# Patient Record
Sex: FEMALE | Race: WHITE | Employment: OTHER | URBAN - METROPOLITAN AREA
[De-identification: names, ages, dates, MRNs, and addresses within clinical notes are randomized per-mention and may not be internally consistent; named-entity substitution may affect disease eponyms.]

---

## 2017-01-17 ENCOUNTER — OFFICE VISIT (OUTPATIENT)
Dept: INTERNAL MEDICINE CLINIC | Age: 72
End: 2017-01-17

## 2017-01-17 VITALS
SYSTOLIC BLOOD PRESSURE: 136 MMHG | RESPIRATION RATE: 16 BRPM | HEART RATE: 72 BPM | HEIGHT: 63 IN | OXYGEN SATURATION: 96 % | TEMPERATURE: 98.2 F | DIASTOLIC BLOOD PRESSURE: 82 MMHG | BODY MASS INDEX: 24.06 KG/M2 | WEIGHT: 135.8 LBS

## 2017-01-17 DIAGNOSIS — L30.9 DERMATITIS: ICD-10-CM

## 2017-01-17 DIAGNOSIS — J40 BRONCHITIS: Primary | ICD-10-CM

## 2017-01-17 DIAGNOSIS — J45.20 MILD INTERMITTENT ASTHMA WITHOUT COMPLICATION: ICD-10-CM

## 2017-01-17 RX ORDER — DESONIDE 0.5 MG/G
CREAM TOPICAL 2 TIMES DAILY
Qty: 15 G | Refills: 0 | Status: SHIPPED | OUTPATIENT
Start: 2017-01-17 | End: 2017-11-29 | Stop reason: ALTCHOICE

## 2017-01-17 RX ORDER — LEVALBUTEROL TARTRATE 45 UG/1
1-2 AEROSOL, METERED ORAL
Qty: 1 INHALER | Refills: 2 | Status: SHIPPED | OUTPATIENT
Start: 2017-01-17

## 2017-01-17 RX ORDER — AZITHROMYCIN 250 MG/1
TABLET, FILM COATED ORAL
Qty: 6 TAB | Refills: 0 | Status: SHIPPED | OUTPATIENT
Start: 2017-01-17 | End: 2017-01-22

## 2017-01-17 NOTE — PROGRESS NOTES
HPI  Ms. Jelena Vázquez is a 70y.o. year old female, she is seen today for cold symptoms. Had thick sputum from chest last night, woke her last night. Started as nasal congestion, sneezing, runny nose about 7-8 days ago, then progressed to chest congestion. Has yellow sputum. Not sure if wheezing, coughs if takes a deep breath. Had sinus pressure, now resolved. Had 2-3 days of fever, Tm 100.9. Yesterday 99. Taking motrin, delsym. Mild nausea, no vomiting. No appetite. No body aches other than normal from exercising. Fell twice while walking and texting. Landed on right rib initially, had pain, then fell weeks later on left rib. Pain resolved now. Also red area on forehead on right about 2 weeks, not itchy, feels a little tender. No known injury. Chief Complaint   Patient presents with    Cold Symptoms     Room 1// sx's x 1 week// congestion, sneezing, productive cough// using Zycam nasal and mouth spray, Delsym cough, motrin    Fall     pt reports 2 falls. one in Nov and one in Dec. \"i wore a rib belt for the pain afterwards. \" Was not seen by UC.  Rash     Pt notes small area on forehead, tender, non-itchy        Prior to Admission medications    Medication Sig Start Date End Date Taking? Authorizing Provider   desonide (TRIDESILON) 0.05 % cream Apply  to affected area two (2) times a day. 1/17/17  Yes Kelly Tjeeda MD   levalbuterol tartrate Maday Clap) 45 mcg/actuation inhaler Take 1-2 Puffs by inhalation every four (4) hours as needed for Wheezing.  1/17/17  Yes Kelly Tejeda MD   azithromycin [facility]) 250 mg tablet use as directed 1/17/17 1/22/17 Yes Kelly Tejeda MD   amLODIPine (NORVASC) 5 mg tablet TAKE ONE (1) TABLET(S) DAILY 11/3/16  Yes Kelly Tejeda MD   oxybutynin chloride XL (DITROPAN XL) 10 mg CR tablet TAKE ONE (1) TABLET(S) DAILY 1/17/16  Yes Kelly Tejeda MD   buPROPion XL (WELLBUTRIN XL) 150 mg tablet TAKE ONE (1) TABLET(S) EVERY MORNING 1/17/16  Yes Dom Barnett MD   FLAXSEED PO Take  by mouth daily. Flaxseed meal   Yes Historical Provider   cetirizine (ZYRTEC) 10 mg tablet Take 10 mg by mouth daily. Yes Historical Provider   folic acid 687 mcg tablet Take 800 mcg by mouth daily. Yes Historical Provider   aspirin 81 mg chewable tablet Take 81 mg by mouth daily. Yes Historical Provider   cyanocobalamin (VITAMIN B-12) 1,000 mcg tablet Take 1,000 mcg by mouth daily. Yes Historical Provider   montelukast (SINGULAIR) 10 mg tablet Take 10 mg by mouth every seven (7) days. Takes on day when she gets allergy shots   Yes Historical Provider   Biotin 2,500 mcg cap Take 1 Cap by mouth daily. Yes Historical Provider   naphazoline-pheniramine (VISINE-A) 0.025-0.3 % ophthalmic solution Administer  to both eyes two (2) times daily as needed. Yes Historical Provider   ibuprofen 200 mg cap Take 200 mg by mouth as needed. Yes Historical Provider         Allergies   Allergen Reactions    Nuts [Tree Nut] Other (comments)     Mouth sores. Walnuts, pecans    Sulfa (Sulfonamide Antibiotics) Hives         REVIEW OF SYSTEMS:  Per HPI    PHYSICAL EXAM:  Visit Vitals    /82 (BP 1 Location: Right arm, BP Patient Position: Sitting)    Pulse 72    Temp 98.2 °F (36.8 °C) (Oral)    Resp 16    Ht 5' 3\" (1.6 m)    Wt 135 lb 12.8 oz (61.6 kg)    SpO2 96%    BMI 24.06 kg/m2     Constitutional: Appears well-developed and well-nourished. No distress. HENT:   Head: Normocephalic and atraumatic. Flat area right forehead near scalp approx 2cm which appears to be broken capillaries, slightly scaly  Eyes: No scleral icterus. Ears: tm's wnl   Nose: b/l nasal mucosa erythema with edema  Mouth: OP clear without lesions, no pharyngeal exudate  Neck: no lad, no tm, supple   Cardiovascular: Normal S1/S2, regular rhythm. No murmurs, rubs, or gallops. Pulmonary/Chest: Effort normal and breath sounds normal. No respiratory distress.  No wheezes, rhonchi, or rales. +coughing  Neurological: Alert. Psychiatric: Normal mood and affect. Behavior is normal.     Lab Results   Component Value Date/Time    Sodium 142 11/07/2016 10:45 AM    Potassium 5.2 11/07/2016 10:45 AM    Chloride 105 11/07/2016 10:45 AM    CO2 24 11/07/2016 10:45 AM    Anion gap 8 05/09/2013 04:10 AM    Glucose 94 11/07/2016 10:45 AM    BUN 12 11/07/2016 10:45 AM    Creatinine 0.77 11/07/2016 10:45 AM    BUN/Creatinine ratio 16 11/07/2016 10:45 AM    GFR est AA 90 11/07/2016 10:45 AM    GFR est non-AA 78 11/07/2016 10:45 AM    Calcium 9.3 11/07/2016 10:45 AM    Bilirubin, total 0.3 11/07/2016 10:45 AM    ALT 12 11/07/2016 10:45 AM    AST 22 11/07/2016 10:45 AM    Alk. phosphatase 64 11/07/2016 10:45 AM    Protein, total 6.5 11/07/2016 10:45 AM    Albumin 4.0 11/07/2016 10:45 AM    Globulin 3.1 05/08/2013 04:04 AM    A-G Ratio 1.6 11/07/2016 10:45 AM     Lab Results   Component Value Date/Time    Hemoglobin A1c 5.4 11/07/2016 10:45 AM    Hemoglobin A1c 5.7 05/05/2016 11:51 AM    Hemoglobin A1c 5.6 08/12/2014 11:21 AM      Lab Results   Component Value Date/Time    Cholesterol, total 227 11/07/2016 10:45 AM    HDL Cholesterol 71 11/07/2016 10:45 AM    LDL, calculated 143 11/07/2016 10:45 AM    VLDL, calculated 13 11/07/2016 10:45 AM    Triglyceride 66 11/07/2016 10:45 AM    CHOL/HDL Ratio 3.7 05/08/2013 04:04 AM          ASSESSMENT/PLAN  Fabi was seen today for cold symptoms, fall and rash. Diagnoses and all orders for this visit:    Bronchitis  -     levalbuterol tartrate (XOPENEX) 45 mcg/actuation inhaler; Take 1-2 Puffs by inhalation every four (4) hours as needed for Wheezing.  -     azithromycin (ZITHROMAX) 250 mg tablet; use as directed  Treat as above, do not feel steroids needed  Dermatitis  -     desonide (TRIDESILON) 0.05 % cream; Apply  to affected area two (2) times a day.   If not improved with above see derm  Mild intermittent asthma without complication          Health Maintenance Due   Topic Date Due    BREAST CANCER SCRN MAMMOGRAM  04/08/2017        Follow-up Disposition:  Return if symptoms worsen or fail to improve. Reviewed plan of care. Patient has provided input and agrees with goals. The nurse provided the patient and/or family with advanced directive information if needed and encouraged the patient to provide a copy to the office when available.

## 2017-01-17 NOTE — MR AVS SNAPSHOT
Visit Information Date & Time Provider Department Dept. Phone Encounter #  
 1/17/2017 10:00 AM Cole BlancoAlejandro [phone] [id number] Upcoming Health Maintenance Date Due  
 BREAST CANCER SCRN MAMMOGRAM 4/8/2017 MEDICARE YEARLY EXAM 5/13/2017 GLAUCOMA SCREENING Q2Y 9/28/2017 COLONOSCOPY 2/21/2020 DTaP/Tdap/Td series (2 - Td) 5/12/2026 Allergies as of 1/17/2017  Review Complete On: 1/17/2017 By: Cole Blanco MD  
  
 Severity Noted Reaction Type Reactions Nuts [Tree Nut]  10/13/2014    Other (comments) Mouth sores. Walnuts, pecans Sulfa (Sulfonamide Antibiotics)  05/31/2010    Hives Current Immunizations  Reviewed on 5/12/2016 Name Date Influenza High Dose Vaccine PF 10/13/2014 Influenza Vaccine 10/29/2015, 10/6/2013 Pneumococcal Conjugate (PCV-13) 5/12/2016  9:58 AM  
 Pneumococcal Vaccine (Unspecified Type) 1/1/2012 Zoster Vaccine, Live 5/21/2010 Not reviewed this visit You Were Diagnosed With   
  
 Codes Comments Bronchitis    -  Primary ICD-10-CM: A19 ICD-9-CM: 092 Dermatitis     ICD-10-CM: L30.9 ICD-9-CM: 692.9 Mild intermittent asthma without complication     NKD-78-PF: J45.20 ICD-9-CM: 493.90 Vitals BP Pulse Temp Resp Height(growth percentile) Weight(growth percentile) 136/82 (BP 1 Location: Right arm, BP Patient Position: Sitting) 72 98.2 °F (36.8 °C) (Oral) 16 5' 3\" (1.6 m) 135 lb 12.8 oz (61.6 kg) SpO2 BMI OB Status Smoking Status 96% 24.06 kg/m2 Postmenopausal Former Smoker Vitals History BMI and BSA Data Body Mass Index Body Surface Area 24.06 kg/m 2 1.65 m 2 Preferred Pharmacy Pharmacy Name Phone [facility] [phone] Your Updated Medication List  
  
   
This list is accurate as of: 1/17/17 10:40 AM.  Always use your most recent med list.  
 amLODIPine 5 mg tablet Commonly known as:  Alexandro Whatley TAKE ONE (1) TABLET(S) DAILY  
  
 aspirin 81 mg chewable tablet Take 81 mg by mouth daily. azithromycin 250 mg tablet Commonly known as:  ZITHROMAX  
use as directed Biotin 2,500 mcg Cap Take 1 Cap by mouth daily. buPROPion  mg tablet Commonly known as:  WELLBUTRIN XL  
TAKE ONE (1) TABLET(S) EVERY MORNING  
  
 desonide 0.05 % cream  
Commonly known as:  TRIDESILON Apply  to affected area two (2) times a day. FLAXSEED PO Take  by mouth daily. Flaxseed meal  
  
 folic acid 960 mcg tablet Take 800 mcg by mouth daily. ibuprofen 200 mg Cap Take 200 mg by mouth as needed. levalbuterol tartrate 45 mcg/actuation inhaler Commonly known as:  Eyvomahade Dykes Take 1-2 Puffs by inhalation every four (4) hours as needed for Wheezing.  
  
 montelukast 10 mg tablet Commonly known as:  SINGULAIR Take 10 mg by mouth every seven (7) days. Takes on day when she gets allergy shots  
  
 oxybutynin chloride XL 10 mg CR tablet Commonly known as:  DITROPAN XL  
TAKE ONE (1) TABLET(S) DAILY  
  
 VISINE-A 0.025-0.3 % ophthalmic solution Generic drug:  naphazoline-pheniramine Administer  to both eyes two (2) times daily as needed. VITAMIN B-12 1,000 mcg tablet Generic drug:  cyanocobalamin Take 1,000 mcg by mouth daily. ZyrTEC 10 mg tablet Generic drug:  cetirizine Take 10 mg by mouth daily. Prescriptions Printed Refills  
 azithromycin (ZITHROMAX) 250 mg tablet 0 Sig: use as directed Class: Print Prescriptions Sent to Pharmacy Refills  
 desonide (TRIDESILON) 0.05 % cream 0 Sig: Apply  to affected area two (2) times a day. Class: Normal  
 Pharmacy: [facility] [address] #: [phone]  Route: Topical  
 levalbuterol tartrate (XOPENEX) 45 mcg/actuation inhaler 2  
 Sig: Take 1-2 Puffs by inhalation every four (4) hours as needed for Wheezing. Class: Normal  
 Pharmacy: [facility] [place] [address], [address] [place] [address] #: [phone] Route: Inhalation Patient Instructions Bronchitis: Care Instructions Your Care Instructions Bronchitis is inflammation of the bronchial tubes, which carry air to the lungs. The tubes swell and produce mucus, or phlegm. The mucus and inflamed bronchial tubes make you cough. You may have trouble breathing. Most cases of bronchitis are caused by viruses like those that cause colds. Antibiotics usually do not help and they may be harmful. Bronchitis usually develops rapidly and lasts about 2 to 3 weeks in otherwise healthy people. Follow-up care is a key part of your treatment and safety. Be sure to make and go to all appointments, and call your doctor if you are having problems. It's also a good idea to know your test results and keep a list of the medicines you take. How can you care for yourself at home? · Take all medicines exactly as prescribed. Call your doctor if you think you are having a problem with your medicine. · Get some extra rest. 
· Take an over-the-counter pain medicine, such as acetaminophen (Tylenol), ibuprofen (Advil, Motrin), or naproxen (Aleve) to reduce fever and relieve body aches. Read and follow all instructions on the label. · Do not take two or more pain medicines at the same time unless the doctor told you to. Many pain medicines have acetaminophen, which is Tylenol. Too much acetaminophen (Tylenol) can be harmful. · Take an over-the-counter cough medicine that contains dextromethorphan to help quiet a dry, hacking cough so that you can sleep. Avoid cough medicines that have more than one active ingredient. Read and follow all instructions on the label.  
· Breathe moist air from a humidifier, hot shower, or sink filled with hot water. The heat and moisture will thin mucus so you can cough it out. · Do not smoke. Smoking can make bronchitis worse. If you need help quitting, talk to your doctor about stop-smoking programs and medicines. These can increase your chances of quitting for good. When should you call for help? Call 911 anytime you think you may need emergency care. For example, call if: 
· You have severe trouble breathing. Call your doctor now or seek immediate medical care if: 
· You have new or worse trouble breathing. · You cough up dark brown or bloody mucus (sputum). · You have a new or higher fever. · You have a new rash. Watch closely for changes in your health, and be sure to contact your doctor if: 
· You cough more deeply or more often, especially if you notice more mucus or a change in the color of your mucus. · You are not getting better as expected. Where can you learn more? Go to http://audra-christal.info/. Enter H333 in the search box to learn more about \"Bronchitis: Care Instructions. \" Current as of: May 23, 2016 Content Version: 11.1 © 6923-3597 [employer]. Care instructions adapted under license by [employer] (which disclaims liability or warranty for this information). If you have questions about a medical condition or this instruction, always ask your healthcare professional. [address] any warranty or liability for your use of this information. Please provide this summary of care documentation to your next provider. Your primary care clinician is listed as Ami Watson. If you have any questions after today's visit, please call [phone].

## 2017-01-17 NOTE — PROGRESS NOTES
Patient received paperwork for advance directive during previous visit but has not completed at this time     Reviewed record In preparation for visit and have obtained necessary documentation      1. Have you been to the ER, urgent care clinic since your last visit? Hospitalized since your last visit? NO    2. Have you seen or consulted any other health care providers outside of the [address] [place] [address] since your last visit? Include any pap smears or colon screening.  NO

## 2017-01-28 RX ORDER — OXYBUTYNIN CHLORIDE 10 MG/1
TABLET, EXTENDED RELEASE ORAL
Qty: 90 TAB | Refills: 4 | Status: SHIPPED | OUTPATIENT
Start: 2017-01-28 | End: 2017-11-29 | Stop reason: SDUPTHER

## 2017-02-16 RX ORDER — BUPROPION HYDROCHLORIDE 150 MG/1
TABLET ORAL
Qty: 90 TAB | Refills: 4 | Status: SHIPPED | OUTPATIENT
Start: 2017-02-16 | End: 2017-11-29 | Stop reason: SDUPTHER

## 2017-08-16 ENCOUNTER — OFFICE VISIT (OUTPATIENT)
Dept: INTERNAL MEDICINE CLINIC | Age: 72
End: 2017-08-16

## 2017-08-16 VITALS
DIASTOLIC BLOOD PRESSURE: 74 MMHG | RESPIRATION RATE: 16 BRPM | TEMPERATURE: 98.6 F | BODY MASS INDEX: 23.92 KG/M2 | HEART RATE: 68 BPM | HEIGHT: 63 IN | OXYGEN SATURATION: 97 % | SYSTOLIC BLOOD PRESSURE: 139 MMHG | WEIGHT: 135 LBS

## 2017-08-16 DIAGNOSIS — Z00.00 MEDICARE ANNUAL WELLNESS VISIT, SUBSEQUENT: Primary | ICD-10-CM

## 2017-08-16 DIAGNOSIS — Z13.39 SCREENING FOR ALCOHOLISM: ICD-10-CM

## 2017-08-16 DIAGNOSIS — Z71.89 ADVANCE CARE PLANNING: ICD-10-CM

## 2017-08-16 DIAGNOSIS — Z12.31 ENCOUNTER FOR SCREENING MAMMOGRAM FOR MALIGNANT NEOPLASM OF BREAST: ICD-10-CM

## 2017-08-16 DIAGNOSIS — L84 CALLUS OF FOOT: ICD-10-CM

## 2017-08-16 DIAGNOSIS — Z13.31 SCREENING FOR DEPRESSION: ICD-10-CM

## 2017-08-16 NOTE — MR AVS SNAPSHOT
Visit Information Date & Time Provider Department Dept. Phone Encounter #  
 8/16/2017  9:10 AM Ena Palomino [phone] [id number] Follow-up Instructions Return if symptoms worsen or fail to improve, for Scheduled appointment with Dr. Lorena Turk on 11/29/17. Your Appointments 11/21/2017  9:30 AM  
LAB with LAB [facility] Ena Ca[facility]) Appt Note: fasting labs [address] [address] [phone]  
  
   
 [address] [place] [address]  
  
    
 11/29/2017  1:00 PM  
[facility] Appt Note: bp f/u  
 [address] [address] [phone]  
  
   
 [address] [facility] [address] Upcoming Health Maintenance Date Due  
 BREAST CANCER SCRN MAMMOGRAM 4/8/2017 INFLUENZA AGE 9 TO ADULT 8/1/2017 GLAUCOMA SCREENING Q2Y 9/28/2017 MEDICARE YEARLY EXAM 8/17/2018 COLONOSCOPY 2/21/2020 DTaP/Tdap/Td series (2 - Td) 5/12/2026 Allergies as of 8/16/2017  Review Complete On: 8/16/2017 By: Debbie Conner MD  
  
 Severity Noted Reaction Type Reactions Nuts [Tree Nut]  10/13/2014    Other (comments) Mouth sores. Walnuts, pecans Sulfa (Sulfonamide Antibiotics)  05/31/2010    Hives Current Immunizations  Reviewed on 5/12/2016 Name Date Influenza High Dose Vaccine PF 10/13/2014 Influenza Vaccine 10/29/2015, 10/6/2013 Pneumococcal Conjugate (PCV-13) 5/12/2016  9:58 AM  
 Pneumococcal Vaccine (Unspecified Type) 1/1/2012 Zoster Vaccine, Live 5/21/2010 Not reviewed this visit You Were Diagnosed With   
  
 Codes Comments Medicare annual wellness visit, subsequent    -  Primary ICD-10-CM: Z00.00 ICD-9-CM: V70.0 Callus of foot     ICD-10-CM: L84 
ICD-9-CM: 734 Encounter for screening mammogram for malignant neoplasm of breast     ICD-10-CM: Z12.31 
ICD-9-CM: V76.12 Screening for depression     ICD-10-CM: Z13.89 ICD-9-CM: V79.0 Screening for alcoholism     ICD-10-CM: Z13.89 ICD-9-CM: V79.1 Advance care planning     ICD-10-CM: Z71.89 ICD-9-CM: V65.49 Vitals BP Pulse Temp Resp Height(growth percentile) Weight(growth percentile) 139/74 68 98.6 °F (37 °C) (Oral) 16 5' 3\" (1.6 m) 135 lb (61.2 kg) SpO2 BMI OB Status Smoking Status 97% 23.91 kg/m2 Postmenopausal Former Smoker Vitals History BMI and BSA Data Body Mass Index Body Surface Area  
 23.91 kg/m 2 1.65 m 2 Preferred Pharmacy Pharmacy Name Phone [facility] [address] [facility], [address]  [phone] Your Updated Medication List  
  
   
This list is accurate as of: 8/16/17  9:55 AM.  Always use your most recent med list. amLODIPine 5 mg tablet Commonly known as:  [place] Letcher TAKE ONE (1) TABLET(S) DAILY  
  
 aspirin 81 mg chewable tablet Take 81 mg by mouth daily. Biotin 2,500 mcg Cap Take 1 Cap by mouth daily. buPROPion  mg tablet Commonly known as:  WELLBUTRIN XL  
TAKE ONE TABLET EVERY MORNING. desonide 0.05 % cream  
Commonly known as:  García Cousin Apply  to affected area two (2) times a day. FLAXSEED PO Take  by mouth daily. Flaxseed meal  
  
 folic acid 292 mcg tablet Take 800 mcg by mouth daily. ibuprofen 200 mg Cap Take 200 mg by mouth as needed. levalbuterol tartrate 45 mcg/actuation inhaler Commonly known as:  Emerson Bain Take 1-2 Puffs by inhalation every four (4) hours as needed for Wheezing.  
  
 montelukast 10 mg tablet Commonly known as:  SINGULAIR Take 10 mg by mouth every seven (7) days. Takes on day when she gets allergy shots  
  
 oxybutynin chloride XL 10 mg CR tablet Commonly known as:  DITROPAN XL  
 TAKE ONE TABLET DAILY. VISINE-A 0.025-0.3 % ophthalmic solution Generic drug:  naphazoline-pheniramine Administer  to both eyes two (2) times daily as needed. VITAMIN B-12 1,000 mcg tablet Generic drug:  cyanocobalamin Take 1,000 mcg by mouth daily. ZyrTEC 10 mg tablet Generic drug:  cetirizine Take 10 mg by mouth daily. Follow-up Instructions Return if symptoms worsen or fail to improve, for Scheduled appointment with Dr. David Lazaro on 11/29/17. To-Do List   
 08/17/2017 Imaging:  [facility] MAMMO BI SCREENING INCL CAD Patient Instructions Schedule of Personalized Health Plan The best way to stay healthy is to live a healthy lifestyle. A healthy lifestyle includes regular exercise, eating a well-balanced diet, keeping a healthy weight and not smoking. Regular physical exams and screening tests are another important way to take care of yourself. Preventive exams provided by health care providers can find health problems early when treatment works best and can keep you from getting certain diseases or illnesses. Preventive services include exams, lab tests, screenings, shots, monitoring and information to help you take care of your own health. All people over 65 should have a pneumonia shot. Pneumonia shots are usually only needed once in a lifetime unless your doctor decides differently. All people over 65 should have a yearly flu shot. People over 65 are at medium to high risk for Hepatitis B. Three shots are needed for complete protection. In addition to your physical exam, some screening tests are recommended: 
 
Bone mass measurement (dexa scan) is recommended every two years Diabetes Mellitus screening is recommended every year. Glaucoma is an eye disease caused by high pressure in the eye. An eye exam is recommended every year.   
 
Cardiovascular screening tests that check your cholesterol and other blood fat (lipid) levels are recommended every five years. Colorectal Cancer screening tests help to find pre-cancerous polyps (growths in the colon) so they can be removed before they turn into cancer. Tests ordered for screening depend on your personal and family history risk factors. Screening for Breast Cancer is recommended yearly with a mammogram. 
 
Screening for Cervical Cancer is recommended every two years (annually for certain risk factors, such as previous history of STD or abnormal PAP in past 7 years), with a Pelvic Exam with PAP Here is a list of your current Health Maintenance items with a due date: 
Health Maintenance Topic Date Due  
 BREAST CANCER SCRN MAMMOGRAM  04/08/2017  INFLUENZA AGE 9 TO ADULT  08/01/2017  GLAUCOMA SCREENING Q2Y  09/28/2017  MEDICARE YEARLY EXAM  08/17/2018  COLONOSCOPY  02/21/2020  
 DTaP/Tdap/Td series (2 - Td) 05/12/2026  Hepatitis C Screening  Completed  OSTEOPOROSIS SCREENING (DEXA)  Completed  ZOSTER VACCINE AGE 60>  Completed  Pneumococcal 65+ Low/Medium Risk  Completed Corns and Calluses: Care Instructions Your Care Instructions Corns and calluses are areas of thick, hard, dead skin. They form to protect your skin from injury. Corns usually form where toes rub together. Calluses often form on the hands or feet. They may form wherever the skin rubs against something, such as shoes. In most cases, you can take steps at home to care for a corn or callus. Follow-up care is a key part of your treatment and safety. Be sure to make and go to all appointments, and call your doctor if you are having problems. It's also a good idea to know your test results and keep a list of the medicines you take. How can you care for yourself at home? · Wear shoes and other footwear that fit correctly and are roomy.  This will reduce rubbing and give corns or calluses time to heal. 
 · Use protective pads, such as moleskin, to cushion the callus or corn. · Soften the corn or callus and remove the dead skin by using over-the-counter products such as salicylic acid. If you have a condition that causes problems with blood flow (such as coronary artery disease) or loss of feeling in your feet (such as diabetes), talk to your doctor before you try any home treatment. · Soak your corn or callus in warm water, and then use a pumice stone to rub dead skin away. · Wash your feet regularly, and rub lotion into your feet while they are still moist. Dry skin can cause a callus to crack and bleed. · Never cut the corn or callus yourself, especially if you have problems with blood flow to your legs or feet. When should you call for help? Call your doctor now or seek immediate medical care if: 
· You have signs of infection, such as: 
¨ Increased pain, swelling, warmth, or redness around the corn or callus. ¨ Red streaks leading from the corn or callus. ¨ Pus draining from the corn or callus. ¨ A fever. Watch closely for changes in your health, and be sure to contact your doctor if: 
· You do not get better as expected. Where can you learn more? Go to http://audra-christal.info/. Enter R244 in the search box to learn more about \"Corns and Calluses: Care Instructions. \" Current as of: October 13, 2016 Content Version: 11.3 © 5101-0560 [employer] Software. Care instructions adapted under license by [employer] (which disclaims liability or warranty for this information). If you have questions about a medical condition or this instruction, always ask your healthcare professional. [address] any warranty or liability for your use of this information. Introducing [facility]! Dear Clyde Pozo: Thank you for requesting a [employer] account.   Our records indicate that you have previously registered for a [employer] account but its currently inactive. Please call our Compass[employer] support line at [phone]. Additional Information If you have questions, please visit the Frequently Asked Questions section of the Compass[employer] website at [email or web address] Remember, CompassMD is NOT to be used for urgent needs. For medical emergencies, dial 911. Now available from your iPhone and Android! Please provide this summary of care documentation to your next provider. Your primary care clinician is listed as Ami Watson. If you have any questions after today's visit, please call [phone].

## 2017-08-16 NOTE — ACP (ADVANCE CARE PLANNING)
Advance Care Planning (ACP) Provider Conversation Snapshot    Date of ACP Conversation: 08/16/17  Persons included in Conversation:  patient  Length of ACP Conversation in minutes:  <16 minutes (Non-Billable)    Authorized Decision Maker (if patient is incapable of making informed decisions): This person is:   Healthcare Agent/Medical Power of  under Advance Directive          For Patients with Decision Making Capacity:   Values/Goals: Exploration of values, goals, and preferences if recovery is not expected, even with continued medical treatment in the event of:  Imminent death    Conversation Outcomes / Follow-Up Plan:   Recommended completion of Advance Directive form after review of ACP materials and conversation with prospective healthcare agent . States she will return completed form at her next clinic visit.

## 2017-08-16 NOTE — PATIENT INSTRUCTIONS
Schedule of Personalized Health Plan    The best way to stay healthy is to live a healthy lifestyle. A healthy lifestyle includes regular exercise, eating a well-balanced diet, keeping a healthy weight and not smoking. Regular physical exams and screening tests are another important way to take care of yourself. Preventive exams provided by health care providers can find health problems early when treatment works best and can keep you from getting certain diseases or illnesses. Preventive services include exams, lab tests, screenings, shots, monitoring and information to help you take care of your own health. All people over 65 should have a pneumonia shot. Pneumonia shots are usually only needed once in a lifetime unless your doctor decides differently. All people over 65 should have a yearly flu shot. People over 65 are at medium to high risk for Hepatitis B. Three shots are needed for complete protection. In addition to your physical exam, some screening tests are recommended:    Bone mass measurement (dexa scan) is recommended every two years  Diabetes Mellitus screening is recommended every year. Glaucoma is an eye disease caused by high pressure in the eye. An eye exam is recommended every year. Cardiovascular screening tests that check your cholesterol and other blood fat (lipid) levels are recommended every five years. Colorectal Cancer screening tests help to find pre-cancerous polyps (growths in the colon) so they can be removed before they turn into cancer. Tests ordered for screening depend on your personal and family history risk factors.     Screening for Breast Cancer is recommended yearly with a mammogram.    Screening for Cervical Cancer is recommended every two years (annually for certain risk factors, such as previous history of STD or abnormal PAP in past 7 years), with a Pelvic Exam with PAP    Here is a list of your current Health Maintenance items with a due date:  Health Maintenance   Topic Date Due    BREAST CANCER SCRN MAMMOGRAM  04/08/2017    INFLUENZA AGE 9 TO ADULT  08/01/2017    GLAUCOMA SCREENING Q2Y  09/28/2017    MEDICARE YEARLY EXAM  08/17/2018    COLONOSCOPY  02/21/2020    DTaP/Tdap/Td series (2 - Td) 05/12/2026    Hepatitis C Screening  Completed    OSTEOPOROSIS SCREENING (DEXA)  Completed    ZOSTER VACCINE AGE 60>  Completed    Pneumococcal 65+ Low/Medium Risk  Completed                Corns and Calluses: Care Instructions  Your Care Instructions  Corns and calluses are areas of thick, hard, dead skin. They form to protect your skin from injury. Corns usually form where toes rub together. Calluses often form on the hands or feet. They may form wherever the skin rubs against something, such as shoes. In most cases, you can take steps at home to care for a corn or callus. Follow-up care is a key part of your treatment and safety. Be sure to make and go to all appointments, and call your doctor if you are having problems. It's also a good idea to know your test results and keep a list of the medicines you take. How can you care for yourself at home? · Wear shoes and other footwear that fit correctly and are roomy. This will reduce rubbing and give corns or calluses time to heal.  · Use protective pads, such as moleskin, to cushion the callus or corn. · Soften the corn or callus and remove the dead skin by using over-the-counter products such as salicylic acid. If you have a condition that causes problems with blood flow (such as coronary artery disease) or loss of feeling in your feet (such as diabetes), talk to your doctor before you try any home treatment. · Soak your corn or callus in warm water, and then use a pumice stone to rub dead skin away. · Wash your feet regularly, and rub lotion into your feet while they are still moist. Dry skin can cause a callus to crack and bleed.   · Never cut the corn or callus yourself, especially if you have problems with blood flow to your legs or feet. When should you call for help? Call your doctor now or seek immediate medical care if:  · You have signs of infection, such as:  ¨ Increased pain, swelling, warmth, or redness around the corn or callus. ¨ Red streaks leading from the corn or callus. ¨ Pus draining from the corn or callus. ¨ A fever. Watch closely for changes in your health, and be sure to contact your doctor if:  · You do not get better as expected. Where can you learn more? Go to http://audra-christal.info/. Enter R244 in the search box to learn more about \"Corns and Calluses: Care Instructions. \"  Current as of: October 13, 2016  Content Version: 11.3  © 7472-2909 [employer]. Care instructions adapted under license by [employer] (which disclaims liability or warranty for this information). If you have questions about a medical condition or this instruction, always ask your healthcare professional. [place] [address] any warranty or liability for your use of this information.

## 2017-08-16 NOTE — PROGRESS NOTES
Reviewed record  In preparation for visit and have obtained necessary documentation. 1. Have you been to the ER, urgent care clinic since your last visit? Hospitalized since your last visit?no  2. Have you seen or consulted any other health care providers outside of the [facility]"" since your last visit? Include any pap smears or colon screening. Saw derm for alopecia Dr Glee [facility]  Advanced directives: has advanced directives and is asked to bring in a copy  Patients vital signs discussed with physician.

## 2017-08-16 NOTE — PROGRESS NOTES
This is a Subsequent Medicare Annual Wellness Visit providing Personalized Prevention Plan Services (PPPS) (Performed 12 months after initial AWV and PPPS )    I have reviewed the patient's medical history in detail and updated the computerized patient record. History   Panchito Hennessy is a 70 y.o. female. Presents for evaluation of bilateral foot pain and for Medicare Annual Wellness Visit. She complains of 1 month history of cracked calluses at soles of both feet. Painful when the cracks occur. Started after wearing textured Croc shoes. Has been using Ceravue cream (ceramide cream and petrolatum jelly) once daily. Trying to get in to see her podiatrist, Dr. Ronak Reddy. Reports she saw her dermatologist, Dr. Andi Nino, a few months ago due to alopecia. Was prescribed creams, shampoos, and Rogaine that have been helping. Soc Hx  Drinks 2-3 alcoholic drinks a night (has [facility]). Health Maintenance  Flu vaccine: 10/11/16    Pneumonia vaccine: PPSV-23 2012, PCV-13 5/12/16      Tetanus vaccine: not indicated     Zoster vaccine: 5/21/10  Colonoscopy: 2/21/15  Mammogram: 4/8/15; due for this  Bone density: 1/6/11  Eye exam: 9/16  Lipids: 11/7/16 (tot chol 227, )  A1c: 11/7/16 (5.4%)  Advanced Directives: discussed  End of Life: discussed       Past Medical History:   Diagnosis Date    Alopecia     Asthma     H/O seasonal allergies     H/O TB skin testing 1968    treated with INH    Hypertension     IFG (impaired fasting glucose) 6/14/2013    a1c 6.0 5/2013    Ill-defined condition     TIA symptoms, saw neuro-opthalmologist and was told it was probably spasm of cranial nerve.  Overactive bladder 3/13/2013    Serrated adenoma of colon 2/6/2014 11/17/09 - Dr. Gege Olvera - repeat in 5 years      Past Surgical History:   Procedure Laterality Date    COLORECTAL SCRN; HI RISK IND  2/20/2015         HX CATARACT REMOVAL Bilateral     HX GI      colonscopy.  polyp removed     Current Outpatient Prescriptions   Medication Sig Dispense Refill    buPROPion XL (WELLBUTRIN XL) 150 mg tablet TAKE ONE TABLET EVERY MORNING. 90 Tab 4    oxybutynin chloride XL (DITROPAN XL) 10 mg CR tablet TAKE ONE TABLET DAILY. 90 Tab 4    desonide (TRIDESILON) 0.05 % cream Apply  to affected area two (2) times a day. 15 g 0    levalbuterol tartrate (XOPENEX) 45 mcg/actuation inhaler Take 1-2 Puffs by inhalation every four (4) hours as needed for Wheezing. 1 Inhaler 2    amLODIPine (NORVASC) 5 mg tablet TAKE ONE (1) TABLET(S) DAILY 90 Tab 4    FLAXSEED PO Take  by mouth daily. Flaxseed meal      cetirizine (ZYRTEC) 10 mg tablet Take 10 mg by mouth daily.  folic acid 833 mcg tablet Take 800 mcg by mouth daily.  aspirin 81 mg chewable tablet Take 81 mg by mouth daily.  cyanocobalamin (VITAMIN B-12) 1,000 mcg tablet Take 1,000 mcg by mouth daily.  montelukast (SINGULAIR) 10 mg tablet Take 10 mg by mouth every seven (7) days. Takes on day when she gets allergy shots      Biotin 2,500 mcg cap Take 1 Cap by mouth daily.  naphazoline-pheniramine (VISINE-A) 0.025-0.3 % ophthalmic solution Administer  to both eyes two (2) times daily as needed.  ibuprofen 200 mg cap Take 200 mg by mouth as needed. Allergies   Allergen Reactions    Nuts [Tree Nut] Other (comments)     Mouth sores.      Walnuts, pecans    Sulfa (Sulfonamide Antibiotics) Hives     Family History   Problem Relation Age of Onset    Hypertension Mother     Stroke Mother     Cancer Father      bladder    Other Father      congential heart valve defect    Depression Sister     Stroke Sister 72    Hypertension Sister     Psychiatric Disorder Sister      depression    COPD Sister     Heart Disease Maternal Grandfather     Dementia Paternal Grandfather      Social History   Substance Use Topics    Smoking status: Former Smoker     Packs/day: 0.25     Years: 8.00     Quit date: 2/19/1995    Smokeless tobacco: Never Used Comment: quit in 1991    Alcohol use 10.5 oz/week     21 Glasses of wine per week      Comment: 2-3 etoh drinks daily - burboun - no withdrawl symptoms when she does not have it     Patient Active Problem List   Diagnosis Code    HTN (hypertension) I10    Asthma J45.909    ADD (attention deficit disorder) F98.8    Overactive bladder N32.81    Environmental allergies Z91.09    S/P colonoscopy Z98.890    At risk for osteoporosis Z91.89    Carotid stenosis, bilateral I65.23    Vertical diplopia H53.2    IFG (impaired fasting glucose) R73.01    Serrated adenoma of colon D12.6    Alopecia areata L63.9    Advance directive discussed with patient Z71.89       Depression Risk Factor Screening:     PHQ over the last two weeks 8/16/2017   Little interest or pleasure in doing things Not at all   Feeling down, depressed or hopeless Not at all   Total Score PHQ 2 0     Alcohol Risk Factor Screening: On any occasion during the past 3 months, have you had more than 3 drinks containing alcohol? No    Do you average more than 7 drinks per week? Yes        Functional Ability and Level of Safety:     Hearing Loss   normal-to-mild    Activities of Daily Living   Self-care. Requires assistance with: no ADLs    Fall Risk   Fall Risk Assessment, last 12 mths 8/16/2017   Able to walk? Yes   Fall in past 12 months? No   Fall with injury? -   Number of falls in past 12 months -   Fall Risk Score -     Abuse Screen   Patient is not abused    Review of Systems   Pertinent items are noted in HPI. Physical Examination     Evaluation of Cognitive Function:  Mood/affect:  neutral  Appearance: age appropriate  Family member/caregiver input: none    Visit Vitals    /74    Pulse 68    Temp 98.6 °F (37 °C) (Oral)    Resp 16    Ht 5' 3\" (1.6 m)    Wt 135 lb (61.2 kg)    SpO2 97%    BMI 23.91 kg/m2       General: Well-developed and well-nourished, no distress.   HEENT:  Head normocephalic/atraumatic, no scleral icterus  Lungs:  Clear to ausculation bilaterally. Good air movement. Heart:  Regular rate and rhythm, normal S1 and S2, no murmur, gallop, or rub  Extremities: No clubbing, cyanosis, or edema. On medial portions of plantar surfaces of both feet are shallow calluses with one linear crack at each foot with adjacent peeling skin. No erythema, warmth, tenderness, or discharge. Neurological: Alert and oriented. Psychiatric: Normal mood and affect. Behavior is normal.     Patient Care Team:  Chuy Dejesus MD as PCP - General (Internal Medicine)        Advice/Referrals/Counseling   Education and counseling provided:  Are appropriate based on today's review and evaluation  End-of-Life planning (with patient's consent)  Screening Mammography    Assessment/Plan       ICD-10-CM ICD-9-CM    1. Medicare annual wellness visit, subsequent Z00.00 V70.0    2. Callus of foot L84 700    3. Encounter for screening mammogram for malignant neoplasm of breast Z12.31 V76.12 [facility] MAMMO BI SCREENING INCL CAD   4. Screening for depression Z13.89 V79.0    5. Screening for alcoholism Z13.89 V79.1    6. Advance care planning Z71.89 V65.49        Diagnoses and all orders for this visit:    1. Medicare annual wellness visit, subsequent    2. Callus of foot  Calluses at soles of both feet. Recommended avoiding wearing shoes without socks, increasing Ceravue cream use to twice a day, and soaking feet in warm water with Epsom salt daily to soften calluses. 3. Encounter for screening mammogram for malignant neoplasm of breast  -     [facility] MAMMO BI SCREENING INCL CAD; Future    4. Screening for depression    5. Screening for alcoholism    6. Advance care planning      Follow-up Disposition:  Return if symptoms worsen or fail to improve, for Scheduled appointment with Dr. Kyle Alonso on 11/29/17. Patient seen and had Medicare Annual Wellness Exam; Wellness Schedule printed, reviewed, and given to patient.

## 2017-08-24 ENCOUNTER — HOSPITAL ENCOUNTER (OUTPATIENT)
Dept: MAMMOGRAPHY | Age: 72
Discharge: HOME | End: 2017-08-24
Attending: INTERNAL MEDICINE
Payer: MEDICARE

## 2017-08-24 DIAGNOSIS — Z12.31 ENCOUNTER FOR SCREENING MAMMOGRAM FOR MALIGNANT NEOPLASM OF BREAST: ICD-10-CM

## 2017-08-24 PROCEDURE — 77067 SCR MAMMO BI INCL CAD: CPT

## 2017-09-18 RX ORDER — AMLODIPINE BESYLATE 5 MG/1
TABLET ORAL
Qty: 90 TAB | Refills: 4 | Status: SHIPPED | OUTPATIENT
Start: 2017-09-18 | End: 2017-11-29 | Stop reason: SDUPTHER

## 2017-09-18 NOTE — TELEPHONE ENCOUNTER
Refill   Requested Prescriptions     Pending Prescriptions Disp Refills    amLODIPine (NORVASC) 5 mg tablet 90 Tab 4     Patient will be using [facility]/ [place] not [facility] [employer]

## 2017-11-20 ENCOUNTER — HOSPITAL ENCOUNTER (OUTPATIENT)
Dept: LAB | Age: 72
Discharge: HOME | End: 2017-11-20
Payer: MEDICARE

## 2017-11-20 ENCOUNTER — LAB ONLY (OUTPATIENT)
Dept: INTERNAL MEDICINE CLINIC | Age: 72
End: 2017-11-20

## 2017-11-20 DIAGNOSIS — R73.01 IFG (IMPAIRED FASTING GLUCOSE): Primary | ICD-10-CM

## 2017-11-20 DIAGNOSIS — Z13.29 SCREENING FOR THYROID DISORDER: ICD-10-CM

## 2017-11-20 DIAGNOSIS — Z13.220 LIPID SCREENING: ICD-10-CM

## 2017-11-20 DIAGNOSIS — I10 ESSENTIAL HYPERTENSION: ICD-10-CM

## 2017-11-20 PROCEDURE — 84443 ASSAY THYROID STIM HORMONE: CPT

## 2017-11-20 PROCEDURE — 80061 LIPID PANEL: CPT

## 2017-11-20 PROCEDURE — 36415 COLL VENOUS BLD VENIPUNCTURE: CPT

## 2017-11-20 PROCEDURE — 83036 HEMOGLOBIN GLYCOSYLATED A1C: CPT

## 2017-11-20 PROCEDURE — 80053 COMPREHEN METABOLIC PANEL: CPT

## 2017-11-21 LAB
ALBUMIN SERPL-MCNC: 4.3 G/DL (ref 3.5–4.8)
ALBUMIN/GLOB SERPL: 1.7 {RATIO} (ref 1.2–2.2)
ALP SERPL-CCNC: 57 IU/L (ref 39–117)
ALT SERPL-CCNC: 13 IU/L (ref 0–32)
AST SERPL-CCNC: 19 IU/L (ref 0–40)
BILIRUB SERPL-MCNC: 0.5 MG/DL (ref 0–1.2)
BUN SERPL-MCNC: 21 MG/DL (ref 8–27)
BUN/CREAT SERPL: 25 (ref 12–28)
CALCIUM SERPL-MCNC: 9.5 MG/DL (ref 8.7–10.3)
CHLORIDE SERPL-SCNC: 102 MMOL/L (ref 96–106)
CHOLEST SERPL-MCNC: 226 MG/DL (ref 100–199)
CO2 SERPL-SCNC: 24 MMOL/L (ref 18–29)
CREAT SERPL-MCNC: 0.84 MG/DL (ref 0.57–1)
EST. AVERAGE GLUCOSE BLD GHB EST-MCNC: 105 MG/DL
GFR SERPLBLD CREATININE-BSD FMLA CKD-EPI: 70 ML/MIN/1.73
GFR SERPLBLD CREATININE-BSD FMLA CKD-EPI: 80 ML/MIN/1.73
GLOBULIN SER CALC-MCNC: 2.6 G/DL (ref 1.5–4.5)
GLUCOSE SERPL-MCNC: 103 MG/DL (ref 65–99)
HBA1C MFR BLD: 5.3 % (ref 4.8–5.6)
HDLC SERPL-MCNC: 83 MG/DL
INTERPRETATION, 910389: NORMAL
LDLC SERPL CALC-MCNC: 127 MG/DL (ref 0–99)
POTASSIUM SERPL-SCNC: 4.9 MMOL/L (ref 3.5–5.2)
PROT SERPL-MCNC: 6.9 G/DL (ref 6–8.5)
SODIUM SERPL-SCNC: 140 MMOL/L (ref 134–144)
TRIGL SERPL-MCNC: 80 MG/DL (ref 0–149)
TSH SERPL DL<=0.005 MIU/L-ACNC: 1.36 UIU/ML (ref 0.45–4.5)
VLDLC SERPL CALC-MCNC: 16 MG/DL (ref 5–40)

## 2017-11-29 ENCOUNTER — OFFICE VISIT (OUTPATIENT)
Dept: INTERNAL MEDICINE CLINIC | Age: 72
End: 2017-11-29

## 2017-11-29 VITALS
BODY MASS INDEX: 24.24 KG/M2 | DIASTOLIC BLOOD PRESSURE: 81 MMHG | TEMPERATURE: 98 F | SYSTOLIC BLOOD PRESSURE: 125 MMHG | WEIGHT: 136.8 LBS | HEART RATE: 82 BPM | OXYGEN SATURATION: 95 % | RESPIRATION RATE: 14 BRPM | HEIGHT: 63 IN

## 2017-11-29 DIAGNOSIS — J45.40 MODERATE PERSISTENT ASTHMA WITHOUT COMPLICATION: Primary | ICD-10-CM

## 2017-11-29 DIAGNOSIS — I10 ESSENTIAL HYPERTENSION: ICD-10-CM

## 2017-11-29 DIAGNOSIS — F98.8 ATTENTION DEFICIT DISORDER (ADD) WITHOUT HYPERACTIVITY: ICD-10-CM

## 2017-11-29 DIAGNOSIS — N32.81 OVERACTIVE BLADDER: ICD-10-CM

## 2017-11-29 DIAGNOSIS — R73.01 IFG (IMPAIRED FASTING GLUCOSE): ICD-10-CM

## 2017-11-29 RX ORDER — BUPROPION HYDROCHLORIDE 150 MG/1
TABLET ORAL
Qty: 90 TAB | Refills: 4 | Status: SHIPPED | OUTPATIENT
Start: 2017-11-29 | End: 2018-02-20

## 2017-11-29 RX ORDER — AMLODIPINE BESYLATE 5 MG/1
TABLET ORAL
Qty: 90 TAB | Refills: 4 | Status: SHIPPED | OUTPATIENT
Start: 2017-11-29 | End: 2018-03-22 | Stop reason: SDUPTHER

## 2017-11-29 RX ORDER — IPRATROPIUM BROMIDE 42 UG/1
SPRAY, METERED NASAL
COMMUNITY
Start: 2017-11-09 | End: 2018-08-16

## 2017-11-29 RX ORDER — OXYBUTYNIN CHLORIDE 10 MG/1
TABLET, EXTENDED RELEASE ORAL
Qty: 90 TAB | Refills: 4 | Status: SHIPPED | OUTPATIENT
Start: 2017-11-29 | End: 2018-12-21 | Stop reason: SDUPTHER

## 2017-11-29 NOTE — PROGRESS NOTES
Fabi Baker  Identified pt with two pt identifiers(name and ). Chief Complaint   Patient presents with    Blood Pressure Check     Room 1// fasting // eye exam up to date w/ [place] Silva    Medication Evaluation     needs printed scripts today        1. Have you been to the ER, urgent care clinic since your last visit? Hospitalized since your last visit? NO    2. Have you seen or consulted any other health care providers outside of the [address] [place] [address] since your last visit? Include any pap smears or colon screening.  NO      Dr Cheyenne Canada notified of reason for visit and vitals    Patient received paperwork for advance directive during previous visit but has not completed at this time     Reviewed record In preparation for visit, huddled with provider and have obtained necessary documentation      Health Maintenance Due   Topic    GLAUCOMA SCREENING Q2Y        Wt Readings from Last 3 Encounters:   17 136 lb 12.8 oz (62.1 kg)   17 135 lb (61.2 kg)   17 135 lb 12.8 oz (61.6 kg)     Temp Readings from Last 3 Encounters:   17 98.6 °F (37 °C) (Oral)   17 98.2 °F (36.8 °C) (Oral)   16 98 °F (36.7 °C) (Oral)     BP Readings from Last 3 Encounters:   17 139/74   17 136/82   16 120/60     Pulse Readings from Last 3 Encounters:   17 68   17 72   16 74         Learning Assessment:  :     Learning Assessment 3/20/2015   PRIMARY LEARNER Patient   HIGHEST LEVEL OF EDUCATION - PRIMARY LEARNER  4 YEARS OF COLLEGE   BARRIERS PRIMARY LEARNER NONE   PRIMARY LANGUAGE ENGLISH    NEED No   LEARNER PREFERENCE PRIMARY READING   ANSWERED BY patient   RELATIONSHIP SELF       Depression Screening:  :     PHQ over the last two weeks 2017   Little interest or pleasure in doing things Not at all   Feeling down, depressed or hopeless Not at all   Total Score PHQ 2 0       Fall Risk Assessment:  :     Fall Risk Assessment, last 12 mths 8/16/2017   Able to walk? Yes   Fall in past 12 months? No   Fall with injury? -   Number of falls in past 12 months -   Fall Risk Score -       Abuse Screening:  :     Abuse Screening Questionnaire 11/14/2016 3/20/2015   Do you ever feel afraid of your partner? N N   Are you in a relationship with someone who physically or mentally threatens you? N N   Is it safe for you to go home? Y Y        I have received verbal consent from Julius Gauthier to discuss any/all medical information while they are present in the room. Medication reconciliation up to date and corrected with patient at this time.

## 2017-11-29 NOTE — MR AVS SNAPSHOT
Visit Information Date & Time Provider Department Dept. Phone Encounter #  
 11/29/2017  1:00 PM Laquita Sorenson MD N[facility] Zarate [phone] [id number] Follow-up Instructions Return in about 6 months (around 5/29/2018) for bp, chol, thyroid, ifg, labs prior. Routing History Upcoming Health Maintenance Date Due  
 GLAUCOMA SCREENING Q2Y 9/28/2017 MEDICARE YEARLY EXAM 8/17/2018 BREAST CANCER SCRN MAMMOGRAM 8/24/2019 COLONOSCOPY 2/21/2020 DTaP/Tdap/Td series (2 - Td) 5/12/2026 Allergies as of 11/29/2017  Review Complete On: 11/29/2017 By: Laquita Sorenson MD  
  
 Severity Noted Reaction Type Reactions Nuts [Tree Nut]  10/13/2014    Other (comments) Mouth sores. Walnuts, pecans Sulfa (Sulfonamide Antibiotics)  05/31/2010    Hives Current Immunizations  Reviewed on 11/29/2017 Name Date Influenza High Dose Vaccine PF 10/13/2014 Influenza Vaccine 10/29/2015, 10/6/2013 Pneumococcal Conjugate (PCV-13) 5/12/2016  9:58 AM  
 Pneumococcal Vaccine (Unspecified Type) 1/1/2012 Zoster Vaccine, Live 5/21/2010 Reviewed by Laquita Sorenson MD on 11/29/2017 at  1:20 PM  
You Were Diagnosed With   
  
 Codes Comments Moderate persistent asthma without complication    -  Primary ICD-10-CM: J45.40 ICD-9-CM: 493.90 IFG (impaired fasting glucose)     ICD-10-CM: R73.01 
ICD-9-CM: 790.21 Essential hypertension     ICD-10-CM: I10 
ICD-9-CM: 401.9 Attention deficit disorder (ADD) without hyperactivity     ICD-10-CM: F98.8 ICD-9-CM: 314.00 Overactive bladder     ICD-10-CM: N32.81 ICD-9-CM: 596.51 Vitals BP Pulse Temp Resp Height(growth percentile) Weight(growth percentile) 125/81 (BP 1 Location: Right arm, BP Patient Position: Sitting) 82 98 °F (36.7 °C) (Oral) 14 5' 3\" (1.6 m) 136 lb 12.8 oz (62.1 kg) SpO2 BMI OB Status Smoking Status 95% 24.23 kg/m2 Postmenopausal Former Smoker Vitals History BMI and BSA Data Body Mass Index Body Surface Area  
 24.23 kg/m 2 1.66 m 2 Preferred Pharmacy Pharmacy Name Phone  [address] [phone] Your Updated Medication List  
  
   
This list is accurate as of: 11/29/17  1:33 PM.  Always use your most recent med list. amLODIPine 5 mg tablet Commonly known as:  Elspekenneth Bakes TAKE ONE (1) TABLET(S) DAILY  
  
 aspirin 81 mg chewable tablet Take 81 mg by mouth daily. Biotin 2,500 mcg Cap Take 1 Cap by mouth daily. buPROPion  mg tablet Commonly known as:  WELLBUTRIN XL  
TAKE ONE TABLET EVERY MORNING. FLAXSEED PO Take  by mouth daily. Flaxseed meal  
  
 folic acid 758 mcg tablet Take 800 mcg by mouth daily. ibuprofen 200 mg Cap Take 200 mg by mouth as needed. ipratropium 0.06 % nasal spray Commonly known as:  ATROVENT  
  
 levalbuterol tartrate 45 mcg/actuation inhaler Commonly known as:  Remigio Dura Take 1-2 Puffs by inhalation every four (4) hours as needed for Wheezing.  
  
 montelukast 10 mg tablet Commonly known as:  SINGULAIR Take 10 mg by mouth every seven (7) days. Takes on day when she gets allergy shots  
  
 oxybutynin chloride XL 10 mg CR tablet Commonly known as:  DITROPAN XL  
TAKE ONE TABLET DAILY. VITAMIN B-12 1,000 mcg tablet Generic drug:  cyanocobalamin Take 1,000 mcg by mouth daily. ZyrTEC 10 mg tablet Generic drug:  cetirizine Take 10 mg by mouth daily. Prescriptions Printed Refills  
 amLODIPine (NORVASC) 5 mg tablet 4 Sig: TAKE ONE (1) TABLET(S) DAILY Class: Print buPROPion XL (WELLBUTRIN XL) 150 mg tablet 4 Sig: TAKE ONE TABLET EVERY MORNING. Class: Print  
 oxybutynin chloride XL (DITROPAN XL) 10 mg CR tablet 4 Sig: TAKE ONE TABLET DAILY. Class: Print Follow-up Instructions Return in about 6 months (around 5/29/2018) for bp, chol, thyroid, ifg, labs prior. To-Do List   
 05/28/2018 Lab:  HEMOGLOBIN A1C WITH EAG   
  
 05/28/2018 Lab:  LIPID PANEL   
  
 05/28/2018 Lab:  METABOLIC PANEL, COMPREHENSIVE [facility]! Dear Shima Cheng: Thank you for requesting a [employer] account. Our records indicate that you already have an active [employer] account. You can access your account anytime at [email or web address] Did you know that you can access your hospital and ER discharge instructions at any time in [employer]? You can also review all of your test results from your hospital stay or ER visit. Additional Information If you have questions, please visit the Frequently Asked Questions section of the [employer] website at [email or web address] Remember, [employer] is NOT to be used for urgent needs. For medical emergencies, dial 911. Now available from your iPhone and Android! Please provide this summary of care documentation to your next provider. Your primary care clinician is listed as Ami Watson. If you have any questions after today's visit, please call [phone].

## 2017-11-29 NOTE — Clinical Note
Masood Abreu NP - all derm records last year - [address] [facility], [facility]

## 2017-11-29 NOTE — PROGRESS NOTES
HPI  Ms. Alphonso Silver is a 67y.o. year old female, she is seen today for follow up HTN. Exercises as much as she can. Walking and crunches and modified pushups. Has been feeling well. Saw derm for alopecia - had treatment with steroid injections, topical steroids and is improved. Has fissures in toe flexures at times - uses cerave which healed it. Is now healed. No, cp, sob, cough or wheezing. Back on allergy injections and asthma controlled. Wellbutrin helps with focus. Missed about 4 days of ditropan and had severe urinary frequency, now back to baseline. Chief Complaint   Patient presents with    Blood Pressure Check     Room 1// fasting // eye exam up to date w/ [facility]    Medication Evaluation     needs printed scripts today         Prior to Admission medications    Medication Sig Start Date End Date Taking? Authorizing Provider   ipratropium (ATROVENT) 0.06 % nasal spray  11/9/17  Yes Historical Provider   amLODIPine (NORVASC) 5 mg tablet TAKE ONE (1) TABLET(S) DAILY 11/29/17  Yes Florencia Dc MD   buPROPion XL (WELLBUTRIN XL) 150 mg tablet TAKE ONE TABLET EVERY MORNING. 11/29/17  Yes Florencia Dc MD   oxybutynin chloride XL (DITROPAN XL) 10 mg CR tablet TAKE ONE TABLET DAILY. 11/29/17  Yes Florencia Dc MD   levalbuterol tartrate [facility]) 45 mcg/actuation inhaler Take 1-2 Puffs by inhalation every four (4) hours as needed for Wheezing. 1/17/17  Yes Flroencia Dc MD   FLAXSEED PO Take  by mouth daily. Flaxseed meal   Yes Historical Provider   cetirizine (ZYRTEC) 10 mg tablet Take 10 mg by mouth daily. Yes Historical Provider   folic acid 841 mcg tablet Take 800 mcg by mouth daily. Yes Historical Provider   aspirin 81 mg chewable tablet Take 81 mg by mouth daily. Yes Historical Provider   cyanocobalamin (VITAMIN B-12) 1,000 mcg tablet Take 1,000 mcg by mouth daily.    Yes Historical Provider   montelukast (SINGULAIR) 10 mg tablet Take 10 mg by mouth every seven (7) days. Takes on day when she gets allergy shots   Yes Historical Provider   Biotin 2,500 mcg cap Take 1 Cap by mouth daily. Yes Historical Provider   ibuprofen 200 mg cap Take 200 mg by mouth as needed. Yes Historical Provider         Allergies   Allergen Reactions    Nuts [Tree Nut] Other (comments)     Mouth sores. Walnuts, pecans    Sulfa (Sulfonamide Antibiotics) Hives         REVIEW OF SYSTEMS:  Per HPI    PHYSICAL EXAM:  Visit Vitals    /81 (BP 1 Location: Right arm, BP Patient Position: Sitting)    Pulse 82    Temp 98 °F (36.7 °C) (Oral)    Resp 14    Ht 5' 3\" (1.6 m)    Wt 136 lb 12.8 oz (62.1 kg)    SpO2 95%    BMI 24.23 kg/m2     Constitutional: Appears well-developed and well-nourished. No distress. HENT:   Head: Normocephalic and atraumatic. Eyes: No scleral icterus. Neck: no lad, no tm, supple   Cardiovascular: Normal S1/S2, regular rhythm. No murmurs, rubs, or gallops. Pulmonary/Chest: Effort normal and breath sounds normal. No respiratory distress. No wheezes, rhonchi, or rales. Ext: No edema. +callous on both feet L>R  Neurological: Alert. Psychiatric: Normal mood and affect. Behavior is normal.     Lab Results   Component Value Date/Time    Sodium 140 11/20/2017 09:36 AM    Potassium 4.9 11/20/2017 09:36 AM    Chloride 102 11/20/2017 09:36 AM    CO2 24 11/20/2017 09:36 AM    Anion gap 8 05/09/2013 04:10 AM    Glucose 103 11/20/2017 09:36 AM    BUN 21 11/20/2017 09:36 AM    Creatinine 0.84 11/20/2017 09:36 AM    BUN/Creatinine ratio 25 11/20/2017 09:36 AM    GFR est AA 80 11/20/2017 09:36 AM    GFR est non-AA 70 11/20/2017 09:36 AM    Calcium 9.5 11/20/2017 09:36 AM    Bilirubin, total 0.5 11/20/2017 09:36 AM    AST (SGOT) 19 11/20/2017 09:36 AM    Alk.  phosphatase 57 11/20/2017 09:36 AM    Protein, total 6.9 11/20/2017 09:36 AM    Albumin 4.3 11/20/2017 09:36 AM    Globulin 3.1 05/08/2013 04:04 AM    A-G Ratio 1.7 11/20/2017 09:36 AM    ALT (SGPT) 13 11/20/2017 09:36 AM     Lab Results   Component Value Date/Time    Hemoglobin A1c 5.3 11/20/2017 09:36 AM    Hemoglobin A1c 5.4 11/07/2016 10:45 AM    Hemoglobin A1c 5.7 05/05/2016 11:51 AM      Lab Results   Component Value Date/Time    Cholesterol, total 226 11/20/2017 09:36 AM    HDL Cholesterol 83 11/20/2017 09:36 AM    LDL, calculated 127 11/20/2017 09:36 AM    VLDL, calculated 16 11/20/2017 09:36 AM    Triglyceride 80 11/20/2017 09:36 AM    CHOL/HDL Ratio 3.7 05/08/2013 04:04 AM          ASSESSMENT/PLAN  Diagnoses and all orders for this visit:    1. Moderate persistent asthma without complication  Controlled - xopenex prn and allergy injections per allergist  2. IFG (impaired fasting glucose)  -     HEMOGLOBIN A1C WITH EAG; Future  Normal range with diet/exercise changes  3. Essential hypertension  -     amLODIPine (NORVASC) 5 mg tablet; TAKE ONE (1) TABLET(S) DAILY  -     METABOLIC PANEL, COMPREHENSIVE; Future  -     LIPID PANEL; Future  Controlled on current regimen, continue   4. Attention deficit disorder (ADD) without hyperactivity  -     buPROPion XL (WELLBUTRIN XL) 150 mg tablet; TAKE ONE TABLET EVERY MORNING. Controlled with wellbutrin  5. Overactive bladder  -     oxybutynin chloride XL (DITROPAN XL) 10 mg CR tablet; TAKE ONE TABLET DAILY. Controlled with oxybutynin      Health Maintenance Due   Topic Date Due    GLAUCOMA SCREENING Q2Y  09/28/2017        Follow-up Disposition:  Return in about 6 months (around 5/29/2018) for bp, chol, ifg, labs prior. Reviewed plan of care. Patient has provided input and agrees with goals. The nurse provided the patient and/or family with advanced directive information if needed and encouraged the patient to provide a copy to the office when available.

## 2018-02-18 ENCOUNTER — TELEPHONE (OUTPATIENT)
Dept: INTERNAL MEDICINE CLINIC | Age: 73
End: 2018-02-18

## 2018-02-18 DIAGNOSIS — B02.9 HERPES ZOSTER WITHOUT COMPLICATION: Primary | ICD-10-CM

## 2018-02-18 RX ORDER — VALACYCLOVIR HYDROCHLORIDE 1 G/1
1000 TABLET, FILM COATED ORAL 3 TIMES DAILY
Qty: 21 TAB | Refills: 0 | Status: SHIPPED | OUTPATIENT
Start: 2018-02-18 | End: 2018-02-19 | Stop reason: SDUPTHER

## 2018-02-18 NOTE — TELEPHONE ENCOUNTER
Patient, whose  is retired pediatrician and neighbor is retired internist, calls with complaint of blisters above eye. Developed itching in that area last night and pain in this area and eye developed this evening. Will send prescription for Valtrex, but she explained to her and her  that she needs to see an ophthalmologist tomorrow. She does not have an ophthalmologist. They stated they will need help from out office to accomplish this. Will send message to Dr Isabela Ruth and her nurse to work on this. . If possible she would like to see Dr Brady Churchill.

## 2018-02-19 ENCOUNTER — TELEPHONE (OUTPATIENT)
Dept: INTERNAL MEDICINE CLINIC | Age: 73
End: 2018-02-19

## 2018-02-19 DIAGNOSIS — B02.9 HERPES ZOSTER WITHOUT COMPLICATION: ICD-10-CM

## 2018-02-19 RX ORDER — VALACYCLOVIR HYDROCHLORIDE 1 G/1
1000 TABLET, FILM COATED ORAL 3 TIMES DAILY
Qty: 21 TAB | Refills: 0 | Status: SHIPPED | OUTPATIENT
Start: 2018-02-19 | End: 2018-02-26

## 2018-02-19 NOTE — TELEPHONE ENCOUNTER
----- Message from Yinka Lord MD sent at 2/19/2018  2:49 PM EST -----  Regarding: RE: Dr. Maria Stein   Please call her  ----- Message -----     From: Elba Sutton LPN     Sent: 9/79/7052   2:16 PM       To: Yinka Lord MD  Subject: FW: Dr. Maria Stein                              ----- Message -----     From: Alice Yang     Sent: 2/19/2018   2:01 PM       To: Elba Sutton LPN, Tess Frost LPN  Subject: FW: Dr. Maria Stein                              ----- Message -----     From: Kevin Moctezuma     Sent: 2/19/2018   1:51 PM       To: Crow Garcia [facility]  Subject: Dr. Lelia Rodriguez stated she is going out of town on Wednesday 2/21/18 and wanted to speak with a nurse or Dr. Michele aClhoun about her shingles and medications.   Pt best contact [phone]

## 2018-02-19 NOTE — TELEPHONE ENCOUNTER
Pt called and stated that she needs an appointment for an ophthalmologist as soon as possible. Wants us to make the appointment for her.

## 2018-02-19 NOTE — TELEPHONE ENCOUNTER
Appt today with Dr Antonina Navarrete, patient is to arrive by 12 for paper work. Patient understands.

## 2018-02-19 NOTE — TELEPHONE ENCOUNTER
Patient reports she did see dr Anju Matt who told her to call dr barajas for 3-4 more days of valtrex. Patient reports she does have a lot of pain on her face that keeps her up at night, patient is taking tylenol/ibuprofen. Pain was deferred to dr barajas as well. Patient reports otc treatment is not helping with the pain, patient is going out of town and would like something to help with the pain. Patient advised if she has open sores she is contagious and this can last up to a week or more. Patient reports she does have open sores and she understands she is contagious. Please advise.

## 2018-02-19 NOTE — TELEPHONE ENCOUNTER
This writer called Dr Rhoades Kind office which is closed at this time. Will attempt again in 30 mins.

## 2018-02-20 ENCOUNTER — OFFICE VISIT (OUTPATIENT)
Dept: INTERNAL MEDICINE CLINIC | Age: 73
End: 2018-02-20

## 2018-02-20 VITALS
HEART RATE: 87 BPM | DIASTOLIC BLOOD PRESSURE: 78 MMHG | SYSTOLIC BLOOD PRESSURE: 130 MMHG | TEMPERATURE: 98.1 F | WEIGHT: 136.6 LBS | HEIGHT: 63 IN | RESPIRATION RATE: 14 BRPM | BODY MASS INDEX: 24.2 KG/M2 | OXYGEN SATURATION: 95 %

## 2018-02-20 DIAGNOSIS — F98.8 ATTENTION DEFICIT DISORDER (ADD) WITHOUT HYPERACTIVITY: ICD-10-CM

## 2018-02-20 DIAGNOSIS — I10 ESSENTIAL HYPERTENSION: ICD-10-CM

## 2018-02-20 DIAGNOSIS — B02.9 HERPES ZOSTER WITHOUT COMPLICATION: Primary | ICD-10-CM

## 2018-02-20 RX ORDER — GABAPENTIN 100 MG/1
100 CAPSULE ORAL
Qty: 90 CAP | Refills: 0 | Status: SHIPPED | OUTPATIENT
Start: 2018-02-20 | End: 2018-04-24

## 2018-02-20 RX ORDER — BUPROPION HYDROCHLORIDE 75 MG/1
75 TABLET ORAL 2 TIMES DAILY
Qty: 60 TAB | Refills: 1 | Status: ON HOLD | OUTPATIENT
Start: 2018-02-20 | End: 2018-08-12

## 2018-02-20 NOTE — PATIENT INSTRUCTIONS
To taper wellbutrin (buproprion):  Start taking 75mg once daily for about a week, then take 75mg every other day for 5-7 days, then every 3 days for 5-7 days then stop. Shingles: Care Instructions  Your Care Instructions    Shingles (herpes zoster) causes pain and a blistered rash. The rash can appear anywhere on the body but will be on only one side of the body, the left or right. It will be in a band, a strip, or a small area. The pain can be very severe. Shingles can also cause tingling or itching in the area of the rash. The blisters scab over after a few days and heal in 2 to 4 weeks. Medicines can help you feel better and may help prevent more serious problems caused by shingles. Shingles is caused by the same virus that causes chickenpox. When you have chickenpox, the virus gets into your nerve roots and stays there (becomes dormant) long after you get over the chickenpox. If the virus becomes active again, it can cause shingles. Follow-up care is a key part of your treatment and safety. Be sure to make and go to all appointments, and call your doctor if you are having problems. It's also a good idea to know your test results and keep a list of the medicines you take. How can you care for yourself at home? · Be safe with medicines. Take your medicines exactly as prescribed. Call your doctor if you think you are having a problem with your medicine. Antiviral medicine helps you get better faster. · Try not to scratch or pick at the blisters. They will crust over and fall off on their own if you leave them alone. · Put cool, wet cloths on the area to relieve pain and itching. You can also use calamine lotion. Try not to use so much lotion that it cakes and is hard to get off. · Put cornstarch or baking soda on the sores to help dry them out so they heal faster. · Do not use thick ointment, such as petroleum jelly, on the sores. This will keep them from drying and healing.   · To help remove loose crusts, soak them in tap water. This can help decrease oozing, and dry and soothe the skin. · Take an over-the-counter pain medicine, such as acetaminophen (Tylenol), ibuprofen (Advil, Motrin), or naproxen (Aleve). Read and follow all instructions on the label. · Avoid close contact with people until the blisters have healed. It is very important for you to avoid contact with anyone who has never had chickenpox or the chickenpox vaccine. Pregnant women, young babies, and anyone else who has a hard time fighting infection (such as someone with HIV, diabetes, or cancer) is especially at risk. When should you call for help? Call your doctor now or seek immediate medical care if:  ? · You have a new or higher fever. ? · You have a severe headache and a stiff neck. ? · You lose the ability to think clearly. ? · The rash spreads to your forehead, nose, eyes, or eyelids. ? · You have eye pain, or your vision gets worse. ? · You have new pain in your face, or you cannot move the muscles in your face. ? · Blisters spread to new parts of your body. ? Watch closely for changes in your health, and be sure to contact your doctor if:  ? · The rash has not healed after 2 to 4 weeks. ? · You still have pain after the rash has healed. Where can you learn more? Go to http://audra-christal.info/. Fidel Ochoa in the search box to learn more about \"Shingles: Care Instructions. \"  Current as of: March 3, 2017  Content Version: 11.4  © 0336-9068 [employer]. Care instructions adapted under license by [employer] (which disclaims liability or warranty for this information). If you have questions about a medical condition or this instruction, always ask your healthcare professional. [address] any warranty or liability for your use of this information.

## 2018-02-20 NOTE — PROGRESS NOTES
HPI  Ms. Shavon Velasquez is a 67y.o. year old female, she is seen today for shingles. Had right sided jaw pain on 2/16/18 and couldn't sleep with some shooting pains. Still has some shooting pains intermittently but no longer having jaw pain. Seen by Dr. Cecelia Mendoza who saw no ophthalmic involvement. Had blisters in her mouth which are now healing. Called on call Dr. Sohail Valerio on 2/18/18 and has been taking valtrex since. No new lesions, seem to be healing. Pain is fairly well controlled with ibuprofen 400mg every 5-6 hours. Getting ready to visit [place] in [place]. Also has been taking ROBERTO-E which is helping mood, wants to stop wellbutrin. Says mood has been good, concentration adequate. Chief Complaint   Patient presents with    Shingles     Room 1// started Valtrex yesterday per Dr Sohail Valerio (on call) // Bryant Winkler yesterday who recommended longer course         Prior to Admission medications    Medication Sig Start Date End Date Taking? Authorizing Provider   gabapentin (NEURONTIN) 100 mg capsule Take 1 Cap by mouth three (3) times daily as needed. 2/20/18  Yes Rene Allred MD   buPROPion [facility]) 75 mg tablet Take 1 Tab by mouth two (2) times a day. 2/20/18  Yes Rene Allred MD   valACYclovir (VALTREX) 1 gram tablet Take 1 Tab by mouth three (3) times daily for 7 days. 2/19/18 2/26/18 Yes Rene Allred MD   ipratropium (ATROVENT) 0.06 % nasal spray  11/9/17  Yes Historical Provider   amLODIPine (NORVASC) 5 mg tablet TAKE ONE (1) TABLET(S) DAILY 11/29/17  Yes Rene Allred MD   oxybutynin chloride XL (DITROPAN XL) 10 mg CR tablet TAKE ONE TABLET DAILY. 11/29/17  Yes Rene Allred MD   levalbuterol tartrate [facility]) 45 mcg/actuation inhaler Take 1-2 Puffs by inhalation every four (4) hours as needed for Wheezing. 1/17/17  Yes Rene Allred MD   FLAXSEED PO Take  by mouth daily.  Flaxseed meal   Yes Historical Provider   cetirizine (ZYRTEC) 10 mg tablet Take 10 mg by mouth daily. Yes Historical Provider   folic acid 798 mcg tablet Take 800 mcg by mouth daily. Yes Historical Provider   aspirin 81 mg chewable tablet Take 81 mg by mouth daily. Yes Historical Provider   cyanocobalamin (VITAMIN B-12) 1,000 mcg tablet Take 1,000 mcg by mouth daily. Yes Historical Provider   montelukast (SINGULAIR) 10 mg tablet Take 10 mg by mouth every seven (7) days. Takes on day when she gets allergy shots   Yes Historical Provider   Biotin 2,500 mcg cap Take 1 Cap by mouth daily. Yes Historical Provider   ibuprofen 200 mg cap Take 200 mg by mouth as needed. Yes Historical Provider         Allergies   Allergen Reactions    Nuts [Tree Nut] Other (comments)     Mouth sores. Walnuts, pecans    Sulfa (Sulfonamide Antibiotics) Hives         REVIEW OF SYSTEMS:  Per HPI    PHYSICAL EXAM:  Visit Vitals    /78 (BP 1 Location: Right arm, BP Patient Position: Sitting)    Pulse 87    Temp 98.1 °F (36.7 °C) (Oral)    Resp 14    Ht 5' 3\" (1.6 m)    Wt 136 lb 9.6 oz (62 kg)    SpO2 95%    BMI 24.2 kg/m2     Constitutional: Appears well-developed and well-nourished. No distress. HENT:   Head: papules and scabbed areas right side of face sparing eye  Eyes: No scleral icterus. Mouth: healing ulcer right inner lower lip  Neck: no lad, no tm, supple   Cardiovascular: Normal S1/S2, regular rhythm. No murmurs, rubs, or gallops. Pulmonary/Chest: Effort normal and breath sounds normal. No respiratory distress. No wheezes, rhonchi, or rales. Psychiatric: Normal mood and affect.  Behavior is normal.     Lab Results   Component Value Date/Time    Sodium 140 11/20/2017 09:36 AM    Potassium 4.9 11/20/2017 09:36 AM    Chloride 102 11/20/2017 09:36 AM    CO2 24 11/20/2017 09:36 AM    Anion gap 8 05/09/2013 04:10 AM    Glucose 103 (H) 11/20/2017 09:36 AM    BUN 21 11/20/2017 09:36 AM    Creatinine 0.84 11/20/2017 09:36 AM    BUN/Creatinine ratio 25 11/20/2017 09:36 AM    GFR est AA 80 11/20/2017 09:36 AM    GFR est non-AA 70 11/20/2017 09:36 AM    Calcium 9.5 11/20/2017 09:36 AM    Bilirubin, total 0.5 11/20/2017 09:36 AM    AST (SGOT) 19 11/20/2017 09:36 AM    Alk. phosphatase 57 11/20/2017 09:36 AM    Protein, total 6.9 11/20/2017 09:36 AM    Albumin 4.3 11/20/2017 09:36 AM    Globulin 3.1 05/08/2013 04:04 AM    A-G Ratio 1.7 11/20/2017 09:36 AM    ALT (SGPT) 13 11/20/2017 09:36 AM     Lab Results   Component Value Date/Time    Hemoglobin A1c 5.3 11/20/2017 09:36 AM    Hemoglobin A1c 5.4 11/07/2016 10:45 AM    Hemoglobin A1c 5.7 (H) 05/05/2016 11:51 AM      Lab Results   Component Value Date/Time    Cholesterol, total 226 (H) 11/20/2017 09:36 AM    HDL Cholesterol 83 11/20/2017 09:36 AM    LDL, calculated 127 (H) 11/20/2017 09:36 AM    VLDL, calculated 16 11/20/2017 09:36 AM    Triglyceride 80 11/20/2017 09:36 AM    CHOL/HDL Ratio 3.7 05/08/2013 04:04 AM          ASSESSMENT/PLAN  Diagnoses and all orders for this visit:    1. Herpes zoster without complication  -     gabapentin (NEURONTIN) 100 mg capsule; Take 1 Cap by mouth three (3) times daily as needed. Healing - continue valtrex - total of 14 days given - will start gabapentin hs and can titrate as needed  2. Essential hypertension  Controlled on current regimen, continue   3. Attention deficit disorder (ADD) without hyperactivity  -     buPROPion (WELLBUTRIN) 75 mg tablet; Take 1 Tab by mouth two (2) times a day. Plan for taper:  Start taking 75mg once daily for about a week, then take 75mg every other day for 5-7 days, then every 3 days for 5-7 days then stop. There are no preventive care reminders to display for this patient. Follow-up Disposition:  Return if symptoms worsen or fail to improve. Keep follow up already scheduled for May    Reviewed plan of care. Patient has provided input and agrees with goals.      The nurse provided the patient and/or family with advanced directive information if needed and encouraged the patient to provide a copy to the office when available.

## 2018-02-20 NOTE — MR AVS SNAPSHOT
Fely Clause 
 
 
 [address] [facility] [address] [phone] Patient: Panchito Stevenson MRN: [id number] ROBERT:[id number] Visit Information Date & Time Provider Department Dept. Phone Encounter #  
 2/20/2018  1:45 PM MD Earl Hills [phone] [id number] Your Appointments 5/22/2018  9:30 AM  
LAB with LAB TT Earl Gould [address] [place] [address]) Appt Note: Dr. Kb Villa BP,Chol,Thyroid,IGF  
 [address] [facility] [address] [phone] 2858 [facility]  
  
    
 5/29/2018  2:00 PM  
ROUTINE CARE with MD Earl Hills [address] [place] [address]) Appt Note: 6 Months Fu for Lab Results [address] [facility] [address] [phone]  
  
   
 8 [facility] [address] Upcoming Health Maintenance Date Due  
 MEDICARE YEARLY EXAM 8/17/2018 GLAUCOMA SCREENING Q2Y 3/1/2019 BREAST CANCER SCRN MAMMOGRAM 8/24/2019 COLONOSCOPY 2/21/2020 DTaP/Tdap/Td series (2 - Td) 5/12/2026 Allergies as of 2/20/2018  Review Complete On: 2/20/2018 By: Kyra Dorado MD  
  
 Severity Noted Reaction Type Reactions Nuts [Tree Nut]  10/13/2014    Other (comments) Mouth sores. Walnuts, pecans Sulfa (Sulfonamide Antibiotics)  05/31/2010    Hives Current Immunizations  Reviewed on 11/29/2017 Name Date Influenza High Dose Vaccine PF 10/13/2014 Influenza Vaccine 10/29/2015, 10/6/2013 Pneumococcal Conjugate (PCV-13) 5/12/2016  9:58 AM  
 Pneumococcal Vaccine (Unspecified Type) 1/1/2012 Zoster Vaccine, Live 5/21/2010 Not reviewed this visit You Were Diagnosed With   
  
 Codes Comments Essential hypertension    -  Primary ICD-10-CM: I10 
ICD-9-CM: 401.9 Herpes zoster without complication     BNA-67-DI: B02.9 ICD-9-CM: 294. 9 Attention deficit disorder (ADD) without hyperactivity     ICD-10-CM: F98.8 ICD-9-CM: 314.00 Vitals BP Pulse Temp Resp Height(growth percentile) Weight(growth percentile) 130/78 (BP 1 Location: Right arm, BP Patient Position: Sitting) 87 98.1 °F (36.7 °C) (Oral) 14 5' 3\" (1.6 m) 136 lb 9.6 oz (62 kg) SpO2 BMI OB Status Smoking Status 95% 24.2 kg/m2 Postmenopausal Former Smoker Vitals History BMI and BSA Data Body Mass Index Body Surface Area  
 24.2 kg/m 2 1.66 m 2 Preferred Pharmacy Pharmacy Name Phone [phone], [address] [address] [phone] Your Updated Medication List  
  
   
This list is accurate as of: 2/20/18  2:00 PM.  Always use your most recent med list. amLODIPine 5 mg tablet Commonly known as:  Claudell Vince TAKE ONE (1) TABLET(S) DAILY  
  
 aspirin 81 mg chewable tablet Take 81 mg by mouth daily. Biotin 2,500 mcg Cap Take 1 Cap by mouth daily. buPROPion 75 mg tablet Commonly known as:  [facility] Take 1 Tab by mouth two (2) times a day. FLAXSEED PO Take  by mouth daily. Flaxseed meal  
  
 folic acid 077 mcg tablet Take 800 mcg by mouth daily. gabapentin 100 mg capsule Commonly known as:  NEURONTIN Take 1 Cap by mouth three (3) times daily as needed. ibuprofen 200 mg Cap Take 200 mg by mouth as needed. ipratropium 42 mcg (0.06 %) nasal spray Commonly known as:  ATROVENT  
  
 levalbuterol tartrate 45 mcg/actuation inhaler Commonly known as:  Gara Anibal Take 1-2 Puffs by inhalation every four (4) hours as needed for Wheezing.  
  
 montelukast 10 mg tablet Commonly known as:  SINGULAIR Take 10 mg by mouth every seven (7) days. Takes on day when she gets allergy shots  
  
 oxybutynin chloride XL 10 mg CR tablet Commonly known as:  DITROPAN XL  
TAKE ONE TABLET DAILY. valACYclovir 1 gram tablet Commonly known as:  VALTREX Take 1 Tab by mouth three (3) times daily for 7 days. VITAMIN B-12 1,000 mcg tablet Generic drug:  cyanocobalamin Take 1,000 mcg by mouth daily. ZyrTEC 10 mg tablet Generic drug:  cetirizine Take 10 mg by mouth daily. Prescriptions Sent to Pharmacy Refills  
 gabapentin (NEURONTIN) 100 mg capsule 0 Sig: Take 1 Cap by mouth three (3) times daily as needed. Class: Normal  
 Pharmacy: [address] Ph #: [phone] Route: Oral  
 buPROPion (WELLBUTRIN) 75 mg tablet 1 Sig: Take 1 Tab by mouth two (2) times a day. Class: Normal  
 Pharmacy: [address] Ph #: [phone] Route: Oral  
  
Patient Instructions To taper wellbutrin (buproprion): 
Start taking 75mg once daily for about a week, then take 75mg every other day for 5-7 days, then every 3 days for 5-7 days then stop. Shingles: Care Instructions Your Care Instructions Shingles (herpes zoster) causes pain and a blistered rash. The rash can appear anywhere on the body but will be on only one side of the body, the left or right. It will be in a band, a strip, or a small area. The pain can be very severe. Shingles can also cause tingling or itching in the area of the rash. The blisters scab over after a few days and heal in 2 to 4 weeks. Medicines can help you feel better and may help prevent more serious problems caused by shingles. Shingles is caused by the same virus that causes chickenpox. When you have chickenpox, the virus gets into your nerve roots and stays there (becomes dormant) long after you get over the chickenpox. If the virus becomes active again, it can cause shingles. Follow-up care is a key part of your treatment and safety.  Be sure to make and go to all appointments, and call your doctor if you are having problems. It's also a good idea to know your test results and keep a list of the medicines you take. How can you care for yourself at home? · Be safe with medicines. Take your medicines exactly as prescribed. Call your doctor if you think you are having a problem with your medicine. Antiviral medicine helps you get better faster. · Try not to scratch or pick at the blisters. They will crust over and fall off on their own if you leave them alone. · Put cool, wet cloths on the area to relieve pain and itching. You can also use calamine lotion. Try not to use so much lotion that it cakes and is hard to get off. · Put cornstarch or baking soda on the sores to help dry them out so they heal faster. · Do not use thick ointment, such as petroleum jelly, on the sores. This will keep them from drying and healing. · To help remove loose crusts, soak them in tap water. This can help decrease oozing, and dry and soothe the skin. · Take an over-the-counter pain medicine, such as acetaminophen (Tylenol), ibuprofen (Advil, Motrin), or naproxen (Aleve). Read and follow all instructions on the label. · Avoid close contact with people until the blisters have healed. It is very important for you to avoid contact with anyone who has never had chickenpox or the chickenpox vaccine. Pregnant women, young babies, and anyone else who has a hard time fighting infection (such as someone with HIV, diabetes, or cancer) is especially at risk. When should you call for help? Call your doctor now or seek immediate medical care if: 
? · You have a new or higher fever. ? · You have a severe headache and a stiff neck. ? · You lose the ability to think clearly. ? · The rash spreads to your forehead, nose, eyes, or eyelids. ? · You have eye pain, or your vision gets worse. ? · You have new pain in your face, or you cannot move the muscles in your face. ? · Blisters spread to new parts of your body. ?Watch closely for changes in your health, and be sure to contact your doctor if: 
? · The rash has not healed after 2 to 4 weeks. ? · You still have pain after the rash has healed. Where can you learn more? Go to http://audra-christal.info/. Antonieta Bernal in the search box to learn more about \"Shingles: Care Instructions. \" Current as of: March 3, 2017 Content Version: 11.4 © 3518-1590 Knack.it. Care instructions adapted under license by [employer] (which disclaims liability or warranty for this information). If you have questions about a medical condition or this instruction, always ask your healthcare professional. [address] any warranty or liability for your use of this information. Introducing [facility] & HEALTH SERVICES! Dear Kendall Etsrada: Thank you for requesting a KloudCatch account. Our records indicate that you already have an active KloudCatch account. You can access your account anytime at [email or web address] Systems Did you know that you can access your hospital and ER discharge instructions at any time in KloudCatch? You can also review all of your test results from your hospital stay or ER visit. Additional Information If you have questions, please visit the Frequently Asked Questions section of the KloudCatch website at [email or web address] Remember, KloudCatch is NOT to be used for urgent needs. For medical emergencies, dial 911. Now available from your iPhone and Android! Please provide this summary of care documentation to your next provider. Your primary care clinician is listed as Ami Watson. If you have any questions after today's visit, please call [phone].

## 2018-04-23 ENCOUNTER — OFFICE VISIT (OUTPATIENT)
Dept: INTERNAL MEDICINE CLINIC | Facility: CLINIC | Age: 73
End: 2018-04-23

## 2018-04-23 VITALS
HEIGHT: 63 IN | RESPIRATION RATE: 16 BRPM | SYSTOLIC BLOOD PRESSURE: 153 MMHG | HEART RATE: 80 BPM | TEMPERATURE: 98.4 F | WEIGHT: 136 LBS | DIASTOLIC BLOOD PRESSURE: 75 MMHG | BODY MASS INDEX: 24.1 KG/M2 | OXYGEN SATURATION: 99 %

## 2018-04-23 DIAGNOSIS — J01.90 ACUTE NON-RECURRENT SINUSITIS, UNSPECIFIED LOCATION: Primary | ICD-10-CM

## 2018-04-23 DIAGNOSIS — J30.9 ALLERGIC RHINITIS, UNSPECIFIED SEASONALITY, UNSPECIFIED TRIGGER: ICD-10-CM

## 2018-04-23 RX ORDER — LEVOFLOXACIN 500 MG/1
500 TABLET, FILM COATED ORAL DAILY
Qty: 5 TAB | Refills: 0 | Status: SHIPPED | OUTPATIENT
Start: 2018-04-23 | End: 2018-05-31 | Stop reason: ALTCHOICE

## 2018-04-23 NOTE — PATIENT INSTRUCTIONS
Use Flonase 2 sprays in both nostrils once a day for about a month  May try Sinus Rinse  Levofloxacin 500 mg once a day for 5 days. Sinusitis: Care Instructions  Your Care Instructions    Sinusitis is an infection of the lining of the sinus cavities in your head. Sinusitis often follows a cold. It causes pain and pressure in your head and face. In most cases, sinusitis gets better on its own in 1 to 2 weeks. But some mild symptoms may last for several weeks. Sometimes antibiotics are needed. Follow-up care is a key part of your treatment and safety. Be sure to make and go to all appointments, and call your doctor if you are having problems. It's also a good idea to know your test results and keep a list of the medicines you take. How can you care for yourself at home? · Take an over-the-counter pain medicine, such as acetaminophen (Tylenol), ibuprofen (Advil, Motrin), or naproxen (Aleve). Read and follow all instructions on the label. · If the doctor prescribed antibiotics, take them as directed. Do not stop taking them just because you feel better. You need to take the full course of antibiotics. · Be careful when taking over-the-counter cold or flu medicines and Tylenol at the same time. Many of these medicines have acetaminophen, which is Tylenol. Read the labels to make sure that you are not taking more than the recommended dose. Too much acetaminophen (Tylenol) can be harmful. · Breathe warm, moist air from a steamy shower, a hot bath, or a sink filled with hot water. Avoid cold, dry air. Using a humidifier in your home may help. Follow the directions for cleaning the machine. · Use saline (saltwater) nasal washes to help keep your nasal passages open and wash out mucus and bacteria. You can buy saline nose drops at a grocery store or drugstore. Or you can make your own at home by adding 1 teaspoon of salt and 1 teaspoon of baking soda to 2 cups of distilled water.  If you make your own, fill a bulb syringe with the solution, insert the tip into your nostril, and squeeze gently. Franceen Abdiel your nose. · Put a hot, wet towel or a warm gel pack on your face 3 or 4 times a day for 5 to 10 minutes each time. · Try a decongestant nasal spray like oxymetazoline (Afrin). Do not use it for more than 3 days in a row. Using it for more than 3 days can make your congestion worse. When should you call for help? Call your doctor now or seek immediate medical care if:  ? · You have new or worse swelling or redness in your face or around your eyes. ? · You have a new or higher fever. ? Watch closely for changes in your health, and be sure to contact your doctor if:  ? · You have new or worse facial pain. ? · The mucus from your nose becomes thicker (like pus) or has new blood in it. ? · You are not getting better as expected. Where can you learn more? Go to http://audra-christal.info/. Enter G603 in the search box to learn more about \"Sinusitis: Care Instructions. \"  Current as of: May 12, 2017  Content Version: 11.4  © 1333-3837 PureWRX. Care instructions adapted under license by [employer] (which disclaims liability or warranty for this information). If you have questions about a medical condition or this instruction, always ask your healthcare professional. [place] [address] any warranty or liability for your use of this information.

## 2018-04-23 NOTE — PROGRESS NOTES
Fabi Baker  Identified pt with two pt identifiers(name and ). Chief Complaint   Patient presents with    Sinus Infection       Reviewed record In preparation for visit and have obtained necessary documentation. Has info on advanced directive but has not filled them out. 1. Have you been to the ER, urgent care clinic or hospitalized since your last visit? No     2. Have you seen or consulted any other health care providers outside of the [address] [place] [place] [address] since your last visit? Include any pap smears or colon screening. No    Vitals reviewed with provider. Health Maintenance reviewed: There are no preventive care reminders to display for this patient. Wt Readings from Last 3 Encounters:   18 136 lb 9.6 oz (62 kg)   17 136 lb 12.8 oz (62.1 kg)   17 135 lb (61.2 kg)      Temp Readings from Last 3 Encounters:   18 98.1 °F (36.7 °C) (Oral)   17 98 °F (36.7 °C) (Oral)   17 98.6 °F (37 °C) (Oral)      BP Readings from Last 3 Encounters:   18 130/78   17 125/81   17 139/74      Pulse Readings from Last 3 Encounters:   18 87   17 82   17 68      There were no vitals filed for this visit. Learning Assessment:   :     Learning Assessment 3/20/2015   PRIMARY LEARNER Patient   HIGHEST LEVEL OF EDUCATION - PRIMARY LEARNER  4 YEARS OF COLLEGE   BARRIERS PRIMARY LEARNER NONE   PRIMARY LANGUAGE ENGLISH    NEED No   LEARNER PREFERENCE PRIMARY READING   ANSWERED BY patient   RELATIONSHIP SELF        Depression Screening:   :     PHQ over the last two weeks 2017   Little interest or pleasure in doing things Not at all   Feeling down, depressed or hopeless Not at all   Total Score PHQ 2 0        Fall Risk Assessment:   :     Fall Risk Assessment, last 12 mths 2017   Able to walk? Yes   Fall in past 12 months?  No   Fall with injury? -   Number of falls in past 12 months -   Fall Risk Score -        Abuse Screening:   :     Abuse Screening Questionnaire 11/14/2016 3/20/2015   Do you ever feel afraid of your partner? N N   Are you in a relationship with someone who physically or mentally threatens you? N N   Is it safe for you to go home?  Y Y        ADL Screening:   :     ADL Assessment 11/14/2016   Feeding yourself No Help Needed   Getting from bed to chair No Help Needed   Getting dressed No Help Needed   Bathing or showering No Help Needed   Walk across the room (includes cane/walker) No Help Needed   Using the telphone No Help Needed   Taking your medications No Help Needed   Preparing meals No Help Needed   Managing money (expenses/bills) No Help Needed   Moderately strenuous housework (laundry) No Help Needed   Shopping for personal items (toiletries/medicines) No Help Needed   Shopping for groceries No Help Needed   Driving No Help Needed   Climbing a flight of stairs No Help Needed   Getting to places beyond walking distances No Help Needed

## 2018-04-23 NOTE — MR AVS SNAPSHOT
[address] [address] [phone] Patient: Duyen Pham MRN: [id number] XCH:[birth year]51[birth year] Visit Information Date & Time Provider Department Dept. Phone Encounter #  
 4/23/2018  2:45 PM MD [facility] [address] [id number] Follow-up Instructions Return if symptoms worsen or fail to improve. Your Appointments 5/22/2018  9:30 AM  
[facility] ([address]) Appt Note: Dr. Topher Christian BP,Chol,Thyroid,IGF; Dr. Topher Christian BP,Chol,Thyroid,IGF  
 [address] 
[address] [facility] [address] [phone]  
  
   
 [place] [address]  
  
    
 5/29/2018  2:00 PM  
ROUTINE CARE with Sneha Momin MD  
[place] [facility]) Appt Note: 6 Months Fu for Lab Results; 6 Months Fu for Lab Results [place] [address] [phone]  
  
   
 [facility] [address] [facility] Upcoming Health Maintenance Date Due  
 MEDICARE YEARLY EXAM 8/17/2018 BREAST CANCER SCRN MAMMOGRAM 8/24/2019 GLAUCOMA SCREENING Q2Y 2/19/2020 COLONOSCOPY 2/21/2020 DTaP/Tdap/Td series (2 - Td) 5/12/2026 Allergies as of 4/23/2018  Review Complete On: 4/23/2018 By: Ni Herrmann LPN Severity Noted Reaction Type Reactions Nuts [Tree Nut]  10/13/2014    Other (comments) Mouth sores. Walnuts, pecans Sulfa (Sulfonamide Antibiotics)  05/31/2010    Hives Current Immunizations  Reviewed on 4/23/2018 Name Date Influenza High Dose Vaccine PF 10/13/2014 Influenza Vaccine 10/29/2015, 10/6/2013 Pneumococcal Conjugate (PCV-13) 5/12/2016  9:58 AM  
 Pneumococcal Vaccine (Unspecified Type) 1/1/2012 Zoster Vaccine, Live 5/21/2010  Reviewed by Dewey Carter LPN on 4/53/2804 at  2:54 PM  
 You Were Diagnosed With   
  
 Codes Comments Acute non-recurrent sinusitis, unspecified location    -  Primary ICD-10-CM: J01.90 ICD-9-CM: 461.9 Vitals BP Pulse Temp Resp Height(growth percentile) Weight(growth percentile) 153/75 (BP 1 Location: Right arm, BP Patient Position: Sitting) 80 98.4 °F (36.9 °C) (Oral) 16 5' 3\" (1.6 m) 136 lb (61.7 kg) SpO2 BMI OB Status Smoking Status 99% 24.09 kg/m2 Postmenopausal Former Smoker BMI and BSA Data Body Mass Index Body Surface Area 24.09 kg/m 2 1.66 m 2 Preferred Pharmacy Pharmacy Name Phone [phone] [phone] Your Updated Medication List  
  
   
This list is accurate as of 4/23/18  3:55 PM.  Always use your most recent med list. amLODIPine 5 mg tablet Commonly known as:  NORVASC  
take 1 tablet by mouth once daily  
  
 aspirin 81 mg chewable tablet Take 81 mg by mouth daily. Biotin 2,500 mcg Cap Take 1 Cap by mouth daily. buPROPion 75 mg tablet Commonly known as:  [facility] Take 1 Tab by mouth two (2) times a day. FLAXSEED PO Take  by mouth daily. Flaxseed meal  
  
 folic acid 385 mcg tablet Take 800 mcg by mouth daily. gabapentin 100 mg capsule Commonly known as:  NEURONTIN Take 1 Cap by mouth three (3) times daily as needed. ibuprofen 200 mg Cap Take 200 mg by mouth as needed. ipratropium 42 mcg (0.06 %) nasal spray Commonly known as:  ATROVENT  
  
 levalbuterol tartrate 45 mcg/actuation inhaler Commonly known as:  Stacy Maclachlan Take 1-2 Puffs by inhalation every four (4) hours as needed for Wheezing. levoFLOXacin 500 mg tablet Commonly known as:  Lethea Model Take 1 Tab by mouth daily. montelukast 10 mg tablet Commonly known as:  SINGULAIR Take 10 mg by mouth every seven (7) days. Takes on day when she gets allergy shots oxybutynin chloride XL 10 mg CR tablet Commonly known as:  DITROPAN XL  
TAKE ONE TABLET DAILY. VITAMIN B-12 1,000 mcg tablet Generic drug:  cyanocobalamin Take 1,000 mcg by mouth daily. ZyrTEC 10 mg tablet Generic drug:  cetirizine Take 10 mg by mouth daily. Prescriptions Sent to Pharmacy Refills  
 levoFLOXacin (LEVAQUIN) 500 mg tablet 0 Sig: Take 1 Tab by mouth daily. Class: Normal  
 [facility] [place] [address]  #: [phone] Route: Oral  
  
Follow-up Instructions Return if symptoms worsen or fail to improve. Patient Instructions Use Flonase 2 sprays in both nostrils once a day for about a month May try Sinus Rinse Levofloxacin 500 mg once a day for 5 days. Sinusitis: Care Instructions Your Care Instructions Sinusitis is an infection of the lining of the sinus cavities in your head. Sinusitis often follows a cold. It causes pain and pressure in your head and face. In most cases, sinusitis gets better on its own in 1 to 2 weeks. But some mild symptoms may last for several weeks. Sometimes antibiotics are needed. Follow-up care is a key part of your treatment and safety. Be sure to make and go to all appointments, and call your doctor if you are having problems. It's also a good idea to know your test results and keep a list of the medicines you take. How can you care for yourself at home? · Take an over-the-counter pain medicine, such as acetaminophen (Tylenol), ibuprofen (Advil, Motrin), or naproxen (Aleve). Read and follow all instructions on the label. · If the doctor prescribed antibiotics, take them as directed. Do not stop taking them just because you feel better. You need to take the full course of antibiotics. · Be careful when taking over-the-counter cold or flu medicines and Tylenol at the same time.  Many of these medicines have acetaminophen, which is Tylenol. Read the labels to make sure that you are not taking more than the recommended dose. Too much acetaminophen (Tylenol) can be harmful. · Breathe warm, moist air from a steamy shower, a hot bath, or a sink filled with hot water. Avoid cold, dry air. Using a humidifier in your home may help. Follow the directions for cleaning the machine. · Use saline (saltwater) nasal washes to help keep your nasal passages open and wash out mucus and bacteria. You can buy saline nose drops at a grocery store or [employer]. Or you can make your own at home by adding 1 teaspoon of salt and 1 teaspoon of baking soda to 2 cups of distilled water. If you make your own, fill a bulb syringe with the solution, insert the tip into your nostril, and squeeze gently. Macel Halt your nose. · Put a hot, wet towel or a warm gel pack on your face 3 or 4 times a day for 5 to 10 minutes each time. · Try a decongestant nasal spray like oxymetazoline (Afrin). Do not use it for more than 3 days in a row. Using it for more than 3 days can make your congestion worse. When should you call for help? Call your doctor now or seek immediate medical care if: 
? · You have new or worse swelling or redness in your face or around your eyes. ? · You have a new or higher fever. ? Watch closely for changes in your health, and be sure to contact your doctor if: 
? · You have new or worse facial pain. ? · The mucus from your nose becomes thicker (like pus) or has new blood in it. ? · You are not getting better as expected. Where can you learn more? Go to http://audra-christal.info/. Enter O950 in the search box to learn more about \"Sinusitis: Care Instructions. \" Current as of: May 12, 2017 Content Version: 11.4 © 4298-4343 [employer].  Care instructions adapted under license by [employer] (which disclaims liability or warranty for this information). If you have questions about a medical condition or this instruction, always ask your healthcare professional. [address] any warranty or liability for your use of this information. Introducing [facility] & HEALTH SERVICES! Dear Rich Peterson: Thank you for requesting a Postify account. Our records indicate that you already have an active Postify account. You can access your account anytime at [email or web address]. Did you know that you can access your hospital and ER discharge instructions at any time in Postify? You can also review all of your test results from your hospital stay or ER visit. Additional Information If you have questions, please visit the Frequently Asked Questions section of the Postify website at [email or web address]. Remember, Postify is NOT to be used for urgent needs. For medical emergencies, dial 911. Now available from your iPhone and Android! Please provide this summary of care documentation to your next provider. Your primary care clinician is listed as mAi Watson. If you have any questions after today's visit, please call [phone].

## 2018-04-24 NOTE — PROGRESS NOTES
HISTORY OF PRESENT ILLNESS  Paul Christian is a 67 y.o. female. HPI  She complains of 3 weeks of bilateral ear pressure/pain, congestion, nasal congestion, post nasal drip and sore throat. Other symptoms include cough, which is chronic and no worse than usual.  She denies headache, shortness of breath, sinus pressure, wheezing, fever, chills, or night sweats. Clinical course has been unchanged since that time. She has tried Zytec with no relief. Past history is significant for allergy to maple and pine pollen and cats. Patient is non-smoker  Patient Active Problem List   Diagnosis Code    HTN (hypertension) I10    Asthma J45.909    ADD (attention deficit disorder) F98.8    Overactive bladder N32.81    Environmental allergies Z91.09    S/P colonoscopy Z98.890    At risk for osteoporosis Z91.89    Carotid stenosis, bilateral I65.23    Vertical diplopia H53.2    IFG (impaired fasting glucose) R73.01    Serrated adenoma of colon D12.6    Alopecia areata L63.9    Advance directive discussed with patient Z71.89     Past Medical History:   Diagnosis Date    Alopecia     Asthma     H/O seasonal allergies     H/O TB skin testing 1968    treated with INH    Hypertension     IFG (impaired fasting glucose) 6/14/2013    a1c 6.0 5/2013    Ill-defined condition     TIA symptoms, saw neuro-opthalmologist and was told it was probably spasm of cranial nerve.  Overactive bladder 3/13/2013    Serrated adenoma of colon 2/6/2014 11/17/09 - Dr. Malathi Morrow - repeat in 5 years     Past Surgical History:   Procedure Laterality Date    COLORECTAL SCRN; HI RISK IND  2/20/2015         HX CATARACT REMOVAL Bilateral     HX GI      colonscopy.  polyp removed     Social History     Social History    Marital status:      Spouse name: N/A    Number of children: N/A    Years of education: N/A     Social History Main Topics    Smoking status: Former Smoker     Packs/day: 0.25     Years: 8.00     Quit date: 2/19/1995    Smokeless tobacco: Never Used      Comment: quit in 1991    Alcohol use 10.5 oz/week     21 Glasses of wine per week      Comment: 2-3 etoh drinks daily - [place]oun - no withdrawl symptoms when she does not have it    Drug use: No    Sexual activity: Yes     Partners: Male     Other Topics Concern    None     Social History Narrative     Family History   Problem Relation Age of Onset    Hypertension Mother     Stroke Mother     Cancer Father      bladder    Other Father      congential heart valve defect    Depression Sister     Stroke Sister 72    Hypertension Sister     Psychiatric Disorder Sister      depression    COPD Sister     Heart Disease Maternal Grandfather     Dementia Paternal Grandfather      Allergies   Allergen Reactions    Nuts [Tree Nut] Other (comments)     Mouth sores. Walnuts, pecans    Sulfa (Sulfonamide Antibiotics) Hives     Current Outpatient Prescriptions   Medication Sig Dispense Refill    levoFLOXacin (LEVAQUIN) 500 mg tablet Take 1 Tab by mouth daily. 5 Tab 0    amLODIPine (NORVASC) 5 mg tablet take 1 tablet by mouth once daily 90 Tab 4    buPROPion (WELLBUTRIN) 75 mg tablet Take 1 Tab by mouth two (2) times a day. 60 Tab 1    ipratropium (ATROVENT) 0.06 % nasal spray       oxybutynin chloride XL (DITROPAN XL) 10 mg CR tablet TAKE ONE TABLET DAILY. 90 Tab 4    levalbuterol tartrate (XOPENEX) 45 mcg/actuation inhaler Take 1-2 Puffs by inhalation every four (4) hours as needed for Wheezing. 1 Inhaler 2    FLAXSEED PO Take  by mouth daily. Flaxseed meal      cetirizine (ZYRTEC) 10 mg tablet Take 10 mg by mouth daily.  folic acid 147 mcg tablet Take 800 mcg by mouth daily.  aspirin 81 mg chewable tablet Take 81 mg by mouth daily.  cyanocobalamin (VITAMIN B-12) 1,000 mcg tablet Take 1,000 mcg by mouth daily.  montelukast (SINGULAIR) 10 mg tablet Take 10 mg by mouth every seven (7) days.  Takes on day when she gets allergy shots      Biotin 2,500 mcg cap Take 1 Cap by mouth daily.  ibuprofen 200 mg cap Take 200 mg by mouth as needed. ROS  Visit Vitals    /75 (BP 1 Location: Right arm, BP Patient Position: Sitting)    Pulse 80    Temp 98.4 °F (36.9 °C) (Oral)    Resp 16    Ht 5' 3\" (1.6 m)    Wt 136 lb (61.7 kg)    SpO2 99%    BMI 24.09 kg/m2     Physical Exam   Constitutional: She is oriented to person, place, and time. She appears well-developed and well-nourished. HENT:   Head: Normocephalic and atraumatic. Right Ear: Tympanic membrane and ear canal normal.   Left Ear: Tympanic membrane and ear canal normal.   Nose: Mucosal edema present. Mouth/Throat: Mucous membranes are normal. Mucous membranes are not pale and not dry. Posterior oropharyngeal erythema (cobblestoning posterior pharynx) present. No oropharyngeal exudate or posterior oropharyngeal edema. Eyes: Conjunctivae are normal. Pupils are equal, round, and reactive to light. Right eye exhibits no discharge. Left eye exhibits no discharge. Neck: Neck supple. Cardiovascular: Normal rate, regular rhythm, S1 normal, S2 normal and normal heart sounds. Exam reveals no gallop. No murmur heard. Pulmonary/Chest: Effort normal and breath sounds normal. She has no wheezes. She has no rhonchi. She has no rales. Lymphadenopathy:     She has no cervical adenopathy. Neurological: She is alert and oriented to person, place, and time. Skin: Skin is warm, dry and intact. No rash noted. Nursing note and vitals reviewed. ASSESSMENT and PLAN    ICD-10-CM ICD-9-CM    1. Acute non-recurrent sinusitis, unspecified location J01.90 461.9 levoFLOXacin (LEVAQUIN) 500 mg tablet   2. Allergic rhinitis, unspecified seasonality, unspecified trigger J30.9 477.9      Diagnoses and all orders for this visit:    1. Acute non-recurrent sinusitis, unspecified location  -     levoFLOXacin (LEVAQUIN) 500 mg tablet; Take 1 Tab by mouth daily.     2. Allergic rhinitis, unspecified seasonality, unspecified trigger  Flonase 2 sprays in both nostrils daily for 30 days. Sinus Rinse     Follow-up Disposition:  Return if symptoms worsen or fail to improve. I have discussed the diagnosis, evaluation and treatment options and the intended plan with the patient. Patient is in agreement. The patient has received an after-visit summary and questions were answered concerning future plans. I have discussed side effects and warnings of any new medications with the patient as well.

## 2018-05-22 ENCOUNTER — HOSPITAL ENCOUNTER (OUTPATIENT)
Dept: LAB | Age: 73
Discharge: HOME | End: 2018-05-22
Payer: MEDICARE

## 2018-05-22 ENCOUNTER — APPOINTMENT (OUTPATIENT)
Dept: INTERNAL MEDICINE CLINIC | Facility: CLINIC | Age: 73
End: 2018-05-22

## 2018-05-22 DIAGNOSIS — R73.01 IFG (IMPAIRED FASTING GLUCOSE): ICD-10-CM

## 2018-05-22 DIAGNOSIS — I10 ESSENTIAL HYPERTENSION: ICD-10-CM

## 2018-05-22 PROCEDURE — 80053 COMPREHEN METABOLIC PANEL: CPT

## 2018-05-22 PROCEDURE — 36415 COLL VENOUS BLD VENIPUNCTURE: CPT

## 2018-05-22 PROCEDURE — 83036 HEMOGLOBIN GLYCOSYLATED A1C: CPT

## 2018-05-22 PROCEDURE — 80061 LIPID PANEL: CPT

## 2018-05-23 LAB
ALBUMIN SERPL-MCNC: 4 G/DL (ref 3.5–4.8)
ALBUMIN/GLOB SERPL: 1.5 {RATIO} (ref 1.2–2.2)
ALP SERPL-CCNC: 62 IU/L (ref 39–117)
ALT SERPL-CCNC: 12 IU/L (ref 0–32)
AST SERPL-CCNC: 14 IU/L (ref 0–40)
BILIRUB SERPL-MCNC: 0.2 MG/DL (ref 0–1.2)
BUN SERPL-MCNC: 22 MG/DL (ref 8–27)
BUN/CREAT SERPL: 30 (ref 12–28)
CALCIUM SERPL-MCNC: 9 MG/DL (ref 8.7–10.3)
CHLORIDE SERPL-SCNC: 105 MMOL/L (ref 96–106)
CHOLEST SERPL-MCNC: 217 MG/DL (ref 100–199)
CO2 SERPL-SCNC: 23 MMOL/L (ref 18–29)
CREAT SERPL-MCNC: 0.73 MG/DL (ref 0.57–1)
EST. AVERAGE GLUCOSE BLD GHB EST-MCNC: 105 MG/DL
GFR SERPLBLD CREATININE-BSD FMLA CKD-EPI: 83 ML/MIN/1.73
GFR SERPLBLD CREATININE-BSD FMLA CKD-EPI: 95 ML/MIN/1.73
GLOBULIN SER CALC-MCNC: 2.7 G/DL (ref 1.5–4.5)
GLUCOSE SERPL-MCNC: 102 MG/DL (ref 65–99)
HBA1C MFR BLD: 5.3 % (ref 4.8–5.6)
HDLC SERPL-MCNC: 72 MG/DL
LDLC SERPL CALC-MCNC: 128 MG/DL (ref 0–99)
POTASSIUM SERPL-SCNC: 4.2 MMOL/L (ref 3.5–5.2)
PROT SERPL-MCNC: 6.7 G/DL (ref 6–8.5)
SODIUM SERPL-SCNC: 141 MMOL/L (ref 134–144)
TRIGL SERPL-MCNC: 87 MG/DL (ref 0–149)
VLDLC SERPL CALC-MCNC: 17 MG/DL (ref 5–40)

## 2018-05-31 ENCOUNTER — OFFICE VISIT (OUTPATIENT)
Dept: INTERNAL MEDICINE CLINIC | Facility: CLINIC | Age: 73
End: 2018-05-31

## 2018-05-31 VITALS
SYSTOLIC BLOOD PRESSURE: 120 MMHG | DIASTOLIC BLOOD PRESSURE: 76 MMHG | RESPIRATION RATE: 16 BRPM | TEMPERATURE: 97.8 F | HEART RATE: 70 BPM | HEIGHT: 63 IN | BODY MASS INDEX: 24.56 KG/M2 | OXYGEN SATURATION: 97 % | WEIGHT: 138.6 LBS

## 2018-05-31 DIAGNOSIS — I10 ESSENTIAL HYPERTENSION: Primary | ICD-10-CM

## 2018-05-31 DIAGNOSIS — M79.644 PAIN OF FINGER OF RIGHT HAND: ICD-10-CM

## 2018-05-31 DIAGNOSIS — R73.01 IFG (IMPAIRED FASTING GLUCOSE): ICD-10-CM

## 2018-05-31 DIAGNOSIS — J45.40 MODERATE PERSISTENT ASTHMA WITHOUT COMPLICATION: ICD-10-CM

## 2018-05-31 RX ORDER — MOMETASONE FUROATE 1 MG/ML
SOLUTION TOPICAL
Refills: 0 | COMMUNITY
Start: 2018-05-11 | End: 2018-08-16

## 2018-05-31 RX ORDER — MOMETASONE FUROATE 1 MG/G
CREAM TOPICAL
Refills: 0 | COMMUNITY
Start: 2018-05-17 | End: 2018-08-16

## 2018-05-31 RX ORDER — CHOLECALCIFEROL (VITAMIN D3) 125 MCG
CAPSULE ORAL
COMMUNITY
End: 2018-08-14

## 2018-05-31 NOTE — PROGRESS NOTES
Fabi Baker  Identified pt with two pt identifiers(name and ). Chief Complaint   Patient presents with    Hypertension     Room 2B// NON fasting        1. Have you been to the ER, urgent care clinic since your last visit? Hospitalized since your last visit? NO    2. Have you seen or consulted any other health care providers outside of the [address] [place] [address] since your last visit? Allergist seen for normal follow up. Provider notified of reason for visit, vitals and flowsheets obtained on patients. Patient received paperwork for advance directive during previous visit but has not completed at this time     Reviewed record In preparation for visit, huddled with provider and have obtained necessary documentation      There are no preventive care reminders to display for this patient. Wt Readings from Last 3 Encounters:   18 136 lb (61.7 kg)   18 136 lb 9.6 oz (62 kg)   17 136 lb 12.8 oz (62.1 kg)     Temp Readings from Last 3 Encounters:   18 98.4 °F (36.9 °C) (Oral)   18 98.1 °F (36.7 °C) (Oral)   17 98 °F (36.7 °C) (Oral)     BP Readings from Last 3 Encounters:   18 153/75   18 130/78   17 125/81     Pulse Readings from Last 3 Encounters:   18 80   18 87   17 82     There were no vitals filed for this visit. Learning Assessment:  :     Learning Assessment 3/20/2015   PRIMARY LEARNER Patient   HIGHEST LEVEL OF EDUCATION - PRIMARY LEARNER  4 YEARS OF COLLEGE   BARRIERS PRIMARY LEARNER NONE   PRIMARY LANGUAGE ENGLISH    NEED No   LEARNER PREFERENCE PRIMARY READING   ANSWERED BY patient   RELATIONSHIP SELF       Depression Screening:  :     PHQ over the last two weeks 2017   Little interest or pleasure in doing things Not at all   Feeling down, depressed or hopeless Not at all   Total Score PHQ 2 0       Fall Risk Assessment:  :     Fall Risk Assessment, last 12 mths 2017   Able to walk?  Yes Fall in past 12 months? No   Fall with injury? -   Number of falls in past 12 months -   Fall Risk Score -       Abuse Screening:  :     Abuse Screening Questionnaire 4/23/2018 11/14/2016 3/20/2015   Do you ever feel afraid of your partner? N N N   Are you in a relationship with someone who physically or mentally threatens you? N N N   Is it safe for you to go home? Y Y Y       ADL Screening:  :     ADL Assessment 11/14/2016   Feeding yourself No Help Needed   Getting from bed to chair No Help Needed   Getting dressed No Help Needed   Bathing or showering No Help Needed   Walk across the room (includes cane/walker) No Help Needed   Using the telphone No Help Needed   Taking your medications No Help Needed   Preparing meals No Help Needed   Managing money (expenses/bills) No Help Needed   Moderately strenuous housework (laundry) No Help Needed   Shopping for personal items (toiletries/medicines) No Help Needed   Shopping for groceries No Help Needed   Driving No Help Needed   Climbing a flight of stairs No Help Needed   Getting to places beyond walking distances No Help Needed         Medication reconciliation up to date and corrected with patient at this time.

## 2018-05-31 NOTE — MR AVS SNAPSHOT
[address] [address] [phone] Patient: Zay Boyd MRN: [id number] QJM:[birth year]12 Visit Information Date & Time Provider Department Dept. Phone Encounter #  
 5/31/2018 10:30 AM MD Noemy Damon [facility] [address] [place] [address] [id number] Follow-up Instructions Return in about 6 months (around 11/30/2018) for bp, chol, labs prior. Upcoming Health Maintenance Date Due Influenza Age 5 to Adult 8/1/2018 MEDICARE YEARLY EXAM 8/17/2018 BREAST CANCER SCRN MAMMOGRAM 8/24/2019 GLAUCOMA SCREENING Q2Y 2/19/2020 COLONOSCOPY 2/21/2020 DTaP/Tdap/Td series (2 - Td) 5/12/2026 Allergies as of 5/31/2018  Review Complete On: 5/31/2018 By: Venus Dorsey MD  
  
 Severity Noted Reaction Type Reactions Nuts [Tree Nut]  10/13/2014    Other (comments) Mouth sores. Walnuts, pecans Sulfa (Sulfonamide Antibiotics)  05/31/2010    Hives Current Immunizations  Reviewed on 4/23/2018 Name Date Influenza High Dose Vaccine PF 10/13/2014 Influenza Vaccine 10/29/2015, 10/6/2013 Pneumococcal Conjugate (PCV-13) 5/12/2016  9:58 AM  
 Pneumococcal Vaccine (Unspecified Type) 1/1/2012 Zoster Vaccine, Live 5/21/2010 Not reviewed this visit You Were Diagnosed With   
  
 Codes Comments Essential hypertension    -  Primary ICD-10-CM: I10 
ICD-9-CM: 401.9 Moderate persistent asthma without complication     JXA-48-ZN: J45.40 ICD-9-CM: 493.90 IFG (impaired fasting glucose)     ICD-10-CM: R73.01 
ICD-9-CM: 790.21 Pain of finger of right hand     ICD-10-CM: M79.644 ICD-9-CM: 729.5 Vitals BP Pulse Temp Resp Height(growth percentile) Weight(growth percentile) 120/76 70 97.8 °F (36.6 °C) (Oral) 16 5' 3\" (1.6 m) 138 lb 9.6 oz (62.9 kg) SpO2 BMI OB Status Smoking Status 97% 24.55 kg/m2 Postmenopausal Former Smoker Vitals History BMI and BSA Data Body Mass Index Body Surface Area 24.55 kg/m 2 1.67 m 2 Preferred Pharmacy Pharmacy Name Phone [id number] [phone] Your Updated Medication List  
  
   
This list is accurate as of 5/31/18 11:32 AM.  Always use your most recent med list.  
  
  
  
  
 ALEVE 220 mg Cap Generic drug:  naproxen sodium Take  by mouth. amLODIPine 5 mg tablet Commonly known as:  NORVASC  
take 1 tablet by mouth once daily  
  
 aspirin 81 mg chewable tablet Take 81 mg by mouth daily. Biotin 2,500 mcg Cap Take 1 Cap by mouth daily. buPROPion 75 mg tablet Commonly known as:  [facility] Take 1 Tab by mouth two (2) times a day. FLAXSEED PO Take  by mouth daily. Flaxseed meal  
  
 folic acid 355 mcg tablet Take 800 mcg by mouth daily. ibuprofen 200 mg Cap Take 200 mg by mouth as needed. ipratropium 42 mcg (0.06 %) nasal spray Commonly known as:  ATROVENT  
  
 levalbuterol tartrate 45 mcg/actuation inhaler Commonly known as:  Donnella Chance Take 1-2 Puffs by inhalation every four (4) hours as needed for Wheezing. * mometasone 0.1 % lotion Commonly known as:  ELOCON  
APPLY 1 BEAD TO SCALP TWICE DAILY AS NEEDED  
  
 * mometasone 0.1 % topical cream  
Commonly known as:  ELOCON  
  
 montelukast 10 mg tablet Commonly known as:  SINGULAIR Take 10 mg by mouth every seven (7) days. Takes on day when she gets allergy shots  
  
 oxybutynin chloride XL 10 mg CR tablet Commonly known as:  DITROPAN XL  
TAKE ONE TABLET DAILY. ROBERTO-E PO Take  by mouth. VITAMIN B-12 1,000 mcg tablet Generic drug:  cyanocobalamin Take 1,000 mcg by mouth daily. ZyrTEC 10 mg tablet Generic drug:  cetirizine Take 10 mg by mouth daily. * Notice:   This list has 2 medication(s) that are the same as other medications prescribed for you. Read the directions carefully, and ask your doctor or other care provider to review them with you. Follow-up Instructions Return in about 6 months (around 11/30/2018) for bp, chol, labs prior. To-Do List   
 11/27/2018 Lab:  LIPID PANEL   
  
 11/27/2018 Lab:  METABOLIC PANEL, COMPREHENSIVE [facility] SERVICES! Dear Kala Roldan: Thank you for requesting a PhoneAndPhone account. Our records indicate that you already have an active PhoneAndPhone account. You can access your account anytime at [email or web address] Did you know that you can access your hospital and ER discharge instructions at any time in PhoneAndPhone? You can also review all of your test results from your hospital stay or ER visit. Additional Information If you have questions, please visit the Frequently Asked Questions section of the PhoneAndPhone website at [email or web address] Remember, PhoneAndPhone is NOT to be used for urgent needs. For medical emergencies, dial 911. Now available from your iPhone and Android! Please provide this summary of care documentation to your next provider. Your primary care clinician is listed as Ami Watson. If you have any questions after today's visit, please call [phone].

## 2018-05-31 NOTE — PROGRESS NOTES
HPI  Ms. Jitendra Thomason is a 67y.o. year old female, she is seen today for follow up HTN, cholesterol with other concerns as well. Regarding BP, has gained some weight and hasn't been exercising for about 2 mos - now doing sit ups. Doesn't check bp at home. No chest pain, sob, dizziness, weakness. Was traveling more over last few months - spending lots of time in car - to [place]. Was less active. 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 Complains of low back pain, better now - was worse on long trips. No n/v/abd pain. Slammed her right 2nd finger in car door, has some swelling, no pain now. Chief Complaint   Patient presents with    Hypertension     Room 2B// NON fasting     Finger Swelling     pointer finger, R hand shut in car door x 1 week ago    Back Pain     pt reports lots of long car trips last month and np exercises, Aleve helping        Prior to Admission medications    Medication Sig Start Date End Date Taking? Authorizing Provider   s-adenosylmethionine sul tosyl (ROBERTO-E PO) Take  by mouth. Yes Historical Provider   naproxen sodium (ALEVE) 220 mg cap Take  by mouth. Yes Historical Provider   amLODIPine (NORVASC) 5 mg tablet take 1 tablet by mouth once daily 3/22/18  Yes Shimon Augustin MD   buPROPion [facility]) 75 mg tablet Take 1 Tab by mouth two (2) times a day. 2/20/18  Yes Shimon Augustin MD   ipratropium (ATROVENT) 0.06 % nasal spray  11/9/17  Yes Historical Provider   oxybutynin chloride XL (DITROPAN XL) 10 mg CR tablet TAKE ONE TABLET DAILY. 11/29/17  Yes Shimon Augustin MD   levalbuterol tartrate Roena Hogan) 45 mcg/actuation inhaler Take 1-2 Puffs by inhalation every four (4) hours as needed for Wheezing. 1/17/17  Yes Shimon Augustin MD   FLAXSEED PO Take  by mouth daily. Flaxseed meal   Yes Historical Provider   cetirizine (ZYRTEC) 10 mg tablet Take 10 mg by mouth daily.    Yes Historical Provider   folic acid 154 mcg tablet Take 800 mcg by mouth daily. Yes Historical Provider   aspirin 81 mg chewable tablet Take 81 mg by mouth daily. Yes Historical Provider   cyanocobalamin (VITAMIN B-12) 1,000 mcg tablet Take 1,000 mcg by mouth daily. Yes Historical Provider   montelukast (SINGULAIR) 10 mg tablet Take 10 mg by mouth every seven (7) days. Takes on day when she gets allergy shots   Yes Historical Provider   Biotin 2,500 mcg cap Take 1 Cap by mouth daily. Yes Historical Provider   mometasone (ELOCON) 0.1 % lotion APPLY 1 BEAD TO SCALP TWICE DAILY AS NEEDED 5/11/18   Historical Provider   mometasone (ELOCON) 0.1 % topical cream  5/17/18   Historical Provider   ibuprofen 200 mg cap Take 200 mg by mouth as needed. Historical Provider         Allergies   Allergen Reactions    Nuts [Tree Nut] Other (comments)     Mouth sores. Walnuts, pecans    Sulfa (Sulfonamide Antibiotics) Hives         REVIEW OF SYSTEMS:  Per HPI    PHYSICAL EXAM:  Visit Vitals    /74 (BP 1 Location: Left arm, BP Patient Position: Sitting)    Pulse 70    Temp 97.8 °F (36.6 °C) (Oral)    Resp 16    Ht 5' 3\" (1.6 m)    Wt 138 lb 9.6 oz (62.9 kg)    SpO2 97%    BMI 24.55 kg/m2     Constitutional: Appears well-developed and well-nourished. No distress. HENT:   Head: Normocephalic and atraumatic. Eyes: No scleral icterus. Cardiovascular: Normal S1/S2, regular rhythm. No murmurs, rubs, or gallops. Pulmonary/Chest: Effort normal and breath sounds normal. No respiratory distress. No wheezes, rhonchi, or rales. MSK: no pain on palpating area of finger injury distal right 2nd finger  Back: no pain on palpation back  Ext: No edema. Neurological: Alert. Psychiatric: Normal mood and affect.  Behavior is normal.     Lab Results   Component Value Date/Time    Sodium 141 05/22/2018 08:34 AM    Potassium 4.2 05/22/2018 08:34 AM    Chloride 105 05/22/2018 08:34 AM    CO2 23 05/22/2018 08:34 AM    Anion gap 8 05/09/2013 04:10 AM    Glucose 102 (H) 05/22/2018 08:34 AM    BUN 22 05/22/2018 08:34 AM    Creatinine 0.73 05/22/2018 08:34 AM    BUN/Creatinine ratio 30 (H) 05/22/2018 08:34 AM    GFR est AA 95 05/22/2018 08:34 AM    GFR est non-AA 83 05/22/2018 08:34 AM    Calcium 9.0 05/22/2018 08:34 AM    Bilirubin, total 0.2 05/22/2018 08:34 AM    AST (SGOT) 14 05/22/2018 08:34 AM    Alk. phosphatase 62 05/22/2018 08:34 AM    Protein, total 6.7 05/22/2018 08:34 AM    Albumin 4.0 05/22/2018 08:34 AM    Globulin 3.1 05/08/2013 04:04 AM    A-G Ratio 1.5 05/22/2018 08:34 AM    ALT (SGPT) 12 05/22/2018 08:34 AM     Lab Results   Component Value Date/Time    Hemoglobin A1c 5.3 05/22/2018 08:34 AM    Hemoglobin A1c 5.3 11/20/2017 09:36 AM    Hemoglobin A1c 5.4 11/07/2016 10:45 AM      Lab Results   Component Value Date/Time    Cholesterol, total 217 (H) 05/22/2018 08:34 AM    HDL Cholesterol 72 05/22/2018 08:34 AM    LDL, calculated 128 (H) 05/22/2018 08:34 AM    VLDL, calculated 17 05/22/2018 08:34 AM    Triglyceride 87 05/22/2018 08:34 AM    CHOL/HDL Ratio 3.7 05/08/2013 04:04 AM          ASSESSMENT/PLAN  Diagnoses and all orders for this visit:    1. Essential hypertension  -     METABOLIC PANEL, COMPREHENSIVE; Future  -     LIPID PANEL; Future  Controlled on current regimen, continue   2. Moderate persistent asthma without complication  controled  3. IFG (impaired fasting glucose)  Normal a1c  4. Pain of finger of right hand  Due to injury - healing      There are no preventive care reminders to display for this patient. Follow-up Disposition:  Return in about 6 months (around 11/30/2018) for bp, chol, labs prior. Reviewed plan of care. Patient has provided input and agrees with goals. The nurse provided the patient and/or family with advanced directive information if needed and encouraged the patient to provide a copy to the office when available.

## 2018-08-12 ENCOUNTER — HOSPITAL ENCOUNTER (INPATIENT)
Age: 73
LOS: 2 days | Discharge: HOME | DRG: 247 | End: 2018-08-14
Attending: EMERGENCY MEDICINE | Admitting: FAMILY MEDICINE
Payer: MEDICARE

## 2018-08-12 ENCOUNTER — APPOINTMENT (OUTPATIENT)
Dept: GENERAL RADIOLOGY | Age: 73
DRG: 247 | End: 2018-08-12
Attending: EMERGENCY MEDICINE
Payer: MEDICARE

## 2018-08-12 DIAGNOSIS — R07.9 CHEST PAIN, UNSPECIFIED TYPE: ICD-10-CM

## 2018-08-12 DIAGNOSIS — I21.4 NSTEMI (NON-ST ELEVATED MYOCARDIAL INFARCTION) (HCC): Primary | ICD-10-CM

## 2018-08-12 PROBLEM — R77.8 ELEVATED TROPONIN: Status: ACTIVE | Noted: 2018-08-12

## 2018-08-12 LAB
ALBUMIN SERPL-MCNC: 3.7 G/DL (ref 3.5–5)
ALBUMIN/GLOB SERPL: 1.1 {RATIO} (ref 1.1–2.2)
ALP SERPL-CCNC: 68 U/L (ref 45–117)
ALT SERPL-CCNC: 20 U/L (ref 12–78)
ANION GAP SERPL CALC-SCNC: 6 MMOL/L (ref 5–15)
AST SERPL-CCNC: 15 U/L (ref 15–37)
BASOPHILS # BLD: 0 K/UL (ref 0–0.1)
BASOPHILS # BLD: 0.1 K/UL (ref 0–0.1)
BASOPHILS NFR BLD: 1 % (ref 0–1)
BASOPHILS NFR BLD: 1 % (ref 0–1)
BILIRUB SERPL-MCNC: 0.4 MG/DL (ref 0.2–1)
BUN SERPL-MCNC: 19 MG/DL (ref 6–20)
BUN/CREAT SERPL: 25 (ref 12–20)
CALCIUM SERPL-MCNC: 8.7 MG/DL (ref 8.5–10.1)
CHLORIDE SERPL-SCNC: 106 MMOL/L (ref 97–108)
CK SERPL-CCNC: 62 U/L (ref 26–192)
CO2 SERPL-SCNC: 27 MMOL/L (ref 21–32)
CREAT SERPL-MCNC: 0.75 MG/DL (ref 0.55–1.02)
DIFFERENTIAL METHOD BLD: ABNORMAL
DIFFERENTIAL METHOD BLD: NORMAL
EOSINOPHIL # BLD: 0.3 K/UL (ref 0–0.4)
EOSINOPHIL # BLD: 0.5 K/UL (ref 0–0.4)
EOSINOPHIL NFR BLD: 5 % (ref 0–7)
EOSINOPHIL NFR BLD: 8 % (ref 0–7)
ERYTHROCYTE [DISTWIDTH] IN BLOOD BY AUTOMATED COUNT: 12.9 % (ref 11.5–14.5)
ERYTHROCYTE [DISTWIDTH] IN BLOOD BY AUTOMATED COUNT: 13.1 % (ref 11.5–14.5)
GLOBULIN SER CALC-MCNC: 3.3 G/DL (ref 2–4)
GLUCOSE SERPL-MCNC: 90 MG/DL (ref 65–100)
HCT VFR BLD AUTO: 36.7 % (ref 35–47)
HCT VFR BLD AUTO: 40.8 % (ref 35–47)
HGB BLD-MCNC: 12.1 G/DL (ref 11.5–16)
HGB BLD-MCNC: 13.1 G/DL (ref 11.5–16)
IMM GRANULOCYTES # BLD: 0 K/UL (ref 0–0.04)
IMM GRANULOCYTES # BLD: 0 K/UL (ref 0–0.04)
IMM GRANULOCYTES NFR BLD AUTO: 0 % (ref 0–0.5)
IMM GRANULOCYTES NFR BLD AUTO: 0 % (ref 0–0.5)
LYMPHOCYTES # BLD: 1.8 K/UL (ref 0.8–3.5)
LYMPHOCYTES # BLD: 2.3 K/UL (ref 0.8–3.5)
LYMPHOCYTES NFR BLD: 30 % (ref 12–49)
LYMPHOCYTES NFR BLD: 38 % (ref 12–49)
MAGNESIUM SERPL-MCNC: 2 MG/DL (ref 1.6–2.4)
MCH RBC QN AUTO: 31.5 PG (ref 26–34)
MCH RBC QN AUTO: 31.8 PG (ref 26–34)
MCHC RBC AUTO-ENTMCNC: 32.1 G/DL (ref 30–36.5)
MCHC RBC AUTO-ENTMCNC: 33 G/DL (ref 30–36.5)
MCV RBC AUTO: 96.3 FL (ref 80–99)
MCV RBC AUTO: 98.1 FL (ref 80–99)
MONOCYTES # BLD: 0.5 K/UL (ref 0–1)
MONOCYTES # BLD: 0.5 K/UL (ref 0–1)
MONOCYTES NFR BLD: 8 % (ref 5–13)
MONOCYTES NFR BLD: 9 % (ref 5–13)
NEUTS SEG # BLD: 2.9 K/UL (ref 1.8–8)
NEUTS SEG # BLD: 3 K/UL (ref 1.8–8)
NEUTS SEG NFR BLD: 49 % (ref 32–75)
NEUTS SEG NFR BLD: 52 % (ref 32–75)
NRBC # BLD: 0 K/UL (ref 0–0.01)
NRBC # BLD: 0 K/UL (ref 0–0.01)
NRBC BLD-RTO: 0 PER 100 WBC
NRBC BLD-RTO: 0 PER 100 WBC
PLATELET # BLD AUTO: 270 K/UL (ref 150–400)
PLATELET # BLD AUTO: 277 K/UL (ref 150–400)
PMV BLD AUTO: 11.4 FL (ref 8.9–12.9)
PMV BLD AUTO: 11.4 FL (ref 8.9–12.9)
POTASSIUM SERPL-SCNC: 3.5 MMOL/L (ref 3.5–5.1)
PROT SERPL-MCNC: 7 G/DL (ref 6.4–8.2)
RBC # BLD AUTO: 3.81 M/UL (ref 3.8–5.2)
RBC # BLD AUTO: 4.16 M/UL (ref 3.8–5.2)
SODIUM SERPL-SCNC: 139 MMOL/L (ref 136–145)
TROPONIN I SERPL-MCNC: 0.14 NG/ML
TROPONIN I SERPL-MCNC: 11.3 NG/ML
TROPONIN I SERPL-MCNC: 3.91 NG/ML
WBC # BLD AUTO: 5.9 K/UL (ref 3.6–11)
WBC # BLD AUTO: 6 K/UL (ref 3.6–11)

## 2018-08-12 PROCEDURE — 84484 ASSAY OF TROPONIN QUANT: CPT | Performed by: EMERGENCY MEDICINE

## 2018-08-12 PROCEDURE — 36415 COLL VENOUS BLD VENIPUNCTURE: CPT

## 2018-08-12 PROCEDURE — 99284 EMERGENCY DEPT VISIT MOD MDM: CPT

## 2018-08-12 PROCEDURE — 74011250637 HC RX REV CODE- 250/637: Performed by: FAMILY MEDICINE

## 2018-08-12 PROCEDURE — 74011250637 HC RX REV CODE- 250/637: Performed by: EMERGENCY MEDICINE

## 2018-08-12 PROCEDURE — 96361 HYDRATE IV INFUSION ADD-ON: CPT

## 2018-08-12 PROCEDURE — 96372 THER/PROPH/DIAG INJ SC/IM: CPT

## 2018-08-12 PROCEDURE — 93005 ELECTROCARDIOGRAM TRACING: CPT

## 2018-08-12 PROCEDURE — 80053 COMPREHEN METABOLIC PANEL: CPT | Performed by: EMERGENCY MEDICINE

## 2018-08-12 PROCEDURE — 74011000250 HC RX REV CODE- 250: Performed by: EMERGENCY MEDICINE

## 2018-08-12 PROCEDURE — 65660000000 HC RM CCU STEPDOWN

## 2018-08-12 PROCEDURE — 71046 X-RAY EXAM CHEST 2 VIEWS: CPT

## 2018-08-12 PROCEDURE — 83735 ASSAY OF MAGNESIUM: CPT | Performed by: EMERGENCY MEDICINE

## 2018-08-12 PROCEDURE — 96374 THER/PROPH/DIAG INJ IV PUSH: CPT

## 2018-08-12 PROCEDURE — 74011250636 HC RX REV CODE- 250/636: Performed by: EMERGENCY MEDICINE

## 2018-08-12 PROCEDURE — 85025 COMPLETE CBC W/AUTO DIFF WBC: CPT | Performed by: EMERGENCY MEDICINE

## 2018-08-12 PROCEDURE — 82550 ASSAY OF CK (CPK): CPT | Performed by: EMERGENCY MEDICINE

## 2018-08-12 PROCEDURE — 74011250636 HC RX REV CODE- 250/636: Performed by: FAMILY MEDICINE

## 2018-08-12 RX ORDER — ENOXAPARIN SODIUM 100 MG/ML
1 INJECTION SUBCUTANEOUS EVERY 12 HOURS
Status: DISCONTINUED | OUTPATIENT
Start: 2018-08-12 | End: 2018-08-13

## 2018-08-12 RX ORDER — BUPROPION HYDROCHLORIDE 75 MG/1
75 TABLET ORAL DAILY
COMMUNITY
End: 2018-11-29 | Stop reason: SDUPTHER

## 2018-08-12 RX ORDER — BUPROPION HYDROCHLORIDE 75 MG/1
75 TABLET ORAL DAILY
Status: DISCONTINUED | OUTPATIENT
Start: 2018-08-13 | End: 2018-08-14 | Stop reason: HOSPADM

## 2018-08-12 RX ORDER — ALBUTEROL SULFATE 0.83 MG/ML
2.5 SOLUTION RESPIRATORY (INHALATION)
Status: DISCONTINUED | OUTPATIENT
Start: 2018-08-12 | End: 2018-08-14 | Stop reason: HOSPADM

## 2018-08-12 RX ORDER — ENOXAPARIN SODIUM 100 MG/ML
60 INJECTION SUBCUTANEOUS
Status: COMPLETED | OUTPATIENT
Start: 2018-08-12 | End: 2018-08-12

## 2018-08-12 RX ORDER — SODIUM CHLORIDE 0.9 % (FLUSH) 0.9 %
5-10 SYRINGE (ML) INJECTION EVERY 8 HOURS
Status: DISCONTINUED | OUTPATIENT
Start: 2018-08-12 | End: 2018-08-14 | Stop reason: HOSPADM

## 2018-08-12 RX ORDER — NITROGLYCERIN 0.4 MG/1
0.4 TABLET SUBLINGUAL
Status: DISCONTINUED | OUTPATIENT
Start: 2018-08-12 | End: 2018-08-14 | Stop reason: HOSPADM

## 2018-08-12 RX ORDER — LANOLIN ALCOHOL/MO/W.PET/CERES
800 CREAM (GRAM) TOPICAL DAILY
Status: DISCONTINUED | OUTPATIENT
Start: 2018-08-13 | End: 2018-08-14 | Stop reason: HOSPADM

## 2018-08-12 RX ORDER — AMLODIPINE BESYLATE 5 MG/1
5 TABLET ORAL DAILY
Status: DISCONTINUED | OUTPATIENT
Start: 2018-08-13 | End: 2018-08-14 | Stop reason: HOSPADM

## 2018-08-12 RX ORDER — OXYBUTYNIN CHLORIDE 5 MG/1
10 TABLET, EXTENDED RELEASE ORAL DAILY
Status: DISCONTINUED | OUTPATIENT
Start: 2018-08-12 | End: 2018-08-14 | Stop reason: HOSPADM

## 2018-08-12 RX ORDER — CETIRIZINE HCL 10 MG
10 TABLET ORAL DAILY
Status: DISCONTINUED | OUTPATIENT
Start: 2018-08-13 | End: 2018-08-14 | Stop reason: HOSPADM

## 2018-08-12 RX ORDER — SODIUM CHLORIDE 9 MG/ML
100 INJECTION, SOLUTION INTRAVENOUS CONTINUOUS
Status: DISCONTINUED | OUTPATIENT
Start: 2018-08-12 | End: 2018-08-14

## 2018-08-12 RX ORDER — IPRATROPIUM BROMIDE 42 UG/1
1 SPRAY, METERED NASAL
Status: DISCONTINUED | OUTPATIENT
Start: 2018-08-12 | End: 2018-08-14 | Stop reason: HOSPADM

## 2018-08-12 RX ORDER — GUAIFENESIN 100 MG/5ML
162 LIQUID (ML) ORAL
Status: COMPLETED | OUTPATIENT
Start: 2018-08-12 | End: 2018-08-12

## 2018-08-12 RX ORDER — GUAIFENESIN 100 MG/5ML
81 LIQUID (ML) ORAL DAILY
Status: DISCONTINUED | OUTPATIENT
Start: 2018-08-13 | End: 2018-08-14 | Stop reason: HOSPADM

## 2018-08-12 RX ORDER — BUPROPION HYDROCHLORIDE 75 MG/1
75 TABLET ORAL 2 TIMES DAILY
Status: DISCONTINUED | OUTPATIENT
Start: 2018-08-12 | End: 2018-08-12

## 2018-08-12 RX ORDER — SODIUM CHLORIDE 0.9 % (FLUSH) 0.9 %
5-10 SYRINGE (ML) INJECTION AS NEEDED
Status: DISCONTINUED | OUTPATIENT
Start: 2018-08-12 | End: 2018-08-14 | Stop reason: HOSPADM

## 2018-08-12 RX ORDER — MONTELUKAST SODIUM 10 MG/1
10 TABLET ORAL
Status: DISCONTINUED | OUTPATIENT
Start: 2018-08-15 | End: 2018-08-14 | Stop reason: HOSPADM

## 2018-08-12 RX ORDER — LANOLIN ALCOHOL/MO/W.PET/CERES
1000 CREAM (GRAM) TOPICAL DAILY
Status: DISCONTINUED | OUTPATIENT
Start: 2018-08-13 | End: 2018-08-14 | Stop reason: HOSPADM

## 2018-08-12 RX ADMIN — Medication 10 ML: at 21:56

## 2018-08-12 RX ADMIN — FAMOTIDINE 20 MG: 10 INJECTION, SOLUTION INTRAVENOUS at 09:28

## 2018-08-12 RX ADMIN — OXYBUTYNIN CHLORIDE 10 MG: 5 TABLET, EXTENDED RELEASE ORAL at 17:50

## 2018-08-12 RX ADMIN — ENOXAPARIN SODIUM 60 MG: 60 INJECTION SUBCUTANEOUS at 11:04

## 2018-08-12 RX ADMIN — Medication 10 ML: at 17:52

## 2018-08-12 RX ADMIN — NITROGLYCERIN 1 INCH: 20 OINTMENT TOPICAL at 17:50

## 2018-08-12 RX ADMIN — SODIUM CHLORIDE 100 ML/HR: 900 INJECTION, SOLUTION INTRAVENOUS at 17:50

## 2018-08-12 RX ADMIN — SODIUM CHLORIDE 1000 ML: 900 INJECTION, SOLUTION INTRAVENOUS at 09:28

## 2018-08-12 RX ADMIN — LIDOCAINE HYDROCHLORIDE 40 ML: 20 SOLUTION ORAL; TOPICAL at 09:28

## 2018-08-12 RX ADMIN — ENOXAPARIN SODIUM 60 MG: 60 INJECTION SUBCUTANEOUS at 21:56

## 2018-08-12 RX ADMIN — ASPIRIN 81 MG 162 MG: 81 TABLET ORAL at 11:05

## 2018-08-12 RX ADMIN — NITROGLYCERIN 1 INCH: 20 OINTMENT TOPICAL at 11:05

## 2018-08-12 RX ADMIN — NITROGLYCERIN 1 INCH: 20 OINTMENT TOPICAL at 23:38

## 2018-08-12 NOTE — CONSULTS
[facility]ARD[facility] CONSULT              Date of  Admission: 8/12/2018  9:15 AM            Assessment and Plan: Jori Zaragoza is admitted with NSTEMI. # NSTEMI - troponin is slightly elevated but symptoms are concerning. Discussed LHC +/- pci and risks/benefits reviewed with her. - Will plan for tomorrow. - asa/statin  - Lovenox for systemic anticoag  - echo tomorrow    # htn -controlled             REASON FOR CONSULT: NSTEMI  Primary Cardiologist: n/a    HPI: Pleasant 67 yof with no prior cardiac history, HTN admitted with cp. She reports having gerd like symptoms with radiation to left arm. She took tums but had no improvement. Given continued symptoms came to ER. Here noted to have abnormal troponin 0.14. NO ECG chagnes. Currently no compalitns. She has previously had similar discomfort but never this severe and radiating to arm. She had remote heart screening done over 10 years ago but otherwise has not had any complaints. Overall fairly active without any complaints.        Patient Active Problem List    Diagnosis Date Noted    Chest pain 08/12/2018    Elevated troponin 08/12/2018    Alopecia areata 05/12/2016    Advance directive discussed with patient 05/12/2016    Serrated adenoma of colon 02/06/2014    Carotid stenosis, bilateral 06/14/2013    Vertical diplopia 06/14/2013    IFG (impaired fasting glucose) 06/14/2013    At risk for osteoporosis 03/18/2013    HTN (hypertension) 03/13/2013    Asthma 03/13/2013    ADD (attention deficit disorder) 03/13/2013    Overactive bladder 03/13/2013    Environmental allergies 03/13/2013    S/P colonoscopy 03/13/2013      Breanne Heredia MD  Past Medical History:   Diagnosis Date    Alopecia     Asthma     H/O seasonal allergies     H/O TB skin testing 1968    treated with INH    Hypertension     IFG (impaired fasting glucose) 6/14/2013    a1c 6.0 5/2013    Ill-defined condition     TIA symptoms, saw neuro-opthalmologist and was told it was probably spasm of cranial nerve.  Overactive bladder 3/13/2013    Serrated adenoma of colon 2/6/2014 11/17/09 - Dr. Henry Form - repeat in 5 years      Past Surgical History:   Procedure Laterality Date    COLORECTAL SCRN; HI RISK IND  2/20/2015         HX CATARACT REMOVAL Bilateral     HX GI      colonscopy. polyp removed     Allergies   Allergen Reactions    Nuts [Tree Nut] Other (comments)     Mouth sores. Walnuts, pecans    Sulfa (Sulfonamide Antibiotics) Hives      Family History   Problem Relation Age of Onset    Hypertension Mother     Stroke Mother     Cancer Father      bladder    Other Father      congential heart valve defect    Depression Sister     Stroke Sister 72    Hypertension Sister     Psychiatric Disorder Sister      depression    COPD Sister     Heart Disease Maternal Grandfather     Dementia Paternal Grandfather       Social History     Social History    Marital status:      Spouse name: N/A    Number of children: N/A    Years of education: N/A     Occupational History    Not on file. Social History Main Topics    Smoking status: Former Smoker     Packs/day: 0.25     Years: 8.00     Quit date: 2/19/1995    Smokeless tobacco: Never Used      Comment: quit in 1991    Alcohol use 10.5 oz/week     21 Glasses of wine per week      Comment: 2-3 etoh drinks daily - burboun - no withdrawl symptoms when she does not have it    Drug use: No    Sexual activity: Yes     Partners: Male     Other Topics Concern    Not on file     Social History Narrative     No current facility-administered medications for this encounter. Current Outpatient Prescriptions   Medication Sig    s-adenosylmethionine sul tosyl (ROBERTO-E PO) Take  by mouth.  naproxen sodium (ALEVE) 220 mg cap Take  by mouth.     mometasone (ELOCON) 0.1 % lotion APPLY 1 BEAD TO SCALP TWICE DAILY AS NEEDED    mometasone (ELOCON) 0.1 % topical cream     amLODIPine (NORVASC) 5 mg tablet take 1 tablet by mouth once daily    buPROPion (WELLBUTRIN) 75 mg tablet Take 1 Tab by mouth two (2) times a day.  ipratropium (ATROVENT) 0.06 % nasal spray     oxybutynin chloride XL (DITROPAN XL) 10 mg CR tablet TAKE ONE TABLET DAILY.  levalbuterol tartrate (XOPENEX) 45 mcg/actuation inhaler Take 1-2 Puffs by inhalation every four (4) hours as needed for Wheezing.  FLAXSEED PO Take  by mouth daily. Flaxseed meal    cetirizine (ZYRTEC) 10 mg tablet Take 10 mg by mouth daily.  folic acid 575 mcg tablet Take 800 mcg by mouth daily.  aspirin 81 mg chewable tablet Take 81 mg by mouth daily.  cyanocobalamin (VITAMIN B-12) 1,000 mcg tablet Take 1,000 mcg by mouth daily.  montelukast (SINGULAIR) 10 mg tablet Take 10 mg by mouth every seven (7) days. Takes on day when she gets allergy shots    Biotin 2,500 mcg cap Take 1 Cap by mouth daily.  ibuprofen 200 mg cap Take 200 mg by mouth as needed. Review of Symptoms:  A comprehensive review of systems was negative in 11 points other than stated above in HPI. Physical Exam    Visit Vitals    BP (!) 134/102    Pulse 92    Temp 97.5 °F (36.4 °C)    Resp 24    Ht 5' 3\" (1.6 m)    Wt 63 kg (139 lb)    SpO2 (!) 83%    BMI 24.62 kg/m2     Skin warm and dry  HEENT: WNL  Oropharynx without exudate. Neck supple  Lungs clear  Heart - RRR, Normal S1/ S2   No Mummurs, click or Rubs  No S3 or S4  Abdomen soft and non tender,   Pulses 2+ throughout   Neuro:  Normal facial grimace,  Moves all extremities.    AAAO   Psych: unanxious    Labs:   Recent Results (from the past 24 hour(s))   EKG, 12 LEAD, INITIAL    Collection Time: 08/12/18  9:29 AM   Result Value Ref Range    Ventricular Rate 64 BPM    Atrial Rate 64 BPM    P-R Interval 236 ms    QRS Duration 102 ms    Q-T Interval 422 ms    QTC Calculation (Bezet) 435 ms    Calculated P Axis 78 degrees    Calculated R Axis 43 degrees    Calculated T Axis 66 degrees    Diagnosis       Sinus rhythm with sinus arrhythmia with 1st degree AV block  When compared with ECG of 07-MAY-2013 14:07,  NC interval has increased     CBC WITH AUTOMATED DIFF    Collection Time: 08/12/18  9:40 AM   Result Value Ref Range    WBC 5.9 3.6 - 11.0 K/uL    RBC 4.16 3.80 - 5.20 M/uL    HGB 13.1 11.5 - 16.0 g/dL    HCT 40.8 35.0 - 47.0 %    MCV 98.1 80.0 - 99.0 FL    MCH 31.5 26.0 - 34.0 PG    MCHC 32.1 30.0 - 36.5 g/dL    RDW 12.9 11.5 - 14.5 %    PLATELET 843 436 - 078 K/uL    MPV 11.4 8.9 - 12.9 FL    NRBC 0.0 0  WBC    ABSOLUTE NRBC 0.00 0.00 - 0.01 K/uL    NEUTROPHILS 52 32 - 75 %    LYMPHOCYTES 30 12 - 49 %    MONOCYTES 9 5 - 13 %    EOSINOPHILS 8 (H) 0 - 7 %    BASOPHILS 1 0 - 1 %    IMMATURE GRANULOCYTES 0 0.0 - 0.5 %    ABS. NEUTROPHILS 3.0 1.8 - 8.0 K/UL    ABS. LYMPHOCYTES 1.8 0.8 - 3.5 K/UL    ABS. MONOCYTES 0.5 0.0 - 1.0 K/UL    ABS. EOSINOPHILS 0.5 (H) 0.0 - 0.4 K/UL    ABS. BASOPHILS 0.1 0.0 - 0.1 K/UL    ABS. IMM. GRANS. 0.0 0.00 - 0.04 K/UL    DF AUTOMATED     METABOLIC PANEL, COMPREHENSIVE    Collection Time: 08/12/18  9:40 AM   Result Value Ref Range    Sodium 139 136 - 145 mmol/L    Potassium 3.5 3.5 - 5.1 mmol/L    Chloride 106 97 - 108 mmol/L    CO2 27 21 - 32 mmol/L    Anion gap 6 5 - 15 mmol/L    Glucose 90 65 - 100 mg/dL    BUN 19 6 - 20 MG/DL    Creatinine 0.75 0.55 - 1.02 MG/DL    BUN/Creatinine ratio 25 (H) 12 - 20      GFR est AA >60 >60 ml/min/1.73m2    GFR est non-AA >60 >60 ml/min/1.73m2    Calcium 8.7 8.5 - 10.1 MG/DL    Bilirubin, total 0.4 0.2 - 1.0 MG/DL    ALT (SGPT) 20 12 - 78 U/L    AST (SGOT) 15 15 - 37 U/L    Alk.  phosphatase 68 45 - 117 U/L    Protein, total 7.0 6.4 - 8.2 g/dL    Albumin 3.7 3.5 - 5.0 g/dL    Globulin 3.3 2.0 - 4.0 g/dL    A-G Ratio 1.1 1.1 - 2.2     TROPONIN I    Collection Time: 08/12/18  9:40 AM   Result Value Ref Range    Troponin-I, Qt. 0.14 (H) <0.05 ng/mL   CK W/ REFLX CKMB    Collection Time: 08/12/18  9:40 AM   Result Value Ref Range    CK 62 26 - 192 U/L MAGNESIUM    Collection Time: 08/12/18  9:40 AM   Result Value Ref Range    Magnesium 2.0 1.6 - 2.4 mg/dL       Cardiographics    Telemetry: Sr  ECG: SR no st-t changes  Echocardiogram: pending

## 2018-08-12 NOTE — IP AVS SNAPSHOT
[address] [facility] [address] [place] [address] 
[phone] Patient: Efraín Cardenas MRN: [id number] ESO:6/[id number] A check crissy indicates which time of day the medication should be taken. My Medications START taking these medications Instructions Each Dose to Equal  
 Morning Noon Evening Bedtime  
 metoprolol tartrate 25 mg tablet Commonly known as:  LOPRESSOR Your last dose was: Your next dose is: Take 0.5 Tabs by mouth every twelve (12) hours. 12.5 mg  
    
   
   
   
  
 rosuvastatin 20 mg tablet Commonly known as:  CRESTOR Your last dose was: Your next dose is: Take 1 Tab by mouth daily. 20 mg  
    
   
   
   
  
 ticagrelor 90 mg tablet Commonly known as:  [employer] Your last dose was: Your next dose is: Take 1 Tab by mouth every twelve (12) hours every twelve (12) hours. 90 mg CONTINUE taking these medications Instructions Each Dose to Equal  
 Morning Noon Evening Bedtime  
 amLODIPine 5 mg tablet Commonly known as:  Lula Spruce Your last dose was: Your next dose is:    
   
   
 take 1 tablet by mouth once daily  
     
   
   
   
  
 aspirin 81 mg chewable tablet Your last dose was: Your next dose is: Take 81 mg by mouth daily. 81 mg Biotin 2,500 mcg Cap Your last dose was: Your next dose is: Take 1 Cap by mouth daily. 1 Cap  
    
   
   
   
  
 buPROPion 75 mg tablet Commonly known as:  [facility] Your last dose was: Your next dose is: Take 75 mg by mouth daily. 75 mg FLAXSEED PO Your last dose was: Your next dose is: Take  by mouth daily. Flaxseed meal  
     
   
   
   
  
 folic acid 833 mcg tablet Your last dose was: Your next dose is: Take 800 mcg by mouth daily. 800 mcg  
    
   
   
   
  
 ipratropium 42 mcg (0.06 %) nasal spray Commonly known as:  ATROVENT Your last dose was: Your next dose is:    
   
   
      
   
   
   
  
 levalbuterol tartrate 45 mcg/actuation inhaler Commonly known as:  Vinayak Lei Your last dose was: Your next dose is: Take 1-2 Puffs by inhalation every four (4) hours as needed for Wheezing. 1-2 Puff * mometasone 0.1 % lotion Commonly known as:  Armando Craft Your last dose was: Your next dose is:    
   
   
 APPLY 1 BEAD TO SCALP TWICE DAILY AS NEEDED  
     
   
   
   
  
 * mometasone 0.1 % topical cream  
Commonly known as:  Armando Craft Your last dose was: Your next dose is:    
   
   
      
   
   
   
  
 montelukast 10 mg tablet Commonly known as:  SINGULAIR Your last dose was: Your next dose is: Take 10 mg by mouth every seven (7) days. Takes on day when she gets allergy shots 10 mg  
    
   
   
   
  
 oxybutynin chloride XL 10 mg CR tablet Commonly known as:  DITROPAN XL Your last dose was: Your next dose is: TAKE ONE TABLET DAILY. ROBERTO-E PO Your last dose was: Your next dose is: Take  by mouth. VITAMIN B-12 1,000 mcg tablet Generic drug:  cyanocobalamin Your last dose was: Your next dose is: Take 1,000 mcg by mouth daily. 1000 mcg ZyrTEC 10 mg tablet Generic drug:  cetirizine Your last dose was: Your next dose is: Take 10 mg by mouth daily. 10 mg  
    
   
   
   
  
 * Notice: This list has 2 medication(s) that are the same as other medications prescribed for you.  Read the directions carefully, and ask your doctor or other care provider to review them with you. STOP taking these medications ALEVE 220 mg Cap Generic drug:  naproxen sodium  
   
  
 ibuprofen 200 mg Cap Where to Get Your Medications Information on where to get these meds will be given to you by the nurse or doctor. ! Ask your nurse or doctor about these medications  
  metoprolol tartrate 25 mg tablet  
 rosuvastatin 20 mg tablet  
 ticagrelor 90 mg tablet

## 2018-08-12 NOTE — ED PROVIDER NOTES
HPI Comments: 67 y.o. female with past medical history significant for HTN, asthma, and serrated adenoma of colon, who presents ambulatory with family member to the ED with cc of chest pain. Pt reports midsternal chest pain starting this morning at ~8:00AM while she was applying makeup. She states the pain radiates down her LUE and to the left side of her jaw with associated dizziness. She describes the pain as sharp, but notes \"it's hard to tell. \" She states the pain has been constant and waxes/wanes in intensity. She denies any relieving or exacerbating factors, including ambulation. Per pt, she has had similar, milder pain in the past that usually improves with H2O; she believes these past mild chest pains have been related to reflux. Pt reports use of Prilosec and Tums this morning without improvement of pain. Pt adds she has had a bowel movement with \"loose\" stool today. Pt denies any cardiac history or recent long distance travel. She specifically denies any vomiting, leg swelling, or leg pain. There are no other acute medical concerns at this time. Social Hx: former Tobacco use, endorses EtOH use, denies Illicit Drug use  PCP: Mere Santos MD    Note written by Haris Mercedes, as dictated by Bebe Thomas MD 9:14 AM      The history is provided by the patient. No  was used. Past Medical History:   Diagnosis Date    Alopecia     Asthma     H/O seasonal allergies     H/O TB skin testing 1968    treated with INH    Hypertension     IFG (impaired fasting glucose) 6/14/2013    a1c 6.0 5/2013    Ill-defined condition     TIA symptoms, saw neuro-opthalmologist and was told it was probably spasm of cranial nerve.     Overactive bladder 3/13/2013    Serrated adenoma of colon 2/6/2014 11/17/09 - Dr. Ibis Edwards - repeat in 5 years       Past Surgical History:   Procedure Laterality Date    COLORECTAL SCRN; HI RISK IND  2/20/2015         HX CATARACT REMOVAL Bilateral     HX GI      colonscopy. polyp removed         Family History:   Problem Relation Age of Onset    Hypertension Mother     Stroke Mother     Cancer Father      bladder    Other Father      congential heart valve defect    Depression Sister     Stroke Sister 72    Hypertension Sister     Psychiatric Disorder Sister      depression    COPD Sister     Heart Disease Maternal Grandfather     Dementia Paternal Grandfather        Social History     Social History    Marital status:      Spouse name: N/A    Number of children: N/A    Years of education: N/A     Occupational History    Not on file. Social History Main Topics    Smoking status: Former Smoker     Packs/day: 0.25     Years: 8.00     Quit date: 2/19/1995    Smokeless tobacco: Never Used      Comment: quit in 1991    Alcohol use 10.5 oz/week     21 Glasses of wine per week      Comment: 2-3 etoh drinks daily - [place]boun - no withdrawl symptoms when she does not have it    Drug use: No    Sexual activity: Yes     Partners: Male     Other Topics Concern    Not on file     Social History Narrative         ALLERGIES: Nuts [tree nut] and Sulfa (sulfonamide antibiotics)    Review of Systems   Constitutional: Negative for fever. HENT: Negative for facial swelling and nosebleeds. + jaw pain   Eyes: Negative for pain. Respiratory: Negative for cough, chest tightness and shortness of breath. Cardiovascular: Positive for chest pain. Negative for leg swelling. Gastrointestinal: Positive for diarrhea (\"loose stool\"). Negative for abdominal pain and vomiting. Endocrine: Negative for polyuria. Genitourinary: Negative for difficulty urinating and flank pain. Musculoskeletal: Positive for myalgias. Negative for arthralgias and back pain. Skin: Negative for color change. Allergic/Immunologic: Negative for immunocompromised state. Neurological: Negative for dizziness and headaches.    Hematological: Does not bruise/bleed easily. Psychiatric/Behavioral: Negative for agitation. All other systems reviewed and are negative. Vitals:    08/12/18 0914   BP: 155/87   Pulse: 71   Resp: 16   Temp: 97.5 °F (36.4 °C)   SpO2: 99%   Weight: 63 kg (139 lb)   Height: 5' 3\" (1.6 m)            Physical Exam   Constitutional: She is oriented to person, place, and time. She appears well-developed and well-nourished. HENT:   Head: Normocephalic and atraumatic. Right Ear: External ear normal.   Left Ear: External ear normal.   Nose: Nose normal.   Mouth/Throat: Oropharynx is clear and moist.   Eyes: EOM are normal. Pupils are equal, round, and reactive to light. No scleral icterus. Neck: Normal range of motion. Neck supple. No JVD present. No tracheal deviation present. No thyromegaly present. Cardiovascular: Normal rate, regular rhythm, normal heart sounds and intact distal pulses. Exam reveals no friction rub. No murmur heard. Good pulses in all extremities   Pulmonary/Chest: Effort normal and breath sounds normal. No stridor. No respiratory distress. She has no wheezes. She has no rales. She exhibits no tenderness. Abdominal: Soft. Bowel sounds are normal. She exhibits no distension. There is no tenderness. There is no rebound and no guarding. Musculoskeletal: Normal range of motion. She exhibits no edema or tenderness. No pain with ROM   Lymphadenopathy:     She has no cervical adenopathy. Neurological: She is alert and oriented to person, place, and time. She has normal reflexes. No cranial nerve deficit. Coordination normal.   Skin: Skin is warm and dry. No rash noted. No erythema. Psychiatric: She has a normal mood and affect. Her behavior is normal. Judgment and thought content normal.   Nursing note and vitals reviewed.      Note written by Haris Soler, as dictated by Bulmaro White MD 9:14 AM    MDM  Number of Diagnoses or Management Options  Diagnosis management comments: 51-year-old white female presents to the emergency department with chest pain. She woke up and was doing her makeup when she felt the pain about an hour ago. It feels sort of like a sharp pain on the left side. No worse with ambulation. She thinks maybe his reflux but it's hard for her to tell. No previous cardiac history. No PE risk factors. Will check basic blood work and chest x-ray. Will give GI cocktail and Pepcid and IV fluids and reassessed shortly. Patient agrees with this plan. Amount and/or Complexity of Data Reviewed  Clinical lab tests: ordered and reviewed  Tests in the radiology section of CPT®: ordered and reviewed  Tests in the medicine section of CPT®: ordered and reviewed  Decide to obtain previous medical records or to obtain history from someone other than the patient: yes  Obtain history from someone other than the patient: yes  Review and summarize past medical records: yes  Independent visualization of images, tracings, or specimens: yes          ED Course       Procedures    ED EKG interpretation:  NSR, rate 64, 1st degree AV block, no DEBBIE/STD. Note written by Haris Banegas, as dictated by Karly Xiong MD 9:38 AM    CXR- No acute process    10:23 AM  Pt states she is feeling better, no real chest pain at this time; GI cocktail seems to have helped her. 10:35 AM  Spoke with pt, updated on slightly positive troponin result at 0.14. She states she had a stress test \"years ago. \" Will give aspirin and nitroglycerin. Due to troponin, will discuss with cardiology. CONSULT NOTE:  10:48 AM Karly Xiong MD spoke with Dr. Jeannine Vanegas, Consult for Cardiology. Discussed available diagnostic tests and clinical findings. Dr. Jeannine Vanegas recommends admission to hospitalist; recommends dose of IV Lovenox; will consult. CONSULT NOTE:  10:56 AM Karly Xiong MD communicated with Dr. Kelly Mills, Consult for Hospitalist via [facility] Text. Discussed available diagnostic tests and clinical findings.   Dr. Bobo Dixon will admit pt.

## 2018-08-12 NOTE — PROGRESS NOTES
1620: TRANSFER - IN REPORT:    Verbal report received from Moses Soares RN(name) on Fabi Baker  being received from ED(unit) for routine progression of care      Report consisted of patients Situation, Background, Assessment and   Recommendations(SBAR). Information from the following report(s) SBAR, Kardex, ED Summary, Intake/Output, MAR, Med Rec Status and Cardiac Rhythm NSR was reviewed with the receiving nurse. Opportunity for questions and clarification was provided. Assessment completed upon patients arrival to unit and care assumed. Skin Assessment:   Primary Nurse Josefian Hoyos and Miguelina Ugarte RN performed a dual skin assessment on this patient No impairment noted  Bharat score is 23      Bedside shift change report given to Kiki Kraft RN (oncoming nurse) by Tracy Muñoz RN (offgoing nurse). Report included the following information SBAR, Kardex, Intake/Output, MAR, Recent Results and Cardiac Rhythm NSR.

## 2018-08-12 NOTE — PROGRESS NOTES
Problem: Unstable angina/NSTEMI: Day of Admission/Day 1  Goal: Activity/Safety  Outcome: Progressing Towards Goal  Pt up ad sunshine to bathroom   Goal: Diagnostic Test/Procedures  Outcome: Progressing Towards Goal  Monitoring troponin's q6. Echo scheduled for tomorrow. Goal: Respiratory  Outcome: Progressing Towards Goal  Pt on RA sat's remain above 90%.

## 2018-08-12 NOTE — Clinical Note
Status[de-identified] Inpatient [101] Type of Bed: Telemetry [19] Inpatient Hospitalization Certified Necessary for the Following Reasons: 3. Patient receiving treatment that can only be provided in an inpatient setting (further clarification in H&P documentation) Admitting Diagnosis: Chest pain [706424] Admitting Diagnosis: Elevated troponin [[id number]] Admitting Physician: Lee Stovall Attending Physician: Lee Stovall Estimated Length of Stay: 2 Midnights Discharge Plan[de-identified] Home with Office Follow-up

## 2018-08-12 NOTE — H&P
History and Physical    Patient: Jitendra Thomason               Sex: female          DOA: 8/12/2018       YOB: 1945      Age:  67 y.o.        LOS:  LOS: 0 days        Chief Complaint   Patient presents with    Chest Pain         HPI:     Jitendra Thomason is a 67 y.o. female with pmhx of asthma, hypertension , gerd, OAB who presents with chest pain onset 8 am today. The pain is mid sternal and left chest wall with radiation to her left jaw and left arm. She reports that she initially though it was her GERD but then the pain did not go away as typically with her gerd. She was seen in ER and received aspirin 162 mg and Nitroglycerin. EKG did not shows any ST changes , CXR normal however her initial troponin 0.14. Patient was started on Lovenox and cardiology consulted. Past Medical History:   Diagnosis Date    Alopecia     Asthma     H/O seasonal allergies     H/O TB skin testing 1968    treated with INH    Hypertension     IFG (impaired fasting glucose) 6/14/2013    a1c 6.0 5/2013    Ill-defined condition     TIA symptoms, saw neuro-opthalmologist and was told it was probably spasm of cranial nerve.  Overactive bladder 3/13/2013    Serrated adenoma of colon 2/6/2014 11/17/09 - Dr. Desi Bui - repeat in 5 years   . Past Surgical History:   Procedure Laterality Date    COLORECTAL SCRN; HI RISK IND  2/20/2015         HX CATARACT REMOVAL Bilateral     HX GI      colonscopy. polyp removed       No current facility-administered medications on file prior to encounter. Current Outpatient Prescriptions on File Prior to Encounter   Medication Sig Dispense Refill    s-adenosylmethionine sul tosyl (ROBERTO-E PO) Take  by mouth.  naproxen sodium (ALEVE) 220 mg cap Take  by mouth.       mometasone (ELOCON) 0.1 % lotion APPLY 1 BEAD TO SCALP TWICE DAILY AS NEEDED  0    mometasone (ELOCON) 0.1 % topical cream   0    amLODIPine (NORVASC) 5 mg tablet take 1 tablet by mouth once daily 90 Tab 4    buPROPion (WELLBUTRIN) 75 mg tablet Take 1 Tab by mouth two (2) times a day. 60 Tab 1    ipratropium (ATROVENT) 0.06 % nasal spray       oxybutynin chloride XL (DITROPAN XL) 10 mg CR tablet TAKE ONE TABLET DAILY. 90 Tab 4    levalbuterol tartrate (XOPENEX) 45 mcg/actuation inhaler Take 1-2 Puffs by inhalation every four (4) hours as needed for Wheezing. 1 Inhaler 2    FLAXSEED PO Take  by mouth daily. Flaxseed meal      cetirizine (ZYRTEC) 10 mg tablet Take 10 mg by mouth daily.  folic acid 426 mcg tablet Take 800 mcg by mouth daily.  aspirin 81 mg chewable tablet Take 81 mg by mouth daily.  cyanocobalamin (VITAMIN B-12) 1,000 mcg tablet Take 1,000 mcg by mouth daily.  montelukast (SINGULAIR) 10 mg tablet Take 10 mg by mouth every seven (7) days. Takes on day when she gets allergy shots      Biotin 2,500 mcg cap Take 1 Cap by mouth daily.  ibuprofen 200 mg cap Take 200 mg by mouth as needed. Social History     Social History    Marital status:      Spouse name: N/A    Number of children: N/A    Years of education: N/A     Occupational History    Not on file. Social History Main Topics    Smoking status: Former Smoker     Packs/day: 0.25     Years: 8.00     Quit date: 2/19/1995    Smokeless tobacco: Never Used      Comment: quit in 1991    Alcohol use 10.5 oz/week     21 Glasses of wine per week      Comment: 2-3 etoh drinks daily - [place] - no withdrawl symptoms when she does not have it    Drug use: No    Sexual activity: Yes     Partners: Male     Other Topics Concern    Not on file     Social History Narrative       Prior to Admission Medications   Prescriptions Last Dose Informant Patient Reported? Taking? Biotin 2,500 mcg cap   Yes No   Sig: Take 1 Cap by mouth daily. FLAXSEED PO   Yes No   Sig: Take  by mouth daily.  Flaxseed meal   amLODIPine (NORVASC) 5 mg tablet   No No   Sig: take 1 tablet by mouth once daily   aspirin 81 mg chewable tablet   Yes No   Sig: Take 81 mg by mouth daily. buPROPion (WELLBUTRIN) 75 mg tablet   No No   Sig: Take 1 Tab by mouth two (2) times a day. cetirizine (ZYRTEC) 10 mg tablet   Yes No   Sig: Take 10 mg by mouth daily. cyanocobalamin (VITAMIN B-12) 1,000 mcg tablet   Yes No   Sig: Take 1,000 mcg by mouth daily. folic acid 435 mcg tablet   Yes No   Sig: Take 800 mcg by mouth daily. ibuprofen 200 mg cap   Yes No   Sig: Take 200 mg by mouth as needed. ipratropium (ATROVENT) 0.06 % nasal spray   Yes No   levalbuterol tartrate (XOPENEX) 45 mcg/actuation inhaler   No No   Sig: Take 1-2 Puffs by inhalation every four (4) hours as needed for Wheezing.   mometasone (ELOCON) 0.1 % lotion   Yes No   Sig: APPLY 1 BEAD TO SCALP TWICE DAILY AS NEEDED   mometasone (ELOCON) 0.1 % topical cream   Yes No   montelukast (SINGULAIR) 10 mg tablet   Yes No   Sig: Take 10 mg by mouth every seven (7) days. Takes on day when she gets allergy shots   naproxen sodium (ALEVE) 220 mg cap   Yes No   Sig: Take  by mouth. oxybutynin chloride XL (DITROPAN XL) 10 mg CR tablet   No No   Sig: TAKE ONE TABLET DAILY. s-adenosylmethionine sul tosyl (ROBERTO-E PO)   Yes No   Sig: Take  by mouth. Facility-Administered Medications: None       Family History   Problem Relation Age of Onset    Hypertension Mother     Stroke Mother     Cancer Father      bladder    Other Father      congential heart valve defect    Depression Sister     Stroke Sister 72    Hypertension Sister     Psychiatric Disorder Sister      depression    COPD Sister     Heart Disease Maternal Grandfather     Dementia Paternal Grandfather        Allergies   Allergen Reactions    Nuts [Tree Nut] Other (comments)     Mouth sores. Walnuts, pecans    Sulfa (Sulfonamide Antibiotics) Hives         Review of Systems   Constitutional: Negative. HENT: Negative. Eyes: Negative. Respiratory: Negative. Cardiovascular: Positive for chest pain. Gastrointestinal: Negative. Endocrine: Negative. Genitourinary: Negative. Musculoskeletal: Negative. Skin: Negative. Allergic/Immunologic: Negative. Neurological: Negative. Hematological: Negative. Psychiatric/Behavioral: Negative. Physical Exam:       Visit Vitals    /88    Pulse 68    Temp 97.5 °F (36.4 °C)    Resp 14    Ht 5' 3\" (1.6 m)    Wt 63 kg (139 lb)    SpO2 100%    BMI 24.62 kg/m2       Physical Exam:  Physical Exam   Constitutional: She is oriented to person, place, and time. She appears well-developed and well-nourished. No distress. HENT:   Head: Normocephalic. Mouth/Throat: No oropharyngeal exudate. Eyes: EOM are normal. Pupils are equal, round, and reactive to light. No scleral icterus. Neck: Neck supple. Cardiovascular: Normal rate, regular rhythm and normal heart sounds. No murmur heard. Pulmonary/Chest: Effort normal and breath sounds normal. No respiratory distress. She has no wheezes. She has no rales. She exhibits no tenderness. Abdominal: Soft. Bowel sounds are normal.   Musculoskeletal: She exhibits no edema. Neurological: She is alert and oriented to person, place, and time. Skin: Skin is warm. No rash noted. She is not diaphoretic. No erythema. No pallor. Psychiatric: She has a normal mood and affect. Ancillary Studies: All lab and imaging reviewed for the past 24 hours.     Recent Results (from the past 24 hour(s))   EKG, 12 LEAD, INITIAL    Collection Time: 08/12/18  9:29 AM   Result Value Ref Range    Ventricular Rate 64 BPM    Atrial Rate 64 BPM    P-R Interval 236 ms    QRS Duration 102 ms    Q-T Interval 422 ms    QTC Calculation (Bezet) 435 ms    Calculated P Axis 78 degrees    Calculated R Axis 43 degrees    Calculated T Axis 66 degrees    Diagnosis       Sinus rhythm with sinus arrhythmia with 1st degree AV block  When compared with ECG of 07-MAY-2013 14:07,  NV interval has increased     CBC WITH AUTOMATED DIFF    Collection Time: 08/12/18  9:40 AM   Result Value Ref Range    WBC 5.9 3.6 - 11.0 K/uL    RBC 4.16 3.80 - 5.20 M/uL    HGB 13.1 11.5 - 16.0 g/dL    HCT 40.8 35.0 - 47.0 %    MCV 98.1 80.0 - 99.0 FL    MCH 31.5 26.0 - 34.0 PG    MCHC 32.1 30.0 - 36.5 g/dL    RDW 12.9 11.5 - 14.5 %    PLATELET 936 958 - 388 K/uL    MPV 11.4 8.9 - 12.9 FL    NRBC 0.0 0  WBC    ABSOLUTE NRBC 0.00 0.00 - 0.01 K/uL    NEUTROPHILS 52 32 - 75 %    LYMPHOCYTES 30 12 - 49 %    MONOCYTES 9 5 - 13 %    EOSINOPHILS 8 (H) 0 - 7 %    BASOPHILS 1 0 - 1 %    IMMATURE GRANULOCYTES 0 0.0 - 0.5 %    ABS. NEUTROPHILS 3.0 1.8 - 8.0 K/UL    ABS. LYMPHOCYTES 1.8 0.8 - 3.5 K/UL    ABS. MONOCYTES 0.5 0.0 - 1.0 K/UL    ABS. EOSINOPHILS 0.5 (H) 0.0 - 0.4 K/UL    ABS. BASOPHILS 0.1 0.0 - 0.1 K/UL    ABS. IMM. GRANS. 0.0 0.00 - 0.04 K/UL    DF AUTOMATED     METABOLIC PANEL, COMPREHENSIVE    Collection Time: 08/12/18  9:40 AM   Result Value Ref Range    Sodium 139 136 - 145 mmol/L    Potassium 3.5 3.5 - 5.1 mmol/L    Chloride 106 97 - 108 mmol/L    CO2 27 21 - 32 mmol/L    Anion gap 6 5 - 15 mmol/L    Glucose 90 65 - 100 mg/dL    BUN 19 6 - 20 MG/DL    Creatinine 0.75 0.55 - 1.02 MG/DL    BUN/Creatinine ratio 25 (H) 12 - 20      GFR est AA >60 >60 ml/min/1.73m2    GFR est non-AA >60 >60 ml/min/1.73m2    Calcium 8.7 8.5 - 10.1 MG/DL    Bilirubin, total 0.4 0.2 - 1.0 MG/DL    ALT (SGPT) 20 12 - 78 U/L    AST (SGOT) 15 15 - 37 U/L    Alk.  phosphatase 68 45 - 117 U/L    Protein, total 7.0 6.4 - 8.2 g/dL    Albumin 3.7 3.5 - 5.0 g/dL    Globulin 3.3 2.0 - 4.0 g/dL    A-G Ratio 1.1 1.1 - 2.2     TROPONIN I    Collection Time: 08/12/18  9:40 AM   Result Value Ref Range    Troponin-I, Qt. 0.14 (H) <0.05 ng/mL   CK W/ REFLX CKMB    Collection Time: 08/12/18  9:40 AM   Result Value Ref Range    CK 62 26 - 192 U/L   MAGNESIUM    Collection Time: 08/12/18  9:40 AM   Result Value Ref Range    Magnesium 2.0 1.6 - 2.4 mg/dL       Assessment/Plan     Active Problems:    Chest pain (8/12/2018)      Elevated troponin (8/12/2018)      GERD  OAB  HTN  Asthma     PLAN:    Chest pain  - admit to telemetry  - possible NSTEMI with elevated troponin 0.14 will trend troponin x 2  - Aspirin , Nitroglycerin , Lovenox 1 mg/kg q12  - Morphine as needed  - suppl O2 PRN   - ECHO   - CARDIOLOGY consulted     GERD   - Famotidine     Asthma   - home med    HTN  - uncontrolled   - Norvasc   - may need Lopressor if not improving    OAB  - Oxybutynin    DVT prophylaxis     Full code        Feli Tolentino MD  8/12/2018  11:28 AM

## 2018-08-12 NOTE — PROGRESS NOTES
HERBAL PRODUCT DISCONTINUATION  he following herbal, alternative, and/or nutritional/dietary supplement product(s) has been discontinued  per [employer] approved policy:    BIOTIN 6679C daily (medication name/dose/frequency)    Please reorder upon discharge if appropriate.

## 2018-08-12 NOTE — IP AVS SNAPSHOT
2700 [facility] [address] [place] [address] 
[phone] Patient: Norberto Valdivia MRN: [id number] IBD:[id number] About your hospitalization You were admitted on:  August 12, 2018 You last received care in the:  [facility] 4 IMCU You were discharged on:  August 14, 2018 Why you were hospitalized Your primary diagnosis was:  Chest Pain Your diagnoses also included:  Elevated Troponin Follow-up Information Follow up With Details Comments Contact Info Additional Information [facility] CARDIAC WELLNESS  APPT. is scheduled for 8/23/2018 at 1:00pm. arrive about 15 minutes prior to appt. wear comfortable clothing. bring medication list and insurance cards. this is a 2 hour time [address] [facility] [address] 330 [facility], [address]. Please arrive 15 minutes prior to your appointment time and you will register in the [place] [address], [address], on the first floor of the [address] [place] [address] Road. Telephone: [phone] Fax: [phone] Driving directions To [facility] [place]. Building: Driving WEST on [address], take exit [address] to [place]. Turn left onto [facility], then turn right into [employer] Parking lot Driving EAST on [address], take exit [address] [facility]. Turn right at the end of the exit ramp. Turn left onto [facility], then turn right into [employer] lot. Miki Hines MD   [address] [place] [address] Road [facility] [phone] Nakia Denny MD Schedule an appointment as soon as possible for a visit in 2 weeks  200 [facility] [address] [place] [address] 
[phone] Your Scheduled Appointments Thursday August 23, 2018  1:00 PM EDT  
CR INTAKE with Lucia Townsend RN  
[facility] CARDIAC WELLNESS ([address]) [address]ann [address] [place] [address]  
[phone] [address], [address]. Please arrive 15 minutes prior to your appointment time and you will register in the [place] [address], [address], on the first floor of the [address] Telephone: [phone] Fax: [phone] Driving directions To [facility] [place]. Building: Driving WEST on [address], take exit [address] to [address]. Turn left onto [facility], then turn right into [employer] Parking lot Driving EAST on [address], take exit 120 [facility]. Turn right at the end of the exit ramp. Turn left onto [facility], then turn right into [employer] lot. Discharge Orders None A check crissy indicates which time of day the medication should be taken. My Medications START taking these medications Instructions Each Dose to Equal  
 Morning Noon Evening Bedtime  
 metoprolol tartrate 25 mg tablet Commonly known as:  LOPRESSOR Your last dose was: Your next dose is: Take 0.5 Tabs by mouth every twelve (12) hours. 12.5 mg  
    
   
   
   
  
 rosuvastatin 20 mg tablet Commonly known as:  CRESTOR Your last dose was: Your next dose is: Take 1 Tab by mouth daily. 20 mg  
    
   
   
   
  
 ticagrelor 90 mg tablet Commonly known as:  Van[employer] Your last dose was: Your next dose is: Take 1 Tab by mouth every twelve (12) hours every twelve (12) hours. 90 mg CONTINUE taking these medications Instructions Each Dose to Equal  
 Morning Noon Evening Bedtime  
 amLODIPine 5 mg tablet Commonly known as:  Nitza Fanti Your last dose was: Your next dose is:    
   
   
 take 1 tablet by mouth once daily  
     
   
   
   
  
 aspirin 81 mg chewable tablet Your last dose was: Your next dose is: Take 81 mg by mouth daily.   
 81 mg  
    
   
   
 Biotin 2,500 mcg Cap Your last dose was: Your next dose is: Take 1 Cap by mouth daily. 1 Cap  
    
   
   
   
  
 buPROPion 75 mg tablet Commonly known as:  [facility] Your last dose was: Your next dose is: Take 75 mg by mouth daily. 75 mg FLAXSEED PO Your last dose was: Your next dose is: Take  by mouth daily. Flaxseed meal  
     
   
   
   
  
 folic acid 653 mcg tablet Your last dose was: Your next dose is: Take 800 mcg by mouth daily. 800 mcg  
    
   
   
   
  
 ipratropium 42 mcg (0.06 %) nasal spray Commonly known as:  ATROVENT Your last dose was: Your next dose is:    
   
   
      
   
   
   
  
 levalbuterol tartrate 45 mcg/actuation inhaler Commonly known as:  Remigio Smiley Your last dose was: Your next dose is: Take 1-2 Puffs by inhalation every four (4) hours as needed for Wheezing. 1-2 Puff * mometasone 0.1 % lotion Commonly known as:  Bryan Riley Your last dose was: Your next dose is:    
   
   
 APPLY 1 BEAD TO SCALP TWICE DAILY AS NEEDED  
     
   
   
   
  
 * mometasone 0.1 % topical cream  
Commonly known as:  Bryan Riley Your last dose was: Your next dose is:    
   
   
      
   
   
   
  
 montelukast 10 mg tablet Commonly known as:  SINGULAIR Your last dose was: Your next dose is: Take 10 mg by mouth every seven (7) days. Takes on day when she gets allergy shots 10 mg  
    
   
   
   
  
 oxybutynin chloride XL 10 mg CR tablet Commonly known as:  DITROPAN XL Your last dose was: Your next dose is: TAKE ONE TABLET DAILY. ROBERTO-E PO Your last dose was: Your next dose is: Take  by mouth. VITAMIN B-12 1,000 mcg tablet Generic drug:  cyanocobalamin Your last dose was: Your next dose is: Take 1,000 mcg by mouth daily. 1000 mcg ZyrTEC 10 mg tablet Generic drug:  cetirizine Your last dose was: Your next dose is: Take 10 mg by mouth daily. 10 mg  
    
   
   
   
  
 * Notice: This list has 2 medication(s) that are the same as other medications prescribed for you. Read the directions carefully, and ask your doctor or other care provider to review them with you. STOP taking these medications ALEVE 220 mg Cap Generic drug:  naproxen sodium  
   
  
 ibuprofen 200 mg Cap Where to Get Your Medications Information on where to get these meds will be given to you by the nurse or doctor. ! Ask your nurse or doctor about these medications  
  metoprolol tartrate 25 mg tablet  
 rosuvastatin 20 mg tablet  
 ticagrelor 90 mg tablet Discharge Instructions Percutaneous Coronary Intervention: What to Expect at [facility] Your Recovery Percutaneous coronary intervention (PCI) is the name for procedures that are used to open a narrowed or blocked coronary artery. The two most common PCI procedures are coronary angioplasty and coronary stent placement. Your groin or arm may have a bruise and feel sore for a day or two after a percutaneous coronary intervention (PCI). You can do light activities around the house, but nothing strenuous for several days. This care sheet gives you a general idea about how long it will take for you to recover. But each person recovers at a different pace. Follow the steps below to get better as quickly as possible. How can you care for yourself at home? Activity 
  · If the doctor gave you a sedative: ¨ For 24 hours, don't do anything that requires attention to detail. It takes time for the medicine's effects to completely wear off. ¨ For your safety, do not drive or operate any machinery that could be dangerous. Wait until the medicine wears off and you can think clearly and react easily.  
  · Do not do strenuous exercise and do not lift, pull, or push anything heavy until your doctor says it is okay. This may be for a day or two. You can walk around the house and do light activity, such as cooking.  
  · If the catheter was placed in your groin, try not to walk up stairs for the first couple of days.  
  · If the catheter was placed in your arm near your wrist, do not bend your wrist deeply for the first couple of days. Be careful using your hand to get into and out of a chair or bed.  
  · Carry your stent identification card with you at all times.  
  · If your doctor recommends it, get more exercise. Walking is a good choice. Bit by bit, increase the amount you walk every day. Try for at least 30 minutes on most days of the week. Diet 
  · Drink plenty of fluids to help your body flush out the dye. If you have kidney, heart, or liver disease and have to limit fluids, talk with your doctor before you increase the amount of fluids you drink.  
  · Keep eating a heart-healthy diet that has lots of fruits, vegetables, and whole grains. If you have not been eating this way, talk to your doctor. You also may want to talk to a dietitian. This expert can help you to learn about healthy foods and plan meals. Medicines 
  · Your doctor will tell you if and when you can restart your medicines. He or she will also give you instructions about taking any new medicines.  
  · If you take blood thinners, such as warfarin (Coumadin), clopidogrel (Plavix), or aspirin, be sure to talk to your doctor. He or she will tell you if and when to start taking those medicines again. Make sure that you understand exactly what your doctor wants you to do.  
  · Your doctor will prescribe blood-thinning medicines.  You will likely take aspirin plus another antiplatelet, such as clopidogrel (Plavix). It is very important that you take these medicines exactly as directed. These medicines help keep the coronary artery open and reduce your risk of a heart attack.  
  · Call your doctor if you think you are having a problem with your medicine.  
 Care of the catheter site 
  · For 1 or 2 days, keep a bandage over the spot where the catheter was inserted. The bandage probably will fall off in this time.  
  · Put ice or a cold pack on the area for 10 to 20 minutes at a time to help with soreness or swelling. Put a thin cloth between the ice and your skin.  
  · You may shower 24 to 48 hours after the procedure, if your doctor okays it. Pat the incision dry.  
  · Do not soak the catheter site until it is healed. Don't take a bath for 1 week, or until your doctor tells you it is okay.  
  · If you are bleeding, lie down and press on the area for 15 minutes to try to make it stop. If the bleeding does not stop, call your doctor or seek immediate medical care. Follow-up care is a key part of your treatment and safety. Be sure to make and go to all appointments, and call your doctor if you are having problems. It's also a good idea to know your test results and keep a list of the medicines you take. When should you call for help? Call 911 anytime you think you may need emergency care. For example, call if: 
  · You passed out (lost consciousness).  
  · You have severe trouble breathing.  
  · You have sudden chest pain and shortness of breath, or you cough up blood.  
  · You have symptoms of a heart attack, such as: ¨ Chest pain or pressure. ¨ Sweating. ¨ Shortness of breath. ¨ Nausea or vomiting. ¨ Pain that spreads from the chest to the neck, jaw, or one or both shoulders or arms. ¨ Dizziness or lightheadedness. ¨ A fast or uneven pulse. After calling 911, chew 1 adult-strength aspirin. Wait for an ambulance. Do not try to drive yourself.  
  · You have been diagnosed with angina, and you have angina symptoms that do not go away with rest or are not getting better within 5 minutes after you take one dose of nitroglycerin.  
 Call your doctor now or seek immediate medical care if: 
  · You are bleeding from the area where the catheter was put in your artery.  
  · You have a fast-growing, painful lump at the catheter site.  
  · You have signs of infection, such as: 
¨ Increased pain, swelling, warmth, or redness. ¨ Red streaks leading from the catheter site. ¨ Pus draining from the catheter site. ¨ A fever.  
  · Your leg or arm looks blue or feels cold, numb, or tingly.  
 Watch closely for changes in your health, and be sure to contact your doctor if you have any problems. Where can you learn more? Go to http://audra-christal.info/. Enter J587 in the search box to learn more about \"Percutaneous Coronary Intervention: What to Expect at Home. \" Current as of: December 6, 2017 Content Version: 11.7 © 9129-3585 [employer]. Care instructions adapted under license by [employer] (which disclaims liability or warranty for this information). If you have questions about a medical condition or this instruction, always ask your healthcare professional. [address] any warranty or liability for your use of this information. ACO Transitions of Care [facility] offers a voluntary care coordination program to provide high quality service and care to [facility] fee-for-service beneficiaries. Dionte Olmstead was designed to help you enhance your health and well-being through the following services: ? Transitions of Care  support for individuals who are transitioning from one care setting to another (example: Hospital to home). ? Chronic and Complex Care Coordination  support for individuals and caregivers of those with serious or chronic illnesses or with more than one chronic (ongoing) condition and those who take a number of different medications. If you meet specific medical criteria, a [address] [place] [address] Rd may call you directly to coordinate your care with your primary care physician and your other care providers. For questions about the [facility] programs, please, contact your physicians office. For general questions or additional information about Accountable Care Organizations: 
Please visit www.medicare.gov/acos. html or call 1-800-MEDICARE (1-459.221.3376) TTY users should call 5-926.861.3754. Mydeo Announcement We are excited to announce that we are making your provider's discharge notes available to you in Mydeo. You will see these notes when they are completed and signed by the physician that discharged you from your recent hospital stay. If you have any questions or concerns about any information you see in Mydeo, please call the Health Information Department where you were seen or reach out to your Primary Care Provider for more information about your plan of care. Introducing [facility] & HEALTH SERVICES! Dear Misti Captain: Thank you for requesting a Mydeo account. Our records indicate that you already have an active Mydeo account. You can access your account anytime at [email or web address] Did you know that you can access your hospital and ER discharge instructions at any time in Mydeo? You can also review all of your test results from your hospital stay or ER visit. Additional Information If you have questions, please visit the Frequently Asked Questions section of the Mydeo website at [email or web address] Remember, Mydeo is NOT to be used for urgent needs. For medical emergencies, dial 911. Now available from your iPhone and Android! Introducing Kvng Espinoza As a [employer] patient, I wanted to make you aware of our electronic visit tool called Kvng Espinoza. Loopport allows you to connect within minutes with a medical provider 24 hours a day, seven days a week via a mobile device or tablet or logging into a secure website from your computer. You can access Kvng Espinoza from anywhere in the [place]. A virtual visit might be right for you when you have a simple condition and feel like you just dont want to get out of bed, or cant get away from work for an appointment, when your regular [employer] provider is not available (evenings, weekends or holidays), or when youre out of town and need minor care. Electronic visits cost only $49 and if the Loopport provider determines a prescription is needed to treat your condition, one can be electronically transmitted to a nearby pharmacy*. Please take a moment to enroll today if you have not already done so. The enrollment process is free and takes just a few minutes. To enroll, please download the Loopport alex to your tablet or phone, or visit [email or web address] to enroll on your computer. And, as an [address] [place] [address] patient with a Mohound account, the results of your visits will be scanned into your electronic medical record and your primary care provider will be able to view the scanned results. We urge you to continue to see your regular [facility] provider for your ongoing medical care. And while your primary care provider may not be the one available when you seek a Kvng Espinoza virtual visit, the peace of mind you get from getting a real diagnosis real time can be priceless. For more information on Kvng Espinoza, view our Frequently Asked Questions (FAQs) at [email or web address]. Sincerely, 
 
Clem Vazquez MD 
Chief Medical Officer [employer] *:  certain medications cannot be prescribed via Kvng Espinoza Providers Seen During Your Hospitalization Provider Specialty Primary office phone Haylie Vazquez MD Emergency Medicine [phone] Chris Amato MD Family Practice [phone] Bk Lees MD Cardiology [phone] Your Primary Care Physician (PCP) Primary Care Physician Office Phone Office Fax Eleonora Zepeda [facility] [phone] You are allergic to the following Allergen Reactions Nuts (Tree Nut) Other (comments) Mouth sores. Walnuts, pecans Sulfa (Sulfonamide Antibiotics) Hives Recent Documentation Height Weight BMI OB Status Smoking Status 1.6 m 63.5 kg 24.8 kg/m2 Postmenopausal Former Smoker Emergency Contacts Name Discharge Info Relation Home Work Mobile SamuelDexter DISCHARGE CAREGIVER [3] Spouse [3] [phone] Patient Belongings The following personal items are in your possession at time of discharge: 
     Visual Aid: None Please provide this summary of care documentation to your next provider. Signatures-by signing, you are acknowledging that this After Visit Summary has been reviewed with you and you have received a copy. Patient Signature:  ____________________________________________________________ Date:  ____________________________________________________________  
  
Marvin Spaulding Provider Signature:  ____________________________________________________________ Date:  ____________________________________________________________

## 2018-08-13 LAB
ANION GAP SERPL CALC-SCNC: 10 MMOL/L (ref 5–15)
ATRIAL RATE: 59 BPM
ATRIAL RATE: 64 BPM
BASOPHILS # BLD: 0.1 K/UL (ref 0–0.1)
BASOPHILS NFR BLD: 1 % (ref 0–1)
BUN SERPL-MCNC: 17 MG/DL (ref 6–20)
BUN/CREAT SERPL: 24 (ref 12–20)
CALCIUM SERPL-MCNC: 8 MG/DL (ref 8.5–10.1)
CALCULATED P AXIS, ECG09: 75 DEGREES
CALCULATED P AXIS, ECG09: 78 DEGREES
CALCULATED R AXIS, ECG10: 36 DEGREES
CALCULATED R AXIS, ECG10: 43 DEGREES
CALCULATED T AXIS, ECG11: 52 DEGREES
CALCULATED T AXIS, ECG11: 66 DEGREES
CHLORIDE SERPL-SCNC: 111 MMOL/L (ref 97–108)
CO2 SERPL-SCNC: 23 MMOL/L (ref 21–32)
CREAT SERPL-MCNC: 0.71 MG/DL (ref 0.55–1.02)
DIAGNOSIS, 93000: NORMAL
DIAGNOSIS, 93000: NORMAL
DIFFERENTIAL METHOD BLD: ABNORMAL
EOSINOPHIL # BLD: 0.4 K/UL (ref 0–0.4)
EOSINOPHIL NFR BLD: 6 % (ref 0–7)
ERYTHROCYTE [DISTWIDTH] IN BLOOD BY AUTOMATED COUNT: 12.9 % (ref 11.5–14.5)
GLUCOSE SERPL-MCNC: 94 MG/DL (ref 65–100)
HCT VFR BLD AUTO: 35.8 % (ref 35–47)
HGB BLD-MCNC: 11.6 G/DL (ref 11.5–16)
IMM GRANULOCYTES # BLD: 0 K/UL (ref 0–0.04)
IMM GRANULOCYTES NFR BLD AUTO: 0 % (ref 0–0.5)
LYMPHOCYTES # BLD: 2.5 K/UL (ref 0.8–3.5)
LYMPHOCYTES NFR BLD: 37 % (ref 12–49)
MCH RBC QN AUTO: 31.6 PG (ref 26–34)
MCHC RBC AUTO-ENTMCNC: 32.4 G/DL (ref 30–36.5)
MCV RBC AUTO: 97.5 FL (ref 80–99)
MONOCYTES # BLD: 0.6 K/UL (ref 0–1)
MONOCYTES NFR BLD: 9 % (ref 5–13)
NEUTS SEG # BLD: 3.3 K/UL (ref 1.8–8)
NEUTS SEG NFR BLD: 48 % (ref 32–75)
NRBC # BLD: 0 K/UL (ref 0–0.01)
NRBC BLD-RTO: 0 PER 100 WBC
P-R INTERVAL, ECG05: 236 MS
P-R INTERVAL, ECG05: 240 MS
PLATELET # BLD AUTO: 237 K/UL (ref 150–400)
PMV BLD AUTO: 11.5 FL (ref 8.9–12.9)
POTASSIUM SERPL-SCNC: 3.2 MMOL/L (ref 3.5–5.1)
Q-T INTERVAL, ECG07: 422 MS
Q-T INTERVAL, ECG07: 462 MS
QRS DURATION, ECG06: 102 MS
QRS DURATION, ECG06: 106 MS
QTC CALCULATION (BEZET), ECG08: 435 MS
QTC CALCULATION (BEZET), ECG08: 457 MS
RBC # BLD AUTO: 3.67 M/UL (ref 3.8–5.2)
SODIUM SERPL-SCNC: 144 MMOL/L (ref 136–145)
TROPONIN I SERPL-MCNC: 9.82 NG/ML
VENTRICULAR RATE, ECG03: 59 BPM
VENTRICULAR RATE, ECG03: 64 BPM
WBC # BLD AUTO: 6.9 K/UL (ref 3.6–11)

## 2018-08-13 PROCEDURE — 4A023N7 MEASUREMENT OF CARDIAC SAMPLING AND PRESSURE, LEFT HEART, PERCUTANEOUS APPROACH: ICD-10-PCS | Performed by: INTERNAL MEDICINE

## 2018-08-13 PROCEDURE — 80048 BASIC METABOLIC PNL TOTAL CA: CPT | Performed by: NURSE PRACTITIONER

## 2018-08-13 PROCEDURE — 85025 COMPLETE CBC W/AUTO DIFF WBC: CPT | Performed by: NURSE PRACTITIONER

## 2018-08-13 PROCEDURE — C1874 STENT, COATED/COV W/DEL SYS: HCPCS

## 2018-08-13 PROCEDURE — 77030010221 HC SPLNT WR POS TELE -B

## 2018-08-13 PROCEDURE — 74011250637 HC RX REV CODE- 250/637: Performed by: FAMILY MEDICINE

## 2018-08-13 PROCEDURE — 93041 RHYTHM ECG TRACING: CPT

## 2018-08-13 PROCEDURE — C1769 GUIDE WIRE: HCPCS

## 2018-08-13 PROCEDURE — 65660000000 HC RM CCU STEPDOWN

## 2018-08-13 PROCEDURE — 77030013715 HC INFL SYS MRTM -B

## 2018-08-13 PROCEDURE — 3E03317 INTRODUCTION OF OTHER THROMBOLYTIC INTO PERIPHERAL VEIN, PERCUTANEOUS APPROACH: ICD-10-PCS | Performed by: INTERNAL MEDICINE

## 2018-08-13 PROCEDURE — C1887 CATHETER, GUIDING: HCPCS

## 2018-08-13 PROCEDURE — B2111ZZ FLUOROSCOPY OF MULTIPLE CORONARY ARTERIES USING LOW OSMOLAR CONTRAST: ICD-10-PCS | Performed by: INTERNAL MEDICINE

## 2018-08-13 PROCEDURE — 74011250636 HC RX REV CODE- 250/636: Performed by: FAMILY MEDICINE

## 2018-08-13 PROCEDURE — 77030019569 HC BND COMPR RAD TERU -B

## 2018-08-13 PROCEDURE — 77030013744

## 2018-08-13 PROCEDURE — 74011250637 HC RX REV CODE- 250/637: Performed by: INTERNAL MEDICINE

## 2018-08-13 PROCEDURE — 74011000250 HC RX REV CODE- 250: Performed by: INTERNAL MEDICINE

## 2018-08-13 PROCEDURE — 027034Z DILATION OF CORONARY ARTERY, ONE ARTERY WITH DRUG-ELUTING INTRALUMINAL DEVICE, PERCUTANEOUS APPROACH: ICD-10-PCS | Performed by: INTERNAL MEDICINE

## 2018-08-13 PROCEDURE — 93454 CORONARY ARTERY ANGIO S&I: CPT

## 2018-08-13 PROCEDURE — C1894 INTRO/SHEATH, NON-LASER: HCPCS

## 2018-08-13 PROCEDURE — 74011000258 HC RX REV CODE- 258: Performed by: INTERNAL MEDICINE

## 2018-08-13 PROCEDURE — 84484 ASSAY OF TROPONIN QUANT: CPT | Performed by: NURSE PRACTITIONER

## 2018-08-13 PROCEDURE — 36415 COLL VENOUS BLD VENIPUNCTURE: CPT | Performed by: NURSE PRACTITIONER

## 2018-08-13 PROCEDURE — 74011636320 HC RX REV CODE- 636/320: Performed by: INTERNAL MEDICINE

## 2018-08-13 PROCEDURE — 74011250636 HC RX REV CODE- 250/636: Performed by: INTERNAL MEDICINE

## 2018-08-13 RX ORDER — SODIUM CHLORIDE 0.9 % (FLUSH) 0.9 %
5-10 SYRINGE (ML) INJECTION AS NEEDED
Status: DISCONTINUED | OUTPATIENT
Start: 2018-08-13 | End: 2018-08-14 | Stop reason: HOSPADM

## 2018-08-13 RX ORDER — ATROPINE SULFATE 0.1 MG/ML
0.4 INJECTION INTRAVENOUS AS NEEDED
Status: DISCONTINUED | OUTPATIENT
Start: 2018-08-13 | End: 2018-08-13

## 2018-08-13 RX ORDER — SODIUM CHLORIDE 9 MG/ML
1.5 INJECTION, SOLUTION INTRAVENOUS CONTINUOUS
Status: DISCONTINUED | OUTPATIENT
Start: 2018-08-13 | End: 2018-08-13 | Stop reason: HOSPADM

## 2018-08-13 RX ORDER — HEPARIN SODIUM 200 [USP'U]/100ML
2000 INJECTION, SOLUTION INTRAVENOUS ONCE
Status: COMPLETED | OUTPATIENT
Start: 2018-08-13 | End: 2018-08-13

## 2018-08-13 RX ORDER — MIDAZOLAM HYDROCHLORIDE 1 MG/ML
1-10 INJECTION, SOLUTION INTRAMUSCULAR; INTRAVENOUS
Status: DISCONTINUED | OUTPATIENT
Start: 2018-08-13 | End: 2018-08-13

## 2018-08-13 RX ORDER — POTASSIUM CHLORIDE 750 MG/1
40 TABLET, FILM COATED, EXTENDED RELEASE ORAL
Status: COMPLETED | OUTPATIENT
Start: 2018-08-13 | End: 2018-08-13

## 2018-08-13 RX ORDER — METOPROLOL TARTRATE 25 MG/1
12.5 TABLET, FILM COATED ORAL EVERY 12 HOURS
Status: DISCONTINUED | OUTPATIENT
Start: 2018-08-13 | End: 2018-08-14 | Stop reason: HOSPADM

## 2018-08-13 RX ORDER — VERAPAMIL HYDROCHLORIDE 2.5 MG/ML
2.5-5 INJECTION, SOLUTION INTRAVENOUS
Status: DISCONTINUED | OUTPATIENT
Start: 2018-08-13 | End: 2018-08-13

## 2018-08-13 RX ORDER — POTASSIUM CHLORIDE 7.45 MG/ML
10 INJECTION INTRAVENOUS
Status: DISCONTINUED | OUTPATIENT
Start: 2018-08-13 | End: 2018-08-13

## 2018-08-13 RX ORDER — FENTANYL CITRATE 50 UG/ML
25-200 INJECTION, SOLUTION INTRAMUSCULAR; INTRAVENOUS
Status: DISCONTINUED | OUTPATIENT
Start: 2018-08-13 | End: 2018-08-13

## 2018-08-13 RX ORDER — POTASSIUM CHLORIDE 14.9 MG/ML
10 INJECTION INTRAVENOUS ONCE
Status: DISCONTINUED | OUTPATIENT
Start: 2018-08-13 | End: 2018-08-13 | Stop reason: SDUPTHER

## 2018-08-13 RX ORDER — SODIUM CHLORIDE 0.9 % (FLUSH) 0.9 %
5-10 SYRINGE (ML) INJECTION EVERY 8 HOURS
Status: DISCONTINUED | OUTPATIENT
Start: 2018-08-13 | End: 2018-08-14 | Stop reason: HOSPADM

## 2018-08-13 RX ORDER — HEPARIN SODIUM 1000 [USP'U]/ML
5000 INJECTION, SOLUTION INTRAVENOUS; SUBCUTANEOUS AS NEEDED
Status: DISCONTINUED | OUTPATIENT
Start: 2018-08-13 | End: 2018-08-13

## 2018-08-13 RX ORDER — SODIUM CHLORIDE 9 MG/ML
3 INJECTION, SOLUTION INTRAVENOUS CONTINUOUS
Status: DISCONTINUED | OUTPATIENT
Start: 2018-08-13 | End: 2018-08-13 | Stop reason: HOSPADM

## 2018-08-13 RX ORDER — ROSUVASTATIN CALCIUM 10 MG/1
20 TABLET, COATED ORAL DAILY
Status: DISCONTINUED | OUTPATIENT
Start: 2018-08-13 | End: 2018-08-14 | Stop reason: HOSPADM

## 2018-08-13 RX ORDER — SODIUM CHLORIDE 0.9 % (FLUSH) 0.9 %
10 SYRINGE (ML) INJECTION AS NEEDED
Status: DISCONTINUED | OUTPATIENT
Start: 2018-08-13 | End: 2018-08-13 | Stop reason: HOSPADM

## 2018-08-13 RX ORDER — LIDOCAINE HYDROCHLORIDE 10 MG/ML
5-30 INJECTION INFILTRATION; PERINEURAL AS NEEDED
Status: DISCONTINUED | OUTPATIENT
Start: 2018-08-13 | End: 2018-08-13

## 2018-08-13 RX ADMIN — POTASSIUM CHLORIDE 40 MEQ: 750 TABLET, EXTENDED RELEASE ORAL at 13:02

## 2018-08-13 RX ADMIN — BUPROPION HYDROCHLORIDE 75 MG: 75 TABLET, FILM COATED ORAL at 09:43

## 2018-08-13 RX ADMIN — NITROGLYCERIN 1 INCH: 20 OINTMENT TOPICAL at 05:23

## 2018-08-13 RX ADMIN — NITROGLYCERIN 200 MCG: 5 INJECTION, SOLUTION INTRAVENOUS at 07:32

## 2018-08-13 RX ADMIN — HEPARIN SODIUM 1000 UNITS: 1000 INJECTION, SOLUTION INTRAVENOUS; SUBCUTANEOUS at 07:18

## 2018-08-13 RX ADMIN — LIDOCAINE HYDROCHLORIDE 5 ML: 10 INJECTION, SOLUTION INFILTRATION; PERINEURAL at 07:19

## 2018-08-13 RX ADMIN — OXYBUTYNIN CHLORIDE 10 MG: 5 TABLET, EXTENDED RELEASE ORAL at 17:22

## 2018-08-13 RX ADMIN — BIVALIRUDIN 1.75 MG/KG/HR: 250 INJECTION, POWDER, LYOPHILIZED, FOR SOLUTION INTRAVENOUS at 07:26

## 2018-08-13 RX ADMIN — MIDAZOLAM HYDROCHLORIDE 1 MG: 1 INJECTION, SOLUTION INTRAMUSCULAR; INTRAVENOUS at 07:32

## 2018-08-13 RX ADMIN — NITROGLYCERIN 1 INCH: 20 OINTMENT TOPICAL at 11:19

## 2018-08-13 RX ADMIN — Medication 10 ML: at 17:16

## 2018-08-13 RX ADMIN — TICAGRELOR 180 MG: 90 TABLET ORAL at 07:38

## 2018-08-13 RX ADMIN — ASPIRIN 81 MG CHEWABLE TABLET 81 MG: 81 TABLET CHEWABLE at 08:07

## 2018-08-13 RX ADMIN — MIDAZOLAM HYDROCHLORIDE 2 MG: 1 INJECTION, SOLUTION INTRAMUSCULAR; INTRAVENOUS at 07:14

## 2018-08-13 RX ADMIN — TICAGRELOR 90 MG: 90 TABLET ORAL at 18:43

## 2018-08-13 RX ADMIN — NITROGLYCERIN 1 INCH: 20 OINTMENT TOPICAL at 17:22

## 2018-08-13 RX ADMIN — METOPROLOL TARTRATE 12.5 MG: 25 TABLET ORAL at 09:44

## 2018-08-13 RX ADMIN — FOLIC ACID TAB 400 MCG 0.8 MG: 400 TAB at 09:44

## 2018-08-13 RX ADMIN — Medication 10 ML: at 21:13

## 2018-08-13 RX ADMIN — ROSUVASTATIN CALCIUM 20 MG: 10 TABLET, FILM COATED ORAL at 11:19

## 2018-08-13 RX ADMIN — HEPARIN SODIUM 2000 UNITS: 200 INJECTION, SOLUTION INTRAVENOUS at 07:20

## 2018-08-13 RX ADMIN — VERAPAMIL HYDROCHLORIDE 5 MG: 2.5 INJECTION, SOLUTION INTRAVENOUS at 07:19

## 2018-08-13 RX ADMIN — FENTANYL CITRATE 25 MCG: 50 INJECTION, SOLUTION INTRAMUSCULAR; INTRAVENOUS at 07:32

## 2018-08-13 RX ADMIN — FENTANYL CITRATE 50 MCG: 50 INJECTION, SOLUTION INTRAMUSCULAR; INTRAVENOUS at 07:14

## 2018-08-13 RX ADMIN — CETIRIZINE HYDROCHLORIDE 10 MG: 10 TABLET, FILM COATED ORAL at 09:44

## 2018-08-13 RX ADMIN — Medication 10 ML: at 05:24

## 2018-08-13 RX ADMIN — IOPAMIDOL 123 ML: 755 INJECTION, SOLUTION INTRAVENOUS at 07:39

## 2018-08-13 RX ADMIN — POTASSIUM CHLORIDE 10 MEQ: 10 INJECTION, SOLUTION INTRAVENOUS at 11:19

## 2018-08-13 RX ADMIN — SODIUM CHLORIDE 1.5 ML/KG/HR: 900 INJECTION, SOLUTION INTRAVENOUS at 08:04

## 2018-08-13 RX ADMIN — Medication 1000 MCG: at 09:43

## 2018-08-13 RX ADMIN — AMLODIPINE BESYLATE 5 MG: 5 TABLET ORAL at 08:07

## 2018-08-13 RX ADMIN — Medication 10 ML: at 11:21

## 2018-08-13 RX ADMIN — SODIUM CHLORIDE 3 ML/KG/HR: 900 INJECTION, SOLUTION INTRAVENOUS at 07:07

## 2018-08-13 RX ADMIN — METOPROLOL TARTRATE 12.5 MG: 25 TABLET ORAL at 21:12

## 2018-08-13 RX ADMIN — IOPAMIDOL 50 ML: 755 INJECTION, SOLUTION INTRAVENOUS at 07:40

## 2018-08-13 NOTE — PROGRESS NOTES
Reviewed radial cath care site, stent card etc with pt and her . Hard copy for instruction with stent card.

## 2018-08-13 NOTE — PROGRESS NOTES
TRANSFER - IN REPORT:    Verbal report received from RUST 72. on [employer]  being received from procedure for routine progression of care. Report consisted of patients Situation, Background, Assessment and Recommendations(SBAR). Information from the following report(s) Procedure Summary, MAR, Recent Results and Med Rec Status was reviewed with the receiving clinician. Opportunity for questions and clarification was provided. Assessment completed upon patients arrival to [address] [place] [address] and care assumed. Cardiac Cath Lab Recovery Arrival Note:    [employer] arrived to [facility] recovery area. Patient procedure= LHC/stent. Patient on cardiac monitor, non-invasive blood pressure, SPO2 monitor. On O2 @ 2 lpm via n/c. IV  of nacl on pump at 186 ml/hr. Patient status doing well without problems. Patient is A&Ox 4. Patient reports no complaints. PROCEDURE SITE CHECK:    Procedure site:without any bleeding and or hematoma, no pain/discomfort reported at procedure site. No change in patient status. Continue to monitor patient and status.

## 2018-08-13 NOTE — PROGRESS NOTES
Problem: Falls - Risk of  Goal: *Absence of Falls  Document Ina Fall Risk and appropriate interventions in the flowsheet.    Outcome: Progressing Towards Goal  Fall Risk Interventions:            Medication Interventions: Evaluate medications/consider consulting pharmacy, Patient to call before getting OOB, Teach patient to arise slowly                  Problem: Patient Education: Go to Patient Education Activity  Goal: Patient/Family Education  Outcome: Progressing Towards Goal  Pt calls for assistance

## 2018-08-13 NOTE — PROGRESS NOTES
TRANSFER - OUT REPORT:    Verbal report given to Mandy MEDELLIN(name) on Fabi Baker  being transferred to [facility]) for routine progression of care       Report consisted of patients Situation, Background, Assessment and   Recommendations(SBAR). Information from the following report(s) Procedure Summary, Intake/Output, MAR, Recent Results, Med Rec Status and Cardiac Rhythm SR/SB 1st degree was reviewed with the receiving nurse. Lines:   Peripheral IV 08/12/18 Right Antecubital (Active)   Site Assessment Clean, dry, & intact 8/13/2018  4:10 AM   Phlebitis Assessment 0 8/13/2018  4:10 AM   Infiltration Assessment 0 8/13/2018  4:10 AM   Dressing Status Clean, dry, & intact 8/13/2018  4:10 AM   Dressing Type Transparent;Tape 8/13/2018  4:10 AM   Hub Color/Line Status Pink 8/13/2018  4:10 AM   Action Taken Open ports on tubing capped 8/13/2018  4:10 AM   Alcohol Cap Used Yes 8/13/2018  4:10 AM        Opportunity for questions and clarification was provided. Patient transported with:   Monitor  Registered Nurse   Pt to room 404 via stretcher.

## 2018-08-13 NOTE — PROGRESS NOTES
Hospitalist Progress Note  Shimon Granados MD  Answering service: [phone] OR [phone] from in house phone        Date of Service:  2018  NAME:  Missael Lynne YOB: 1945  MRN:  [id number]      Admission Summary:   67 y.o. female with pmhx of HTN,TIA,Asthma OAB who presents with chest pain,mid sternal Lt chest with radiation to Lt jaw Lt arm,initially thought GERD,but symptoms did not resolve. In the ER pt received ASA,&NG,noted with elevated troponin 0.14,ekg with no acute ischemic changes,CXR neg. Pt was started on heparin infusion,card.consulted. Pt admitted for NSTEMI    Interval history / Subjective:   No CP, no SOB, palpitation, dizziness     Assessment & Plan:      NSTEMI   - elevated troponin 0.14>3.91>11.30>> 9.8  - cardiology consult  -s/p LHC,OM2 (99% prox stenosis)-PCI-DESx1  - Started Brilinta ,cont asa, NG,statin  - started on lorpressor  - Morphine as needed  -Cardiology Consult appreciated    Hypokalemia  -replace   -monitor     GERD   - Famotidine      Asthma,mild intermittent  - home med     HTN  - uncontrolled   - Norvasc   - Lopressor has been added     OAB  - Oxybutynin    Code status: full  DVT prophylaxis: Lovenox    Care Plan discussed with: Patient/Family, Nurse and   Disposition: monitor overnight, if stable d/c am with family      Hospital Problems  Date Reviewed: 2018          Codes Class Noted POA    * (Principal)Chest pain ICD-10-CM: R07.9  ICD-9-CM: 786.50  2018 Unknown        Elevated troponin ICD-10-CM: R74.8  ICD-9-CM: 790.6  2018 Unknown                Review of Systems:   A comprehensive review of systems was negative except for that written in the HPI. Vital Signs:    Last 24hrs VS reviewed since prior progress note.  Most recent are:  Visit Vitals    /80    Pulse 75    Temp 98.2 °F (36.8 °C)    Resp 22    Ht 5' 3\" (1.6 m)    Wt 62.1 kg (136 lb 14.5 oz)    SpO2 100%    BMI 24.25 kg/m2       No intake or output data in the 24 hours ending 08/13/18 0740     Physical Examination:             Constitutional:  No acute distress, cooperative, pleasant    ENT:  Oral mucous moist, oropharynx benign. Neck supple,    Resp:  CTA bilaterally. No wheezing/rhonchi/rales. No accessory muscle use   CV:  Regular rhythm, normal rate, no murmurs, gallops, rubs    GI:  Soft, non distended, non tender. normoactive bowel sounds, no hepatosplenomegaly     Musculoskeletal:  No edema, warm, 2+ pulses throughout    Neurologic:  Moves all extremities. AAOx3,no focal deficit            Data Review:    Review and/or order of clinical lab test      Labs:     Recent Labs      08/13/18 0407 08/12/18 2153   WBC  6.9  6.0   HGB  11.6  12.1   HCT  35.8  36.7   PLT  237  270     Recent Labs      08/13/18   0407 08/12/18   0940   NA  144  139   K  3.2*  3.5   CL  111*  106   CO2  23  27   BUN  17  19   CREA  0.71  0.75   GLU  94  90   CA  8.0*  8.7   MG   --   2.0     Recent Labs      08/12/18   0940   SGOT  15   ALT  20   AP  68   TBILI  0.4   TP  7.0   ALB  3.7   GLOB  3.3     No results for input(s): INR, PTP, APTT in the last 72 hours. No lab exists for component: INREXT   No results for input(s): FE, TIBC, PSAT, FERR in the last 72 hours. No results found for: FOL, RBCF   No results for input(s): PH, PCO2, PO2 in the last 72 hours.   Recent Labs      08/13/18   0407 08/12/18 2153  08/12/18   1540   TROIQ  9.82*  11.30*  3.91*     Lab Results   Component Value Date/Time    Cholesterol, total 217 (H) 05/22/2018 08:34 AM    HDL Cholesterol 72 05/22/2018 08:34 AM    LDL, calculated 128 (H) 05/22/2018 08:34 AM    Triglyceride 87 05/22/2018 08:34 AM    CHOL/HDL Ratio 3.7 05/08/2013 04:04 AM     No results found for: GLUCPOC  Lab Results   Component Value Date/Time    Color YELLOW 08/13/2010 08:50 AM    Appearance CLEAR 08/13/2010 08:50 AM    Specific gravity 1.018 08/13/2010 08:50 AM    pH (UA) 7.0 08/13/2010 08:50 AM    Protein NEGATIVE  08/13/2010 08:50 AM    Glucose NEGATIVE  08/13/2010 08:50 AM    Ketone NEGATIVE  08/13/2010 08:50 AM    Bilirubin NEGATIVE  08/13/2010 08:50 AM    Urobilinogen 0.2 08/13/2010 08:50 AM    Nitrites NEGATIVE  08/13/2010 08:50 AM    Leukocyte Esterase SMALL (A) 08/13/2010 08:50 AM    Epithelial cells 0-5 08/13/2010 08:50 AM    Bacteria NEGATIVE  08/13/2010 08:50 AM    WBC 0-4 08/13/2010 08:50 AM    RBC 0-3 08/13/2010 08:50 AM         Medications Reviewed:     Current Facility-Administered Medications   Medication Dose Route Frequency    atropine injection 0.4 mg  0.4 mg IntraVENous PRN    bivalirudin (ANGIOMAX) 250 mg in 0.9% sodium chloride (MBP/ADV) 50 mL  1.75 mg/kg/hr IntraVENous CONTINUOUS    0.9% sodium chloride infusion  1.5 mL/kg/hr IntraVENous CONTINUOUS    0.9% sodium chloride infusion  3 mL/kg/hr IntraVENous CONTINUOUS    fentaNYL citrate (PF) injection  mcg   mcg IntraVENous Multiple    heparin (porcine) 1,000 unit/mL injection 5,000 Units  5,000 Units IntraVENous PRN    iopamidol (ISOVUE-370) 76 % injection 100-400 mL  100-400 mL IntraVENous PRN    iopamidol (ISOVUE-370) 76 % injection 50 mL  50 mL IntraVENous PRN    lidocaine (XYLOCAINE) 10 mg/mL (1 %) injection 5-30 mL  5-30 mL SubCUTAneous PRN    midazolam (VERSED) injection 1-10 mg  1-10 mg IntraVENous Multiple    nitroglycerin 100 mcg/ml compounded injection  100-1,000 mcg IntraCORONary Multiple    saline peripheral flush soln 10 mL  10 mL InterCATHeter PRN    verapamil (ISOPTIN) 2.5 mg/mL injection 2.5-5 mg  2.5-5 mg IntraVENous Multiple    amLODIPine (NORVASC) tablet 5 mg  5 mg Oral DAILY    aspirin chewable tablet 81 mg  81 mg Oral DAILY    cetirizine (ZYRTEC) tablet 10 mg  10 mg Oral DAILY    cyanocobalamin (VITAMIN B12) tablet 1,000 mcg  1,000 mcg Oral DAILY    folic acid tablet 0.8 mg  800 mcg Oral DAILY    ipratropium (ATROVENT) 42 mcg (0.06 %) nasal spray 1 Spray  1 Spray Both Nostrils Q4H PRN    albuterol (PROVENTIL VENTOLIN) nebulizer solution 2.5 mg  2.5 mg Nebulization Q4H PRN    [START ON 8/15/2018] montelukast (SINGULAIR) tablet 10 mg  10 mg Oral Q7D    oxybutynin chloride XL (DITROPAN XL) tablet 10 mg  10 mg Oral DAILY    sodium chloride (NS) flush 5-10 mL  5-10 mL IntraVENous Q8H    sodium chloride (NS) flush 5-10 mL  5-10 mL IntraVENous PRN    0.9% sodium chloride infusion  100 mL/hr IntraVENous CONTINUOUS    nitroglycerin (NITROBID) 2 % ointment 1 Inch  1 Inch Topical Q6H    nitroglycerin (NITROSTAT) tablet 0.4 mg  0.4 mg SubLINGual Q5MIN PRN    enoxaparin (LOVENOX) injection 60 mg  1 mg/kg SubCUTAneous Q12H    buPROPion (WELLBUTRIN) tablet 75 mg  75 mg Oral DAILY     ______________________________________________________________________  EXPECTED LENGTH OF STAY: - - -  ACTUAL LENGTH OF STAY:          1                 Nury Clarke MD

## 2018-08-13 NOTE — PROGRESS NOTES
TRANSFER - IN REPORT:    Verbal report received from Aida(name) on Fabi Baker  being received from cath lab(unit) for ordered procedure      Report consisted of patients Situation, Background, Assessment and   Recommendations(SBAR). Information from the following report(s) SBAR, Kardex, Procedure Summary, MAR and Recent Results was reviewed with the receiving nurse. Opportunity for questions and clarification was provided. Assessment completed upon patients arrival to unit and care assumed.

## 2018-08-13 NOTE — CARDIO/PULMONARY
Cardiac Rehab: MI education folder, with catheterization brochure, to bedside of Fabi Baker. Educated using teach back method. Reviewed MI diagnosis definition and purpose of intervention. Discussed risk factors for CAD to include the following: family history, elevated BMI, hyperlipidemia, hypertension, diabetes, stress, and smoking. Discussed Heart Healthy/Low Sodium (2000 mg) diet. Reviewed the importance of medication compliance, the purpose of BRILINTA AND METOPROLOL, and potential side effects. Discussed follow up appointments with cardiologist, signs and symptoms of angina, and what to report to physician after discharge. Emphasized the value of cardiac rehab. Discussed Cardiac Rehab Program format, benefits, and encouraged enrollment to assist with risk modification and management. Initial Cardiac Rehab Program intake appointment scheduled for 8/23/2018 and appointment information is on the AVS.    Fabi Baker verbalized understanding with questions answered.   Willi Jonas RN

## 2018-08-14 ENCOUNTER — DOCUMENTATION ONLY (OUTPATIENT)
Dept: CASE MANAGEMENT | Age: 73
End: 2018-08-14

## 2018-08-14 VITALS
HEIGHT: 63 IN | WEIGHT: 139.99 LBS | HEART RATE: 71 BPM | RESPIRATION RATE: 18 BRPM | TEMPERATURE: 98.5 F | OXYGEN SATURATION: 98 % | SYSTOLIC BLOOD PRESSURE: 153 MMHG | BODY MASS INDEX: 24.8 KG/M2 | DIASTOLIC BLOOD PRESSURE: 78 MMHG

## 2018-08-14 LAB
ANION GAP SERPL CALC-SCNC: 5 MMOL/L (ref 5–15)
BASOPHILS # BLD: 0 K/UL (ref 0–0.1)
BASOPHILS NFR BLD: 1 % (ref 0–1)
BUN SERPL-MCNC: 14 MG/DL (ref 6–20)
BUN/CREAT SERPL: 21 (ref 12–20)
CALCIUM SERPL-MCNC: 8.9 MG/DL (ref 8.5–10.1)
CHLORIDE SERPL-SCNC: 108 MMOL/L (ref 97–108)
CO2 SERPL-SCNC: 26 MMOL/L (ref 21–32)
CREAT SERPL-MCNC: 0.66 MG/DL (ref 0.55–1.02)
DIFFERENTIAL METHOD BLD: ABNORMAL
EOSINOPHIL # BLD: 0.4 K/UL (ref 0–0.4)
EOSINOPHIL NFR BLD: 5 % (ref 0–7)
ERYTHROCYTE [DISTWIDTH] IN BLOOD BY AUTOMATED COUNT: 12.9 % (ref 11.5–14.5)
GLUCOSE SERPL-MCNC: 112 MG/DL (ref 65–100)
HCT VFR BLD AUTO: 36.5 % (ref 35–47)
HGB BLD-MCNC: 11.9 G/DL (ref 11.5–16)
IMM GRANULOCYTES # BLD: 0 K/UL (ref 0–0.04)
IMM GRANULOCYTES NFR BLD AUTO: 0 % (ref 0–0.5)
LYMPHOCYTES # BLD: 1.7 K/UL (ref 0.8–3.5)
LYMPHOCYTES NFR BLD: 20 % (ref 12–49)
MCH RBC QN AUTO: 31.6 PG (ref 26–34)
MCHC RBC AUTO-ENTMCNC: 32.6 G/DL (ref 30–36.5)
MCV RBC AUTO: 96.8 FL (ref 80–99)
MONOCYTES # BLD: 0.8 K/UL (ref 0–1)
MONOCYTES NFR BLD: 9 % (ref 5–13)
NEUTS SEG # BLD: 5.6 K/UL (ref 1.8–8)
NEUTS SEG NFR BLD: 66 % (ref 32–75)
NRBC # BLD: 0 K/UL (ref 0–0.01)
NRBC BLD-RTO: 0 PER 100 WBC
PLATELET # BLD AUTO: 257 K/UL (ref 150–400)
PMV BLD AUTO: 11.6 FL (ref 8.9–12.9)
POTASSIUM SERPL-SCNC: 4 MMOL/L (ref 3.5–5.1)
RBC # BLD AUTO: 3.77 M/UL (ref 3.8–5.2)
SODIUM SERPL-SCNC: 139 MMOL/L (ref 136–145)
WBC # BLD AUTO: 8.6 K/UL (ref 3.6–11)

## 2018-08-14 PROCEDURE — 93306 TTE W/DOPPLER COMPLETE: CPT

## 2018-08-14 PROCEDURE — 80048 BASIC METABOLIC PNL TOTAL CA: CPT | Performed by: FAMILY MEDICINE

## 2018-08-14 PROCEDURE — 74011250637 HC RX REV CODE- 250/637: Performed by: INTERNAL MEDICINE

## 2018-08-14 PROCEDURE — 36415 COLL VENOUS BLD VENIPUNCTURE: CPT | Performed by: FAMILY MEDICINE

## 2018-08-14 PROCEDURE — 74011250637 HC RX REV CODE- 250/637: Performed by: FAMILY MEDICINE

## 2018-08-14 PROCEDURE — 85025 COMPLETE CBC W/AUTO DIFF WBC: CPT | Performed by: FAMILY MEDICINE

## 2018-08-14 RX ORDER — ROSUVASTATIN CALCIUM 20 MG/1
20 TABLET, COATED ORAL DAILY
Qty: 90 TAB | Refills: 3 | Status: SHIPPED | OUTPATIENT
Start: 2018-08-14 | End: 2019-09-27

## 2018-08-14 RX ORDER — METOPROLOL TARTRATE 25 MG/1
12.5 TABLET, FILM COATED ORAL EVERY 12 HOURS
Qty: 60 TAB | Refills: 3 | Status: SHIPPED | OUTPATIENT
Start: 2018-08-14 | End: 2020-08-19 | Stop reason: SDUPTHER

## 2018-08-14 RX ADMIN — ROSUVASTATIN CALCIUM 20 MG: 10 TABLET, FILM COATED ORAL at 08:32

## 2018-08-14 RX ADMIN — Medication 1000 MCG: at 08:32

## 2018-08-14 RX ADMIN — CETIRIZINE HYDROCHLORIDE 10 MG: 10 TABLET, FILM COATED ORAL at 08:33

## 2018-08-14 RX ADMIN — Medication 10 ML: at 06:54

## 2018-08-14 RX ADMIN — AMLODIPINE BESYLATE 5 MG: 5 TABLET ORAL at 08:33

## 2018-08-14 RX ADMIN — TICAGRELOR 90 MG: 90 TABLET ORAL at 06:54

## 2018-08-14 RX ADMIN — NITROGLYCERIN 1 INCH: 20 OINTMENT TOPICAL at 00:00

## 2018-08-14 RX ADMIN — BUPROPION HYDROCHLORIDE 75 MG: 75 TABLET, FILM COATED ORAL at 08:33

## 2018-08-14 RX ADMIN — FOLIC ACID TAB 400 MCG 0.8 MG: 400 TAB at 08:32

## 2018-08-14 RX ADMIN — ASPIRIN 81 MG CHEWABLE TABLET 81 MG: 81 TABLET CHEWABLE at 08:32

## 2018-08-14 RX ADMIN — METOPROLOL TARTRATE 12.5 MG: 25 TABLET ORAL at 08:32

## 2018-08-14 NOTE — PROGRESS NOTES
Bedside shift change report given to Lisa Anne (oncoming nurse) by JAIRO Hazel (offgoing nurse). Report included the following information SBAR, Kardex, Procedure Summary, MAR and Recent Results.

## 2018-08-14 NOTE — DISCHARGE INSTRUCTIONS
Percutaneous Coronary Intervention: What to Expect at [facility]    Percutaneous coronary intervention (PCI) is the name for procedures that are used to open a narrowed or blocked coronary artery. The two most common PCI procedures are coronary angioplasty and coronary stent placement. Your groin or arm may have a bruise and feel sore for a day or two after a percutaneous coronary intervention (PCI). You can do light activities around the house, but nothing strenuous for several days. This care sheet gives you a general idea about how long it will take for you to recover. But each person recovers at a different pace. Follow the steps below to get better as quickly as possible. How can you care for yourself at home? Activity    · If the doctor gave you a sedative:  ¨ For 24 hours, don't do anything that requires attention to detail. It takes time for the medicine's effects to completely wear off. ¨ For your safety, do not drive or operate any machinery that could be dangerous. Wait until the medicine wears off and you can think clearly and react easily.     · Do not do strenuous exercise and do not lift, pull, or push anything heavy until your doctor says it is okay. This may be for a day or two. You can walk around the house and do light activity, such as cooking.     · If the catheter was placed in your groin, try not to walk up stairs for the first couple of days.     · If the catheter was placed in your arm near your wrist, do not bend your wrist deeply for the first couple of days. Be careful using your hand to get into and out of a chair or bed.     · Carry your stent identification card with you at all times.     · If your doctor recommends it, get more exercise. Walking is a good choice. Bit by bit, increase the amount you walk every day. Try for at least 30 minutes on most days of the week. Diet    · Drink plenty of fluids to help your body flush out the dye.  If you have kidney, heart, or liver disease and have to limit fluids, talk with your doctor before you increase the amount of fluids you drink.     · Keep eating a heart-healthy diet that has lots of fruits, vegetables, and whole grains. If you have not been eating this way, talk to your doctor. You also may want to talk to a dietitian. This expert can help you to learn about healthy foods and plan meals. Medicines    · Your doctor will tell you if and when you can restart your medicines. He or she will also give you instructions about taking any new medicines.     · If you take blood thinners, such as warfarin (Coumadin), clopidogrel (Plavix), or aspirin, be sure to talk to your doctor. He or she will tell you if and when to start taking those medicines again. Make sure that you understand exactly what your doctor wants you to do.     · Your doctor will prescribe blood-thinning medicines. You will likely take aspirin plus another antiplatelet, such as clopidogrel (Plavix). It is very important that you take these medicines exactly as directed. These medicines help keep the coronary artery open and reduce your risk of a heart attack.     · Call your doctor if you think you are having a problem with your medicine.    Care of the catheter site    · For 1 or 2 days, keep a bandage over the spot where the catheter was inserted. The bandage probably will fall off in this time.     · Put ice or a cold pack on the area for 10 to 20 minutes at a time to help with soreness or swelling. Put a thin cloth between the ice and your skin.     · You may shower 24 to 48 hours after the procedure, if your doctor okays it. Pat the incision dry.     · Do not soak the catheter site until it is healed. Don't take a bath for 1 week, or until your doctor tells you it is okay.     · If you are bleeding, lie down and press on the area for 15 minutes to try to make it stop. If the bleeding does not stop, call your doctor or seek immediate medical care.    Follow-up care is a key part of your treatment and safety. Be sure to make and go to all appointments, and call your doctor if you are having problems. It's also a good idea to know your test results and keep a list of the medicines you take. When should you call for help? Call 911 anytime you think you may need emergency care. For example, call if:    · You passed out (lost consciousness).     · You have severe trouble breathing.     · You have sudden chest pain and shortness of breath, or you cough up blood.     · You have symptoms of a heart attack, such as:  ¨ Chest pain or pressure. ¨ Sweating. ¨ Shortness of breath. ¨ Nausea or vomiting. ¨ Pain that spreads from the chest to the neck, jaw, or one or both shoulders or arms. ¨ Dizziness or lightheadedness. ¨ A fast or uneven pulse. After calling 911, chew 1 adult-strength aspirin. Wait for an ambulance. Do not try to drive yourself.     · You have been diagnosed with angina, and you have angina symptoms that do not go away with rest or are not getting better within 5 minutes after you take one dose of nitroglycerin.    Call your doctor now or seek immediate medical care if:    · You are bleeding from the area where the catheter was put in your artery.     · You have a fast-growing, painful lump at the catheter site.     · You have signs of infection, such as:  ¨ Increased pain, swelling, warmth, or redness. ¨ Red streaks leading from the catheter site. ¨ Pus draining from the catheter site. ¨ A fever.     · Your leg or arm looks blue or feels cold, numb, or tingly.    Watch closely for changes in your health, and be sure to contact your doctor if you have any problems. Where can you learn more? Go to http://audra-christal.info/. Enter X452 in the search box to learn more about \"Percutaneous Coronary Intervention: What to Expect at Home. \"  Current as of: December 6, 2017  Content Version: 11.7  © 2049-9410 [employer], [facility].  Care instructions adapted under license by [employer] (which disclaims liability or warranty for this information). If you have questions about a medical condition or this instruction, always ask your healthcare professional. [address] any warranty or liability for your use of this information.

## 2018-08-14 NOTE — PROGRESS NOTES
Hospital follow-up PCP transitional care appointment has been scheduled with Guillaume Hayes NP for Friday, 8/17/18 at 1:00 p.m. Pending patient discharge.   Abhi Meadows, Care Management Specialist.

## 2018-08-14 NOTE — PROGRESS NOTES
DANIKA NOTE:    The objective of todays visit was to review the transitional care plan with the patient. We discussed the importance of transitional care as it relates to reducing the likelihood of readmission. We reviewed the goals for the first 30 days following hospital discharge. The patient and I discussed wrap around services including nurse navigation, care coordination, home health, transitional care appointments with their primary care provider and specialist team and the role of dispatch health. Patient education focused on readmission zones as described as  The Red Zone: High risk for readmission, days 1-21  The Yellow Zone: Moderate risk for readmission, days 22-29  The Green Zone: Lower risk for readmission, days 30 and after    Patient agrees to visit with;  Ezio Griffith NP ( PCP) Friday, 8/17/18 at 1:00 p.m. Patient is to call Jazzmine Pradhan for appointment. She agrees to call his office today to set appointment for next week. Patient will visit with [facility] 8/23/2018. Patient has [facility] information. Patient understands that Ambulatory NN Beth Simon RN CCM will be calling her to review discharge plan and answer any questions. Medication reconciliation was performed by physician and RN. NN discussed Brilinta and not utilizing ETOH and NSAIDs as GI  bleeding is a potential complication . Patient agrees to retrieve medications immediately. Patient utilizes Pill [employer]. In addition, the patient was instructed on worrisome symptoms for worsening of their medical conditions including chest pain, bleeding from right radial insertion site and potential for infection. Learning was assessed using teach back in the form of role playing. The patient identified appropriate wrap around services during this interaction. A written individual care plan was reviewed with the patient as well today. Patient did not discuss Advance Directive with NN.     The patient expressed the following specific concerns regarding their discharge  None    Thank you

## 2018-08-15 ENCOUNTER — PATIENT OUTREACH (OUTPATIENT)
Dept: INTERNAL MEDICINE CLINIC | Facility: CLINIC | Age: 73
End: 2018-08-15

## 2018-08-15 LAB
ATRIAL RATE: 66 BPM
CALCULATED P AXIS, ECG09: 74 DEGREES
CALCULATED R AXIS, ECG10: 24 DEGREES
CALCULATED T AXIS, ECG11: 56 DEGREES
DIAGNOSIS, 93000: NORMAL
P-R INTERVAL, ECG05: 210 MS
Q-T INTERVAL, ECG07: 432 MS
QRS DURATION, ECG06: 108 MS
QTC CALCULATION (BEZET), ECG08: 452 MS
VENTRICULAR RATE, ECG03: 66 BPM

## 2018-08-15 NOTE — PROGRESS NOTES
Hospital Discharge Follow-Up      Date/Time:  8/16/2018 2:45 PM    Patient was admitted to [facility] on 8/12/18 and discharged on 8/14/18 for Chest Pain, Elevated Troponin, NSTEMI, Left Heart Cath, PCI of OM2, CAD. The physician discharge summary was available at the time of outreach. Patient was contacted within 2 business days of discharge. Top Challenges reviewed with the provider   - NSTEMI- elevated Troponin. 8/12/18 range 0.14 -11.30. 8/13/18 9.82. EKG- SR w/ 1st degree AV  Block. Cardio consult. - 8/13/18- Left Cardiac Cath. PCI of OM2. Access: Rt radial.    - 8/14/18- TTE- Left ventricle: Systolic function normal. EF estimated range of 55 % to 60 %. Severe hypokinesis of basal-mid inferoseptal wall(s). Severe hypokinesis of the entire inferior wall(s). - Started on Brilinta, Rosuvastatin and Metoprolol. Planned duration of Dual Antiplatelet Therapy (DAPT) will be 12 months. PCP f/u 8/17/18. [facility] initial appt 8/23/18. Need to schedule 2 week Cardio f/u w/ Dr Rickie Montiel. - HTN- d/c /78. Taking Amlodipine 5 mg daily. Encourage SMBP. - ACP- not on file. Inquire if has document. If so obtain copy. If not discuss [employer]. Method of communication with provider :chart routing    Inpatient RRAT score: 6  Was this a readmission? no   Patient stated reason for the readmission: n/a    Nurse Navigator (NN) attempted to contact the patient by telephone x2 to perform post hospital discharge assessment. Messages left. Disease Specific:   N/A    Summary of patient's top problems:  1. Chest Pain, Elevated Troponin, NSTEMI- elevated Troponin. 8/12/18 range 0.14 -11.30. 8/13/18- 9.82. EKG- SR w/ 1st degree AV  Block. No previous cardiac hx. Cardio consult. Left Heart Cath- PCI of OM2, CAD. TTE- Left ventricle: Systolic function normal. EF estimated range of 55 % to 60 %. Severe hypokinesis of basal-mid inferoseptal wall(s).  Severe hypokinesis of the entire inferior wall(s). 5/23/18 Tot Choles- 217, LDL- 128. Started on Brilinta, Rosuvastatin and Metoprolol. Planned duration of Dual Antiplatelet Therapy (DAPT) will be 12 months. PCP f/u 8/17/18. [facility] initial appt 8/23/18. Need to schedule 2 week Cardio f/u w/ Dr Artur Celeste. 2. HTN- d/c /78. Taking Amlodipine 5 mg daily. 3. ACP- not on file. Home Health orders at discharge: [address]: n/a  Date of initial visit: [address] ordered/company: none  Durable Medical Equipment received: n/a    Medication(s):   New Medications at Discharge:   metoprolol tartrate (LOPRESSOR) 25 mg tablet Take 0.5 Tabs by mouth every twelve (12) hours. Qty: 60 Tab, Refills: 3       rosuvastatin (CRESTOR) 20 mg tablet Take 1 Tab by mouth daily. Qty: 90 Tab, Refills: 3       ticagrelor (BRILINTA) 90 mg tablet Take 1 Tab by mouth every twelve (12) hours every twelve (12) hours. Qty: 60 Tab, Refills: 11         Changed Medications at Discharge: none  Discontinued Medications at Discharge: naproxen sodium (ALEVE) 220 mg cap,  ibuprofen 200 mg cap    Referral to Pharm D needed: no     Current Outpatient Prescriptions   Medication Sig    metoprolol tartrate (LOPRESSOR) 25 mg tablet Take 0.5 Tabs by mouth every twelve (12) hours.  rosuvastatin (CRESTOR) 20 mg tablet Take 1 Tab by mouth daily.  ticagrelor (BRILINTA) 90 mg tablet Take 1 Tab by mouth every twelve (12) hours every twelve (12) hours.  buPROPion (WELLBUTRIN) 75 mg tablet Take 75 mg by mouth daily.  s-adenosylmethionine sul tosyl (ROBERTO-E PO) Take  by mouth.  mometasone (ELOCON) 0.1 % lotion APPLY 1 BEAD TO SCALP TWICE DAILY AS NEEDED    mometasone (ELOCON) 0.1 % topical cream     amLODIPine (NORVASC) 5 mg tablet take 1 tablet by mouth once daily    ipratropium (ATROVENT) 0.06 % nasal spray     oxybutynin chloride XL (DITROPAN XL) 10 mg CR tablet TAKE ONE TABLET DAILY.     levalbuterol tartrate (XOPENEX) 45 mcg/actuation inhaler Take 1-2 Puffs by inhalation every four (4) hours as needed for Wheezing.  FLAXSEED PO Take  by mouth daily. Flaxseed meal    cetirizine (ZYRTEC) 10 mg tablet Take 10 mg by mouth daily.  folic acid 957 mcg tablet Take 800 mcg by mouth daily.  aspirin 81 mg chewable tablet Take 81 mg by mouth daily.  cyanocobalamin (VITAMIN B-12) 1,000 mcg tablet Take 1,000 mcg by mouth daily.  montelukast (SINGULAIR) 10 mg tablet Take 10 mg by mouth every seven (7) days. Takes on day when she gets allergy shots    Biotin 2,500 mcg cap Take 1 Cap by mouth daily. No current facility-administered medications for this visit. There are no discontinued medications. [facility] follow up appointment(s):   Future Appointments  Date Time Provider Niurka Boles   8/17/2018 1:45 PM Mary Alonso MD [facility]   8/23/2018 1:00 PM Leandro Millan RN [address] [place] [address], [facility] [address]   11/20/2018 8:15 AM LAB [facility]   11/29/2018 1:00 PM Sedrick Rodrigues MD [facility]      Non-[facility] follow up appointment(s): needs to schedule 2 week f/u w/ Dr Lasha Cabral of Care- collaboration & care coordination to prevent complications post hospitalization. 8/15/18- attempted to contact pt x2. Messages left. Per EMR review pt has PCP f/u on 8/17/18. [facility] appt on 8/23/18. Needs Cardio f/u in 2 weeks. Call to [facility] [employer] office. Spoke w/ Walker Elba. No upcoming appts scheduled as of yet. Call to [facility]. Confirmed Rxs for Brilinta, Rosuvastatin, and Metoprolol p/u 8/14/18.  Plan- NN to attempt pt contact on 8/16/18 if no pt return call-ID

## 2018-08-16 ENCOUNTER — PATIENT OUTREACH (OUTPATIENT)
Dept: INTERNAL MEDICINE CLINIC | Facility: CLINIC | Age: 73
End: 2018-08-16

## 2018-08-16 PROBLEM — E78.00 HYPERCHOLESTEROLEMIA: Status: ACTIVE | Noted: 2018-08-16

## 2018-08-16 PROBLEM — R77.8 ELEVATED TROPONIN: Status: RESOLVED | Noted: 2018-08-12 | Resolved: 2018-08-16

## 2018-08-16 PROBLEM — R07.9 CHEST PAIN: Status: RESOLVED | Noted: 2018-08-12 | Resolved: 2018-08-16

## 2018-08-16 PROBLEM — I25.10 CORONARY ARTERY DISEASE INVOLVING NATIVE CORONARY ARTERY OF NATIVE HEART WITHOUT ANGINA PECTORIS: Status: ACTIVE | Noted: 2018-08-16

## 2018-08-16 NOTE — Clinical Note
Dr Autumn Pena- I forgot to route my 8/15 note to you. Please review also. You are seeing pt tomorrow. Pt reported she no longer takes Vit B12, Biotin, Atrovent, Elocon lotion & cream. Can you please remove from med list? Thanks.

## 2018-08-16 NOTE — PROGRESS NOTES
Hospital Discharge Follow-Up      Date/Time:  2018 3:33 PM      Top Challenges reviewed with the provider   - See 8/15/16 NN Encounter    - Cardio appt scheduled for 18 w/ Dr Tisha Palomares of communication with provider :chart routing    Nurse Navigator (NN) contacted the patient by telephone to perform post hospital discharge assessment. Verified name and  with patient as identifiers. Provided introduction to self, and explanation of the Nurse Navigator role. Reviewed discharge instructions and red flags with patient who verbalized understanding. Patient given an opportunity to ask questions and does not have any further questions or concerns at this time. The patient agrees to contact the PCP office for questions related to their healthcare. NN provided contact information for future reference. Barriers to care? none identified @ this time    Advance Care Planning:   Does patient have an Advance Directive:  Pt reported thinks does have an ACP document. Will locate & provide copy to PCP office. Pt stated her MPOA is spouse Haris Posey. Medication reconciliation was performed with patient, who verbalizes understanding of administration of home medications. There were no barriers to obtaining medications identified at this time. Current Outpatient Prescriptions   Medication Sig    metoprolol tartrate (LOPRESSOR) 25 mg tablet Take 0.5 Tabs by mouth every twelve (12) hours.  rosuvastatin (CRESTOR) 20 mg tablet Take 1 Tab by mouth daily.  ticagrelor (BRILINTA) 90 mg tablet Take 1 Tab by mouth every twelve (12) hours every twelve (12) hours.  buPROPion (WELLBUTRIN) 75 mg tablet Take 75 mg by mouth daily.  s-adenosylmethionine sul tosyl (ROBERTO-E PO) Take  by mouth.  amLODIPine (NORVASC) 5 mg tablet take 1 tablet by mouth once daily    oxybutynin chloride XL (DITROPAN XL) 10 mg CR tablet TAKE ONE TABLET DAILY.     levalbuterol tartrate (XOPENEX) 45 mcg/actuation inhaler Take 1-2 Puffs by inhalation every four (4) hours as needed for Wheezing.  FLAXSEED PO Take  by mouth daily. Flaxseed meal    cetirizine (ZYRTEC) 10 mg tablet Take 10 mg by mouth daily.  folic acid 811 mcg tablet Take 800 mcg by mouth daily.  aspirin 81 mg chewable tablet Take 81 mg by mouth daily.  montelukast (SINGULAIR) 10 mg tablet Take 10 mg by mouth every seven (7) days. Takes on day when she gets allergy shots    mometasone (ELOCON) 0.1 % lotion APPLY 1 BEAD TO SCALP TWICE DAILY AS NEEDED    mometasone (ELOCON) 0.1 % topical cream     ipratropium (ATROVENT) 0.06 % nasal spray     cyanocobalamin (VITAMIN B-12) 1,000 mcg tablet Take 1,000 mcg by mouth daily.  Biotin 2,500 mcg cap Take 1 Cap by mouth daily. No current facility-administered medications for this visit. There are no discontinued medications. [facility] follow up appointment(s): Future Appointments  Date Time Provider Niurka Boles   8/17/2018 1:45 PM Nica Burch MD [facility]   8/23/2018 1:00 PM Shakir Dos Santos RN [address] [place] [address], [facility] [address]   11/20/2018 8:15 AM LAB [facility]   11/29/2018 1:00 PM Saurabh Melissa MD [facility]      Non-[facility] follow up appointment(s): 8/31/18 with Cardio Dr Morena Mccarthy @ [facility]. [facility]  information provided as a resource       Goals      Transitions of Care- collaboration & care coordination to prevent complications post hospitalization. 8/16/18- spoke w/ pt. Denied CP, SOB, Dyspnea. Confirmed taking new meds & routine meds as prescribed. Verbalized understanding planned duration of Dual Antiplatelet Therapy (DAPT) will be 12 months. Cardiac & AC red flag s/s & action plan reviewed. Patient advised providers on call 24 hours a day / 7 days a week (M-F 5 PM to 8 AM, and from Friday 5 PM until Monday 8 AM for the weekend) should the patient have questions or concerns. [facility] provided. Pt confirmed 8/17/18 PCP f/u. Pt confirmed 8/23/18 Cardiac Wellness f/u. Pt reported Cardio f/u scheduled for 8/31/18 w/ Dr Sheba Young. Plan- pt to attend scheduled appts. Pt continue med adherence. Pt f/u Cardiac Wellness activity recommendation. NN to collaborate w/ pt, PCP, & other Care Team Members as needed for care coordination. Next NN f/u ~ 1 week-ID. 8/15/18- attempted to contact pt x2. Messages left. Per EMR review pt has PCP f/u on 8/17/18. Cardiac Wellness appt on 8/23/18. Needs Cardio f/u in 2 weeks. Call to [place] office. Spoke w/ Bishop Hays. No upcoming appts scheduled as of yet. Call to [facility]. Confirmed Rxs for Brilinta, Rosuvastatin, and Metoprolol p/u 8/14/18.  Plan- NN to attempt pt contact on 8/16/18 if no pt return call-ID

## 2018-08-17 ENCOUNTER — OFFICE VISIT (OUTPATIENT)
Dept: INTERNAL MEDICINE CLINIC | Facility: CLINIC | Age: 73
End: 2018-08-17

## 2018-08-17 VITALS
DIASTOLIC BLOOD PRESSURE: 69 MMHG | OXYGEN SATURATION: 95 % | SYSTOLIC BLOOD PRESSURE: 113 MMHG | WEIGHT: 139 LBS | TEMPERATURE: 98.3 F | BODY MASS INDEX: 24.63 KG/M2 | HEART RATE: 61 BPM | HEIGHT: 63 IN | RESPIRATION RATE: 16 BRPM

## 2018-08-17 DIAGNOSIS — Z71.89 ADVANCE DIRECTIVE DISCUSSED WITH PATIENT: ICD-10-CM

## 2018-08-17 DIAGNOSIS — I25.10 CORONARY ARTERY DISEASE INVOLVING NATIVE CORONARY ARTERY OF NATIVE HEART WITHOUT ANGINA PECTORIS: ICD-10-CM

## 2018-08-17 DIAGNOSIS — Z13.31 SCREENING FOR DEPRESSION: ICD-10-CM

## 2018-08-17 DIAGNOSIS — I77.9 BILATERAL CAROTID ARTERY DISEASE (HCC): ICD-10-CM

## 2018-08-17 DIAGNOSIS — I10 ESSENTIAL HYPERTENSION: ICD-10-CM

## 2018-08-17 DIAGNOSIS — Z00.00 MEDICARE ANNUAL WELLNESS VISIT, SUBSEQUENT: Primary | ICD-10-CM

## 2018-08-17 DIAGNOSIS — E78.00 HYPERCHOLESTEROLEMIA: ICD-10-CM

## 2018-08-17 NOTE — PROGRESS NOTES
This is the Subsequent Medicare Annual Wellness Exam, performed 12 months or more after the Initial AWV or the last Subsequent AWV    I have reviewed the patient's medical history in detail and updated the computerized patient record. History     Past Medical History:   Diagnosis Date    Alopecia     Asthma     CAD (coronary artery disease) 08/12/2018    NSTEMI, Cath, TYRONE OM2 of dominant Cx    H/O seasonal allergies     H/O TB skin testing 1968    treated with INH    Hypercholesterolemia     Hypertension     IFG (impaired fasting glucose) 6/14/2013    a1c 6.0 5/2013    Ill-defined condition     TIA symptoms, saw neuro-opthalmologist and was told it was probably spasm of cranial nerve.  Overactive bladder 3/13/2013    Serrated adenoma of colon 2/6/2014 11/17/09 - Dr. Avril Hill - repeat in 5 years      Past Surgical History:   Procedure Laterality Date    COLORECTAL SCRN; HI RISK IND  2/20/2015         HX CATARACT REMOVAL Bilateral     HX GI      colonscopy. polyp removed     Current Outpatient Prescriptions   Medication Sig Dispense Refill    metoprolol tartrate (LOPRESSOR) 25 mg tablet Take 0.5 Tabs by mouth every twelve (12) hours. 60 Tab 3    rosuvastatin (CRESTOR) 20 mg tablet Take 1 Tab by mouth daily. 90 Tab 3    ticagrelor (BRILINTA) 90 mg tablet Take 1 Tab by mouth every twelve (12) hours every twelve (12) hours. 60 Tab 11    buPROPion (WELLBUTRIN) 75 mg tablet Take 75 mg by mouth daily.  s-adenosylmethionine sul tosyl (ROBERTO-E PO) Take  by mouth.  amLODIPine (NORVASC) 5 mg tablet take 1 tablet by mouth once daily 90 Tab 4    oxybutynin chloride XL (DITROPAN XL) 10 mg CR tablet TAKE ONE TABLET DAILY. 90 Tab 4    levalbuterol tartrate (XOPENEX) 45 mcg/actuation inhaler Take 1-2 Puffs by inhalation every four (4) hours as needed for Wheezing. 1 Inhaler 2    FLAXSEED PO Take  by mouth daily. Flaxseed meal      cetirizine (ZYRTEC) 10 mg tablet Take 10 mg by mouth daily.  folic acid 268 mcg tablet Take 800 mcg by mouth daily.  aspirin 81 mg chewable tablet Take 81 mg by mouth daily.  montelukast (SINGULAIR) 10 mg tablet Take 10 mg by mouth every seven (7) days. Takes on day when she gets allergy shots       Allergies   Allergen Reactions    Nuts [Tree Nut] Other (comments)     Mouth sores.      Walnuts, pecans    Sulfa (Sulfonamide Antibiotics) Hives     Family History   Problem Relation Age of Onset    Hypertension Mother     Stroke Mother     Cancer Father      bladder    Other Father      congential heart valve defect    Depression Sister     Stroke Sister 72    Hypertension Sister     Psychiatric Disorder Sister      depression    COPD Sister     Heart Disease Maternal Grandfather     Dementia Paternal Grandfather      Social History   Substance Use Topics    Smoking status: Former Smoker     Packs/day: 0.25     Years: 8.00     Quit date: 2/19/1995    Smokeless tobacco: Never Used      Comment: quit in 1991    Alcohol use 10.5 oz/week     21 Glasses of wine per week      Comment: 2-3 etoh drinks daily - [place] - no withdrawl symptoms when she does not have it     Patient Active Problem List   Diagnosis Code    Essential hypertension I10    Asthma J45.909    ADD (attention deficit disorder) F98.8    Overactive bladder N32.81    Environmental allergies Z91.09    S/P colonoscopy Z98.890    At risk for osteoporosis Z91.89    Bilateral carotid artery disease (HCC) I77.9    Vertical diplopia H53.2    IFG (impaired fasting glucose) R73.01    Serrated adenoma of colon D12.6    Alopecia areata L63.9    Advance directive discussed with patient Z70.80    Coronary artery disease involving native coronary artery of native heart without angina pectoris I25.10    Hypercholesterolemia E78.00       Depression Risk Factor Screening:     PHQ over the last two weeks 8/17/2018   Little interest or pleasure in doing things Not at all   Feeling down, depressed, irritable, or hopeless Several days   Total Score PHQ 2 1     Alcohol Risk Factor Screening:   Drinks 1 or 2 per day    Functional Ability and Level of Safety:   Hearing Loss  Hearing is good. Activities of Daily Living  The home contains: handrails and rugs  Patient does total self care    Fall Risk  Fall Risk Assessment, last 12 mths 8/17/2018   Able to walk? Yes   Fall in past 12 months?  No   Fall with injury? -   Number of falls in past 12 months -   Fall Risk Score -       Abuse Screen  Patient is not abused    Cognitive Screening   Evaluation of Cognitive Function:  Has your family/caregiver stated any concerns about your memory: no  Normal    Patient Care Team   Patient Care Team:  Bharat Oseguera MD as PCP - General (Internal Medicine)  Roberto Alvarado MD as Physician (Cardiology)  Jennifer Osborn RN as Ambulatory Care Navigator (Internal Medicine)    Assessment/Plan   Education and counseling provided:  End-of-Life planning (with patient's consent)        Health Maintenance Due   Topic Date Due    Influenza Age 5 to Adult  08/01/2018    MEDICARE YEARLY EXAM  08/17/2018

## 2018-08-17 NOTE — MR AVS SNAPSHOT
[address] [phone] Patient: Jitendra Thomason MRN: [id number] BDD:[id number]/75/[id number] Visit Information Date & Time Provider Department Dept. Phone Encounter #  
 8/17/2018  1:45 PM Ernesto Xie MD [facility] of [address] [place] [address] [id number] Follow-up Instructions Return in about 3 months (around 11/29/2018). Your Appointments 11/20/2018  8:15 AM  
LAB with LAB [facility] of [facility] Appt Note: BP [facility] PRIOR [address] [phone]  
  
   
 [address]  
  
    
 11/29/2018  1:00 PM  
ROUTINE CARE with Shimon Augustin MD  
[facility] of [facility]) Appt Note: 6 month f/u  
 [address] [phone]  
  
   
 [address] [place] [address] Upcoming Health Maintenance Date Due Influenza Age 5 to Adult 8/1/2018 MEDICARE YEARLY EXAM 8/17/2018 BREAST CANCER SCRN MAMMOGRAM 8/24/2019 GLAUCOMA SCREENING Q2Y 2/19/2020 COLONOSCOPY 2/21/2020 DTaP/Tdap/Td series (2 - Td) 5/12/2026 Allergies as of 8/17/2018  Review Complete On: 8/17/2018 By: Ernesto Xie MD  
  
 Severity Noted Reaction Type Reactions Nuts [Tree Nut]  10/13/2014    Other (comments) Mouth sores. Walnuts, pecans Sulfa (Sulfonamide Antibiotics)  05/31/2010    Hives Current Immunizations  Reviewed on 8/17/2018 Name Date Influenza High Dose Vaccine PF 10/13/2014 Influenza Vaccine 10/29/2015, 10/6/2013 Pneumococcal Conjugate (PCV-13) 5/12/2016  9:58 AM  
 Pneumococcal Vaccine (Unspecified Type) 1/1/2012 Zoster Vaccine, Live 5/21/2010  Reviewed by Clifton Hernández LPN on 6/84/7217 at  1:44 PM  
 Reviewed by Clifton Hernández LPN on 2/94/9456 at  1:44 PM  
 You Were Diagnosed With   
  
 Codes Comments Medicare annual wellness visit, subsequent    -  Primary ICD-10-CM: Z00.00 ICD-9-CM: V70.0 Advance directive discussed with patient     ICD-10-CM: Z71.89 ICD-9-CM: V65.49 Coronary artery disease involving native coronary artery of native heart without angina pectoris     ICD-10-CM: I25.10 ICD-9-CM: 414.01 Hypercholesterolemia     ICD-10-CM: E78.00 ICD-9-CM: 272.0 Screening for depression     ICD-10-CM: Z13.89 ICD-9-CM: V79.0 Vitals BP Pulse Temp Resp Height(growth percentile) Weight(growth percentile) 113/69 (BP 1 Location: Left arm, BP Patient Position: Sitting) 61 98.3 °F (36.8 °C) (Oral) 16 5' 3\" (1.6 m) 139 lb (63 kg) SpO2 BMI OB Status Smoking Status 95% 24.62 kg/m2 Postmenopausal Former Smoker BMI and BSA Data Body Mass Index Body Surface Area  
 24.62 kg/m 2 1.67 m 2 Preferred Pharmacy Pharmacy Name Phone [id number], [address] [phone] Your Updated Medication List  
  
   
This list is accurate as of 8/17/18  2:32 PM.  Always use your most recent med list. amLODIPine 5 mg tablet Commonly known as:  NORVASC  
take 1 tablet by mouth once daily  
  
 aspirin 81 mg chewable tablet Take 81 mg by mouth daily. buPROPion 75 mg tablet Commonly known as:  [facility] Take 75 mg by mouth daily. FLAXSEED PO Take  by mouth daily. Flaxseed meal  
  
 folic acid 044 mcg tablet Take 800 mcg by mouth daily. levalbuterol tartrate 45 mcg/actuation inhaler Commonly known as:  Terrilee Tavo Take 1-2 Puffs by inhalation every four (4) hours as needed for Wheezing. metoprolol tartrate 25 mg tablet Commonly known as:  LOPRESSOR Take 0.5 Tabs by mouth every twelve (12) hours. montelukast 10 mg tablet Commonly known as:  SINGULAIR Take 10 mg by mouth every seven (7) days.  Takes on day when she gets allergy shots  
  
 oxybutynin chloride XL 10 mg CR tablet Commonly known as:  DITROPAN XL  
TAKE ONE TABLET DAILY. rosuvastatin 20 mg tablet Commonly known as:  CRESTOR Take 1 Tab by mouth daily. ROBERTO-E PO Take  by mouth. ticagrelor 90 mg tablet Commonly known as:  [employer] Take 1 Tab by mouth every twelve (12) hours every twelve (12) hours. ZyrTEC 10 mg tablet Generic drug:  cetirizine Take 10 mg by mouth daily. We Performed the Following [id number] [employer] [ [facility]] Follow-up Instructions Return in about 3 months (around 11/29/2018). To-Do List   
 08/23/2018 1:00 PM  
  Appointment with Richard Lyons RN at [address] [place] [address] ([phone]) Patient Instructions Keep appointments with Dr. Sukhwinder Anders in November. The best way to stay healthy is to live a healthy lifestyle. A healthy lifestyle includes regular exercise, eating a well-balanced diet, keeping a healthy weight and not smoking. Regular physical exams and screening tests are another important way to take care of yourself. Preventive exams provided by health care providers can find health problems early when treatment works best and can keep you from getting certain diseases or illnesses. Preventive services include exams, lab tests, screenings, shots, monitoring and information to help you take care of your own health. All people over 65 should have a pneumonia shot. Pneumonia shots are usually only needed once in a lifetime unless your doctor decides differently. In addition to your physical exam, some screening tests are recommended: 
 
All people over 65 should have a yearly flu shot. People over 65 are at medium to high risk for Hepatitis B. Three shots are needed for complete protection. Bone mass measurement (dexa scan) is recommended every two years. Diabetes Mellitus screening is recommended every year. Glaucoma is an eye disease caused by high pressure in the eye. An eye exam is recommended every year. Cardiovascular screening tests that check your cholesterol and other blood fat (lipid) levels are recommended every five years. Colorectal Cancer screening tests help to find pre-cancerous polyps (growths in the colon) so they can be removed before they turn into cancer. Tests ordered for screening depend on your personal and family history risk factors. Prostate Cancer Screening (annually up to age 76) Screening for breast cancer is recommended yearly with a Mammogram. 
 
Screening for cervical and vaginal cancer is recommended with a pelvic and Pap test every two years. However if you have had an abnormal pap in the past  three years or at high risk for cervical or vaginal cancer Medicare will cover a pap test and a pelvic exam every year. Here is a list of your current Health Maintenance items with a due date: 
Health Maintenance Due Topic Date Due  
 Flu Vaccine  08/01/2018 Mariela Stevenson Annual Well Visit  08/17/2018 Coronary Artery Disease: Care Instructions Your Care Instructions The heart is a muscle, and like any muscle, it needs blood to work well. Coronary artery disease occurs when the arteries that bring oxygen-rich blood to your heart have a buildup of plaque-deposits of fats and other substances. Plaque can reduce blood flow to the heart muscle. This can cause angina symptoms such as chest pain or pressure. A heart attack can happen if blood flow is completely blocked. You can do a lot to improve your health and prevent a heart attack. Eating healthy food, not smoking, getting regular exercise, and taking your medicine are the main things you can do every day to stay healthy. Follow-up care is a key part of your treatment and safety.  Be sure to make and go to all appointments, and call your doctor if you are having problems. It's also a good idea to know your test results and keep a list of the medicines you take. How can you care for yourself at home? Medicines 
  · Be safe with medicines. Take your medicines exactly as prescribed. Call your doctor if you think you are having a problem with your medicine. You will get more details on the specific medicines your doctor prescribes. You may need several medicines. ¨ Angiotensin-converting enzyme (ACE) inhibitors, angiotensin II receptor blockers (ARBs), beta-blockers, and statins can help prevent a heart attack. ACE inhibitors, ARBs, and beta-blockers help lower your blood pressure. Statins help lower cholesterol, which is a type of fat that can clog your arteries. ¨ Nitrates can help make chest pain happen less often. ¨ Aspirin and other blood thinners help prevent heart attacks and strokes.  
  · If your doctor has given you nitroglycerin for angina symptoms (such as chest pain or pressure) keep it with you at all times. If you have symptoms, sit down and rest, and take the first dose of nitroglycerin as directed. If your symptoms get worse or are not getting better within 5 minutes, call 911 right away. Stay on the phone. The emergency  will give you further instructions.  
  · Be sure to tell your doctor about any angina symptoms that you have had, even if they went away.  
  · Do not take any over-the-counter medicines, vitamins, or herbal products without talking to your doctor first.  
Barbara Welchs your doctor if a cardiac rehab program is right for you. Cardiac rehab can help you make lifestyle changes. In cardiac rehab, a team of health professionals provides education and support to help you make new, healthy habits. 
  · Do not smoke. Avoid secondhand smoke too. Smoking can increase your risk of a heart attack or stroke. If you need help quitting, talk to your doctor about stop-smoking programs and medicines.  These can increase your chances of quitting for good.  
  · Eat a heart-healthy diet that is high in fiber and low in saturated fat and sodium. ¨ Learn what a serving is. A \"serving\" and a \"portion\" are not always the same thing. Make sure that you are not eating larger portions than recommended. For example, a serving of pasta is ½ cup. A serving size of meat is 2 to 3 ounces; a 3-ounce serving is about the size of a deck of cards. ¨ Eat a variety of grain products every day. Include whole-grain foods such as oats, whole wheat bread, and brown rice. ¨ Eat fish, skinless poultry, lean meats, and soy products such as tofu instead of high-fat meats. Cut out all visible fat when you prepare meat. Eat at least 2 servings of fish a week. ¨ Eat a variety of fruit and vegetables every day. They have lots of nutrients that help protect against heart disease, and they have little-if any-fat. Keep carrots, celery, and other veggies handy for snacks. Buy fruit that is in season and store it where you can see it so that you will be tempted to eat it. Cook dishes that have a lot of veggies in them, such as stir-fried dishes and soups. ¨ Read food labels and try to avoid saturated fat and trans fat. They increase your risk of heart disease. Bake, broil, grill, or steam foods instead of frying them. Use olive or canola oil when you cook. Try cholesterol-lowering spreads, such as Benecol or Take Control. ¨ Limit sodium. Your doctor may recommend that you limit sodium to less than 1,500 mg a day. ¨ Limit processed foods, including cookies and crackers. ¨ Limit drinks and foods with added sugar. ¨ Choose low-fat or fat-free milk and dairy products. ¨ Limit alcohol to 2 drinks a day for men and 1 drink a day for women. Too much alcohol can cause health problems.  
  · If your doctor recommends it, get more exercise. Walking is a good choice. Bit by bit, increase the amount you walk every day.  Try for at least 30 minutes on most days of the week. You also may want to swim, bike, or do other activities.  
  · Stay at a healthy weight. Lose weight if you need to.  
  · Talk to your family, friends, or a therapist about your feelings. It is normal to feel upset about having this disease and to feel afraid of having a heart attack. Talking openly about your feelings can help you cope. If you think you have symptoms of depression, talk to your doctor.  
  · Avoid colds and flu. Get a pneumococcal vaccine shot. If you have had one before, ask your doctor whether you need another dose. Get a flu vaccine every year. If you must be around people with colds or flu, wash your hands often. When should you call for help? Call 911 anytime you think you may need emergency care. For example, call if: 
  · You have symptoms of a heart attack. These may include: ¨ Chest pain or pressure, or a strange feeling in the chest. 
¨ Sweating. ¨ Shortness of breath. ¨ Nausea or vomiting. ¨ Pain, pressure, or a strange feeling in the back, neck, jaw, or upper belly or in one or both shoulders or arms. ¨ Lightheadedness or sudden weakness. ¨ A fast or irregular heartbeat. After you call 911, the  may tell you to chew 1 adult-strength or 2 to 4 low-dose aspirin. Wait for an ambulance.  Do not try to drive yourself.  
  · You have angina symptoms (such as chest pain or pressure) that do not go away with rest or are not getting better within 5 minutes after you take a dose of nitroglycerin.  
  · You passed out (lost consciousness).  
 Call your doctor now or seek immediate medical care if: 
  · You are having angina symptoms, such as chest pain or pressure, more often than usual, or they are different or worse than usual.  
  · You have new or increased shortness of breath.  
  · You are dizzy or lightheaded, or you feel like you may faint.  
 Watch closely for changes in your health, and be sure to contact your doctor if you have any problems. Where can you learn more? Go to http://audra-christal.info/. Enter T437 in the search box to learn more about \"Coronary Artery Disease: Care Instructions. \" Current as of: December 6, 2017 Content Version: 11.7 © 2409-7515 [employer]. Care instructions adapted under license by [employer] (which disclaims liability or warranty for this information). If you have questions about a medical condition or this instruction, always ask your healthcare professional. [address] any warranty or liability for your use of this information. Introducing [facility] HE[facility]! Dear Kush Marcano: Thank you for requesting a [employer] account. Our records indicate that you already have an active [employer] account. You can access your account anytime at [email or web address] Did you know that you can access your hospital and ER discharge instructions at any time in [employer]? You can also review all of your test results from your hospital stay or ER visit. Additional Information If you have questions, please visit the Frequently Asked Questions section of the [employer] website at [email or web address] Remember, [employer] is NOT to be used for urgent needs. For medical emergencies, dial 911. Now available from your iPhone and Android! Please provide this summary of care documentation to your next provider. Your primary care clinician is listed as Ami Watson. If you have any questions after today's visit, please call [phone].

## 2018-08-17 NOTE — PROGRESS NOTES
Fabi Baker  Identified pt with two pt identifiers(name and ). Chief Complaint   Patient presents with   [facility] Follow Up            Reviewed record In preparation for visit and have obtained necessary documentation. Has info on advanced directive but has not filled them out. 1. Have you been to the ER, urgent care clinic or hospitalized since your last visit? [facility]    2. Have you seen or consulted any other health care providers outside of the [address] [place] [address] since your last visit? Include any pap smears or colon screening. Allergist, dermatology    Vitals reviewed with provider.     Health Maintenance reviewed:     Health Maintenance Due   Topic    Influenza Age 5 to Adult     MEDICARE YEARLY EXAM    aid of   Wt Readings from Last 3 Encounters:   18 139 lb (63 kg)   18 139 lb 15.9 oz (63.5 kg)   18 138 lb 9.6 oz (62.9 kg)   o home  Temp Readings from Last 3 Encounters:   18 98.3 °F (36.8 °C) (Oral)   18 98.5 °F (36.9 °C)   18 97.8 °F (36.6 °C) (Oral)   emani  BP Readings from Last 3 Encounters:   18 113/69   18 153/78   18 120/76   he   Pulse Readings from Last 3 Encounters:   18 61   18 71   18 70    st  Vitals:    18 1355   BP: 113/69   Pulse: 61   Resp: 16   Temp: 98.3 °F (36.8 °C)   TempSrc: Oral   SpO2: 95%   Weight: 139 lb (63 kg)   Height: 5' 3\" (1.6 m)     Learning Assessment 3/20/2015   PRIMARY LEARNER Patient   HIGHEST LEVEL OF EDUCATION - PRIMARY LEARNER  4 YEARS OF COLLEGE   BARRIERS PRIMARY LEARNER NONE   PRIMARY LANGUAGE ENGLISH    NEED No   LEARNER PREFERENCE PRIMARY READING   ANSWERED BY patient   RELATIONSHIP SELF         2016 3/20/2015      N PHQ over the last two weeks 2018   Little interest or pleasure in doing things Not at all   Feeling down, depressed, irritable, or hopeless Several days   Total Score PHQ 2 1   N      N N   Is it safe for you to go   Fall Risk Assessment, last 12 mths 8/17/2018   Able to walk? Yes   Fall in past 12 months?  No   Fall with injury? -   Number of falls in past 12 months -   Fall Risk Score -

## 2018-08-17 NOTE — PATIENT INSTRUCTIONS
Keep appointments with Dr. Miky Vo in November. The best way to stay healthy is to live a healthy lifestyle. A healthy lifestyle includes regular exercise, eating a well-balanced diet, keeping a healthy weight and not smoking. Regular physical exams and screening tests are another important way to take care of yourself. Preventive exams provided by health care providers can find health problems early when treatment works best and can keep you from getting certain diseases or illnesses. Preventive services include exams, lab tests, screenings, shots, monitoring and information to help you take care of your own health. All people over 65 should have a pneumonia shot. Pneumonia shots are usually only needed once in a lifetime unless your doctor decides differently. In addition to your physical exam, some screening tests are recommended:    All people over 65 should have a yearly flu shot. People over 65 are at medium to high risk for Hepatitis B. Three shots are needed for complete protection. Bone mass measurement (dexa scan) is recommended every two years. Diabetes Mellitus screening is recommended every year. Glaucoma is an eye disease caused by high pressure in the eye. An eye exam is recommended every year. Cardiovascular screening tests that check your cholesterol and other blood fat (lipid) levels are recommended every five years. Colorectal Cancer screening tests help to find pre-cancerous polyps (growths in the colon) so they can be removed before they turn into cancer. Tests ordered for screening depend on your personal and family history risk factors. Prostate Cancer Screening (annually up to age 76)    Screening for breast cancer is recommended yearly with a Mammogram.    Screening for cervical and vaginal cancer is recommended with a pelvic and Pap test every two years.  However if you have had an abnormal pap in the past  three years or at high risk for cervical or vaginal cancer Medicare will cover a pap test and a pelvic exam every year. Here is a list of your current Health Maintenance items with a due date:  Health Maintenance Due   Topic Date Due    Flu Vaccine  08/01/2018    Annual Well Visit  08/17/2018              Coronary Artery Disease: Care Instructions  Your Care Instructions    The heart is a muscle, and like any muscle, it needs blood to work well. Coronary artery disease occurs when the arteries that bring oxygen-rich blood to your heart have a buildup of plaque-deposits of fats and other substances. Plaque can reduce blood flow to the heart muscle. This can cause angina symptoms such as chest pain or pressure. A heart attack can happen if blood flow is completely blocked. You can do a lot to improve your health and prevent a heart attack. Eating healthy food, not smoking, getting regular exercise, and taking your medicine are the main things you can do every day to stay healthy. Follow-up care is a key part of your treatment and safety. Be sure to make and go to all appointments, and call your doctor if you are having problems. It's also a good idea to know your test results and keep a list of the medicines you take. How can you care for yourself at home? Medicines    · Be safe with medicines. Take your medicines exactly as prescribed. Call your doctor if you think you are having a problem with your medicine. You will get more details on the specific medicines your doctor prescribes. You may need several medicines. ¨ Angiotensin-converting enzyme (ACE) inhibitors, angiotensin II receptor blockers (ARBs), beta-blockers, and statins can help prevent a heart attack. ACE inhibitors, ARBs, and beta-blockers help lower your blood pressure. Statins help lower cholesterol, which is a type of fat that can clog your arteries. ¨ Nitrates can help make chest pain happen less often. ¨ Aspirin and other blood thinners help prevent heart attacks and strokes.   · If your doctor has given you nitroglycerin for angina symptoms (such as chest pain or pressure) keep it with you at all times. If you have symptoms, sit down and rest, and take the first dose of nitroglycerin as directed. If your symptoms get worse or are not getting better within 5 minutes, call 911 right away. Stay on the phone. The emergency  will give you further instructions.     · Be sure to tell your doctor about any angina symptoms that you have had, even if they went away.     · Do not take any over-the-counter medicines, vitamins, or herbal products without talking to your doctor first.   Zaid Bishop your doctor if a cardiac rehab program is right for you. Cardiac rehab can help you make lifestyle changes. In cardiac rehab, a team of health professionals provides education and support to help you make new, healthy habits.    · Do not smoke. Avoid secondhand smoke too. Smoking can increase your risk of a heart attack or stroke. If you need help quitting, talk to your doctor about stop-smoking programs and medicines. These can increase your chances of quitting for good.     · Eat a heart-healthy diet that is high in fiber and low in saturated fat and sodium. ¨ Learn what a serving is. A \"serving\" and a \"portion\" are not always the same thing. Make sure that you are not eating larger portions than recommended. For example, a serving of pasta is ½ cup. A serving size of meat is 2 to 3 ounces; a 3-ounce serving is about the size of a deck of cards. ¨ Eat a variety of grain products every day. Include whole-grain foods such as oats, whole wheat bread, and brown rice. ¨ Eat fish, skinless poultry, lean meats, and soy products such as tofu instead of high-fat meats. Cut out all visible fat when you prepare meat. Eat at least 2 servings of fish a week. ¨ Eat a variety of fruit and vegetables every day.  They have lots of nutrients that help protect against heart disease, and they have little-if any-fat. Keep carrots, celery, and other veggies handy for snacks. Buy fruit that is in season and store it where you can see it so that you will be tempted to eat it. Cook dishes that have a lot of veggies in them, such as stir-fried dishes and soups. ¨ Read food labels and try to avoid saturated fat and trans fat. They increase your risk of heart disease. Bake, broil, grill, or steam foods instead of frying them. Use olive or canola oil when you cook. Try cholesterol-lowering spreads, such as Benecol or Take Control. ¨ Limit sodium. Your doctor may recommend that you limit sodium to less than 1,500 mg a day. ¨ Limit processed foods, including cookies and crackers. ¨ Limit drinks and foods with added sugar. ¨ Choose low-fat or fat-free milk and dairy products. ¨ Limit alcohol to 2 drinks a day for men and 1 drink a day for women. Too much alcohol can cause health problems.     · If your doctor recommends it, get more exercise. Walking is a good choice. Bit by bit, increase the amount you walk every day. Try for at least 30 minutes on most days of the week. You also may want to swim, bike, or do other activities.     · Stay at a healthy weight. Lose weight if you need to.     · Talk to your family, friends, or a therapist about your feelings. It is normal to feel upset about having this disease and to feel afraid of having a heart attack. Talking openly about your feelings can help you cope. If you think you have symptoms of depression, talk to your doctor.     · Avoid colds and flu. Get a pneumococcal vaccine shot. If you have had one before, ask your doctor whether you need another dose. Get a flu vaccine every year. If you must be around people with colds or flu, wash your hands often. When should you call for help? Call 911 anytime you think you may need emergency care. For example, call if:    · You have symptoms of a heart attack.  These may include:  ¨ Chest pain or pressure, or a strange feeling in the chest.  ¨ Sweating. ¨ Shortness of breath. ¨ Nausea or vomiting. ¨ Pain, pressure, or a strange feeling in the back, neck, jaw, or upper belly or in one or both shoulders or arms. ¨ Lightheadedness or sudden weakness. ¨ A fast or irregular heartbeat. After you call 911, the  may tell you to chew 1 adult-strength or 2 to 4 low-dose aspirin. Wait for an ambulance. Do not try to drive yourself.     · You have angina symptoms (such as chest pain or pressure) that do not go away with rest or are not getting better within 5 minutes after you take a dose of nitroglycerin.     · You passed out (lost consciousness).    Call your doctor now or seek immediate medical care if:    · You are having angina symptoms, such as chest pain or pressure, more often than usual, or they are different or worse than usual.     · You have new or increased shortness of breath.     · You are dizzy or lightheaded, or you feel like you may faint.    Watch closely for changes in your health, and be sure to contact your doctor if you have any problems. Where can you learn more? Go to http://audra-chrsital.info/. Enter E976 in the search box to learn more about \"Coronary Artery Disease: Care Instructions. \"  Current as of: December 6, 2017  Content Version: 11.7  © 8831-7777 [employer]. Care instructions adapted under license by [employer] (which disclaims liability or warranty for this information). If you have questions about a medical condition or this instruction, always ask your healthcare professional. [place] [address] any warranty or liability for your use of this information.

## 2018-08-17 NOTE — ACP (ADVANCE CARE PLANNING)
With patient's permission advance care planning discussed. Primary SDM would be . Secondary SDM would be daughter Shanika Robles. She wants no life prolonging treatment if death is imminent. She wants no life prolonging treatment if there is overwhelming, permanent neurologic injury. Reviewed paperwork. Conversation took 4 minutes.

## 2018-08-17 NOTE — PROGRESS NOTES
HISTORY OF PRESENT ILLNESS  Jocelyn Waller is a 67 y.o. female. She is accompanied by her . HPI  MsBharath Waller is a 67y.o. year old female, who is a patient of Dr Yan Pompa,  is seen today for Transition of Care services following a hospital discharge for NSTEMI, cardiac catheterization and TYRONE to OM2 branch of dominant circumflex artery on 8/14. Our office Nurse Navigator performed an outreach to Ms. Baker on 8/14 and 8/16 (within 2 business days of discharge) to complete medication reconciliation and a telephonic assessment of her condition. She has hypertension, hyperlipidemia, coronary artery disease, history of prior MI, status post coronary artery stenting, carotid artery disease and impaired fasting glucose. She presented to the ED on 812 complaining of chest discomfort that she thought was indigestion. Her troponin was elevated at 3.91 and peaked at 11.30. CBC, CMP and CK were normal. EKG showed sinus arrhythmia and 1st degree A-V block with no ST or T wave changes. On 8/13 she had a cardia catheterization with finding of 99% lesion in the 2nd obtuse marginal. A TYRONE was placed; there was 0% stenosis post procedure. An echocardiogram done on day of discharge 8/14, showed EF 55- 60%, but there was severe hypokinesis of the basal mid inferoseptal wall and the entire inferior wall. She has has no symptoms since discharge. In retrospect she thinks she was having symptoms for months, but never mentioned it, attributing symptoms to indigestion. Medication added: Brilinta 90 mg, metoprolol XL 12.5, rosuvastatin 20 mg    Diet and Lifestyle: generally follows a low fat low cholesterol diet, generally follows a low sodium diet, exercises regularly, nonsmoker  Medication compliance: compliant all of the time  Medication side effects: none  Home BP Monitoring: not done  Cardiovascular ROS:  She complains of lower extremity edema.  (no worse than usual)   She denies palpitations, orthopnea, exertional chest pressure/discomfort, claudication, dyspnea on exertion, dizziness   She has an appointment with Dr Vivien Miguel on 8/31. PHQ over the last two weeks 8/17/2018   Little interest or pleasure in doing things Not at all   Feeling down, depressed, irritable, or hopeless Several days   Total Score PHQ 2 1     Patient Active Problem List   Diagnosis Code    Essential hypertension I10    Asthma J45.909    ADD (attention deficit disorder) F98.8    Overactive bladder N32.81    Environmental allergies Z91.09    S/P colonoscopy Z98.890    At risk for osteoporosis Z91.89    Bilateral carotid artery disease (HCC) I77.9    Vertical diplopia H53.2    IFG (impaired fasting glucose) R73.01    Serrated adenoma of colon D12.6    Alopecia areata L63.9    Advance directive discussed with patient Z70.80    Coronary artery disease involving native coronary artery of native heart without angina pectoris I25.10    Hypercholesterolemia E78.00     Past Medical History:   Diagnosis Date    Alopecia     Asthma     CAD (coronary artery disease) 08/12/2018    NSTEMI, Cath, TYRONE OM2 of dominant Cx    H/O seasonal allergies     H/O TB skin testing 1968    treated with INH    Hypercholesterolemia     Hypertension     IFG (impaired fasting glucose) 6/14/2013    a1c 6.0 5/2013    Ill-defined condition     TIA symptoms, saw neuro-opthalmologist and was told it was probably spasm of cranial nerve.  Overactive bladder 3/13/2013    Serrated adenoma of colon 2/6/2014 11/17/09 - Dr. Martin De Santiago - repeat in 5 years     Past Surgical History:   Procedure Laterality Date    COLORECTAL SCRN; HI RISK IND  2/20/2015         HX CATARACT REMOVAL Bilateral     HX GI      colonscopy.  polyp removed     Social History     Social History    Marital status:      Spouse name: N/A    Number of children: N/A    Years of education: N/A     Social History Main Topics    Smoking status: Former Smoker     Packs/day: 0.25     Years: 8.00     Quit date: 2/19/1995    Smokeless tobacco: Never Used      Comment: quit in 1991    Alcohol use 10.5 oz/week     21 Glasses of wine per week      Comment: 2-3 etoh drinks daily - [place] - no withdrawl symptoms when she does not have it    Drug use: No    Sexual activity: Yes     Partners: Male     Other Topics Concern    None     Social History Narrative     Family History   Problem Relation Age of Onset    Hypertension Mother     Stroke Mother     Cancer Father      bladder    Other Father      congential heart valve defect    Depression Sister     Stroke Sister 72    Hypertension Sister     Psychiatric Disorder Sister      depression    COPD Sister     Heart Disease Maternal Grandfather     Dementia Paternal Grandfather      Allergies   Allergen Reactions    Nuts [Tree Nut] Other (comments)     Mouth sores. Walnuts, pecans    Sulfa (Sulfonamide Antibiotics) Hives     Current Outpatient Prescriptions   Medication Sig Dispense Refill    metoprolol tartrate (LOPRESSOR) 25 mg tablet Take 0.5 Tabs by mouth every twelve (12) hours. 60 Tab 3    rosuvastatin (CRESTOR) 20 mg tablet Take 1 Tab by mouth daily. 90 Tab 3    ticagrelor (BRILINTA) 90 mg tablet Take 1 Tab by mouth every twelve (12) hours every twelve (12) hours. 60 Tab 11    buPROPion (WELLBUTRIN) 75 mg tablet Take 75 mg by mouth daily.  s-adenosylmethionine sul tosyl (ROBERTO-E PO) Take  by mouth.  amLODIPine (NORVASC) 5 mg tablet take 1 tablet by mouth once daily 90 Tab 4    oxybutynin chloride XL (DITROPAN XL) 10 mg CR tablet TAKE ONE TABLET DAILY. 90 Tab 4    levalbuterol tartrate (XOPENEX) 45 mcg/actuation inhaler Take 1-2 Puffs by inhalation every four (4) hours as needed for Wheezing. 1 Inhaler 2    FLAXSEED PO Take  by mouth daily. Flaxseed meal      cetirizine (ZYRTEC) 10 mg tablet Take 10 mg by mouth daily.  folic acid 665 mcg tablet Take 800 mcg by mouth daily.       aspirin 81 mg chewable tablet Take 81 mg by mouth daily.  montelukast (SINGULAIR) 10 mg tablet Take 10 mg by mouth every seven (7) days. Takes on day when she gets allergy shots         Review of Systems   Constitutional: Negative for malaise/fatigue and weight loss. Gastrointestinal: Negative for constipation, diarrhea and heartburn. Musculoskeletal: Negative for back pain and joint pain. Neurological: Negative for dizziness, tingling and focal weakness. Visit Vitals    /69 (BP 1 Location: Left arm, BP Patient Position: Sitting)    Pulse 61    Temp 98.3 °F (36.8 °C) (Oral)    Resp 16    Ht 5' 3\" (1.6 m)    Wt 139 lb (63 kg)    SpO2 95%    BMI 24.62 kg/m2     Physical Exam   Constitutional: She is oriented to person, place, and time. She appears well-developed and well-nourished. HENT:   Head: Normocephalic and atraumatic. Eyes: Conjunctivae are normal. Pupils are equal, round, and reactive to light. Neck: Neck supple. Carotid bruit is not present. No thyromegaly present. Cardiovascular: Normal rate, regular rhythm and normal heart sounds. PMI is not displaced. Exam reveals no gallop. No murmur heard. Pulses:       Dorsalis pedis pulses are 2+ on the right side, and 2+ on the left side. Posterior tibial pulses are 2+ on the right side, and 2+ on the left side. Pulmonary/Chest: Effort normal. She has no wheezes. She has no rhonchi. She has no rales. Abdominal: Soft. Normal appearance. She exhibits no abdominal bruit and no mass. There is no hepatosplenomegaly. There is no tenderness. Musculoskeletal: She exhibits no edema. Lymphadenopathy:     She has no cervical adenopathy. Right: No supraclavicular adenopathy present. Left: No supraclavicular adenopathy present. Neurological: She is alert and oriented to person, place, and time. No sensory deficit. Skin: Skin is warm, dry and intact. No rash noted. Psychiatric: She has a normal mood and affect.  Her behavior is normal. Nursing note and vitals reviewed. ASSESSMENT and PLAN    ICD-10-CM ICD-9-CM    1. Medicare annual wellness visit, subsequent Z00.00 V70.0    2. Advance directive discussed with patient Z71.89 V65.49    3. Coronary artery disease involving native coronary artery of native heart without angina pectoris I25.10 414.01    4. Hypercholesterolemia E78.00 272.0    5. Essential hypertension I10 401.9    6. Bilateral carotid artery disease (HCC) I77.9 447.9    7. Screening for depression Z13.89 V79.0 [place] DEPRESSION SCREEN ANNUAL     Diagnoses and all orders for this visit:    1. Medicare annual wellness visit, subsequent    2. Advance directive discussed with patient    3. Coronary artery disease involving native coronary artery of native heart without angina pectoris  Stable now taking aspirin and statin. She may start to walk, slowly on level ground, keeping heart rate under 100. She has appointment with cardiac rehab.     4. Hypercholesterolemia  Hyperlipidemia is uncontrolled - medication changes/orders   Lipid panel is already ordered for next OV with  [employer]     5. Essential hypertension  Hypertension is controlled    6. Bilateral carotid artery disease (HCC)  Stable taking aspirin and statin. 7. Screening for depression--Negative  -     [place] DEPRESSION SCREEN ANNUAL      Follow-up Disposition:  Return in about 3 months (around 11/29/2018). lab results and schedule of future lab studies reviewed with patient  reviewed diet, exercise and weight control  I have discussed the diagnosis, evaluation and treatment options and the intended plan with the patient. Patient understands and is in agreement. The patient has received an after-visit summary and questions were answered concerning future plans. I have discussed side effects and warnings of any new medications with the patient as well.

## 2018-08-22 ENCOUNTER — TELEPHONE (OUTPATIENT)
Dept: CARDIAC REHAB | Age: 73
End: 2018-08-22

## 2018-08-22 NOTE — TELEPHONE ENCOUNTER
8/22/2018 Cardiac Rehab: Called Ms. Fabi Johnsondevinarmond to remind of intake appointment on Thursday, August 23, 2018. Confirmed appointment with patient. Provided patient with contact information for [address] [place] [address] Cardiac Rehab. Also, reminded patient to bring a list of current medications, a personal schedule, and to wear comfortable clothes and shoes.  Dom Ramos

## 2018-08-23 ENCOUNTER — HOSPITAL ENCOUNTER (OUTPATIENT)
Dept: CARDIAC REHAB | Age: 73
Discharge: HOME | End: 2018-08-23
Payer: MEDICARE

## 2018-08-23 VITALS — BODY MASS INDEX: 23.91 KG/M2 | WEIGHT: 135 LBS

## 2018-08-23 PROCEDURE — 93798 PHYS/QHP OP CAR RHAB W/ECG: CPT

## 2018-08-23 NOTE — CARDIO/PULMONARY
Fabi Baker   67 y.o.    presented to cardiac wellness for orientation and exercise tolerance test today with a primary diagnosis of NSTEMI (8/12/18) and radial stent placement x 1 (8/13/18) . Daria Alegria has a history of HTN, and HLD . Cardiac risk factors include family history, dyslipidemia, alcohol/drug abuse, hypertension, stress and these were reviewed with the patient. Daria Alegria does not work and lives at home with her , a retired pediatrician. She states they have many neighbors who are physicians, and good friends, and relies on them frequently for health advice. She reports it was the internist across the street who advised her to go to the ED when she was first experiencing the radiating chest pain. PHQ9, depression score, is 1 and this is considered normal. The result was discussed with patient who confirms score to be accurate. Noelle Medina does acknowledge she takes Wellbutrin for ADHD and \"atypical anxiety\", and has a psychiatrist who follows her for these conditions. Patient denied chest pain or SOB during 6 minute walk and was in SB/SR, 1 PVC. Daria Alegria will attend a 60 minute class once a week and exercise 3 days a week in cardiac wellness. EF is 60% . Education manual and exercise schedule were given to patient.

## 2018-08-27 ENCOUNTER — HOSPITAL ENCOUNTER (OUTPATIENT)
Dept: CARDIAC REHAB | Age: 73
Discharge: HOME | End: 2018-08-27
Payer: MEDICARE

## 2018-08-27 ENCOUNTER — PATIENT OUTREACH (OUTPATIENT)
Dept: INTERNAL MEDICINE CLINIC | Facility: CLINIC | Age: 73
End: 2018-08-27

## 2018-08-27 VITALS — BODY MASS INDEX: 24.09 KG/M2 | WEIGHT: 136 LBS

## 2018-08-27 PROCEDURE — 93798 PHYS/QHP OP CAR RHAB W/ECG: CPT

## 2018-08-27 NOTE — PROGRESS NOTES
NN F/U Progress Note    Goals Addressed             Most Recent     Transitions of Care- collaboration & care coordination to prevent complications post hospitalization. On track (8/27/2018)             8/27/18- per EMR review attended 8/17/18 PCP f/u. Spoke w/ pt. Reported \"doing well\". Taking Brilinta & ASA. No evidence of bleeding. No CP, SOB, Dyspnea. Started Cardiac Rehab. Has attended 2 sessions. Going well. Cardio f/u with Dr Morena Mccarthy on 8/31/18. Plan- pt to attend Cardiac Rehab & Cardio f/u as scheduled. Pt to continue med adherence. Next NN f/u ~ 1 week re- Cardio update, f/u if does have an ACP document-ID. 8/16/18- spoke w/ pt. Denied CP, SOB, Dyspnea. Confirmed taking new meds & routine meds as prescribed. Verbalized understanding planned duration of Dual Antiplatelet Therapy (DAPT) will be 12 months. Cardiac & AC red flag s/s & action plan reviewed. Patient advised providers on call 24 hours a day / 7 days a week (M-F 5 PM to 8 AM, and from Friday 5 PM until Monday 8 AM for the weekend) should the patient have questions or concerns. [facility] information provided. Pt confirmed 8/17/18 PCP f/u. Pt confirmed 8/23/18 Cardiac Wellness f/u. Pt reported Cardio f/u scheduled for 8/31/18 w/ Dr Morena Mccarthy. Plan- pt to attend scheduled appts. Pt continue med adherence. Pt f/u Cardiac Wellness activity recommendation. NN to collaborate w/ pt, PCP, & other Care Team Members as needed for care coordination. Next NN f/u ~ 1 week-ID. 8/15/18- attempted to contact pt x2. Messages left. Per EMR review pt has PCP f/u on 8/17/18. Cardiac Wellness appt on 8/23/18. Needs Cardio f/u in 2 weeks. Call to [facility] office. Spoke w/ Perla Weber. No upcoming appts scheduled as of yet. Call to [facility]. Confirmed Rxs for Brilinta, Rosuvastatin, and Metoprolol p/u 8/14/18.  Plan- NN to attempt pt contact on 8/16/18 if no pt return call-ID

## 2018-08-29 ENCOUNTER — HOSPITAL ENCOUNTER (OUTPATIENT)
Dept: CARDIAC REHAB | Age: 73
Discharge: HOME | End: 2018-08-29
Payer: MEDICARE

## 2018-08-29 VITALS — BODY MASS INDEX: 24.09 KG/M2 | WEIGHT: 136 LBS

## 2018-08-29 PROCEDURE — 93798 PHYS/QHP OP CAR RHAB W/ECG: CPT

## 2018-08-29 PROCEDURE — 93797 PHYS/QHP OP CAR RHAB WO ECG: CPT | Performed by: DIETITIAN, REGISTERED

## 2018-08-31 ENCOUNTER — APPOINTMENT (OUTPATIENT)
Dept: CARDIAC REHAB | Age: 73
End: 2018-08-31
Payer: MEDICARE

## 2018-08-31 ENCOUNTER — HOSPITAL ENCOUNTER (OUTPATIENT)
Dept: CARDIAC REHAB | Age: 73
Discharge: HOME | End: 2018-08-31
Payer: MEDICARE

## 2018-08-31 VITALS — BODY MASS INDEX: 24.13 KG/M2 | WEIGHT: 136.2 LBS

## 2018-08-31 PROCEDURE — 93798 PHYS/QHP OP CAR RHAB W/ECG: CPT

## 2018-09-05 ENCOUNTER — HOSPITAL ENCOUNTER (OUTPATIENT)
Dept: CARDIAC REHAB | Age: 73
Discharge: HOME | End: 2018-09-05
Payer: MEDICARE

## 2018-09-05 VITALS — WEIGHT: 137.2 LBS | BODY MASS INDEX: 24.3 KG/M2

## 2018-09-05 PROCEDURE — 93798 PHYS/QHP OP CAR RHAB W/ECG: CPT

## 2018-09-05 PROCEDURE — 93797 PHYS/QHP OP CAR RHAB WO ECG: CPT

## 2018-09-10 ENCOUNTER — HOSPITAL ENCOUNTER (OUTPATIENT)
Dept: CARDIAC REHAB | Age: 73
Discharge: HOME | End: 2018-09-10
Payer: MEDICARE

## 2018-09-10 VITALS — WEIGHT: 137 LBS | BODY MASS INDEX: 24.27 KG/M2

## 2018-09-10 PROCEDURE — 93798 PHYS/QHP OP CAR RHAB W/ECG: CPT

## 2018-09-12 ENCOUNTER — HOSPITAL ENCOUNTER (OUTPATIENT)
Dept: CARDIAC REHAB | Age: 73
Discharge: HOME | End: 2018-09-12
Payer: MEDICARE

## 2018-09-12 VITALS — BODY MASS INDEX: 24.27 KG/M2 | WEIGHT: 137 LBS

## 2018-09-12 PROCEDURE — 93797 PHYS/QHP OP CAR RHAB WO ECG: CPT | Performed by: DIETITIAN, REGISTERED

## 2018-09-12 PROCEDURE — 93798 PHYS/QHP OP CAR RHAB W/ECG: CPT | Performed by: DIETITIAN, REGISTERED

## 2018-09-13 ENCOUNTER — PATIENT OUTREACH (OUTPATIENT)
Dept: INTERNAL MEDICINE CLINIC | Facility: CLINIC | Age: 73
End: 2018-09-13

## 2018-09-13 ENCOUNTER — OFFICE VISIT (OUTPATIENT)
Dept: INTERNAL MEDICINE CLINIC | Facility: CLINIC | Age: 73
End: 2018-09-13

## 2018-09-13 VITALS
HEIGHT: 63 IN | WEIGHT: 134.2 LBS | RESPIRATION RATE: 18 BRPM | TEMPERATURE: 98.1 F | HEART RATE: 52 BPM | BODY MASS INDEX: 23.78 KG/M2 | DIASTOLIC BLOOD PRESSURE: 70 MMHG | SYSTOLIC BLOOD PRESSURE: 138 MMHG | OXYGEN SATURATION: 95 %

## 2018-09-13 DIAGNOSIS — Z79.899 HIGH RISK MEDICATION USE: ICD-10-CM

## 2018-09-13 DIAGNOSIS — R07.81 RIB PAIN ON LEFT SIDE: Primary | ICD-10-CM

## 2018-09-13 NOTE — PROGRESS NOTES
NN F/U Progress Note Goals Addressed Most Recent  Transitions of Care- collaboration & care coordination to prevent complications post hospitalization. On track (8/27/2018) 9/13/18- attempted to contact pt for final NN FELICIANO f/u. Messages left x 2. Per EMR review pt continues w/ Cardiac Rehab. No recent Cardiology notes noted in Media. Pt seen in PCP office today unrelated to NSTEMI. Plan- attempt final pt contact on 9/14/18. F/U w/ Dr Gong's office re 8/31/18 ov notes. Resolve NN FELICIANO Episode-ID. 8/27/18- per EMR review attended 8/17/18 PCP f/u. Spoke w/ pt. Reported \"doing well\". Taking Brilinta & ASA. No evidence of bleeding. No CP, SOB, Dyspnea. Started Cardiac Rehab. Has attended 2 sessions. Going well. Cardio f/u with Dr Vignesh Samuels on 8/31/18. Plan- pt to attend Cardiac Rehab & Cardio f/u as scheduled. Pt to continue med adherence. Next NN f/u ~ 1 week re- Cardio update, f/u if does have an ACP document-ID. 8/16/18- spoke w/ pt. Denied CP, SOB, Dyspnea. Confirmed taking new meds & routine meds as prescribed. Verbalized understanding planned duration of Dual Antiplatelet Therapy (DAPT) will be 12 months. Cardiac & AC red flag s/s & action plan reviewed. Patient advised providers on call 24 hours a day / 7 days a week (M-F 5 PM to 8 AM, and from Friday 5 PM until Monday 8 AM for the weekend) should the patient have questions or concerns. [facility] information provided. Pt confirmed 8/17/18 PCP f/u. Pt confirmed 8/23/18 Cardiac Wellness f/u. Pt reported Cardio f/u scheduled for 8/31/18 w/ Dr Vignesh Samuels. Plan- pt to attend scheduled appts. Pt continue med adherence. Pt f/u Cardiac Wellness activity recommendation. NN to collaborate w/ pt, PCP, & other Care Team Members as needed for care coordination. Next NN f/u ~ 1 week-ID. 8/15/18- attempted to contact pt x2. Messages left.  Per EMR review pt has PCP f/u on 8/17/18. Cardiac Wellness appt on 8/23/18. Needs Cardio f/u in 2 weeks. Call to [facility]. Spoke w/ Ephraim Profit. No upcoming appts scheduled as of yet. Call to [facility]. Confirmed Rxs for Brilinta, Rosuvastatin, and Metoprolol p/u 8/14/18. Plan- NN to attempt pt contact on 8/16/18 if no pt return call-ID

## 2018-09-13 NOTE — MR AVS SNAPSHOT
[address] [address] [phone] Patient: Jessika West MRN: [id number] Visit Information Date & Time Provider Department Dept. Phone Encounter #  
 2018  9:00 AM Naya Bourne 132[facility] [address] [place] [address] [id number] Follow-up Instructions Return if symptoms worsen or fail to improve. Your Appointments 2018  8:15 AM  
LAB with LAB [facility] 18 ([address]) Appt Note: BP [facility] PRIOR [address] [phone]  
  
   
 [address]  
  
    
 2018  1:00 PM  
ROUTINE CARE with MD Jose Francisco [facility]) Appt Note: 6 month f/u  
 [address] [phone]  
  
   
 [address] [facility] Upcoming Health Maintenance Date Due Influenza Age 5 to Adult 2018 MEDICARE YEARLY EXAM 2019 BREAST CANCER SCRN MAMMOGRAM 2019 GLAUCOMA SCREENING Q2Y 2020 COLONOSCOPY 2020 DTaP/Tdap/Td series (2 - Td) 2026 Allergies as of 2018  Review Complete On: 2018 By: Annemarie Sim LPN Severity Noted Reaction Type Reactions Nuts [Tree Nut]  10/13/2014    Other (comments) Mouth sores. Walnuts, pecans Sulfa (Sulfonamide Antibiotics)  2010    Hives Current Immunizations  Reviewed on 2018 Name Date Influenza High Dose Vaccine PF 10/13/2014 Influenza Vaccine 10/29/2015, 10/6/2013 Pneumococcal Conjugate (PCV-13) 2016  9:58 AM  
 Pneumococcal Vaccine (Unspecified Type) 2012 Zoster Vaccine, Live 2010 Reviewed by Annemarie Sim LPN on 6382 at  9:04 AM  
You Were Diagnosed With   
  
 Codes Comments Rib pain on left side    -  Primary ICD-10-CM: R07.81 ICD-9-CM: 786.50 Vitals BP Pulse Temp Resp Height(growth percentile) Weight(growth percentile) 138/70 (BP 1 Location: Left arm, BP Patient Position: Sitting) (!) 52 98.1 °F (36.7 °C) (Oral) 18 5' 3\" (1.6 m) 134 lb 3.2 oz (60.9 kg) SpO2 BMI OB Status Smoking Status 95% 23.77 kg/m2 Postmenopausal Former Smoker Vitals History BMI and BSA Data Body Mass Index Body Surface Area  
 23.77 kg/m 2 1.65 m 2 Preferred Pharmacy Pharmacy Name Phone [id number] [address], [address] [phone] Your Updated Medication List  
  
   
This list is accurate as of 9/13/18  9:43 AM.  Always use your most recent med list. amLODIPine 5 mg tablet Commonly known as:  NORVASC  
take 1 tablet by mouth once daily  
  
 aspirin 81 mg chewable tablet Take 81 mg by mouth daily. buPROPion 75 mg tablet Commonly known as:  [facility] Take 75 mg by mouth daily. FLAXSEED PO Take  by mouth daily. Flaxseed meal  
  
 folic acid 149 mcg tablet Take 800 mcg by mouth daily. levalbuterol tartrate 45 mcg/actuation inhaler Commonly known as:  Jesus Lang Take 1-2 Puffs by inhalation every four (4) hours as needed for Wheezing. metoprolol tartrate 25 mg tablet Commonly known as:  LOPRESSOR Take 0.5 Tabs by mouth every twelve (12) hours. montelukast 10 mg tablet Commonly known as:  SINGULAIR Take 10 mg by mouth every seven (7) days. Takes on day when she gets allergy shots  
  
 oxybutynin chloride XL 10 mg CR tablet Commonly known as:  DITROPAN XL  
TAKE ONE TABLET DAILY. PLANT STANOL DAVID PO Take  by mouth. rosuvastatin 20 mg tablet Commonly known as:  CRESTOR Take 1 Tab by mouth daily. ROBERTO-E PO Take  by mouth. ticagrelor 90 mg tablet Commonly known as:  [employer] Take 1 Tab by mouth every twelve (12) hours every twelve (12) hours. ZyrTEC 10 mg tablet Generic drug:  cetirizine Take 10 mg by mouth daily. Follow-up Instructions Return if symptoms worsen or fail to improve. To-Do List   
 09/13/2018 Imaging:  XR CHEST PA LAT   
  
 09/14/2018 11:00 AM  
  Appointment with [address] at [address] [place] [address] ([phone])  
  
 09/17/2018 11:00 AM  
  Appointment with [address] at [address] [place] [address] ([phone])  
  
 09/19/2018 1:00 PM  
  Appointment with [address] at [address] [place] [address] ([phone])  
  
 09/19/2018 2:00 PM  
  Appointment with [address] at [address] [place] [address] ([phone])  
  
 09/21/2018 10:00 AM  
  Appointment with [address] at [address] [place] [address] ([phone])  
  
 09/21/2018 11:00 AM  
  Appointment with [address] at [address] [place] [address] ([phone])  
  
 09/24/2018 11:00 AM  
  Appointment with [address] at [address] [place] [address] ([phone])  
  
 09/26/2018 1:00 PM  
  Appointment with [address] at [address] [place] [address] ([phone])  
  
 09/26/2018 2:00 PM  
  Appointment with [address] at [address] [place] [address] ([phone])  
  
 09/28/2018 11:00 AM  
  Appointment with [address] at [address] [place] [address] ([phone]) 10/01/2018 11:00 AM  
  Appointment with [address] at [address] [place] [address] ([phone]) 10/03/2018 1:00 PM  
  Appointment with [address] at [address] [place] [address] ([phone]) 10/03/2018 2:00 PM  
  Appointment with [address] at [address] [place] [address] ([phone]) 10/05/2018 11:00 AM  
  Appointment with [address] at [address] [place] [address] ([phone])  10/08/2018 11:00 AM  
 Appointment with [address] at [address] [place] [address] ([phone]) 10/10/2018 1:00 PM  
  Appointment with [address] at [address] [place] [address] ([phone]) 10/10/2018 2:00 PM  
  Appointment with [address] at [address] [place] [address] ([phone]) 10/12/2018 11:00 AM  
  Appointment with [address] at [address] [place] [address] ([phone]) 10/15/2018 11:00 AM  
  Appointment with [address] at [address] [place] [address] ([phone]) 10/17/2018 1:00 PM  
  Appointment with [address] at [address] [place] [address] ([phone]) 10/17/2018 2:00 PM  
  Appointment with [address] at [address] [place] [address] ([phone]) 10/19/2018 11:00 AM  
  Appointment with [address] at [address] [place] [address] ([phone])  
  
 10/22/2018 11:00 AM  
  Appointment with [address] at [address] [place] [address] ([phone])  
  
 10/24/2018 1:00 PM  
  Appointment with [address] at [address] [place] [address] ([phone])  
  
 10/24/2018 2:00 PM  
  Appointment with [address] at [address] [place] [address] ([phone]) Patient Instructions I have provided information on costochondritis. If your xray is negative this is likely the cause. Please continue to use heat or ice for pain relief. You can also  try over the counter lidocaine patches, leave on for 12 hours then remove. Costochondritis: Care Instructions Your Care Instructions You have chest pain because the cartilage of your rib cage is inflamed. This problem is called costochondritis. This type of chest wall pain may last from days to weeks. It is not a heart problem. Sometimes costochondritis occurs with a cold or the flu, and other times the exact cause is not known. Follow-up care is a key part of your treatment and safety.  Be sure to make and go to all appointments, and call your doctor if you are having problems. It's also a good idea to know your test results and keep a list of the medicines you take. How can you care for yourself at home? · Take medicines for pain and inflammation exactly as directed. ¨ If the doctor gave you a prescription medicine, take it as prescribed. ¨ If you are not taking a prescription pain medicine, ask your doctor if you can take an over-the-counter medicine. ¨ Do not take two or more pain medicines at the same time unless the doctor told you to. Many pain medicines have acetaminophen, which is Tylenol. Too much acetaminophen (Tylenol) can be harmful. · It may help to use a warm compress or heating pad (set on low) on your chest. You can also try alternating heat and ice. Put ice or a cold pack on the area for 10 to 20 minutes at a time. Put a thin cloth between the ice and your skin. · Avoid any activity that strains the chest area. As your pain gets better, you can slowly return to your normal activities. · Do not use tape, an elastic bandage, a \"rib belt,\" or anything else that restricts your chest wall motion. When should you call for help? Call 911 anytime you think you may need emergency care. For example, call if: 
  · You have new or different chest pain or pressure. This may occur with: ¨ Sweating. ¨ Shortness of breath. ¨ Nausea or vomiting. ¨ Pain that spreads from the chest to the neck, jaw, or one or both shoulders or arms. ¨ Dizziness or lightheadedness. ¨ A fast or uneven pulse. After calling 911, chew 1 adult-strength aspirin. Wait for an ambulance. Do not try to drive yourself.  
  · You have severe trouble breathing.  
 Call your doctor now or seek immediate medical care if: 
  · You have a fever or cough.  
  · You have any trouble breathing.  
  · Your chest pain gets worse.  
 Watch closely for changes in your health, and be sure to contact your doctor if:   · Your chest pain continues even though you are taking anti-inflammatory medicine.  
  · Your chest wall pain has not improved after 5 to 7 days. Where can you learn more? Go to http://audra-christal.info/. Enter E345 in the search box to learn more about \"Costochondritis: Care Instructions. \" Current as of: November 20, 2017 Content Version: 11.7 © 1535-5141 [employer]. Care instructions adapted under license by [employer] (which disclaims liability or warranty for this information). If you have questions about a medical condition or this instruction, always ask your healthcare professional. [address] [address] any warranty or liability for your use of this information. Introducing [facility]! Dear Shima Cheng: Thank you for requesting a [employer] account. Our records indicate that you already have an active [employer] account. You can access your account anytime at [email or web address] Did you know that you can access your hospital and ER discharge instructions at any time in [employer]? You can also review all of your test results from your hospital stay or ER visit. Additional Information If you have questions, please visit the Frequently Asked Questions section of the [employer] website at [email or web address] Remember, [employer] is NOT to be used for urgent needs. For medical emergencies, dial 911. Now available from your iPhone and Android! Please provide this summary of care documentation to your next provider. Your primary care clinician is listed as Ami Watson. If you have any questions after today's visit, please call [phone].

## 2018-09-13 NOTE — PROGRESS NOTES
NEETU Howard is a 68y.o. year old female patient of Marilu Oconnor MD who presents with c/o pulled muscle in chest.    Pt has history of has Essential hypertension, Asthma, ADD (attention deficit disorder), Overactive bladder, Environmental allergies, S/P colonoscopy, At risk for osteoporosis, Bilateral carotid artery disease ([address] Vertical diplopia, IFG (impaired fasting glucose), Serrated adenoma of colon, Alopecia areata, Advance directive discussed with patient, Coronary artery disease involving native coronary artery of native heart without angina pectoris, and Hypercholesterolemia on her problem list..    Pt c/o pulled muscle in left side of chest following a fall on Tuesday. Pt states she was in the garden seated and lost balance and rolled to left side. Having pain under left breast and around ribcage, area is tender to touch. Hasn't noticed any bruising or redness. Worried she has bleeding inside bc has been on a blood thinner since hospital discharge. Pain had been worsening but started to get better last night. Ice helps. Describes pain as sharp, constants, hurts worse with deep breathing or cough. Denies SOB, cough or wheezing. Denies fevers or chills. Denies chest pain or palpitations. Per chart review pt was hospitalized in August for NSTEMI, cardiac catheterization and TYRONE to OM2 branch of dominant circumflex artery on 8/14 and is currently doing cardiac rehab. Pt is on brillinta.      Patient Active Problem List   Diagnosis Code    Essential hypertension I10    Asthma J45.909    ADD (attention deficit disorder) F98.8    Overactive bladder N32.81    Environmental allergies Z91.09    S/P colonoscopy Z98.890    At risk for osteoporosis Z91.89    Bilateral carotid artery disease (HCC) I77.9    Vertical diplopia H53.2    IFG (impaired fasting glucose) R73.01    Serrated adenoma of colon D12.6    Alopecia areata L63.9    Advance directive discussed with patient Z65.80    Coronary artery disease involving native coronary artery of native heart without angina pectoris I25.10    Hypercholesterolemia E78.00     Past Medical History:   Diagnosis Date    Alopecia     Asthma     CAD (coronary artery disease) 08/12/2018    NSTEMI, Cath, TYRONE OM2 of dominant Cx    H/O seasonal allergies     H/O TB skin testing 1968    treated with INH    Hypercholesterolemia     Hypertension     IFG (impaired fasting glucose) 6/14/2013    a1c 6.0 5/2013    Ill-defined condition     TIA symptoms, saw neuro-opthalmologist and was told it was probably spasm of cranial nerve.  Overactive bladder 3/13/2013    Serrated adenoma of colon 2/6/2014 11/17/09 - Dr. Melissa Solorzano - repeat in 5 years     Past Surgical History:   Procedure Laterality Date    COLORECTAL SCRN; HI RISK IND  2/20/2015         HX CATARACT REMOVAL Bilateral     HX GI      colonscopy.  polyp removed    HX MALIGNANT SKIN LESION EXCISION Left 2018    \"non-melanoma pre-cancerous lesion removed\" per patient     Social History     Social History    Marital status:      Spouse name: N/A    Number of children: N/A    Years of education: N/A     Social History Main Topics    Smoking status: Former Smoker     Packs/day: 0.25     Years: 8.00     Quit date: 2/19/1995    Smokeless tobacco: Never Used      Comment: quit in 1991    Alcohol use 10.5 oz/week     21 Glasses of wine per week      Comment: 2-3 etoh drinks daily - [place] - no withdrawl symptoms when she does not have it    Drug use: No    Sexual activity: Yes     Partners: Male     Other Topics Concern    None     Social History Narrative     Family History   Problem Relation Age of Onset    Hypertension Mother     Stroke Mother     Lung Disease Mother     Cancer Father      bladder    Other Father      congential heart valve defect    Depression Sister     Stroke Sister 72    Hypertension Sister     COPD Sister     Other Sister 79     twisted bowel    Heart Disease Maternal Grandfather     Dementia Paternal Grandfather     Cancer Maternal Grandmother      esophageal    Asthma Paternal Grandmother     Dementia Paternal Grandmother      Allergies   Allergen Reactions    Nuts [Tree Nut] Other (comments)     Mouth sores. Walnuts, pecans    Sulfa (Sulfonamide Antibiotics) Hives       MEDICATIONS  Current Outpatient Prescriptions   Medication Sig    PLANT STANOL DAVID PO Take  by mouth.  metoprolol tartrate (LOPRESSOR) 25 mg tablet Take 0.5 Tabs by mouth every twelve (12) hours.  rosuvastatin (CRESTOR) 20 mg tablet Take 1 Tab by mouth daily.  ticagrelor (BRILINTA) 90 mg tablet Take 1 Tab by mouth every twelve (12) hours every twelve (12) hours.  buPROPion (WELLBUTRIN) 75 mg tablet Take 75 mg by mouth daily.  s-adenosylmethionine sul tosyl (ROBERTO-E PO) Take  by mouth.  amLODIPine (NORVASC) 5 mg tablet take 1 tablet by mouth once daily    oxybutynin chloride XL (DITROPAN XL) 10 mg CR tablet TAKE ONE TABLET DAILY.  levalbuterol tartrate (XOPENEX) 45 mcg/actuation inhaler Take 1-2 Puffs by inhalation every four (4) hours as needed for Wheezing.  FLAXSEED PO Take  by mouth daily. Flaxseed meal    cetirizine (ZYRTEC) 10 mg tablet Take 10 mg by mouth daily.  folic acid 306 mcg tablet Take 800 mcg by mouth daily.  aspirin 81 mg chewable tablet Take 81 mg by mouth daily.  montelukast (SINGULAIR) 10 mg tablet Take 10 mg by mouth every seven (7) days. Takes on day when she gets allergy shots     No current facility-administered medications for this visit. REVIEW OF SYSTEMS  Per HPI        Visit Vitals    /70 (BP 1 Location: Left arm, BP Patient Position: Sitting)    Pulse (!) 52    Temp 98.1 °F (36.7 °C) (Oral)    Resp 18    Ht 5' 3\" (1.6 m)    Wt 134 lb 3.2 oz (60.9 kg)    SpO2 95%    BMI 23.77 kg/m2         General: Well-developed, well-nourished. In no distress. A&O x 3. Head: Normocephalic, atraumatic.   Eyes: Conjunctiva clear. Mouth/Throat: Lips, mucosa, and tongue normal.   Neck: Supple, symmetrical, trachea midline  Lungs: Clear to auscultation bilaterally. No crackles or wheezes. No use of accessory muscles. Speaks in full sentences without SOB. Chest Wall: TTP over left lower ribcage, approx ribs 9-12, no deformity noted  Heart: RRR, normal S1 and S2, no murmur, click, rub, or gallop. Skin: No rashes or lesions. Neurovasc: No edema appreciated. Musculoskeletal: Gait normal. ROM normal at left shoulder. Psychiatric: Normal mood and affect. Behavior is normal.       XR Results (most recent):    Results from Hospital Encounter encounter on 08/12/18   XR CHEST PA LAT   Narrative INDICATION:   Chest pain, left arm pain, left-sided face and neck pain,  shortness of breath, history of asthma    COMPARISON: May 31, 2010    FINDINGS:    Frontal and lateral views of the chest demonstrate a normal cardiomediastinal  silhouette. The lungs are hyperinflated. There is no edema, effusion,  consolidation, or pneumothorax. The osseous structures are unremarkable. Impression IMPRESSION:  No acute process. ASSESSMENT and PLAN  Diagnoses and all orders for this visit:    1. Rib pain on left side  -     XR CHEST PA LAT; Future  -suspect muscle strain vs costochondritis  -will r/o acute process  -pt unable to take NSAIDS, advised lidocaine patches, continue ice/heat    2. High risk medication use  -pt recently started Brilinta       Patient Instructions     I have provided information on costochondritis. If your xray is negative this is likely the cause. Please continue to use heat or ice for pain relief. You can also  try over the counter lidocaine patches, leave on for 12 hours then remove. Costochondritis: Care Instructions  Your Care Instructions  You have chest pain because the cartilage of your rib cage is inflamed. This problem is called costochondritis.  This type of chest wall pain may last from days to weeks. It is not a heart problem. Sometimes costochondritis occurs with a cold or the flu, and other times the exact cause is not known. Follow-up care is a key part of your treatment and safety. Be sure to make and go to all appointments, and call your doctor if you are having problems. It's also a good idea to know your test results and keep a list of the medicines you take. How can you care for yourself at home? · Take medicines for pain and inflammation exactly as directed. ¨ If the doctor gave you a prescription medicine, take it as prescribed. ¨ If you are not taking a prescription pain medicine, ask your doctor if you can take an over-the-counter medicine. ¨ Do not take two or more pain medicines at the same time unless the doctor told you to. Many pain medicines have acetaminophen, which is Tylenol. Too much acetaminophen (Tylenol) can be harmful. · It may help to use a warm compress or heating pad (set on low) on your chest. You can also try alternating heat and ice. Put ice or a cold pack on the area for 10 to 20 minutes at a time. Put a thin cloth between the ice and your skin. · Avoid any activity that strains the chest area. As your pain gets better, you can slowly return to your normal activities. · Do not use tape, an elastic bandage, a \"rib belt,\" or anything else that restricts your chest wall motion. When should you call for help? Call 911 anytime you think you may need emergency care. For example, call if:    · You have new or different chest pain or pressure. This may occur with:  ¨ Sweating. ¨ Shortness of breath. ¨ Nausea or vomiting. ¨ Pain that spreads from the chest to the neck, jaw, or one or both shoulders or arms. ¨ Dizziness or lightheadedness. ¨ A fast or uneven pulse. After calling 911, chew 1 adult-strength aspirin. Wait for an ambulance.  Do not try to drive yourself.     · You have severe trouble breathing.    Call your doctor now or seek immediate medical care if:    · You have a fever or cough.     · You have any trouble breathing.     · Your chest pain gets worse.    Watch closely for changes in your health, and be sure to contact your doctor if:    · Your chest pain continues even though you are taking anti-inflammatory medicine.     · Your chest wall pain has not improved after 5 to 7 days. Where can you learn more? Go to http://audra-christal.info/. Enter Q896 in the search box to learn more about \"Costochondritis: Care Instructions. \"  Current as of: November 20, 2017  Content Version: 11.7  © 3417-4469 [employer]. Care instructions adapted under license by [employer] (which disclaims liability or warranty for this information). If you have questions about a medical condition or this instruction, always ask your healthcare professional. [address] any warranty or liability for your use of this information. Please keep your follow-up appointment with Keena Fallon MD.     Follow-up Disposition:  Return if symptoms worsen or fail to improve. Health Maintenance Due   Topic Date Due    Influenza Age 5 to Adult  08/01/2018       I have discussed the diagnosis with the patient and the intended plan as seen in the above orders. Patient is in agreement. The patient has received an after-visit summary and questions were answered concerning future plans. I have discussed medication side effects and warnings with the patient as well. Warning signs for the above conditions were discussed including when to call our office or go to the emergency room. The nurse provided the patient and/or family with advanced directive information if needed and encouraged the patient to provide a copy to the office when available.

## 2018-09-13 NOTE — PATIENT INSTRUCTIONS
I have provided information on costochondritis. If your xray is negative this is likely the cause. Please continue to use heat or ice for pain relief. You can also  try over the counter lidocaine patches, leave on for 12 hours then remove. Costochondritis: Care Instructions  Your Care Instructions  You have chest pain because the cartilage of your rib cage is inflamed. This problem is called costochondritis. This type of chest wall pain may last from days to weeks. It is not a heart problem. Sometimes costochondritis occurs with a cold or the flu, and other times the exact cause is not known. Follow-up care is a key part of your treatment and safety. Be sure to make and go to all appointments, and call your doctor if you are having problems. It's also a good idea to know your test results and keep a list of the medicines you take. How can you care for yourself at home? · Take medicines for pain and inflammation exactly as directed. ¨ If the doctor gave you a prescription medicine, take it as prescribed. ¨ If you are not taking a prescription pain medicine, ask your doctor if you can take an over-the-counter medicine. ¨ Do not take two or more pain medicines at the same time unless the doctor told you to. Many pain medicines have acetaminophen, which is Tylenol. Too much acetaminophen (Tylenol) can be harmful. · It may help to use a warm compress or heating pad (set on low) on your chest. You can also try alternating heat and ice. Put ice or a cold pack on the area for 10 to 20 minutes at a time. Put a thin cloth between the ice and your skin. · Avoid any activity that strains the chest area. As your pain gets better, you can slowly return to your normal activities. · Do not use tape, an elastic bandage, a \"rib belt,\" or anything else that restricts your chest wall motion. When should you call for help? Call 911 anytime you think you may need emergency care.  For example, call if:    · You have new or different chest pain or pressure. This may occur with:  ¨ Sweating. ¨ Shortness of breath. ¨ Nausea or vomiting. ¨ Pain that spreads from the chest to the neck, jaw, or one or both shoulders or arms. ¨ Dizziness or lightheadedness. ¨ A fast or uneven pulse. After calling 911, chew 1 adult-strength aspirin. Wait for an ambulance. Do not try to drive yourself.     · You have severe trouble breathing.    Call your doctor now or seek immediate medical care if:    · You have a fever or cough.     · You have any trouble breathing.     · Your chest pain gets worse.    Watch closely for changes in your health, and be sure to contact your doctor if:    · Your chest pain continues even though you are taking anti-inflammatory medicine.     · Your chest wall pain has not improved after 5 to 7 days. Where can you learn more? Go to http://audra-christal.info/. Enter S743 in the search box to learn more about \"Costochondritis: Care Instructions. \"  Current as of: November 20, 2017  Content Version: 11.7  © 8164-6868 [employer]. Care instructions adapted under license by [employer] (which disclaims liability or warranty for this information). If you have questions about a medical condition or this instruction, always ask your healthcare professional. [address] any warranty or liability for your use of this information.

## 2018-09-13 NOTE — PROGRESS NOTES
Fabi Baker  Identified pt with two pt identifiers(name and ). Chief Complaint   Patient presents with    Rib Pain     pulled muscle/ fell on tuesday       Reviewed record In preparation for visit and have obtained necessary documentation. Has info on advanced directive but has not filled them out. 1. Have you been to the ER, urgent care clinic or hospitalized since your last visit? No     2. Have you seen or consulted any other health care providers outside of the [address] [place] [address] since your last visit? Include any pap smears or colon screening. No    Vitals reviewed with provider.     Health Maintenance reviewed:     Health Maintenance Due   Topic    Influenza Age 5 to Adult           Wt Readings from Last 3 Encounters:   18 134 lb 3.2 oz (60.9 kg)   18 137 lb (62.1 kg)   09/10/18 137 lb (62.1 kg)        Temp Readings from Last 3 Encounters:   18 98.1 °F (36.7 °C) (Oral)   18 98.3 °F (36.8 °C) (Oral)   18 98.5 °F (36.9 °C)        BP Readings from Last 3 Encounters:   18 138/70   18 113/69   18 153/78        Pulse Readings from Last 3 Encounters:   18 (!) 52   18 61   18 71        Vitals:    18 0917   BP: 138/70   Pulse: (!) 52   Resp: 18   Temp: 98.1 °F (36.7 °C)   TempSrc: Oral   SpO2: 95%   Weight: 134 lb 3.2 oz (60.9 kg)   Height: 5' 3\" (1.6 m)   PainSc:   7   PainLoc: Rib Cage          Learning Assessment:   :       Learning Assessment 3/20/2015   PRIMARY LEARNER Patient   HIGHEST LEVEL OF EDUCATION - PRIMARY LEARNER  4 YEARS OF COLLEGE   BARRIERS PRIMARY LEARNER NONE   PRIMARY LANGUAGE ENGLISH    NEED No   LEARNER PREFERENCE PRIMARY READING   ANSWERED BY patient   RELATIONSHIP SELF        Depression Screening:   :       PHQ over the last two weeks 2018   Little interest or pleasure in doing things Not at all   Feeling down, depressed, irritable, or hopeless Not at all   Total Score PHQ 2 0   Trouble falling or staying asleep, or sleeping too much Several days   Feeling tired or having little energy Not at all   Poor appetite, weight loss, or overeating Not at all   Feeling bad about yourself - or that you are a failure or have let yourself or your family down Not at all   Trouble concentrating on things such as school, work, reading, or watching TV Not at all   Moving or speaking so slowly that other people could have noticed; or the opposite being so fidgety that others notice Not at all   Thoughts of being better off dead, or hurting yourself in some way Not at all   PHQ 9 Score 1        Fall Risk Assessment:   :       Fall Risk Assessment, last 12 mths 8/17/2018   Able to walk? Yes   Fall in past 12 months? No   Fall with injury? -   Number of falls in past 12 months -   Fall Risk Score -        Abuse Screening:   :       Abuse Screening Questionnaire 8/17/2018 4/23/2018 11/14/2016 3/20/2015   Do you ever feel afraid of your partner? N N N N   Are you in a relationship with someone who physically or mentally threatens you? N N N N   Is it safe for you to go home?  Y Y Y Y        ADL Screening:   :       ADL Assessment 8/17/2018   Feeding yourself No Help Needed   Getting from bed to chair No Help Needed   Getting dressed No Help Needed   Bathing or showering No Help Needed   Walk across the room (includes cane/walker) No Help Needed   Using the telphone No Help Needed   Taking your medications No Help Needed   Preparing meals No Help Needed   Managing money (expenses/bills) No Help Needed   Moderately strenuous housework (laundry) No Help Needed   Shopping for personal items (toiletries/medicines) No Help Needed   Shopping for groceries No Help Needed   Driving No Help Needed   Climbing a flight of stairs No Help Needed   Getting to places beyond walking distances No Help Needed

## 2018-09-14 ENCOUNTER — PATIENT OUTREACH (OUTPATIENT)
Dept: INTERNAL MEDICINE CLINIC | Facility: CLINIC | Age: 73
End: 2018-09-14

## 2018-09-14 ENCOUNTER — TELEPHONE (OUTPATIENT)
Dept: CARDIAC REHAB | Age: 73
End: 2018-09-14

## 2018-09-14 ENCOUNTER — APPOINTMENT (OUTPATIENT)
Dept: CARDIAC REHAB | Age: 73
End: 2018-09-14
Payer: MEDICARE

## 2018-09-14 NOTE — TELEPHONE ENCOUNTER
Cardiac Wellness:Fabi Baker called in to say she would not be in to exercise due to weather. She plans to exercise at her local gym. Jung Rivas RN

## 2018-09-14 NOTE — PROGRESS NOTES
NN F/U Progress Note Goals Addressed Most Recent  COMPLETED: Transitions of Care- collaboration & care coordination to prevent complications post hospitalization. On track (9/14/2018) 9/14/18- pt left message on 9/13/18 @ 6:42 PM. Stated doing well. Attending Cardiac Rehab. Seen in PCP office 9/13/18 for \"inflammation in cartilage between ribs. It won't keep me from going to rehab. No need to call me back. Thank you\". NN call to [facility]. Requested copy of 8/31/18 ov note be faxed to PCP office @ [phone]. Next Cardio f/u on 11/26/18 for Echo & Dr Black Silva afterwards. No readmissions during 30 day FELICIANO period. Plan- NN FELICIANO Episode resolved-ID. 9/13/18- attempted to contact pt for final NN FELICIANO f/u. Messages left x 2. Per EMR review pt continues w/ Cardiac Rehab. No recent Cardiology notes noted in Media. Pt seen in PCP office today unrelated to NSTEMI. Plan- attempt final pt contact on 9/14/18. F/U w/ Dr Gong's office re 8/31/18 ov notes. Resolve NN FELICIANO Episode-ID. 8/27/18- per EMR review attended 8/17/18 PCP f/u. Spoke w/ pt. Reported \"doing well\". Taking Brilinta & ASA. No evidence of bleeding. No CP, SOB, Dyspnea. Started Cardiac Rehab. Has attended 2 sessions. Going well. Cardio f/u with Dr Black Silva on 8/31/18. Plan- pt to attend Cardiac Rehab & Cardio f/u as scheduled. Pt to continue med adherence. Next NN f/u ~ 1 week re- Cardio update, f/u if does have an ACP document-ID. 8/16/18- spoke w/ pt. Denied CP, SOB, Dyspnea. Confirmed taking new meds & routine meds as prescribed. Verbalized understanding planned duration of Dual Antiplatelet Therapy (DAPT) will be 12 months. Cardiac & AC red flag s/s & action plan reviewed. Patient advised providers on call 24 hours a day / 7 days a week (M-F 5 PM to 8 AM, and from Friday 5 PM until Monday 8 AM for the weekend) should the patient have questions or concerns.  [facility] information provided. Pt confirmed 8/17/18 PCP f/u. Pt confirmed 8/23/18 Cardiac Wellness f/u. Pt reported Cardio f/u scheduled for 8/31/18 w/ Dr Chris Orozco. Plan- pt to attend scheduled appts. Pt continue med adherence. Pt f/u Cardiac Wellness activity recommendation. NN to collaborate w/ pt, PCP, & other Care Team Members as needed for care coordination. Next NN f/u ~ 1 week-ID. 8/15/18- attempted to contact pt x2. Messages left. Per EMR review pt has PCP f/u on 8/17/18. Cardiac Wellness appt on 8/23/18. Needs Cardio f/u in 2 weeks. Call to [facility] office. Spoke w/ [facility]. No upcoming appts scheduled as of yet. Call to [facility]. Confirmed Rxs for Brilinta, Rosuvastatin, and Metoprolol p/u 8/14/18. Plan- NN to attempt pt contact on 8/16/18 if no pt return call-ID

## 2018-09-17 ENCOUNTER — HOSPITAL ENCOUNTER (OUTPATIENT)
Dept: CARDIAC REHAB | Age: 73
Discharge: HOME | End: 2018-09-17
Payer: MEDICARE

## 2018-09-17 VITALS — BODY MASS INDEX: 23.95 KG/M2 | WEIGHT: 135.2 LBS

## 2018-09-17 PROCEDURE — 93798 PHYS/QHP OP CAR RHAB W/ECG: CPT

## 2018-09-19 ENCOUNTER — HOSPITAL ENCOUNTER (OUTPATIENT)
Dept: CARDIAC REHAB | Age: 73
Discharge: HOME | End: 2018-09-19
Payer: MEDICARE

## 2018-09-19 VITALS — BODY MASS INDEX: 23.91 KG/M2 | WEIGHT: 135 LBS

## 2018-09-19 PROCEDURE — 93797 PHYS/QHP OP CAR RHAB WO ECG: CPT

## 2018-09-19 PROCEDURE — 93798 PHYS/QHP OP CAR RHAB W/ECG: CPT

## 2018-09-21 ENCOUNTER — OFFICE VISIT (OUTPATIENT)
Dept: INTERNAL MEDICINE CLINIC | Facility: CLINIC | Age: 73
End: 2018-09-21

## 2018-09-21 ENCOUNTER — APPOINTMENT (OUTPATIENT)
Dept: CARDIAC REHAB | Age: 73
End: 2018-09-21
Payer: MEDICARE

## 2018-09-21 VITALS
HEART RATE: 64 BPM | BODY MASS INDEX: 24.1 KG/M2 | SYSTOLIC BLOOD PRESSURE: 136 MMHG | OXYGEN SATURATION: 97 % | HEIGHT: 63 IN | WEIGHT: 136 LBS | TEMPERATURE: 98.4 F | DIASTOLIC BLOOD PRESSURE: 66 MMHG | RESPIRATION RATE: 16 BRPM

## 2018-09-21 DIAGNOSIS — H11.32 SUBCONJUNCTIVAL HEMORRHAGE OF LEFT EYE: Primary | ICD-10-CM

## 2018-09-21 DIAGNOSIS — Z23 ENCOUNTER FOR IMMUNIZATION: ICD-10-CM

## 2018-09-21 NOTE — PATIENT INSTRUCTIONS
Vaccine Information Statement Influenza (Flu) Vaccine (Inactivated or Recombinant): What you need to know Many Vaccine Information Statements are available in [place] and other languages. See www.immunize.org/vis Hojas de Información Sobre Vacunas están disponibles en Español y en muchos otros idiomas. Visite www.immunize.org/vis 1. Why get vaccinated? Influenza (flu) is a contagious disease that spreads around the [place] every year, usually between October and May. Flu is caused by influenza viruses, and is spread mainly by coughing, sneezing, and close contact. Anyone can get flu. Flu strikes suddenly and can last several days. Symptoms vary by age, but can include: 
 fever/chills  sore throat  muscle aches  fatigue  cough  headache  runny or stuffy nose Flu can also lead to pneumonia and blood infections, and cause diarrhea and seizures in children. If you have a medical condition, such as heart or lung disease, flu can make it worse. Flu is more dangerous for some people. Infants and young children, people 72years of age and older, pregnant women, and people with certain health conditions or a weakened immune system are at greatest risk. Each year thousands of people in the [facility] die from flu, and many more are hospitalized. Flu vaccine can: 
 keep you from getting flu, 
 make flu less severe if you do get it, and 
 keep you from spreading flu to your family and other people. 2. Inactivated and recombinant flu vaccines A dose of flu vaccine is recommended every flu season. Children 6 months through 6years of age may need two doses during the same flu season. Everyone else needs only one dose each flu season.   
 
 
Some inactivated flu vaccines contain a very small amount of a mercury-based preservative called thimerosal. Studies have not shown thimerosal in vaccines to be harmful, but flu vaccines that do not contain thimerosal are available. There is no live flu virus in flu shots. They cannot cause the flu. There are many flu viruses, and they are always changing. Each year a new flu vaccine is made to protect against three or four viruses that are likely to cause disease in the upcoming flu season. But even when the vaccine doesnt exactly match these viruses, it may still provide some protection Flu vaccine cannot prevent: 
 flu that is caused by a virus not covered by the vaccine, or 
 illnesses that look like flu but are not. It takes about 2 weeks for protection to develop after vaccination, and protection lasts through the flu season. 3. Some people should not get this vaccine Tell the person who is giving you the vaccine:  If you have any severe, life-threatening allergies. If you ever had a life-threatening allergic reaction after a dose of flu vaccine, or have a severe allergy to any part of this vaccine, you may be advised not to get vaccinated. Most, but not all, types of flu vaccine contain a small amount of egg protein.  If you ever had Guillain-Barré Syndrome (also called GBS). Some people with a history of GBS should not get this vaccine. This should be discussed with your doctor.  If you are not feeling well. It is usually okay to get flu vaccine when you have a mild illness, but you might be asked to come back when you feel better. 4. Risks of a vaccine reaction With any medicine, including vaccines, there is a chance of reactions. These are usually mild and go away on their own, but serious reactions are also possible. Most people who get a flu shot do not have any problems with it. Minor problems following a flu shot include:  
 soreness, redness, or swelling where the shot was given  hoarseness  sore, red or itchy eyes  cough  fever  aches  headache  itching  fatigue If these problems occur, they usually begin soon after the shot and last 1 or 2 days. More serious problems following a flu shot can include the following:  There may be a small increased risk of Guillain-Barré Syndrome (GBS) after inactivated flu vaccine. This risk has been estimated at 1 or 2 additional cases per million people vaccinated. This is much lower than the risk of severe complications from flu, which can be prevented by flu vaccine.  Young children who get the flu shot along with pneumococcal vaccine (PCV13) and/or DTaP vaccine at the same time might be slightly more likely to have a seizure caused by fever. Ask your doctor for more information. Tell your doctor if a child who is getting flu vaccine has ever had a seizure. Problems that could happen after any injected vaccine:  People sometimes faint after a medical procedure, including vaccination. Sitting or lying down for about 15 minutes can help prevent fainting, and injuries caused by a fall. Tell your doctor if you feel dizzy, or have vision changes or ringing in the ears.  Some people get severe pain in the shoulder and have difficulty moving the arm where a shot was given. This happens very rarely.  Any medication can cause a severe allergic reaction. Such reactions from a vaccine are very rare, estimated at about 1 in a million doses, and would happen within a few minutes to a few hours after the vaccination. As with any medicine, there is a very remote chance of a vaccine causing a serious injury or death. The safety of vaccines is always being monitored. For more information, visit: www.cdc.gov/vaccinesafety/ 
 
5. What if there is a serious reaction? What should I look for?  Look for anything that concerns you, such as signs of a severe allergic reaction, very high fever, or unusual behavior.  
 
Signs of a severe allergic reaction can include hives, swelling of the face and throat, difficulty breathing, a fast heartbeat, dizziness, and weakness  usually within a few minutes to a few hours after the vaccination. What should I do?  If you think it is a severe allergic reaction or other emergency that cant wait, call 9-1-1 and get the person to the nearest hospital. Otherwise, call your doctor.  Reactions should be reported to the Vaccine Adverse Event Reporting System (VAERS). Your doctor should file this report, or you can do it yourself through  the VAERS web site at www.vaers. [facility].gov, or by calling 1-494.882.8679. VAERS does not give medical advice. 6. The National Vaccine Injury Compensation Program 
 
The [facility] Vaccine Injury Compensation Program (VICP) is a federal program that was created to compensate people who may have been injured by certain vaccines. Persons who believe they may have been injured by a vaccine can learn about the program and about filing a claim by calling 8-414.832.3297 or visiting the [facility] [employer] website at www.[facility].gov/vaccinecompensation. There is a time limit to file a claim for compensation. 7. How can I learn more?  Ask your healthcare provider. He or she can give you the vaccine package insert or suggest other sources of information.  Call your local or state health department.  Contact the Centers for Disease Control and Prevention (CDC): 
- Call 9-189.680.5131 (1-800-CDC-INFO) or 
- Visit CDCs website at www.cdc.gov/flu Vaccine Information Statement Inactivated Influenza Vaccine 8/7/2015 
42 OLIVIA Ceballos [id number]-[id number] Department of [facility] [employer] Centers for Disease Control and Prevention Office Use Only Subconjunctival Hemorrhage: Care Instructions Your Care Instructions Sometimes small blood vessels in the white of the eye can break, causing a red spot or speck. This is called a subconjunctival hemorrhage. The blood vessels may break when you sneeze, cough, vomit, strain, or bend over. Sometimes there is no clear cause. The blood may look alarming, especially if the spot is large. If there is no pain or vision change, there is usually no reason to worry, and the blood slowly will go away on its own in 2 to 3 weeks. Follow-up care is a key part of your treatment and safety. Be sure to make and go to all appointments, and call your doctor if you are having problems. It's also a good idea to know your test results and keep a list of the medicines you take. How can you care for yourself at home? · Watch for changes in your eye. It is normal for the red spot on your eyeball to change color as it heals. Just like a bruise on your skin, it may change from red to brown to purple to yellow. · Do not take aspirin or products that contain aspirin, which can increase bleeding. Use acetaminophen (Tylenol) if you need pain relief for another problem. · Do not take two or more pain medicines at the same time unless the doctor told you to. Many pain medicines have acetaminophen, which is Tylenol. Too much acetaminophen (Tylenol) can be harmful. When should you call for help? Call your doctor now or seek immediate medical care if: 
  · You have signs of an eye infection, such as: 
¨ Pus or thick discharge coming from the eye. ¨ Redness or swelling around the eye. ¨ A fever.  
  · You see blood over the black part of your eye (pupil).  
  · You have any changes or problems in your vision.  
  · You have any pain in your eye.  
 Watch closely for changes in your health, and be sure to contact your doctor if: 
  · You do not get better as expected. Where can you learn more? Go to http://audra-hcristal.info/. Enter B862 in the search box to learn more about \"Subconjunctival Hemorrhage: Care Instructions. \" Current as of: December 3, 2017 Content Version: 11.7 © 7161-8004 [employer].  Care instructions adapted under license by [address] (which disclaims liability or warranty for this information). If you have questions about a medical condition or this instruction, always ask your healthcare professional. [address] any warranty or liability for your use of this information.

## 2018-09-21 NOTE — PROGRESS NOTES
Fabi Baker  Identified pt with two pt identifiers(name and ). Chief Complaint Patient presents with  Eye Problem  
  irritation left  Immunization/Injection Reviewed record In preparation for visit and have obtained necessary documentation. Has info on advanced directive but has not filled them out. 1. Have you been to the ER, urgent care clinic or hospitalized since your last visit? No  
 
2. Have you seen or consulted any other health care providers outside of the [address] [place] [address] since your last visit? Include any pap smears or colon screening. Card rehab Vitals reviewed with provider. Health Maintenance reviewed: After verbal order read back of , patient received High Dose Flu Shot (Adjuvanted Fluad) in right arm. [address] 27309-135-52 Lot [id number] Exp 19 Patient tolerated procedure without complaints and received VIS. There are no preventive care reminders to display for this patient. Is it safe for you to go home? Y Y Y Y  
   
   
   
   
   
   
   
   
   
   
Pulse Readings from Last 3 Encounters:  
18 64  
18 (!) 52  
18 61 No Help Needed No Help Magdy Vieyra Learning Assessment 3/20/2015 PRIMARY LEARNER Patient HIGHEST LEVEL OF EDUCATION - PRIMARY LEARNER  4 YEARS OF COLLEGE  
BARRIERS PRIMARY LEARNER NONE PRIMARY LANGUAGE ENGLISH  NEED No  
LEARNER PREFERENCE PRIMARY READING  
ANSWERED BY patient RELATIONSHIP SELF  
 partner? N N N N Are you in a 
PHQ over the last two weeks 2018 Little interest or pleasure in doing things Not at all Feeling down, depressed, irritable, or hopeless Not at all Total Score PHQ 2 0 Trouble falling or staying asleep, or sleeping too much Several days Feeling tired or having little energy Not at all Poor appetite, weight loss, or overeating Not at all Feeling bad about yourself - or that you are a failure or have let yourself or your family down Not at all Trouble concentrating on things such as school, work, reading, or watching TV Not at all Moving or speaking so slowly that other people could have noticed; or the opposite being so fidgety that others notice Not at all Thoughts of being better off dead, or hurting yourself in some way Not at all PHQ 9 Score 1

## 2018-09-21 NOTE — MR AVS SNAPSHOT
[address] [address] [phone] Patient: Angi Sanon MRN: [id number] FST:1/84/2924 Visit Information Date & Time Provider Department Dept. Phone Encounter #  
 9/21/2018  9:45 AM MD Art[facility] [address] [id number] Follow-up Instructions Return if symptoms worsen or fail to improve. Your Appointments 11/20/2018  8:15 AM  
LAB with LAB [facility] ([facility]) Appt Note: BP [facility] LAB'S PRIOR [address] [phone]  
  
   
 [address]  
  
    
 11/29/2018  1:00 PM  
ROUTINE CARE with MD Arturo Hazel [facility]) Appt Note: 6 month f/u  
 [address] [phone]  
  
   
 [address] [facility] Upcoming Health Maintenance Date Due Influenza Age 5 to Adult 8/1/2018 MEDICARE YEARLY EXAM 8/18/2019 BREAST CANCER SCRN MAMMOGRAM 8/24/2019 GLAUCOMA SCREENING Q2Y 2/19/2020 COLONOSCOPY 2/21/2020 DTaP/Tdap/Td series (2 - Td) 5/12/2026 Allergies as of 9/21/2018  Review Complete On: 9/21/2018 By: Pratima Cutler MD  
  
 Severity Noted Reaction Type Reactions Nuts [Tree Nut]  10/13/2014    Other (comments) Mouth sores. Walnuts, pecans Sulfa (Sulfonamide Antibiotics)  05/31/2010    Hives Current Immunizations  Reviewed on 9/21/2018 Name Date Influenza High Dose Vaccine PF 10/13/2014 Influenza Vaccine 10/29/2015, 10/6/2013 Influenza Vaccine (Tri) Adjuvanted  Incomplete Pneumococcal Conjugate (PCV-13) 5/12/2016  9:58 AM  
 Pneumococcal Vaccine (Unspecified Type) 1/1/2012 Zoster Vaccine, Live 5/21/2010  Reviewed by Donny Sanchez LPN on 0/60/8958 at  9:50 AM  
You Were Diagnosed With   
  
 Codes Comments Subconjunctival hemorrhage of left eye    -  Primary ICD-10-CM: H11.32 
ICD-9-CM: 372.72 Encounter for immunization     ICD-10-CM: V19 ICD-9-CM: V03.89 Vitals BP Pulse Temp Resp Height(growth percentile) Weight(growth percentile) 136/66 (BP 1 Location: Left arm, BP Patient Position: Sitting) 64 98.4 °F (36.9 °C) (Oral) 16 5' 3\" (1.6 m) 136 lb (61.7 kg) SpO2 BMI OB Status Smoking Status 97% 24.09 kg/m2 Postmenopausal Former Smoker BMI and BSA Data Body Mass Index Body Surface Area 24.09 kg/m 2 1.66 m 2 Preferred Pharmacy Pharmacy Name Phone [id number], [address] [phone] Your Updated Medication List  
  
   
This list is accurate as of 9/21/18 10:32 AM.  Always use your most recent med list. amLODIPine 5 mg tablet Commonly known as:  NORVASC  
take 1 tablet by mouth once daily  
  
 aspirin 81 mg chewable tablet Take 81 mg by mouth daily. buPROPion 75 mg tablet Commonly known as:  [facility] Take 75 mg by mouth daily. FLAXSEED PO Take  by mouth daily. Flaxseed meal  
  
 folic acid 543 mcg tablet Take 800 mcg by mouth daily. levalbuterol tartrate 45 mcg/actuation inhaler Commonly known as:  Terrilee Tavo Take 1-2 Puffs by inhalation every four (4) hours as needed for Wheezing. metoprolol tartrate 25 mg tablet Commonly known as:  LOPRESSOR Take 0.5 Tabs by mouth every twelve (12) hours. montelukast 10 mg tablet Commonly known as:  SINGULAIR Take 10 mg by mouth every seven (7) days. Takes on day when she gets allergy shots  
  
 oxybutynin chloride XL 10 mg CR tablet Commonly known as:  DITROPAN XL  
TAKE ONE TABLET DAILY. PLANT STANOL DAVID PO Take  by mouth. rosuvastatin 20 mg tablet Commonly known as:  CRESTOR Take 1 Tab by mouth daily. ROBERTO-E PO Take  by mouth. ticagrelor 90 mg tablet Commonly known as:  [employer] Take 1 Tab by mouth every twelve (12) hours every twelve (12) hours. ZyrTEC 10 mg tablet Generic drug:  cetirizine Take 10 mg by mouth daily. We Performed the Following ADMIN INFLUENZA VIRUS VAC [ [facility]] INFLUENZA VACCINE INACTIVATED (IIV), SUBUNIT, ADJUVANTED, IM [id number] CPT(R)] Follow-up Instructions Return if symptoms worsen or fail to improve. To-Do List   
 09/24/2018 11:00 AM  
  Appointment with [address] at [address] [place] [address] ([phone])  
  
 09/26/2018 1:00 PM  
  Appointment with [address] at [address] [place] [address] ([phone])  
  
 09/26/2018 2:00 PM  
  Appointment with [address] at [address] [place] [address] ([phone])  
  
 09/28/2018 11:00 AM  
  Appointment with [address] at [address] [place] [address] ([phone]) 10/01/2018 11:00 AM  
  Appointment with [address] at [address] [place] [address] ([phone]) 10/03/2018 1:00 PM  
  Appointment with [address] at [address] [place] [address] ([phone]) 10/03/2018 2:00 PM  
  Appointment with [address] at [address] [place] [address] ([phone]) 10/05/2018 10:00 AM  
  Appointment with [address] at [address] [place] [address] ([phone]) 10/05/2018 11:00 AM  
  Appointment with [address] at [address] [place] [address] ([phone]) 10/08/2018 11:00 AM  
  Appointment with [address] at [address] [place] [address] ([phone]) 10/10/2018 1:00 PM  
  Appointment with [address] at [address] [place] [address] ([phone]) 10/10/2018 2:00 PM  
  Appointment with [address] at [address] [place] [address] ([phone]) 10/12/2018 11:00 AM  
  Appointment with [address] at [address] [place] [address] ([phone]) 10/15/2018 11:00 AM  
  Appointment with [address] at [address] [place] [address] ([phone]) 10/17/2018 1:00 PM  
  Appointment with [address] at [address] [place] [address] ([phone]) 10/17/2018 2:00 PM  
  Appointment with [address] at [address] [place] [address] ([phone]) 10/19/2018 11:00 AM  
  Appointment with [address] at [address] [place] [address] ([phone])  
  
 10/22/2018 11:00 AM  
  Appointment with [address] at [address] [place] [address] ([phone])  
  
 10/24/2018 1:00 PM  
  Appointment with [address] at [address] [place] [address] ([phone])  
  
 10/24/2018 2:00 PM  
  Appointment with [address] at [address] [place] [address] ([phone]) Patient Instructions Vaccine Information Statement Influenza (Flu) Vaccine (Inactivated or Recombinant): What you need to know Many Vaccine Information Statements are available in Brazilian and other languages. See www.immunize.org/vis Hojas de Información Sobre Vacunas están disponibles en Español y en muchos otros idiomas. Visite www.immunize.org/vis 1. Why get vaccinated? Influenza (flu) is a contagious disease that spreads around the [place] every year, usually between October and May. Flu is caused by influenza viruses, and is spread mainly by coughing, sneezing, and close contact. Anyone can get flu. Flu strikes suddenly and can last several days. Symptoms vary by age, but can include: 
 fever/chills  sore throat  muscle aches  fatigue  cough  headache  runny or stuffy nose Flu can also lead to pneumonia and blood infections, and cause diarrhea and seizures in children. If you have a medical condition, such as heart or lung disease, flu can make it worse. Flu is more dangerous for some people.  Infants and young children, people 72years of age and older, pregnant women, and people with certain health conditions or a weakened immune system are at greatest risk. Each year thousands of people in the [facility] die from flu, and many more are hospitalized. Flu vaccine can: 
 keep you from getting flu, 
 make flu less severe if you do get it, and 
 keep you from spreading flu to your family and other people. 2. Inactivated and recombinant flu vaccines A dose of flu vaccine is recommended every flu season. Children 6 months through 6years of age may need two doses during the same flu season. Everyone else needs only one dose each flu season. Some inactivated flu vaccines contain a very small amount of a mercury-based preservative called thimerosal. Studies have not shown thimerosal in vaccines to be harmful, but flu vaccines that do not contain thimerosal are available. There is no live flu virus in flu shots. They cannot cause the flu. There are many flu viruses, and they are always changing. Each year a new flu vaccine is made to protect against three or four viruses that are likely to cause disease in the upcoming flu season. But even when the vaccine doesnt exactly match these viruses, it may still provide some protection Flu vaccine cannot prevent: 
 flu that is caused by a virus not covered by the vaccine, or 
 illnesses that look like flu but are not. It takes about 2 weeks for protection to develop after vaccination, and protection lasts through the flu season. 3. Some people should not get this vaccine Tell the person who is giving you the vaccine:  If you have any severe, life-threatening allergies. If you ever had a life-threatening allergic reaction after a dose of flu vaccine, or have a severe allergy to any part of this vaccine, you may be advised not to get vaccinated. Most, but not all, types of flu vaccine contain a small amount of egg protein.  If you ever had Guillain-Barré Syndrome (also called GBS). Some people with a history of GBS should not get this vaccine. This should be discussed with your doctor.  If you are not feeling well. It is usually okay to get flu vaccine when you have a mild illness, but you might be asked to come back when you feel better. 4. Risks of a vaccine reaction With any medicine, including vaccines, there is a chance of reactions. These are usually mild and go away on their own, but serious reactions are also possible. Most people who get a flu shot do not have any problems with it. Minor problems following a flu shot include:  
 soreness, redness, or swelling where the shot was given  hoarseness  sore, red or itchy eyes  cough  fever  aches  headache  itching  fatigue If these problems occur, they usually begin soon after the shot and last 1 or 2 days. More serious problems following a flu shot can include the following:  There may be a small increased risk of Guillain-Barré Syndrome (GBS) after inactivated flu vaccine. This risk has been estimated at 1 or 2 additional cases per million people vaccinated. This is much lower than the risk of severe complications from flu, which can be prevented by flu vaccine.  Young children who get the flu shot along with pneumococcal vaccine (PCV13) and/or DTaP vaccine at the same time might be slightly more likely to have a seizure caused by fever. Ask your doctor for more information. Tell your doctor if a child who is getting flu vaccine has ever had a seizure. Problems that could happen after any injected vaccine:  People sometimes faint after a medical procedure, including vaccination. Sitting or lying down for about 15 minutes can help prevent fainting, and injuries caused by a fall. Tell your doctor if you feel dizzy, or have vision changes or ringing in the ears.  Some people get severe pain in the shoulder and have difficulty moving the arm where a shot was given. This happens very rarely.  Any medication can cause a severe allergic reaction. Such reactions from a vaccine are very rare, estimated at about 1 in a million doses, and would happen within a few minutes to a few hours after the vaccination. As with any medicine, there is a very remote chance of a vaccine causing a serious injury or death. The safety of vaccines is always being monitored. For more information, visit: www.cdc.gov/vaccinesafety/ 
 
5. What if there is a serious reaction? What should I look for?  Look for anything that concerns you, such as signs of a severe allergic reaction, very high fever, or unusual behavior. Signs of a severe allergic reaction can include hives, swelling of the face and throat, difficulty breathing, a fast heartbeat, dizziness, and weakness  usually within a few minutes to a few hours after the vaccination. What should I do?  If you think it is a severe allergic reaction or other emergency that cant wait, call 9-1-1 and get the person to the nearest hospital. Otherwise, call your doctor.  Reactions should be reported to the Vaccine Adverse Event Reporting System (VAERS). Your doctor should file this report, or you can do it yourself through  the VAERS web site at www.vaers. [facility].gov, or by calling 7-198.998.8877. VAERS does not give medical advice. 6. The National Vaccine Injury Compensation Program 
 
The [facility] Vaccine Injury Compensation Program (VICP) is a federal program that was created to compensate people who may have been injured by certain vaccines. Persons who believe they may have been injured by a vaccine can learn about the program and about filing a claim by calling 7-341.149.3887 or visiting the [employer] website at www.[facility].gov/vaccinecompensation. There is a time limit to file a claim for compensation. 7. How can I learn more?  Ask your healthcare provider. He or she can give you the vaccine package insert or suggest other sources of information.  Call your local or state health department.  Contact the Centers for Disease Control and Prevention (CDC): 
- Call 0-626.833.1689 (1-800-CDC-INFO) or 
- Visit CDCs website at www.cdc.gov/flu Vaccine Information Statement Inactivated Influenza Vaccine 8/7/2015 
42 OLIVIA Sheppard Ip [id number] [facility] and [employer] Centers for Disease Control and Prevention Office Use Only Subconjunctival Hemorrhage: Care Instructions Your Care Instructions Sometimes small blood vessels in the white of the eye can break, causing a red spot or speck. This is called a subconjunctival hemorrhage. The blood vessels may break when you sneeze, cough, vomit, strain, or bend over. Sometimes there is no clear cause. The blood may look alarming, especially if the spot is large. If there is no pain or vision change, there is usually no reason to worry, and the blood slowly will go away on its own in 2 to 3 weeks. Follow-up care is a key part of your treatment and safety. Be sure to make and go to all appointments, and call your doctor if you are having problems. It's also a good idea to know your test results and keep a list of the medicines you take. How can you care for yourself at home? · Watch for changes in your eye. It is normal for the red spot on your eyeball to change color as it heals. Just like a bruise on your skin, it may change from red to brown to purple to yellow. · Do not take aspirin or products that contain aspirin, which can increase bleeding. Use acetaminophen (Tylenol) if you need pain relief for another problem. · Do not take two or more pain medicines at the same time unless the doctor told you to. Many pain medicines have acetaminophen, which is Tylenol. Too much acetaminophen (Tylenol) can be harmful. When should you call for help? Call your doctor now or seek immediate medical care if: 
  · You have signs of an eye infection, such as: 
¨ Pus or thick discharge coming from the eye. ¨ Redness or swelling around the eye. ¨ A fever.  
  · You see blood over the black part of your eye (pupil).  
  · You have any changes or problems in your vision.  
  · You have any pain in your eye.  
 Watch closely for changes in your health, and be sure to contact your doctor if: 
  · You do not get better as expected. Where can you learn more? Go to http://audra-christal.info/. Enter Y733 in the search box to learn more about \"Subconjunctival Hemorrhage: Care Instructions. \" Current as of: December 3, 2017 Content Version: 11.7 © 0574-2664 mon.ki. Care instructions adapted under license by [employer] (which disclaims liability or warranty for this information). If you have questions about a medical condition or this instruction, always ask your healthcare professional. [address] [address] any warranty or liability for your use of this information. Introducing [address]! Dear Hadley Myers: Thank you for requesting a dscout account. Our records indicate that you already have an active dscout account. You can access your account anytime at [email or web address]. "[employer]"/Suninfo Information Did you know that you can access your hospital and ER discharge instructions at any time in dscout? You can also review all of your test results from your hospital stay or ER visit. Additional Information If you have questions, please visit the Frequently Asked Questions section of the dscout website at [email or web address]. "[employer]"/[employer] Information/. Remember, dscout is NOT to be used for urgent needs. For medical emergencies, dial 911. Now available from your iPhone and Android! Please provide this summary of care documentation to your next provider. Your primary care clinician is listed as Ami Watson. If you have any questions after today's visit, please call [phone].

## 2018-09-21 NOTE — PROGRESS NOTES
HISTORY OF PRESENT ILLNESS Bentley Reynoso is a 68 y.o. female. HPI  She presents for redness in left eye since yesterday. Has aching discomfort 2/10 intensity. Denies blurry vision, FB sensation and photophobia. Denies injury, but was working in garden yesterday and did a lot of bending. Dylan Cyrus Past 3 days stools are mustard yellow and looser than usual. Has been taking plant sterols in effort to lower cholesterol. This was mentioned at cardiac rehab along with oatmeal as a means of lowering cholesterol. She wondersif these would allow her to stop taking statin. Patient Active Problem List  
Diagnosis Code  Essential hypertension I10  
 Asthma J45.909  ADD (attention deficit disorder) F98.8  Overactive bladder N32.81  
 Environmental allergies Z91.09  
 S/P colonoscopy Z98.890  
 At risk for osteoporosis Z91.89  Bilateral carotid artery disease (HCC) I77.9  Vertical diplopia H53.2  IFG (impaired fasting glucose) R73.01  
 Serrated adenoma of colon D12.6  Alopecia areata L63.9  Advance directive discussed with patient Z70.80  Coronary artery disease involving native coronary artery of native heart without angina pectoris I25.10  Hypercholesterolemia E78.00 Past Medical History:  
Diagnosis Date  Alopecia  Asthma  CAD (coronary artery disease) 08/12/2018 NSTEMI, Cath, TYRONE OM2 of dominant Cx  H/O seasonal allergies  H/O TB skin testing 1968  
 treated with INH  Hypercholesterolemia  Hypertension  IFG (impaired fasting glucose) 6/14/2013  
 a1c 6.0 5/2013  Ill-defined condition TIA symptoms, saw neuro-opthalmologist and was told it was probably spasm of cranial nerve.  Overactive bladder 3/13/2013  Serrated adenoma of colon 2/6/2014 11/17/09 - Dr. Thanh Cadet - repeat in 5 years Past Surgical History:  
Procedure Laterality Date  COLORECTAL SCRN; HI RISK IND  2/20/2015  HX CATARACT REMOVAL Bilateral   
 HX GI    
 colonscopy. polyp removed  HX MALIGNANT SKIN LESION EXCISION Left 2018 \"non-melanoma pre-cancerous lesion removed\" per patient Social History Social History  Marital status:  Spouse name: N/A  
 Number of children: N/A  
 Years of education: N/A Social History Main Topics  Smoking status: Former Smoker Packs/day: 0.25 Years: 8.00 Quit date: 2/19/1995  Smokeless tobacco: Never Used Comment: quit in 1991  Alcohol use 10.5 oz/week 21 Glasses of wine per week Comment: 2-3 etoh drinks daily - burboun - no withdrawl symptoms when she does not have it  Drug use: No  
 Sexual activity: Yes  
  Partners: Male Other Topics Concern  None Social History Narrative Family History Problem Relation Age of Onset  Hypertension Mother  Stroke Mother  Lung Disease Mother  Cancer Father   
  bladder  Other Father   
  congential heart valve defect  Depression Sister  Stroke Sister 72  Hypertension Sister  COPD Sister  Other Sister 79  
  twisted bowel  Heart Disease Maternal Grandfather  Dementia Paternal Grandfather  Cancer Maternal Grandmother   
  esophageal  
 Asthma Paternal Grandmother  Dementia Paternal Grandmother Allergies Allergen Reactions  Nuts [Tree Nut] Other (comments) Mouth sores. Walnuts, pecans  Sulfa (Sulfonamide Antibiotics) Hives Current Outpatient Prescriptions Medication Sig Dispense Refill  PLANT STANOL DAVID PO Take  by mouth.  metoprolol tartrate (LOPRESSOR) 25 mg tablet Take 0.5 Tabs by mouth every twelve (12) hours. 60 Tab 3  
 rosuvastatin (CRESTOR) 20 mg tablet Take 1 Tab by mouth daily. 90 Tab 3  
 ticagrelor (BRILINTA) 90 mg tablet Take 1 Tab by mouth every twelve (12) hours every twelve (12) hours. 60 Tab 11  
 buPROPion (WELLBUTRIN) 75 mg tablet Take 75 mg by mouth daily.  s-adenosylmethionine sul tosyl (ROBERTO-E PO) Take  by mouth.  amLODIPine (NORVASC) 5 mg tablet take 1 tablet by mouth once daily 90 Tab 4  
 oxybutynin chloride XL (DITROPAN XL) 10 mg CR tablet TAKE ONE TABLET DAILY. 90 Tab 4  
 levalbuterol tartrate (XOPENEX) 45 mcg/actuation inhaler Take 1-2 Puffs by inhalation every four (4) hours as needed for Wheezing. 1 Inhaler 2  
 FLAXSEED PO Take  by mouth daily. Flaxseed meal    
 cetirizine (ZYRTEC) 10 mg tablet Take 10 mg by mouth daily.  folic acid 138 mcg tablet Take 800 mcg by mouth daily.  aspirin 81 mg chewable tablet Take 81 mg by mouth daily.  montelukast (SINGULAIR) 10 mg tablet Take 10 mg by mouth every seven (7) days. Takes on day when she gets allergy shots ROS Visit Vitals  /66 (BP 1 Location: Left arm, BP Patient Position: Sitting)  Pulse 64  Temp 98.4 °F (36.9 °C) (Oral)  Resp 16  
 Ht 5' 3\" (1.6 m)  Wt 136 lb (61.7 kg)  SpO2 97%  BMI 24.09 kg/m2 Visual Acuity Screening Right eye Left eye Both eyes Without correction: 20/25 20/25 20/25 With correction:     
 
Physical Exam  
Constitutional: She appears well-developed and well-nourished. HENT:  
Head: Normocephalic and atraumatic. Eyes: EOM are normal. Pupils are equal, round, and reactive to light. Right conjunctiva has no hemorrhage. Left conjunctiva has a hemorrhage. Fundoscopic exam: The right eye shows no hemorrhage and no papilledema. The left eye shows no hemorrhage and no papilledema. Nursing note and vitals reviewed. ASSESSMENT and PLAN 
  ICD-10-CM ICD-9-CM 1. Subconjunctival hemorrhage of left eye H11.32 372.72   
2. Encounter for immunization Z23 V03.89 INFLUENZA VACCINE INACTIVATED (IIV), SUBUNIT, ADJUVANTED, IM  
   ADMIN INFLUENZA VIRUS VAC Diagnoses and all orders for this visit: 1. Subconjunctival hemorrhage of left eye Reassured but informed her may take 2 weeks or more to resolve. 2. Encounter for immunization -     Influenza Vaccine Inactivated (IIV)(FLUAD), Subunit, Adjuvanted, IM, (48919) -     Administration fee () for Medicare insured patients We discussed that plant sterols and oatmeal will not be a substitute for statin regardless of cholesterol lowering as she has CAD. Follow-up Disposition: 
Return if symptoms worsen or fail to improve.

## 2018-09-24 ENCOUNTER — HOSPITAL ENCOUNTER (OUTPATIENT)
Dept: CARDIAC REHAB | Age: 73
Discharge: HOME | End: 2018-09-24
Payer: MEDICARE

## 2018-09-24 VITALS — BODY MASS INDEX: 23.91 KG/M2 | WEIGHT: 135 LBS

## 2018-09-24 PROCEDURE — 93798 PHYS/QHP OP CAR RHAB W/ECG: CPT

## 2018-09-26 ENCOUNTER — APPOINTMENT (OUTPATIENT)
Dept: INTERNAL MEDICINE CLINIC | Facility: CLINIC | Age: 73
End: 2018-09-26

## 2018-09-26 ENCOUNTER — HOSPITAL ENCOUNTER (OUTPATIENT)
Dept: CARDIAC REHAB | Age: 73
Discharge: HOME | End: 2018-09-26
Payer: MEDICARE

## 2018-09-26 ENCOUNTER — HOSPITAL ENCOUNTER (OUTPATIENT)
Dept: LAB | Age: 73
Discharge: HOME | End: 2018-09-26
Payer: MEDICARE

## 2018-09-26 VITALS — WEIGHT: 135 LBS | BODY MASS INDEX: 23.91 KG/M2

## 2018-09-26 DIAGNOSIS — I10 ESSENTIAL HYPERTENSION: ICD-10-CM

## 2018-09-26 PROCEDURE — 36415 COLL VENOUS BLD VENIPUNCTURE: CPT

## 2018-09-26 PROCEDURE — 80053 COMPREHEN METABOLIC PANEL: CPT

## 2018-09-26 PROCEDURE — 80061 LIPID PANEL: CPT

## 2018-09-26 PROCEDURE — 93798 PHYS/QHP OP CAR RHAB W/ECG: CPT

## 2018-09-26 PROCEDURE — 93797 PHYS/QHP OP CAR RHAB WO ECG: CPT

## 2018-09-27 LAB
ALBUMIN SERPL-MCNC: 4.1 G/DL (ref 3.5–4.8)
ALBUMIN/GLOB SERPL: 1.8 {RATIO} (ref 1.2–2.2)
ALP SERPL-CCNC: 72 IU/L (ref 39–117)
ALT SERPL-CCNC: 16 IU/L (ref 0–32)
AST SERPL-CCNC: 13 IU/L (ref 0–40)
BILIRUB SERPL-MCNC: 0.2 MG/DL (ref 0–1.2)
BUN SERPL-MCNC: 21 MG/DL (ref 8–27)
BUN/CREAT SERPL: 25 (ref 12–28)
CALCIUM SERPL-MCNC: 9.5 MG/DL (ref 8.7–10.3)
CHLORIDE SERPL-SCNC: 106 MMOL/L (ref 96–106)
CHOLEST SERPL-MCNC: 109 MG/DL (ref 100–199)
CO2 SERPL-SCNC: 21 MMOL/L (ref 20–29)
CREAT SERPL-MCNC: 0.84 MG/DL (ref 0.57–1)
GLOBULIN SER CALC-MCNC: 2.3 G/DL (ref 1.5–4.5)
GLUCOSE SERPL-MCNC: 94 MG/DL (ref 65–99)
HDLC SERPL-MCNC: 60 MG/DL
LDLC SERPL CALC-MCNC: 40 MG/DL (ref 0–99)
POTASSIUM SERPL-SCNC: 4.6 MMOL/L (ref 3.5–5.2)
PROT SERPL-MCNC: 6.4 G/DL (ref 6–8.5)
SODIUM SERPL-SCNC: 142 MMOL/L (ref 134–144)
TRIGL SERPL-MCNC: 47 MG/DL (ref 0–149)
VLDLC SERPL CALC-MCNC: 9 MG/DL (ref 5–40)

## 2018-09-28 ENCOUNTER — HOSPITAL ENCOUNTER (OUTPATIENT)
Dept: CARDIAC REHAB | Age: 73
Discharge: HOME | End: 2018-09-28
Payer: MEDICARE

## 2018-09-28 VITALS — BODY MASS INDEX: 23.74 KG/M2 | WEIGHT: 134 LBS

## 2018-09-28 PROCEDURE — 93798 PHYS/QHP OP CAR RHAB W/ECG: CPT

## 2018-10-01 ENCOUNTER — HOSPITAL ENCOUNTER (OUTPATIENT)
Dept: CARDIAC REHAB | Age: 73
Discharge: HOME | End: 2018-10-01
Payer: MEDICARE

## 2018-10-01 VITALS — BODY MASS INDEX: 23.6 KG/M2 | WEIGHT: 133.2 LBS

## 2018-10-01 PROCEDURE — 93798 PHYS/QHP OP CAR RHAB W/ECG: CPT

## 2018-10-03 ENCOUNTER — APPOINTMENT (OUTPATIENT)
Dept: CARDIAC REHAB | Age: 73
End: 2018-10-03
Payer: MEDICARE

## 2018-10-03 ENCOUNTER — HOSPITAL ENCOUNTER (OUTPATIENT)
Dept: CARDIAC REHAB | Age: 73
Discharge: HOME | End: 2018-10-03
Payer: MEDICARE

## 2018-10-03 VITALS — BODY MASS INDEX: 23.44 KG/M2 | WEIGHT: 132.3 LBS

## 2018-10-03 PROCEDURE — 93798 PHYS/QHP OP CAR RHAB W/ECG: CPT | Performed by: DIETITIAN, REGISTERED

## 2018-10-05 ENCOUNTER — HOSPITAL ENCOUNTER (OUTPATIENT)
Dept: CARDIAC REHAB | Age: 73
Discharge: HOME | End: 2018-10-05
Payer: MEDICARE

## 2018-10-05 VITALS — WEIGHT: 133 LBS | BODY MASS INDEX: 23.56 KG/M2

## 2018-10-05 PROCEDURE — 93797 PHYS/QHP OP CAR RHAB WO ECG: CPT | Performed by: DIETITIAN, REGISTERED

## 2018-10-05 PROCEDURE — 93798 PHYS/QHP OP CAR RHAB W/ECG: CPT

## 2018-10-08 ENCOUNTER — HOSPITAL ENCOUNTER (OUTPATIENT)
Dept: CARDIAC REHAB | Age: 73
Discharge: HOME | End: 2018-10-08
Payer: MEDICARE

## 2018-10-08 VITALS — WEIGHT: 134 LBS | BODY MASS INDEX: 23.74 KG/M2

## 2018-10-08 PROCEDURE — 93798 PHYS/QHP OP CAR RHAB W/ECG: CPT

## 2018-10-10 ENCOUNTER — HOSPITAL ENCOUNTER (OUTPATIENT)
Dept: CARDIAC REHAB | Age: 73
Discharge: HOME | End: 2018-10-10
Payer: MEDICARE

## 2018-10-10 VITALS — BODY MASS INDEX: 23.38 KG/M2 | WEIGHT: 132 LBS

## 2018-10-10 PROCEDURE — 93797 PHYS/QHP OP CAR RHAB WO ECG: CPT

## 2018-10-10 PROCEDURE — 93798 PHYS/QHP OP CAR RHAB W/ECG: CPT

## 2018-10-12 ENCOUNTER — APPOINTMENT (OUTPATIENT)
Dept: CARDIAC REHAB | Age: 73
End: 2018-10-12
Payer: MEDICARE

## 2018-10-12 ENCOUNTER — HOSPITAL ENCOUNTER (OUTPATIENT)
Dept: CARDIAC REHAB | Age: 73
Discharge: HOME | End: 2018-10-12
Payer: MEDICARE

## 2018-10-12 VITALS — BODY MASS INDEX: 23.38 KG/M2 | WEIGHT: 132 LBS

## 2018-10-12 PROCEDURE — 93798 PHYS/QHP OP CAR RHAB W/ECG: CPT

## 2018-10-13 DIAGNOSIS — F98.8 ATTENTION DEFICIT DISORDER (ADD) WITHOUT HYPERACTIVITY: ICD-10-CM

## 2018-10-13 RX ORDER — BUPROPION HYDROCHLORIDE 75 MG/1
TABLET ORAL
Qty: 60 TAB | Refills: 1 | Status: SHIPPED | OUTPATIENT
Start: 2018-10-13 | End: 2019-02-07 | Stop reason: SDUPTHER

## 2018-10-15 ENCOUNTER — APPOINTMENT (OUTPATIENT)
Dept: CARDIAC REHAB | Age: 73
End: 2018-10-15
Payer: MEDICARE

## 2018-10-17 ENCOUNTER — APPOINTMENT (OUTPATIENT)
Dept: CARDIAC REHAB | Age: 73
End: 2018-10-17
Payer: MEDICARE

## 2018-10-19 ENCOUNTER — APPOINTMENT (OUTPATIENT)
Dept: CARDIAC REHAB | Age: 73
End: 2018-10-19
Payer: MEDICARE

## 2018-10-22 ENCOUNTER — APPOINTMENT (OUTPATIENT)
Dept: CARDIAC REHAB | Age: 73
End: 2018-10-22
Payer: MEDICARE

## 2018-10-24 ENCOUNTER — APPOINTMENT (OUTPATIENT)
Dept: CARDIAC REHAB | Age: 73
End: 2018-10-24
Payer: MEDICARE

## 2018-10-26 ENCOUNTER — APPOINTMENT (OUTPATIENT)
Dept: CARDIAC REHAB | Age: 73
End: 2018-10-26
Payer: MEDICARE

## 2018-11-28 NOTE — PROGRESS NOTES
HPI  Ms. Beka Rider is a 68y.o. year old female, she is seen today for follow up HTN, high cholesterol, ADD. Since I last saw patient had NSTEMI in August with cardiac catheterization and TYRONE to OM2 branch of dominant circumflex artery and was in cardiac rehab. Went to ED with indigestion with pain down left arm and up left side of neck. Completed cardiac rehab, now going to [facility] and walking most days of the week, going to gym using cardio machines for 45 minutes. Tells me she has been more achy since being on crestor. Especially right shoulder, lower back, moves around. Has discussed with cardiologist who has considered changing statin but she wants to wait for now. Hit her left leg on elliptical and got a hematoma under left lower leg with swelling in foot and has since improved. No chest pain or sob, no symptoms like with NSTEMI. Does have indigestion sometimes at night relieved with tums. Only drinks etoh 1-2 x per week. Only 1 drink at at time. Also has cut back on cheese and milk. Drinking nut milks instead. No PND or orthopnea. ADD well controlled with wellbutrin. Takes ROBERTO-E + wellbutrin once in am.   Rea Greenwoodugh me occasionally BP higher [id number] systolic at home. Chief Complaint   Patient presents with    Blood Pressure Check     Room 2A// NON fasting // Derm last week, [facility]// Card Monday,  [facility]     Cholesterol Problem        Prior to Admission medications    Medication Sig Start Date End Date Taking? Authorizing Provider   azelastine (ASTELIN) 137 mcg (0.1 %) nasal spray instill 2 sprays into each nostril twice a day 11/12/18  Yes Provider, Historical   buPROPion (WELLBUTRIN) 75 mg tablet take 1 tablet by mouth twice a day 10/13/18  Yes Connor Gomez MD   metoprolol tartrate (LOPRESSOR) 25 mg tablet Take 0.5 Tabs by mouth every twelve (12) hours. 8/14/18  Yes Bam Gong MD   rosuvastatin (CRESTOR) 20 mg tablet Take 1 Tab by mouth daily. 8/14/18  Yes Israel Soni MD   ticagrelor (BRILINTA) 90 mg tablet Take 1 Tab by mouth every twelve (12) hours every twelve (12) hours. 8/14/18  Yes Israel Soni MD   s-adenosylmethionine sul tosyl (ROBERTO-E PO) Take  by mouth. Yes Provider, Historical   amLODIPine (NORVASC) 5 mg tablet take 1 tablet by mouth once daily 3/22/18  Yes Avel Nix MD   oxybutynin chloride XL (DITROPAN XL) 10 mg CR tablet TAKE ONE TABLET DAILY. 11/29/17  Yes Avel Nix MD   levalbuterol tartrate [facility]) 45 mcg/actuation inhaler Take 1-2 Puffs by inhalation every four (4) hours as needed for Wheezing. 1/17/17  Yes Avel Nix MD   FLAXSEED PO Take  by mouth daily. Flaxseed meal   Yes Provider, Historical   cetirizine (ZYRTEC) 10 mg tablet Take 10 mg by mouth daily. Yes Provider, Historical   folic acid 752 mcg tablet Take 800 mcg by mouth daily. Yes Provider, Historical   aspirin 81 mg chewable tablet Take 81 mg by mouth daily. Yes Provider, Historical   montelukast (SINGULAIR) 10 mg tablet Take 10 mg by mouth every seven (7) days. Takes on day when she gets allergy shots   Yes Provider, Historical         Allergies   Allergen Reactions    Nuts [Tree Nut] Other (comments)     Mouth sores. Walnuts, pecans    Sulfa (Sulfonamide Antibiotics) Hives         REVIEW OF SYSTEMS:  Per HPI    PHYSICAL EXAM:  Visit Vitals  /56 (BP 1 Location: Left arm, BP Patient Position: Sitting)   Pulse 61   Temp 98.6 °F (37 °C) (Oral)   Resp 16   Ht 5' 3\" (1.6 m)   Wt 140 lb (63.5 kg)   SpO2 96%   BMI 24.80 kg/m²     Constitutional: Appears well-developed and well-nourished. No distress. HENT:   Head: Normocephalic and atraumatic. Eyes: No scleral icterus. Neck: no lad, no tm, supple   Cardiovascular: Normal S1/S2, regular rhythm. No murmurs, rubs, or gallops. Pulmonary/Chest: Effort normal and breath sounds normal. No respiratory distress. No wheezes, rhonchi, or rales.    Abdomen: Soft, NT/ND, +BS, no rebound or guarding, no masses, no HSM appreciated. Ext: No edema. +area of swelling above left ankle laterally in area of injury  Neurological: Alert. Psychiatric: Normal mood and affect. Behavior is normal.     Lab Results   Component Value Date/Time    Sodium 142 09/26/2018 08:10 AM    Potassium 4.6 09/26/2018 08:10 AM    Chloride 106 09/26/2018 08:10 AM    CO2 21 09/26/2018 08:10 AM    Anion gap 5 08/14/2018 03:38 AM    Glucose 94 09/26/2018 08:10 AM    BUN 21 09/26/2018 08:10 AM    Creatinine 0.84 09/26/2018 08:10 AM    BUN/Creatinine ratio 25 09/26/2018 08:10 AM    GFR est AA 80 09/26/2018 08:10 AM    GFR est non-AA 69 09/26/2018 08:10 AM    Calcium 9.5 09/26/2018 08:10 AM    Bilirubin, total 0.2 09/26/2018 08:10 AM    AST (SGOT) 13 09/26/2018 08:10 AM    Alk. phosphatase 72 09/26/2018 08:10 AM    Protein, total 6.4 09/26/2018 08:10 AM    Albumin 4.1 09/26/2018 08:10 AM    Globulin 3.3 08/12/2018 09:40 AM    A-G Ratio 1.8 09/26/2018 08:10 AM    ALT (SGPT) 16 09/26/2018 08:10 AM     Lab Results   Component Value Date/Time    Hemoglobin A1c 5.3 05/22/2018 08:34 AM    Hemoglobin A1c 5.3 11/20/2017 09:36 AM    Hemoglobin A1c 5.4 11/07/2016 10:45 AM      Lab Results   Component Value Date/Time    Cholesterol, total 109 09/26/2018 08:10 AM    HDL Cholesterol 60 09/26/2018 08:10 AM    LDL, calculated 40 09/26/2018 08:10 AM    VLDL, calculated 9 09/26/2018 08:10 AM    Triglyceride 47 09/26/2018 08:10 AM    CHOL/HDL Ratio 3.7 05/08/2013 04:04 AM          ASSESSMENT/PLAN  Diagnoses and all orders for this visit:    1. Coronary artery disease involving native coronary artery of native heart without angina pectoris  No symptoms - followed by cardiology, continue exercise and diet changes  2. Hypercholesterolemia  -     METABOLIC PANEL, COMPREHENSIVE; Future  -     LIPID PANEL; Future  Lipids excellent - continue crestor  3. Essential hypertension  Controlled on current regimen, continue   4.  Attention deficit disorder (ADD) without hyperactivity  Controlled on current regimen, continue         There are no preventive care reminders to display for this patient. Follow-up Disposition:  Return in about 6 months (around 5/29/2019) for bp, chol, med eval.       Reviewed plan of care. Patient has provided input and agrees with goals. The nurse provided the patient and/or family with advanced directive information if needed and encouraged the patient to provide a copy to the office when available.

## 2018-11-29 ENCOUNTER — OFFICE VISIT (OUTPATIENT)
Dept: INTERNAL MEDICINE CLINIC | Facility: CLINIC | Age: 73
End: 2018-11-29

## 2018-11-29 VITALS
DIASTOLIC BLOOD PRESSURE: 56 MMHG | BODY MASS INDEX: 24.8 KG/M2 | TEMPERATURE: 98.6 F | SYSTOLIC BLOOD PRESSURE: 111 MMHG | RESPIRATION RATE: 16 BRPM | WEIGHT: 140 LBS | HEART RATE: 61 BPM | OXYGEN SATURATION: 96 % | HEIGHT: 63 IN

## 2018-11-29 DIAGNOSIS — I25.10 CORONARY ARTERY DISEASE INVOLVING NATIVE CORONARY ARTERY OF NATIVE HEART WITHOUT ANGINA PECTORIS: Primary | ICD-10-CM

## 2018-11-29 DIAGNOSIS — E78.00 HYPERCHOLESTEROLEMIA: ICD-10-CM

## 2018-11-29 DIAGNOSIS — F98.8 ATTENTION DEFICIT DISORDER (ADD) WITHOUT HYPERACTIVITY: ICD-10-CM

## 2018-11-29 DIAGNOSIS — I10 ESSENTIAL HYPERTENSION: ICD-10-CM

## 2018-11-29 RX ORDER — AZELASTINE 1 MG/ML
SPRAY, METERED NASAL
Refills: 0 | COMMUNITY
Start: 2018-11-12 | End: 2020-11-02

## 2018-11-29 NOTE — PROGRESS NOTES
Fabi Baker  Identified pt with two pt identifiers(name and ). Chief Complaint   Patient presents with    Blood Pressure Check     Room 2A// NON fasting     Cholesterol Problem       1. Have you been to the ER, urgent care clinic since your last visit? Hospitalized since your last visit? NO    2. Have you seen or consulted any other health care providers outside of the [address] [place] [address] since your last visit? Include any pap smears or colon screening. NO      Provider notified of reason for visit, vitals and flowsheets obtained on patients. Patient received paperwork for advance directive during previous visit but has not completed at this time     Reviewed record In preparation for visit, huddled with provider and have obtained necessary documentation      Health Maintenance Due   Topic    Shingrix Vaccine Age 50> (1 of 2)       Wt Readings from Last 3 Encounters:   10/12/18 132 lb (59.9 kg)   10/10/18 132 lb (59.9 kg)   10/08/18 134 lb (60.8 kg)     Temp Readings from Last 3 Encounters:   18 98.4 °F (36.9 °C) (Oral)   18 98.1 °F (36.7 °C) (Oral)   18 98.3 °F (36.8 °C) (Oral)     BP Readings from Last 3 Encounters:   18 136/66   18 138/70   18 113/69     Pulse Readings from Last 3 Encounters:   18 64   18 (!) 52   18 61     There were no vitals filed for this visit.       Learning Assessment:  :     Learning Assessment 3/20/2015   PRIMARY LEARNER Patient   HIGHEST LEVEL OF EDUCATION - PRIMARY LEARNER  4 YEARS OF COLLEGE   BARRIERS PRIMARY LEARNER NONE   PRIMARY LANGUAGE ENGLISH    NEED No   LEARNER PREFERENCE PRIMARY READING   ANSWERED BY patient   RELATIONSHIP SELF       Depression Screening:  :     PHQ over the last two weeks 10/10/2018   Little interest or pleasure in doing things Not at all   Feeling down, depressed, irritable, or hopeless Not at all   Total Score PHQ 2 0   Trouble falling or staying asleep, or sleeping too much Several days   Feeling tired or having little energy Several days   Poor appetite, weight loss, or overeating Not at all   Feeling bad about yourself - or that you are a failure or have let yourself or your family down Several days   Trouble concentrating on things such as school, work, reading, or watching TV Not at all   Moving or speaking so slowly that other people could have noticed; or the opposite being so fidgety that others notice Not at all   Thoughts of being better off dead, or hurting yourself in some way Not at all   PHQ 9 Score 3       Fall Risk Assessment:  :     Fall Risk Assessment, last 12 mths 8/17/2018   Able to walk? Yes   Fall in past 12 months? No   Fall with injury? -   Number of falls in past 12 months -   Fall Risk Score -       Abuse Screening:  :     Abuse Screening Questionnaire 8/17/2018 4/23/2018 11/14/2016 3/20/2015   Do you ever feel afraid of your partner? N N N N   Are you in a relationship with someone who physically or mentally threatens you? N N N N   Is it safe for you to go home? Y Y Y Y       ADL Screening:  :     ADL Assessment 8/17/2018   Feeding yourself No Help Needed   Getting from bed to chair No Help Needed   Getting dressed No Help Needed   Bathing or showering No Help Needed   Walk across the room (includes cane/walker) No Help Needed   Using the telphone No Help Needed   Taking your medications No Help Needed   Preparing meals No Help Needed   Managing money (expenses/bills) No Help Needed   Moderately strenuous housework (laundry) No Help Needed   Shopping for personal items (toiletries/medicines) No Help Needed   Shopping for groceries No Help Needed   Driving No Help Needed   Climbing a flight of stairs No Help Needed   Getting to places beyond walking distances No Help Needed         Medication reconciliation up to date and corrected with patient at this time.

## 2018-12-21 DIAGNOSIS — N32.81 OVERACTIVE BLADDER: ICD-10-CM

## 2018-12-24 RX ORDER — OXYBUTYNIN CHLORIDE 10 MG/1
TABLET, EXTENDED RELEASE ORAL
Qty: 90 TAB | Refills: 4 | Status: SHIPPED | OUTPATIENT
Start: 2018-12-24 | End: 2020-01-03

## 2019-02-07 DIAGNOSIS — F98.8 ATTENTION DEFICIT DISORDER (ADD) WITHOUT HYPERACTIVITY: ICD-10-CM

## 2019-02-07 RX ORDER — BUPROPION HYDROCHLORIDE 75 MG/1
TABLET ORAL
Qty: 60 TAB | Refills: 11 | Status: SHIPPED | OUTPATIENT
Start: 2019-02-07 | End: 2020-08-19

## 2019-04-29 DIAGNOSIS — I10 ESSENTIAL HYPERTENSION: ICD-10-CM

## 2019-04-29 NOTE — TELEPHONE ENCOUNTER
Refill     Requested Prescriptions     Pending Prescriptions Disp Refills    amLODIPine (NORVASC) 5 mg tablet 90 Tab 4

## 2019-04-29 NOTE — TELEPHONE ENCOUNTER
PCP: Morro Cherry MD     Last appt: 11/29/2018   Future Appointments   Date Time Provider Niurka Elvi   5/22/2019  8:30 AM [facility]   5/22/2019 11:30 AM [facility]   5/29/2019 10:30 AM Gurpreet Krause MD [facility]        Requested Prescriptions     Pending Prescriptions Disp Refills    amLODIPine (NORVASC) 5 mg tablet 90 Tab 4

## 2019-04-30 RX ORDER — AMLODIPINE BESYLATE 5 MG/1
TABLET ORAL
Qty: 90 TAB | Refills: 4 | Status: SHIPPED | OUTPATIENT
Start: 2019-04-30 | End: 2019-06-19

## 2019-05-22 ENCOUNTER — HOSPITAL ENCOUNTER (OUTPATIENT)
Dept: MAMMOGRAPHY | Age: 74
Discharge: HOME | End: 2019-05-22
Attending: INTERNAL MEDICINE
Payer: MEDICARE

## 2019-05-22 DIAGNOSIS — Z12.39 SCREENING BREAST EXAMINATION: ICD-10-CM

## 2019-05-22 PROCEDURE — 77067 SCR MAMMO BI INCL CAD: CPT

## 2019-06-13 LAB
ALBUMIN SERPL-MCNC: 4.1 G/DL (ref 3.5–4.8)
ALBUMIN/GLOB SERPL: 1.7 {RATIO} (ref 1.2–2.2)
ALP SERPL-CCNC: 56 IU/L (ref 39–117)
ALT SERPL-CCNC: 23 IU/L (ref 0–32)
AST SERPL-CCNC: 22 IU/L (ref 0–40)
BILIRUB SERPL-MCNC: 0.3 MG/DL (ref 0–1.2)
BUN SERPL-MCNC: 21 MG/DL (ref 8–27)
BUN/CREAT SERPL: 24 (ref 12–28)
CALCIUM SERPL-MCNC: 9.3 MG/DL (ref 8.7–10.3)
CHLORIDE SERPL-SCNC: 107 MMOL/L (ref 96–106)
CHOLEST SERPL-MCNC: 130 MG/DL (ref 100–199)
CO2 SERPL-SCNC: 23 MMOL/L (ref 20–29)
CREAT SERPL-MCNC: 0.87 MG/DL (ref 0.57–1)
GLOBULIN SER CALC-MCNC: 2.4 G/DL (ref 1.5–4.5)
GLUCOSE SERPL-MCNC: 93 MG/DL (ref 65–99)
HDLC SERPL-MCNC: 65 MG/DL
LDLC SERPL CALC-MCNC: 54 MG/DL (ref 0–99)
POTASSIUM SERPL-SCNC: 4.5 MMOL/L (ref 3.5–5.2)
PROT SERPL-MCNC: 6.5 G/DL (ref 6–8.5)
SODIUM SERPL-SCNC: 142 MMOL/L (ref 134–144)
TRIGL SERPL-MCNC: 55 MG/DL (ref 0–149)
VLDLC SERPL CALC-MCNC: 11 MG/DL (ref 5–40)

## 2019-06-19 ENCOUNTER — OFFICE VISIT (OUTPATIENT)
Dept: INTERNAL MEDICINE CLINIC | Facility: CLINIC | Age: 74
End: 2019-06-19

## 2019-06-19 VITALS
RESPIRATION RATE: 14 BRPM | WEIGHT: 130 LBS | OXYGEN SATURATION: 96 % | HEART RATE: 70 BPM | TEMPERATURE: 98 F | SYSTOLIC BLOOD PRESSURE: 92 MMHG | DIASTOLIC BLOOD PRESSURE: 54 MMHG | BODY MASS INDEX: 23.04 KG/M2 | HEIGHT: 63 IN

## 2019-06-19 DIAGNOSIS — I25.10 CORONARY ARTERY DISEASE INVOLVING NATIVE CORONARY ARTERY OF NATIVE HEART WITHOUT ANGINA PECTORIS: ICD-10-CM

## 2019-06-19 DIAGNOSIS — I10 ESSENTIAL HYPERTENSION: Primary | ICD-10-CM

## 2019-06-19 DIAGNOSIS — F98.8 ATTENTION DEFICIT DISORDER (ADD) WITHOUT HYPERACTIVITY: ICD-10-CM

## 2019-06-19 DIAGNOSIS — E78.00 HYPERCHOLESTEROLEMIA: ICD-10-CM

## 2019-06-19 RX ORDER — BISMUTH SUBSALICYLATE 262 MG
1 TABLET,CHEWABLE ORAL DAILY
COMMUNITY

## 2019-06-19 NOTE — PROGRESS NOTES
Fabi Baker  Identified pt with two pt identifiers(name and ). Chief Complaint   Patient presents with    Blood Pressure Check     room 2C//     Cholesterol Problem    Medication Evaluation       1. Have you been to the ER, urgent care clinic since your last visit? Hospitalized since your last visit? NO    2. Have you seen or consulted any other health care providers outside of the [address] [place] [address] since your last visit? Include any pap smears or colon screening. NO      Provider notified of reason for visit, vitals and flowsheets obtained on patients. Patient received paperwork for advance directive during previous visit but has not completed at this time     Reviewed record In preparation for visit, huddled with provider and have obtained necessary documentation      There are no preventive care reminders to display for this patient. Wt Readings from Last 3 Encounters:   18 140 lb (63.5 kg)   10/12/18 132 lb (59.9 kg)   10/10/18 132 lb (59.9 kg)     Temp Readings from Last 3 Encounters:   18 98.6 °F (37 °C) (Oral)   18 98.4 °F (36.9 °C) (Oral)   18 98.1 °F (36.7 °C) (Oral)     BP Readings from Last 3 Encounters:   18 111/56   18 136/66   18 138/70     Pulse Readings from Last 3 Encounters:   18 61   18 64   18 (!) 52     There were no vitals filed for this visit.       Learning Assessment:  :     Learning Assessment 3/20/2015   PRIMARY LEARNER Patient   HIGHEST LEVEL OF EDUCATION - PRIMARY LEARNER  4 YEARS OF COLLEGE   BARRIERS PRIMARY LEARNER NONE   PRIMARY LANGUAGE ENGLISH    NEED No   LEARNER PREFERENCE PRIMARY READING   ANSWERED BY patient   RELATIONSHIP SELF       Depression Screening:  :     3 most recent PHQ Screens 2019   Little interest or pleasure in doing things Not at all   Feeling down, depressed, irritable, or hopeless Not at all   Total Score PHQ 2 0   Trouble falling or staying asleep, or sleeping too much -   Feeling tired or having little energy -   Poor appetite, weight loss, or overeating -   Feeling bad about yourself - or that you are a failure or have let yourself or your family down -   Trouble concentrating on things such as school, work, reading, or watching TV -   Moving or speaking so slowly that other people could have noticed; or the opposite being so fidgety that others notice -   Thoughts of being better off dead, or hurting yourself in some way -   PHQ 9 Score -       Fall Risk Assessment:  :     Fall Risk Assessment, last 12 mths 8/17/2018   Able to walk? Yes   Fall in past 12 months? No   Fall with injury? -   Number of falls in past 12 months -   Fall Risk Score -       Abuse Screening:  :     Abuse Screening Questionnaire 8/17/2018 4/23/2018 11/14/2016 3/20/2015   Do you ever feel afraid of your partner? N N N N   Are you in a relationship with someone who physically or mentally threatens you? N N N N   Is it safe for you to go home? Y Y Y Y       ADL Screening:  :     ADL Assessment 8/17/2018   Feeding yourself No Help Needed   Getting from bed to chair No Help Needed   Getting dressed No Help Needed   Bathing or showering No Help Needed   Walk across the room (includes cane/walker) No Help Needed   Using the telphone No Help Needed   Taking your medications No Help Needed   Preparing meals No Help Needed   Managing money (expenses/bills) No Help Needed   Moderately strenuous housework (laundry) No Help Needed   Shopping for personal items (toiletries/medicines) No Help Needed   Shopping for groceries No Help Needed   Driving No Help Needed   Climbing a flight of stairs No Help Needed   Getting to places beyond walking distances No Help Needed         Medication reconciliation up to date and corrected with patient at this time.

## 2019-06-19 NOTE — PROGRESS NOTES
HPI  Ms. Angi Sanon is a 68y.o. year old female, she is seen today for follow up HTN, high cholesterol. Has less myalgia and less weakness in thighs with lower dose crestor. Has lost 10# since November. Weight without clothes has been about 125-127#. Only drinking alcoholic drink per evening. Has been eating oat bran with oat milk daily in morning with fruit. Eats mostly plant based diet with chicken and fish and eggs. Eats lots of whole grains. No chest pain, sob, dizziness, lightheadedness. BP at home has been in low 893Y systolic as high as 896. Mood is good - ADD fairly well controlled with wellbutrin and ROBERTO-E. Walks for exercise, in winter goes to gym. Getting ready to go to family reunion in [place]. Chief Complaint   Patient presents with    Blood Pressure Check     room 2C//     Cholesterol Problem    Medication Evaluation        Prior to Admission medications    Medication Sig Start Date End Date Taking? Authorizing Provider   multivitamin (ONE A DAY) tablet Take 1 Tab by mouth daily. Yes Provider, Historical   OAT BRAN PO Take  by mouth. Yes Provider, Historical   buPROPion (WELLBUTRIN) 75 mg tablet take 1 tablet by mouth twice a day 2/7/19  Yes Abigail Boss MD   oxybutynin chloride XL (DITROPAN XL) 10 mg CR tablet TAKE ONE TABLET DAILY. 12/24/18  Yes Abigail Boss MD   azelastine (ASTELIN) 137 mcg (0.1 %) nasal spray instill 2 sprays into each nostril twice a day 11/12/18  Yes Provider, Historical   metoprolol tartrate (LOPRESSOR) 25 mg tablet Take 0.5 Tabs by mouth every twelve (12) hours. 8/14/18  Yes Halina Gong MD   rosuvastatin (CRESTOR) 20 mg tablet Take 1 Tab by mouth daily. 8/14/18  Yes Obi Powell MD   ticagrelor (BRILINTA) 90 mg tablet Take 1 Tab by mouth every twelve (12) hours every twelve (12) hours.  8/14/18  Yes Obi Powell MD   levalbuterol tartrate (XOPENEX) 45 mcg/actuation inhaler Take 1-2 Puffs by inhalation every four (4) hours as needed for Wheezing. 1/17/17  Yes Morro Cherry MD   FLAXSEED PO Take  by mouth daily. Flaxseed meal   Yes Provider, Historical   cetirizine (ZYRTEC) 10 mg tablet Take 10 mg by mouth daily. Yes Provider, Historical   aspirin 81 mg chewable tablet Take 81 mg by mouth daily. Yes Provider, Historical   montelukast (SINGULAIR) 10 mg tablet Take 10 mg by mouth every seven (7) days. Takes on day when she gets allergy shots   Yes Provider, Historical   s-adenosylmethionine sul tosyl (ROBERTO-E PO) Take  by mouth. Provider, Historical         Allergies   Allergen Reactions    Nuts [Tree Nut] Other (comments)     Mouth sores. Walnuts, pecans    Sulfa (Sulfonamide Antibiotics) Hives         REVIEW OF SYSTEMS:  Per HPI  PHYSICAL EXAM:  Visit Vitals  BP 92/54   Pulse 70   Temp 98 °F (36.7 °C) (Oral)   Resp 14   Ht 5' 3\" (1.6 m)   Wt 130 lb (59 kg)   SpO2 96%   BMI 23.03 kg/m²     Constitutional: Appears well-developed and well-nourished. No distress. HENT:   Head: Normocephalic and atraumatic. Eyes: No scleral icterus. Cardiovascular: Normal S1/S2, regular rhythm. No murmurs, rubs, or gallops. Pulmonary/Chest: Effort normal and breath sounds normal. No respiratory distress. No wheezes, rhonchi, or rales. Ext: No edema. Neurological: Alert. Psychiatric: Normal mood and affect. Behavior is normal.     Lab Results   Component Value Date/Time    Sodium 142 06/12/2019 09:03 AM    Potassium 4.5 06/12/2019 09:03 AM    Chloride 107 (H) 06/12/2019 09:03 AM    CO2 23 06/12/2019 09:03 AM    Anion gap 5 08/14/2018 03:38 AM    Glucose 93 06/12/2019 09:03 AM    BUN 21 06/12/2019 09:03 AM    Creatinine 0.87 06/12/2019 09:03 AM    BUN/Creatinine ratio 24 06/12/2019 09:03 AM    GFR est AA 76 06/12/2019 09:03 AM    GFR est non-AA 66 06/12/2019 09:03 AM    Calcium 9.3 06/12/2019 09:03 AM    Bilirubin, total 0.3 06/12/2019 09:03 AM    AST (SGOT) 22 06/12/2019 09:03 AM    Alk.  phosphatase 56 06/12/2019 09:03 AM    Protein, total 6.5 06/12/2019 09:03 AM    Albumin 4.1 06/12/2019 09:03 AM    Globulin 3.3 08/12/2018 09:40 AM    A-G Ratio 1.7 06/12/2019 09:03 AM    ALT (SGPT) 23 06/12/2019 09:03 AM     Lab Results   Component Value Date/Time    Hemoglobin A1c 5.3 05/22/2018 08:34 AM    Hemoglobin A1c 5.3 11/20/2017 09:36 AM    Hemoglobin A1c 5.4 11/07/2016 10:45 AM      Lab Results   Component Value Date/Time    Cholesterol, total 130 06/12/2019 09:03 AM    HDL Cholesterol 65 06/12/2019 09:03 AM    LDL, calculated 54 06/12/2019 09:03 AM    VLDL, calculated 11 06/12/2019 09:03 AM    Triglyceride 55 06/12/2019 09:03 AM    CHOL/HDL Ratio 3.7 05/08/2013 04:04 AM          ASSESSMENT/PLAN  Diagnoses and all orders for this visit:    1. Essential hypertension  bp low - dc amlodipine  2. Hypercholesterolemia  -     METABOLIC PANEL, COMPREHENSIVE; Future  -     LIPID PANEL; Future  At goal, continue current medications   3. Attention deficit disorder (ADD) without hyperactivity  Controlled on current regimen, continue   4. Coronary artery disease involving native coronary artery of native heart without angina pectoris  No symptoms, cholesterol excellent - followed by cardiology        There are no preventive care reminders to display for this patient. Follow-up and Dispositions    · Return in about 6 months (around 12/19/2019) for chol, bp, labs prior. Reviewed plan of care. Patient has provided input and agrees with goals. The nurse provided the patient and/or family with advanced directive information if needed and encouraged the patient to provide a copy to the office when available.

## 2019-09-26 ENCOUNTER — APPOINTMENT (OUTPATIENT)
Dept: GENERAL RADIOLOGY | Age: 74
End: 2019-09-26
Attending: EMERGENCY MEDICINE
Payer: MEDICARE

## 2019-09-26 ENCOUNTER — HOSPITAL ENCOUNTER (OUTPATIENT)
Age: 74
Setting detail: OBSERVATION
Discharge: HOME | End: 2019-09-27
Attending: EMERGENCY MEDICINE | Admitting: INTERNAL MEDICINE
Payer: MEDICARE

## 2019-09-26 DIAGNOSIS — R07.9 CHEST PAIN, UNSPECIFIED TYPE: Primary | ICD-10-CM

## 2019-09-26 DIAGNOSIS — I21.4 NSTEMI (NON-ST ELEVATED MYOCARDIAL INFARCTION) (HCC): ICD-10-CM

## 2019-09-26 LAB
ANION GAP SERPL CALC-SCNC: 6 MMOL/L (ref 5–15)
BASOPHILS # BLD: 0 K/UL (ref 0–0.1)
BASOPHILS NFR BLD: 1 % (ref 0–1)
BUN SERPL-MCNC: 24 MG/DL (ref 6–20)
BUN/CREAT SERPL: 26 (ref 12–20)
CALCIUM SERPL-MCNC: 9.2 MG/DL (ref 8.5–10.1)
CHLORIDE SERPL-SCNC: 109 MMOL/L (ref 97–108)
CK SERPL-CCNC: 47 U/L (ref 26–192)
CO2 SERPL-SCNC: 26 MMOL/L (ref 21–32)
COMMENT, HOLDF: NORMAL
CREAT SERPL-MCNC: 0.91 MG/DL (ref 0.55–1.02)
DIFFERENTIAL METHOD BLD: ABNORMAL
EOSINOPHIL # BLD: 0.3 K/UL (ref 0–0.4)
EOSINOPHIL NFR BLD: 4 % (ref 0–7)
ERYTHROCYTE [DISTWIDTH] IN BLOOD BY AUTOMATED COUNT: 12.6 % (ref 11.5–14.5)
GLUCOSE SERPL-MCNC: 130 MG/DL (ref 65–100)
HCT VFR BLD AUTO: 36.4 % (ref 35–47)
HGB BLD-MCNC: 11.7 G/DL (ref 11.5–16)
IMM GRANULOCYTES # BLD AUTO: 0 K/UL (ref 0–0.04)
IMM GRANULOCYTES NFR BLD AUTO: 0 % (ref 0–0.5)
LYMPHOCYTES # BLD: 2.5 K/UL (ref 0.8–3.5)
LYMPHOCYTES NFR BLD: 36 % (ref 12–49)
MAGNESIUM SERPL-MCNC: 2.2 MG/DL (ref 1.6–2.4)
MCH RBC QN AUTO: 31.4 PG (ref 26–34)
MCHC RBC AUTO-ENTMCNC: 32.1 G/DL (ref 30–36.5)
MCV RBC AUTO: 97.6 FL (ref 80–99)
MONOCYTES # BLD: 0.7 K/UL (ref 0–1)
MONOCYTES NFR BLD: 10 % (ref 5–13)
NEUTS SEG # BLD: 3.4 K/UL (ref 1.8–8)
NEUTS SEG NFR BLD: 49 % (ref 32–75)
NRBC # BLD: 0 K/UL (ref 0–0.01)
NRBC BLD-RTO: 0 PER 100 WBC
PLATELET # BLD AUTO: 212 K/UL (ref 150–400)
PMV BLD AUTO: 12.2 FL (ref 8.9–12.9)
POTASSIUM SERPL-SCNC: 3.6 MMOL/L (ref 3.5–5.1)
RBC # BLD AUTO: 3.73 M/UL (ref 3.8–5.2)
SAMPLES BEING HELD,HOLD: NORMAL
SODIUM SERPL-SCNC: 141 MMOL/L (ref 136–145)
TROPONIN I SERPL-MCNC: <0.05 NG/ML
WBC # BLD AUTO: 6.9 K/UL (ref 3.6–11)

## 2019-09-26 PROCEDURE — 93005 ELECTROCARDIOGRAM TRACING: CPT

## 2019-09-26 PROCEDURE — 83735 ASSAY OF MAGNESIUM: CPT

## 2019-09-26 PROCEDURE — 80048 BASIC METABOLIC PNL TOTAL CA: CPT

## 2019-09-26 PROCEDURE — 36415 COLL VENOUS BLD VENIPUNCTURE: CPT

## 2019-09-26 PROCEDURE — 85025 COMPLETE CBC W/AUTO DIFF WBC: CPT

## 2019-09-26 PROCEDURE — 71046 X-RAY EXAM CHEST 2 VIEWS: CPT

## 2019-09-26 PROCEDURE — 99285 EMERGENCY DEPT VISIT HI MDM: CPT

## 2019-09-26 PROCEDURE — 84484 ASSAY OF TROPONIN QUANT: CPT

## 2019-09-26 PROCEDURE — 74011250637 HC RX REV CODE- 250/637: Performed by: EMERGENCY MEDICINE

## 2019-09-26 PROCEDURE — 82550 ASSAY OF CK (CPK): CPT

## 2019-09-26 RX ORDER — METOPROLOL TARTRATE 25 MG/1
12.5 TABLET, FILM COATED ORAL
Status: COMPLETED | OUTPATIENT
Start: 2019-09-26 | End: 2019-09-27

## 2019-09-26 RX ORDER — GUAIFENESIN 100 MG/5ML
162 LIQUID (ML) ORAL
Status: COMPLETED | OUTPATIENT
Start: 2019-09-26 | End: 2019-09-26

## 2019-09-26 RX ADMIN — ASPIRIN 81 MG CHEWABLE TABLET 162 MG: 81 TABLET CHEWABLE at 23:42

## 2019-09-26 RX ADMIN — NITROGLYCERIN 1 INCH: 20 OINTMENT TOPICAL at 23:42

## 2019-09-27 VITALS
OXYGEN SATURATION: 98 % | SYSTOLIC BLOOD PRESSURE: 158 MMHG | RESPIRATION RATE: 18 BRPM | BODY MASS INDEX: 22.77 KG/M2 | TEMPERATURE: 98 F | HEIGHT: 64 IN | DIASTOLIC BLOOD PRESSURE: 72 MMHG | HEART RATE: 53 BPM | WEIGHT: 133.38 LBS

## 2019-09-27 LAB
ACT BLD: 241 SECS (ref 79–138)
ATRIAL RATE: 56 BPM
BASOPHILS # BLD: 0 K/UL (ref 0–0.1)
BASOPHILS NFR BLD: 1 % (ref 0–1)
CALCULATED P AXIS, ECG09: 64 DEGREES
CALCULATED R AXIS, ECG10: 30 DEGREES
CALCULATED T AXIS, ECG11: 66 DEGREES
CHOLEST SERPL-MCNC: 185 MG/DL
DIAGNOSIS, 93000: NORMAL
DIFFERENTIAL METHOD BLD: ABNORMAL
EOSINOPHIL # BLD: 0.3 K/UL (ref 0–0.4)
EOSINOPHIL NFR BLD: 4 % (ref 0–7)
ERYTHROCYTE [DISTWIDTH] IN BLOOD BY AUTOMATED COUNT: 12.5 % (ref 11.5–14.5)
EST. AVERAGE GLUCOSE BLD GHB EST-MCNC: 108 MG/DL
HBA1C MFR BLD: 5.4 % (ref 4.2–6.3)
HCT VFR BLD AUTO: 41.1 % (ref 35–47)
HDLC SERPL-MCNC: 54 MG/DL
HDLC SERPL: 3.4 {RATIO} (ref 0–5)
HGB BLD-MCNC: 12.8 G/DL (ref 11.5–16)
IMM GRANULOCYTES # BLD AUTO: 0 K/UL (ref 0–0.04)
IMM GRANULOCYTES NFR BLD AUTO: 0 % (ref 0–0.5)
LDLC SERPL CALC-MCNC: 111.8 MG/DL (ref 0–100)
LIPASE SERPL-CCNC: 114 U/L (ref 73–393)
LIPID PROFILE,FLP: ABNORMAL
LYMPHOCYTES # BLD: 2.7 K/UL (ref 0.8–3.5)
LYMPHOCYTES NFR BLD: 41 % (ref 12–49)
MAGNESIUM SERPL-MCNC: 2.3 MG/DL (ref 1.6–2.4)
MCH RBC QN AUTO: 31.7 PG (ref 26–34)
MCHC RBC AUTO-ENTMCNC: 31.1 G/DL (ref 30–36.5)
MCV RBC AUTO: 101.7 FL (ref 80–99)
MONOCYTES # BLD: 0.6 K/UL (ref 0–1)
MONOCYTES NFR BLD: 9 % (ref 5–13)
NEUTS SEG # BLD: 3 K/UL (ref 1.8–8)
NEUTS SEG NFR BLD: 46 % (ref 32–75)
NRBC # BLD: 0 K/UL (ref 0–0.01)
NRBC BLD-RTO: 0 PER 100 WBC
P-R INTERVAL, ECG05: 212 MS
PHOSPHATE SERPL-MCNC: 4.4 MG/DL (ref 2.6–4.7)
PLATELET # BLD AUTO: 210 K/UL (ref 150–400)
PMV BLD AUTO: 12.2 FL (ref 8.9–12.9)
Q-T INTERVAL, ECG07: 442 MS
QRS DURATION, ECG06: 100 MS
QTC CALCULATION (BEZET), ECG08: 426 MS
RBC # BLD AUTO: 4.04 M/UL (ref 3.8–5.2)
TRIGL SERPL-MCNC: 96 MG/DL (ref ?–150)
TROPONIN I SERPL-MCNC: 0.85 NG/ML
TSH SERPL DL<=0.05 MIU/L-ACNC: 2.88 UIU/ML (ref 0.36–3.74)
VENTRICULAR RATE, ECG03: 56 BPM
VLDLC SERPL CALC-MCNC: 19.2 MG/DL
WBC # BLD AUTO: 6.6 K/UL (ref 3.6–11)

## 2019-09-27 PROCEDURE — 74011250637 HC RX REV CODE- 250/637: Performed by: EMERGENCY MEDICINE

## 2019-09-27 PROCEDURE — 74011250637 HC RX REV CODE- 250/637: Performed by: INTERNAL MEDICINE

## 2019-09-27 PROCEDURE — 85347 COAGULATION TIME ACTIVATED: CPT

## 2019-09-27 PROCEDURE — 90471 IMMUNIZATION ADMIN: CPT

## 2019-09-27 PROCEDURE — 77030013715 HC INFL SYS MRTM -B: Performed by: INTERNAL MEDICINE

## 2019-09-27 PROCEDURE — 93571 IV DOP VEL&/PRESS C FLO 1ST: CPT | Performed by: INTERNAL MEDICINE

## 2019-09-27 PROCEDURE — C1769 GUIDE WIRE: HCPCS | Performed by: INTERNAL MEDICINE

## 2019-09-27 PROCEDURE — 77030012468 HC VLV BLEEDBK CNTRL ABBT -B: Performed by: INTERNAL MEDICINE

## 2019-09-27 PROCEDURE — 99218 HC RM OBSERVATION: CPT

## 2019-09-27 PROCEDURE — C1894 INTRO/SHEATH, NON-LASER: HCPCS | Performed by: INTERNAL MEDICINE

## 2019-09-27 PROCEDURE — 74011250636 HC RX REV CODE- 250/636: Performed by: INTERNAL MEDICINE

## 2019-09-27 PROCEDURE — 90686 IIV4 VACC NO PRSV 0.5 ML IM: CPT | Performed by: INTERNAL MEDICINE

## 2019-09-27 PROCEDURE — 84443 ASSAY THYROID STIM HORMONE: CPT

## 2019-09-27 PROCEDURE — 74011250636 HC RX REV CODE- 250/636

## 2019-09-27 PROCEDURE — C1887 CATHETER, GUIDING: HCPCS | Performed by: INTERNAL MEDICINE

## 2019-09-27 PROCEDURE — 77030019569 HC BND COMPR RAD TERU -B: Performed by: INTERNAL MEDICINE

## 2019-09-27 PROCEDURE — 83690 ASSAY OF LIPASE: CPT

## 2019-09-27 PROCEDURE — 83735 ASSAY OF MAGNESIUM: CPT

## 2019-09-27 PROCEDURE — 84100 ASSAY OF PHOSPHORUS: CPT

## 2019-09-27 PROCEDURE — 93454 CORONARY ARTERY ANGIO S&I: CPT | Performed by: INTERNAL MEDICINE

## 2019-09-27 PROCEDURE — 77030010221 HC SPLNT WR POS TELE -B: Performed by: INTERNAL MEDICINE

## 2019-09-27 PROCEDURE — 77030013744: Performed by: INTERNAL MEDICINE

## 2019-09-27 PROCEDURE — 77030004532 HC CATH ANGI DX IMP BSC -A: Performed by: INTERNAL MEDICINE

## 2019-09-27 PROCEDURE — 74011636320 HC RX REV CODE- 636/320: Performed by: INTERNAL MEDICINE

## 2019-09-27 PROCEDURE — 99152 MOD SED SAME PHYS/QHP 5/>YRS: CPT | Performed by: INTERNAL MEDICINE

## 2019-09-27 PROCEDURE — 74011000250 HC RX REV CODE- 250: Performed by: INTERNAL MEDICINE

## 2019-09-27 PROCEDURE — 36415 COLL VENOUS BLD VENIPUNCTURE: CPT

## 2019-09-27 PROCEDURE — 74011000258 HC RX REV CODE- 258: Performed by: INTERNAL MEDICINE

## 2019-09-27 PROCEDURE — 85025 COMPLETE CBC W/AUTO DIFF WBC: CPT

## 2019-09-27 PROCEDURE — 80061 LIPID PANEL: CPT

## 2019-09-27 PROCEDURE — 83036 HEMOGLOBIN GLYCOSYLATED A1C: CPT

## 2019-09-27 PROCEDURE — 99153 MOD SED SAME PHYS/QHP EA: CPT | Performed by: INTERNAL MEDICINE

## 2019-09-27 PROCEDURE — 77030008543 HC TBNG MON PRSS MRTM -A: Performed by: INTERNAL MEDICINE

## 2019-09-27 PROCEDURE — 84484 ASSAY OF TROPONIN QUANT: CPT

## 2019-09-27 RX ORDER — PRAVASTATIN SODIUM 40 MG/1
40 TABLET ORAL
Qty: 30 TAB | Refills: 1 | Status: SHIPPED | OUTPATIENT
Start: 2019-09-27 | End: 2020-02-13

## 2019-09-27 RX ORDER — BUPROPION HYDROCHLORIDE 75 MG/1
75 TABLET ORAL 2 TIMES DAILY
Status: DISCONTINUED | OUTPATIENT
Start: 2019-09-27 | End: 2019-09-27 | Stop reason: HOSPADM

## 2019-09-27 RX ORDER — AZELASTINE 1 MG/ML
2 SPRAY, METERED NASAL 2 TIMES DAILY
Status: DISCONTINUED | OUTPATIENT
Start: 2019-09-27 | End: 2019-09-27 | Stop reason: HOSPADM

## 2019-09-27 RX ORDER — HEPARIN SODIUM 200 [USP'U]/100ML
INJECTION, SOLUTION INTRAVENOUS
Status: COMPLETED | OUTPATIENT
Start: 2019-09-27 | End: 2019-09-27

## 2019-09-27 RX ORDER — EZETIMIBE 10 MG/1
10 TABLET ORAL DAILY
Qty: 60 TAB | Refills: 1 | Status: SHIPPED | OUTPATIENT
Start: 2019-09-27 | End: 2020-02-13

## 2019-09-27 RX ORDER — BISACODYL 5 MG
5 TABLET, DELAYED RELEASE (ENTERIC COATED) ORAL DAILY PRN
Status: DISCONTINUED | OUTPATIENT
Start: 2019-09-27 | End: 2019-09-27 | Stop reason: HOSPADM

## 2019-09-27 RX ORDER — SODIUM CHLORIDE 0.9 % (FLUSH) 0.9 %
SYRINGE (ML) INJECTION AS NEEDED
Status: DISCONTINUED | OUTPATIENT
Start: 2019-09-27 | End: 2019-09-27 | Stop reason: HOSPADM

## 2019-09-27 RX ORDER — OXYBUTYNIN CHLORIDE 5 MG/1
10 TABLET, EXTENDED RELEASE ORAL DAILY
Status: DISCONTINUED | OUTPATIENT
Start: 2019-09-27 | End: 2019-09-27 | Stop reason: HOSPADM

## 2019-09-27 RX ORDER — MIDAZOLAM HYDROCHLORIDE 1 MG/ML
INJECTION, SOLUTION INTRAMUSCULAR; INTRAVENOUS AS NEEDED
Status: DISCONTINUED | OUTPATIENT
Start: 2019-09-27 | End: 2019-09-27 | Stop reason: HOSPADM

## 2019-09-27 RX ORDER — SODIUM CHLORIDE 0.9 % (FLUSH) 0.9 %
5-40 SYRINGE (ML) INJECTION EVERY 8 HOURS
Status: DISCONTINUED | OUTPATIENT
Start: 2019-09-27 | End: 2019-09-27 | Stop reason: HOSPADM

## 2019-09-27 RX ORDER — ROSUVASTATIN CALCIUM 10 MG/1
20 TABLET, COATED ORAL DAILY
Status: DISCONTINUED | OUTPATIENT
Start: 2019-09-27 | End: 2019-09-27

## 2019-09-27 RX ORDER — PHENOL/SODIUM PHENOLATE
20 AEROSOL, SPRAY (ML) MUCOUS MEMBRANE DAILY
Qty: 30 TAB | Refills: 0 | Status: SHIPPED
Start: 2019-09-27 | End: 2022-02-11

## 2019-09-27 RX ORDER — ACETAMINOPHEN 325 MG/1
650 TABLET ORAL
Status: DISCONTINUED | OUTPATIENT
Start: 2019-09-27 | End: 2019-09-27 | Stop reason: HOSPADM

## 2019-09-27 RX ORDER — METOPROLOL TARTRATE 25 MG/1
12.5 TABLET, FILM COATED ORAL EVERY 12 HOURS
Status: DISCONTINUED | OUTPATIENT
Start: 2019-09-27 | End: 2019-09-27 | Stop reason: HOSPADM

## 2019-09-27 RX ORDER — SODIUM CHLORIDE 0.9 % (FLUSH) 0.9 %
5-40 SYRINGE (ML) INJECTION AS NEEDED
Status: DISCONTINUED | OUTPATIENT
Start: 2019-09-27 | End: 2019-09-27 | Stop reason: HOSPADM

## 2019-09-27 RX ORDER — SODIUM CHLORIDE 0.9 % (FLUSH) 0.9 %
5-40 SYRINGE (ML) INJECTION EVERY 8 HOURS
Status: CANCELLED | OUTPATIENT
Start: 2019-09-27

## 2019-09-27 RX ORDER — IPRATROPIUM BROMIDE AND ALBUTEROL SULFATE 2.5; .5 MG/3ML; MG/3ML
3 SOLUTION RESPIRATORY (INHALATION)
Status: DISCONTINUED | OUTPATIENT
Start: 2019-09-27 | End: 2019-09-27 | Stop reason: HOSPADM

## 2019-09-27 RX ORDER — HEPARIN SODIUM 10000 [USP'U]/100ML
12-25 INJECTION, SOLUTION INTRAVENOUS
Status: DISCONTINUED | OUTPATIENT
Start: 2019-09-27 | End: 2019-09-27

## 2019-09-27 RX ORDER — PRAVASTATIN SODIUM 40 MG/1
40 TABLET ORAL
Status: DISCONTINUED | OUTPATIENT
Start: 2019-09-27 | End: 2019-09-27 | Stop reason: HOSPADM

## 2019-09-27 RX ORDER — NITROGLYCERIN 0.4 MG/1
0.4 TABLET SUBLINGUAL
Status: DISCONTINUED | OUTPATIENT
Start: 2019-09-27 | End: 2019-09-27 | Stop reason: HOSPADM

## 2019-09-27 RX ORDER — ENOXAPARIN SODIUM 100 MG/ML
40 INJECTION SUBCUTANEOUS EVERY 24 HOURS
Status: DISCONTINUED | OUTPATIENT
Start: 2019-09-27 | End: 2019-09-27

## 2019-09-27 RX ORDER — HYDRALAZINE HYDROCHLORIDE 20 MG/ML
10 INJECTION INTRAMUSCULAR; INTRAVENOUS
Status: DISCONTINUED | OUTPATIENT
Start: 2019-09-27 | End: 2019-09-27 | Stop reason: HOSPADM

## 2019-09-27 RX ORDER — MORPHINE SULFATE 2 MG/ML
2 INJECTION, SOLUTION INTRAMUSCULAR; INTRAVENOUS
Status: DISCONTINUED | OUTPATIENT
Start: 2019-09-27 | End: 2019-09-27 | Stop reason: HOSPADM

## 2019-09-27 RX ORDER — SODIUM CHLORIDE 9 MG/ML
1.5 INJECTION, SOLUTION INTRAVENOUS CONTINUOUS
Status: DISCONTINUED | OUTPATIENT
Start: 2019-09-27 | End: 2019-09-27 | Stop reason: HOSPADM

## 2019-09-27 RX ORDER — SODIUM CHLORIDE 0.9 % (FLUSH) 0.9 %
5-40 SYRINGE (ML) INJECTION AS NEEDED
Status: CANCELLED | OUTPATIENT
Start: 2019-09-27

## 2019-09-27 RX ORDER — CETIRIZINE HCL 10 MG
10 TABLET ORAL DAILY
Status: DISCONTINUED | OUTPATIENT
Start: 2019-09-27 | End: 2019-09-27 | Stop reason: HOSPADM

## 2019-09-27 RX ORDER — FENTANYL CITRATE 50 UG/ML
INJECTION, SOLUTION INTRAMUSCULAR; INTRAVENOUS AS NEEDED
Status: DISCONTINUED | OUTPATIENT
Start: 2019-09-27 | End: 2019-09-27 | Stop reason: HOSPADM

## 2019-09-27 RX ORDER — MONTELUKAST SODIUM 10 MG/1
10 TABLET ORAL
Status: DISCONTINUED | OUTPATIENT
Start: 2019-09-27 | End: 2019-09-27

## 2019-09-27 RX ORDER — SODIUM CHLORIDE 9 MG/ML
3 INJECTION, SOLUTION INTRAVENOUS CONTINUOUS
Status: DISPENSED | OUTPATIENT
Start: 2019-09-27 | End: 2019-09-27

## 2019-09-27 RX ORDER — HEPARIN SODIUM 1000 [USP'U]/ML
INJECTION, SOLUTION INTRAVENOUS; SUBCUTANEOUS AS NEEDED
Status: DISCONTINUED | OUTPATIENT
Start: 2019-09-27 | End: 2019-09-27 | Stop reason: HOSPADM

## 2019-09-27 RX ORDER — HEPARIN SODIUM 5000 [USP'U]/ML
60 INJECTION, SOLUTION INTRAVENOUS; SUBCUTANEOUS ONCE
Status: DISCONTINUED | OUTPATIENT
Start: 2019-09-27 | End: 2019-09-27

## 2019-09-27 RX ORDER — ONDANSETRON 2 MG/ML
4 INJECTION INTRAMUSCULAR; INTRAVENOUS
Status: DISCONTINUED | OUTPATIENT
Start: 2019-09-27 | End: 2019-09-27 | Stop reason: HOSPADM

## 2019-09-27 RX ORDER — EZETIMIBE 10 MG/1
10 TABLET ORAL DAILY
Status: DISCONTINUED | OUTPATIENT
Start: 2019-09-27 | End: 2019-09-27 | Stop reason: HOSPADM

## 2019-09-27 RX ORDER — VERAPAMIL HYDROCHLORIDE 2.5 MG/ML
INJECTION, SOLUTION INTRAVENOUS AS NEEDED
Status: DISCONTINUED | OUTPATIENT
Start: 2019-09-27 | End: 2019-09-27 | Stop reason: HOSPADM

## 2019-09-27 RX ORDER — GUAIFENESIN 100 MG/5ML
81 LIQUID (ML) ORAL DAILY
Status: DISCONTINUED | OUTPATIENT
Start: 2019-09-27 | End: 2019-09-27 | Stop reason: HOSPADM

## 2019-09-27 RX ORDER — SODIUM CHLORIDE 9 MG/ML
3 INJECTION, SOLUTION INTRAVENOUS CONTINUOUS
Status: DISCONTINUED | OUTPATIENT
Start: 2019-09-27 | End: 2019-09-27

## 2019-09-27 RX ORDER — LIDOCAINE HYDROCHLORIDE 10 MG/ML
INJECTION INFILTRATION; PERINEURAL AS NEEDED
Status: DISCONTINUED | OUTPATIENT
Start: 2019-09-27 | End: 2019-09-27 | Stop reason: HOSPADM

## 2019-09-27 RX ADMIN — METOPROLOL TARTRATE 12.5 MG: 25 TABLET ORAL at 12:13

## 2019-09-27 RX ADMIN — EZETIMIBE 10 MG: 10 TABLET ORAL at 14:22

## 2019-09-27 RX ADMIN — CETIRIZINE HYDROCHLORIDE 10 MG: 10 TABLET, FILM COATED ORAL at 11:56

## 2019-09-27 RX ADMIN — INFLUENZA VIRUS VACCINE 0.5 ML: 15; 15; 15; 15 SUSPENSION INTRAMUSCULAR at 15:18

## 2019-09-27 RX ADMIN — SODIUM CHLORIDE 3 ML/KG/HR: 900 INJECTION, SOLUTION INTRAVENOUS at 07:46

## 2019-09-27 RX ADMIN — OXYBUTYNIN CHLORIDE 10 MG: 5 TABLET, EXTENDED RELEASE ORAL at 11:56

## 2019-09-27 RX ADMIN — Medication 10 ML: at 14:00

## 2019-09-27 RX ADMIN — INFLUENZA VIRUS VACCINE 0.5 ML: 15; 15; 15; 15 SUSPENSION INTRAMUSCULAR at 14:50

## 2019-09-27 RX ADMIN — METOPROLOL TARTRATE 12.5 MG: 25 TABLET ORAL at 00:26

## 2019-09-27 RX ADMIN — BUPROPION HYDROCHLORIDE 75 MG: 75 TABLET, FILM COATED ORAL at 11:56

## 2019-09-27 NOTE — ED NOTES
Assumed care of patient at change of shift. Patient presents with \"indigestion\" onset 9 PM tonight while at rest similar to symptoms last August when she had a stent placed. She reports pain in her jaw and left arm similar to previous presentation. She currently has no discomfort in her arm/jaw,  although she states still slight indigestion. Initial EKG and troponin are negative. Will consult cardiology for admission.

## 2019-09-27 NOTE — CONSULTS
S Cardiology Consult Note    Date of consult:  09/27/19  Date of admission: 9/26/2019  Primary Cardiologist: Luc Duff  Physician Requesting consult: Dr Lucas Velez / Reason for consult: Chest pain    History of the presenting illness:  Kareen Kelly is a 76 y. o. who presented to the [facility] ER last night after an episode of chest discomfort. She had sudden onset of severe central chest discomfort that felt like indigestion in terms of quality, and similar to prior anginal pain. She said this started around 9pm last night after a choir practice. Lasted at least 15 minutes. Took some tums but got no relief from this. She also noticed some radiation of the pain down her left arm. Ms Michael Nolan had an NSTEMI in August 2018 and cath showed a critical lesion in her OM2 off a large dominant LCx. This was treated at the time with a TYRONE. Has been on Livalo for hyperlipidemia but her insurance recent determined they would not cover it and was going to cost $1000, so she had run out and we had just recently been notified of this and ordered pravastatin for her instead. Past Medical History:   Diagnosis Date    Alopecia     Asthma     CAD (coronary artery disease) 08/12/2018    NSTEMI, Cath, TYRONE OM2 of dominant Cx    H/O seasonal allergies     H/O TB skin testing 1968    treated with INH    Hypercholesterolemia     Hypertension     IFG (impaired fasting glucose) 6/14/2013    a1c 6.0 5/2013    Ill-defined condition     TIA symptoms, saw neuro-opthalmologist and was told it was probably spasm of cranial nerve.  Overactive bladder 3/13/2013    Serrated adenoma of colon 2/6/2014 11/17/09 - Dr. Erica Frost - repeat in 5 years       Prior to Admission medications    Medication Sig Start Date End Date Taking? Authorizing Provider   multivitamin (ONE A DAY) tablet Take 1 Tab by mouth daily. Provider, Historical   OAT BRAN PO Take  by mouth.     Provider, Historical   buPROPion (WELLBUTRIN) 75 mg tablet take 1 tablet by mouth twice a day 2/7/19   Michelle Rdz MD   oxybutynin chloride XL (DITROPAN XL) 10 mg CR tablet TAKE ONE TABLET DAILY. 12/24/18   Michelle Rdz MD   azelastine (ASTELIN) 137 mcg (0.1 %) nasal spray instill 2 sprays into each nostril twice a day 11/12/18   Provider, Historical   metoprolol tartrate (LOPRESSOR) 25 mg tablet Take 0.5 Tabs by mouth every twelve (12) hours. 8/14/18   Frankie Meza MD   rosuvastatin (CRESTOR) 20 mg tablet Take 1 Tab by mouth daily. 8/14/18   Frankie Meza MD   ticagrelor (BRILINTA) 90 mg tablet Take 1 Tab by mouth every twelve (12) hours every twelve (12) hours. 8/14/18   Frankie Meza MD   s-adenosylmethionine sul tosyl (ROBERTO-E PO) Take  by mouth. Provider, Historical   levalbuterol tartrate (XOPENEX) 45 mcg/actuation inhaler Take 1-2 Puffs by inhalation every four (4) hours as needed for Wheezing. 1/17/17   Michelle Rdz MD   FLAXSEED PO Take  by mouth daily. Flaxseed meal    Provider, Historical   cetirizine (ZYRTEC) 10 mg tablet Take 10 mg by mouth daily. Provider, Historical   aspirin 81 mg chewable tablet Take 81 mg by mouth daily. Provider, Historical   montelukast (SINGULAIR) 10 mg tablet Take 10 mg by mouth every seven (7) days.  Takes on day when she gets allergy shots    Provider, Historical       Family History   Problem Relation Age of Onset    Hypertension Mother     Stroke Mother     Lung Disease Mother     Cancer Father         bladder    Other Father         congential heart valve defect    Depression Sister     Stroke Sister 72    Hypertension Sister     COPD Sister     Other Sister 79        twisted bowel    Heart Disease Maternal Grandfather     Dementia Paternal Grandfather     Cancer Maternal Grandmother         esophageal    Asthma Paternal Grandmother     Dementia Paternal Grandmother     Breast Cancer Paternal Grandmother        Social History     Socioeconomic History    Marital status:      Spouse name: Not on file    Number of children: Not on file    Years of education: Not on file    Highest education level: Not on file   Occupational History    Not on file   Social Needs    Financial resource strain: Not on file    Food insecurity:     Worry: Not on file     Inability: Not on file    Transportation needs:     Medical: Not on file     Non-medical: Not on file   Tobacco Use    Smoking status: Former Smoker     Packs/day: 0.25     Years: 8.00     Pack years: 2.00     Last attempt to quit: 1995     Years since quittin.6    Smokeless tobacco: Never Used    Tobacco comment: quit in    Substance and Sexual Activity    Alcohol use:  Yes     Alcohol/week: 17.5 standard drinks     Types: 21 Glasses of wine per week     Comment: 2-3 etoh drinks daily - [place] - no withdrawl symptoms when she does not have it    Drug use: No    Sexual activity: Yes     Partners: Male   Lifestyle    Physical activity:     Days per week: Not on file     Minutes per session: Not on file    Stress: Not on file   Relationships    Social connections:     Talks on phone: Not on file     Gets together: Not on file     Attends Hoahaoism service: Not on file     Active member of club or organization: Not on file     Attends meetings of clubs or organizations: Not on file     Relationship status: Not on file    Intimate partner violence:     Fear of current or ex partner: Not on file     Emotionally abused: Not on file     Physically abused: Not on file     Forced sexual activity: Not on file   Other Topics Concern    Not on file   Social History Narrative    Not on file       ROS    Visit Vitals  /85 (BP 1 Location: Left arm, BP Patient Position: At rest)   Pulse (!) 56   Temp 98.1 °F (36.7 °C)   Resp 16   Ht 5' 4\" (1.626 m)   Wt 60.5 kg (133 lb 6.1 oz)   SpO2 97%   BMI 22.89 kg/m²     Physical Exam   Constitutional: She is oriented to person, place, and time and well-developed, well-nourished, and in no distress. HENT:   Head: Normocephalic and atraumatic. Eyes: Conjunctivae are normal. No scleral icterus. Neck: No JVD present. Cardiovascular: Normal rate, regular rhythm and normal heart sounds. Exam reveals no gallop. No murmur heard. Pulmonary/Chest: Effort normal and breath sounds normal. No stridor. No respiratory distress. She has no wheezes. Abdominal: Soft. She exhibits no distension. Musculoskeletal: Normal range of motion. She exhibits no deformity. Neurological: She is alert and oriented to person, place, and time. Skin: Skin is warm and dry. Psychiatric: Mood and affect normal.       Lab review:  BMP:   Lab Results   Component Value Date/Time     09/26/2019 09:55 PM    K 3.6 09/26/2019 09:55 PM     (H) 09/26/2019 09:55 PM    CO2 26 09/26/2019 09:55 PM    AGAP 6 09/26/2019 09:55 PM     (H) 09/26/2019 09:55 PM    BUN 24 (H) 09/26/2019 09:55 PM    CREA 0.91 09/26/2019 09:55 PM    GFRAA >60 09/26/2019 09:55 PM    GFRNA >60 09/26/2019 09:55 PM        CBC:  Lab Results   Component Value Date/Time    WBC 6.6 09/27/2019 03:54 AM    HGB 12.8 09/27/2019 03:54 AM    HCT 41.1 09/27/2019 03:54 AM    PLATELET 171 30/52/0309 03:54 AM    .7 (H) 09/27/2019 03:54 AM       All Cardiac Markers in the last 24 hours:    Lab Results   Component Value Date/Time    TROIQ 0.85 (H) 09/27/2019 03:54 AM    TROIQ <0.05 09/26/2019 09:55 PM     Lab Results   Component Value Date/Time    Cholesterol, total 185 09/27/2019 03:54 AM    HDL Cholesterol 54 09/27/2019 03:54 AM    LDL, calculated 111.8 (H) 09/27/2019 03:54 AM    VLDL, calculated 19.2 09/27/2019 03:54 AM    Triglyceride 96 09/27/2019 03:54 AM    CHOL/HDL Ratio 3.4 09/27/2019 03:54 AM         Data review:  EKG tracing personally reviewed: NSR with 1st degree AV block. Minor NSTW changes, no obvious ischemia. Echocardiogram:  11/26/18: [facility] records - EF 55-60%. Mild LVH. Mild MR.  Mild TR.      Assessment:    1. NSTEMI  2. CAD presenting with unstable angina  3. HTN  4. Hyperlipidemia - sub-optimal control. Issues with statin intolerance in the past.   5. H/o GERD    Recommendations / Plan:    [facility] possible PCI this morning  Consent obtained  Aspirin, heparin, beta blocker  Didn't tolerate Crestor previously. Switched to [place] but too expensive. We will try Pravastatin 40mg daily plus ezetimibe. Signed:  Antonina OTERO  [facility]  Interventional Cardiology  09/27/19

## 2019-09-27 NOTE — DISCHARGE SUMMARY
Discharge Summary       PATIENT ID: Pardeep Najera  MRN: [id number]   YOB: 1945    DATE OF ADMISSION: 9/26/2019  9:35 PM    DATE OF DISCHARGE: 09/27/19  PRIMARY CARE PROVIDER: Paul Chapin MD       DISCHARGING PROVIDER: Charline Rascon MD    To contact this individual call [phone] and ask the  to page. If unavailable ask to be transferred the Adult Hospitalist Department. CONSULTATIONS: IP CONSULT TO CARDIOLOGY      PROCEDURES/SURGERIES: Procedure(s):  LEFT HEART CATH / CORONARY ANGIOGRAPHY  Fractional Flow Reserve    IMAGING  Xr Chest Pa Lat    Result Date: 9/26/2019  IMPRESSION: No acute findings. [id number] [facility] COURSE:  This is a 66-year-old woman with a past medical history significant for coronary artery disease, status post stent placement; hypertension; asthma; dyslipidemia; was in her usual state of health until the day of presentation at the emergency room when the patient developed chest pain. The pain is located at the center of the chest, described the pain as discomfort, like indigestion with radiation to the left upper extremity, 4/10 in severity, constant. No known aggravating or relieving factors. The patient stated that the present presentation is similar to when she had heart attack and underwent stent placement 13 months ago. No associated shortness of breath, no diaphoresis. The patient came to the emergency room for further evaluation. When the patient arrived at the emergency room, the first set of troponin was negative. The emergency room physician consulted the patient's cardiologist who advised admission to the hospital.  The patient was then referred to the hospitalist service for that purpose. The patient was last admitted to the hospital from 08/12/2018 to 08/14/2018. The patient was admitted with chest pain. Evaluation revealed coronary artery disease. The patient underwent cardiac catheterization with stent placement. The patient denies fever, rigors and chills. Admission diagnosis   # Chest pain  # Hypertension  # Asthma  # Dyslipidemia  # Hyperglycemia     Patient underwent cardiac catheterization with   Cath showed non-obstructive CAD  OM2 stent widely patent  LAD: 50% proximal stenosis, iFR 0.91 (negative), FFR w IV adenosine 0.87 (negative).         Medical therapies recommended  ASA, pravastatin & zetia, beta blocker    DISCHARGE DIAGNOSES / PLAN:    # Chest pain possible GERD- oral Omeprazole given   # Hypertension  # Asthma  # Dyslipidemia  # Hyperglycemia      Patient Active Problem List   Diagnosis Code    Essential hypertension I10    Asthma J45.909    ADD (attention deficit disorder) F98.8    Overactive bladder N32.81    Environmental allergies Z91.09    S/P colonoscopy Z98.890    At risk for osteoporosis Z91.89    Bilateral carotid artery disease (HCC) I77.9    Vertical diplopia H53.2    IFG (impaired fasting glucose) R73.01    Serrated adenoma of colon D12.6    Alopecia areata L63.9    Advance directive discussed with patient Z71.89    Chest pain R07.9    Coronary artery disease involving native coronary artery of native heart without angina pectoris I25.10    Hypercholesterolemia E78.00               PENDING TEST RESULTS:   At the time of discharge the following test results are still pending: None     FOLLOW UP APPOINTMENTS:    Follow-up Information     Follow up With Specialties Details Why Contact Info    Fifi Santos MD Internal Medicine Schedule an appointment as soon as possible for a visit in 2 weeks For regular follow up  [address] [place] [address]  [phone]      Diamond Norris MD Cardiology Schedule an appointment as soon as possible for a visit in 2 weeks Regular follow up  [address]:   · It is important that you take the medication exactly as they are prescribed. · Keep your medication in the bottles provided by the pharmacist and keep a list of the medication names, dosages, and times to be taken in your wallet. · Do not take other medications without consulting your doctor. · No drinking alcohol or driving car or operating machinery if you are on narcotic pain medications. Donot take sedating mediations if you are sleepy or confused. · Fall Precautions  · Keep Well Hydrated  · Report to your medical provider if you feel you have  developed allergies to medications  · Follow up with your PCP or Consultant for medication adjustments and refills  · Monitor for signs of fevers,chills,bleeding,chest pain and seek medical attention if you do so. DIET: Cardiac cath     ACTIVITY: As tolerated     WOUND CARE: None     EQUIPMENT needed: None       DISCHARGE MEDICATIONS:  Current Discharge Medication List      START taking these medications    Details   ezetimibe (ZETIA) 10 mg tablet Take 1 Tab by mouth daily. Qty: 60 Tab, Refills: 1      Omeprazole delayed release (PRILOSEC D/R) 20 mg tablet Take 1 Tab by mouth daily. Qty: 30 Tab, Refills: 0      pravastatin (PRAVACHOL) 40 mg tablet Take 1 Tab by mouth nightly. Qty: 30 Tab, Refills: 1         CONTINUE these medications which have NOT CHANGED    Details   multivitamin (ONE A DAY) tablet Take 1 Tab by mouth daily. OAT BRAN PO Take  by mouth. buPROPion (WELLBUTRIN) 75 mg tablet take 1 tablet by mouth twice a day  Qty: 60 Tab, Refills: 11    Associated Diagnoses: Attention deficit disorder (ADD) without hyperactivity      oxybutynin chloride XL (DITROPAN XL) 10 mg CR tablet TAKE ONE TABLET DAILY.   Qty: 90 Tab, Refills: 4    Associated Diagnoses: Overactive bladder      azelastine (ASTELIN) 137 mcg (0.1 %) nasal spray instill 2 sprays into each nostril twice a day  Refills: 0      metoprolol tartrate (LOPRESSOR) 25 mg tablet Take 0.5 Tabs by mouth every twelve (12) hours. Qty: 60 Tab, Refills: 3      s-adenosylmethionine sul tosyl (ROBERTO-E PO) Take  by mouth.      levalbuterol tartrate (XOPENEX) 45 mcg/actuation inhaler Take 1-2 Puffs by inhalation every four (4) hours as needed for Wheezing. Qty: 1 Inhaler, Refills: 2    Associated Diagnoses: Bronchitis      FLAXSEED PO Take  by mouth daily. Flaxseed meal      cetirizine (ZYRTEC) 10 mg tablet Take 10 mg by mouth daily. aspirin 81 mg chewable tablet Take 81 mg by mouth daily. montelukast (SINGULAIR) 10 mg tablet Take 10 mg by mouth every seven (7) days. Takes on day when she gets allergy shots         STOP taking these medications       rosuvastatin (CRESTOR) 20 mg tablet Comments:   Reason for Stopping:         ticagrelor (BRILINTA) 90 mg tablet Comments:   Reason for Stopping:               All Micro Results     None          Recent Results (from the past 24 hour(s))   EKG, 12 LEAD, INITIAL    Collection Time: 09/26/19  9:47 PM   Result Value Ref Range    Ventricular Rate 56 BPM    Atrial Rate 56 BPM    P-R Interval 212 ms    QRS Duration 100 ms    Q-T Interval 442 ms    QTC Calculation (Bezet) 426 ms    Calculated P Axis 64 degrees    Calculated R Axis 30 degrees    Calculated T Axis 66 degrees    Diagnosis       Sinus bradycardia with 1st degree AV block  When compared with ECG of 13-AUG-2018 08:25,  T wave inversion now evident in Lateral leads  Confirmed by Rebeca Dang MD, Corinna Clint ([id number]) on 9/27/2019 9:36:27 AM     SAMPLES BEING HELD    Collection Time: 09/26/19  9:54 PM   Result Value Ref Range    SAMPLES BEING HELD 1BLU,1RED     COMMENT        Add-on orders for these samples will be processed based on acceptable specimen integrity and analyte stability, which may vary by analyte.    CBC WITH AUTOMATED DIFF    Collection Time: 09/26/19  9:55 PM   Result Value Ref Range    WBC 6.9 3.6 - 11.0 K/uL    RBC 3.73 (L) 3.80 - 5.20 M/uL    HGB 11.7 11.5 - 16.0 g/dL    HCT 36.4 35.0 - 47.0 %    MCV 97.6 80.0 - 99.0 FL    MCH 31.4 26.0 - 34.0 PG    MCHC 32.1 30.0 - 36.5 g/dL    RDW 12.6 11.5 - 14.5 %    PLATELET 213 151 - 240 K/uL    MPV 12.2 8.9 - 12.9 FL    NRBC 0.0 0  WBC    ABSOLUTE NRBC 0.00 0.00 - 0.01 K/uL    NEUTROPHILS 49 32 - 75 %    LYMPHOCYTES 36 12 - 49 %    MONOCYTES 10 5 - 13 %    EOSINOPHILS 4 0 - 7 %    BASOPHILS 1 0 - 1 %    IMMATURE GRANULOCYTES 0 0.0 - 0.5 %    ABS. NEUTROPHILS 3.4 1.8 - 8.0 K/UL    ABS. LYMPHOCYTES 2.5 0.8 - 3.5 K/UL    ABS. MONOCYTES 0.7 0.0 - 1.0 K/UL    ABS. EOSINOPHILS 0.3 0.0 - 0.4 K/UL    ABS. BASOPHILS 0.0 0.0 - 0.1 K/UL    ABS. IMM.  GRANS. 0.0 0.00 - 0.04 K/UL    DF AUTOMATED     CK W/ REFLX CKMB    Collection Time: 09/26/19  9:55 PM   Result Value Ref Range    CK 47 26 - 713 U/L   METABOLIC PANEL, BASIC    Collection Time: 09/26/19  9:55 PM   Result Value Ref Range    Sodium 141 136 - 145 mmol/L    Potassium 3.6 3.5 - 5.1 mmol/L    Chloride 109 (H) 97 - 108 mmol/L    CO2 26 21 - 32 mmol/L    Anion gap 6 5 - 15 mmol/L    Glucose 130 (H) 65 - 100 mg/dL    BUN 24 (H) 6 - 20 MG/DL    Creatinine 0.91 0.55 - 1.02 MG/DL    BUN/Creatinine ratio 26 (H) 12 - 20      GFR est AA >60 >60 ml/min/1.73m2    GFR est non-AA >60 >60 ml/min/1.73m2    Calcium 9.2 8.5 - 10.1 MG/DL   MAGNESIUM    Collection Time: 09/26/19  9:55 PM   Result Value Ref Range    Magnesium 2.2 1.6 - 2.4 mg/dL   TROPONIN I    Collection Time: 09/26/19  9:55 PM   Result Value Ref Range    Troponin-I, Qt. <0.05 <0.05 ng/mL   LIPID PANEL    Collection Time: 09/27/19  3:54 AM   Result Value Ref Range    LIPID PROFILE          Cholesterol, total 185 <200 MG/DL    Triglyceride 96 <150 MG/DL    HDL Cholesterol 54 MG/DL    LDL, calculated 111.8 (H) 0 - 100 MG/DL    VLDL, calculated 19.2 MG/DL    CHOL/HDL Ratio 3.4 0.0 - 5.0     CBC WITH AUTOMATED DIFF    Collection Time: 09/27/19  3:54 AM   Result Value Ref Range    WBC 6.6 3.6 - 11.0 K/uL    RBC 4.04 3.80 - 5.20 M/uL    HGB 12.8 11.5 - 16.0 g/dL    HCT 41.1 35.0 - 47.0 %    .7 (H) 80.0 - 99.0 FL    MCH 31.7 26.0 - 34.0 PG    MCHC 31.1 30.0 - 36.5 g/dL    RDW 12.5 11.5 - 14.5 %    PLATELET 507 593 - 874 K/uL    MPV 12.2 8.9 - 12.9 FL    NRBC 0.0 0  WBC    ABSOLUTE NRBC 0.00 0.00 - 0.01 K/uL    NEUTROPHILS 46 32 - 75 %    LYMPHOCYTES 41 12 - 49 %    MONOCYTES 9 5 - 13 %    EOSINOPHILS 4 0 - 7 %    BASOPHILS 1 0 - 1 %    IMMATURE GRANULOCYTES 0 0.0 - 0.5 %    ABS. NEUTROPHILS 3.0 1.8 - 8.0 K/UL    ABS. LYMPHOCYTES 2.7 0.8 - 3.5 K/UL    ABS. MONOCYTES 0.6 0.0 - 1.0 K/UL    ABS. EOSINOPHILS 0.3 0.0 - 0.4 K/UL    ABS. BASOPHILS 0.0 0.0 - 0.1 K/UL    ABS. IMM. GRANS. 0.0 0.00 - 0.04 K/UL    DF AUTOMATED     MAGNESIUM    Collection Time: 09/27/19  3:54 AM   Result Value Ref Range    Magnesium 2.3 1.6 - 2.4 mg/dL   PHOSPHORUS    Collection Time: 09/27/19  3:54 AM   Result Value Ref Range    Phosphorus 4.4 2.6 - 4.7 MG/DL   LIPASE    Collection Time: 09/27/19  3:54 AM   Result Value Ref Range    Lipase 114 73 - 393 U/L   TROPONIN I    Collection Time: 09/27/19  3:54 AM   Result Value Ref Range    Troponin-I, Qt. 0.85 (H) <0.05 ng/mL   TSH 3RD GENERATION    Collection Time: 09/27/19  4:09 AM   Result Value Ref Range    TSH 2.88 0.36 - 3.74 uIU/mL   HEMOGLOBIN A1C WITH EAG    Collection Time: 09/27/19  4:09 AM   Result Value Ref Range    Hemoglobin A1c 5.4 4.2 - 6.3 %    Est. average glucose 108 mg/dL           NOTIFY YOUR PHYSICIAN FOR ANY OF THE FOLLOWING:   Fever over 101 degrees for 24 hours. Chest pain, shortness of breath, fever, chills, nausea, vomiting, diarrhea, change in mentation, falling, weakness, bleeding. Severe pain or pain not relieved by medications. Or, any other signs or symptoms that you may have questions about.     DISPOSITION:   x Home With:   OT  PT  LENA  RN       Long term SNF/Inpatient Rehab    Independent/assisted living    Hospice    Other:       PATIENT CONDITION AT DISCHARGE:     Functional status    Poor     Deconditioned    x Independent      Cognition     Lucid     Forgetful     Dementia      Catheters/lines (plus indication)    Esparza     PICC     PEG    x None      Code status    x Full code     DNR      PHYSICAL EXAMINATION AT DISCHARGE:  Gen:  No distress, alert  HEENT:  Normal cephalic atraumatic, extra-occular movements are intact. Neck:  Supple, No JVD  Lungs:  Clear bilaterally, no wheeze, no rales, normal effort  Heart:  Regular Rate and Rhythm, normal S1 and S2, no edema  Abdomen:  Soft, non tender, normal bowel sounds, no guarding.   Extremities:  Well perfused, no cyanosis or edema  Neurological:  Awake and alert, CN's are intact, normal strength throughout extremities  Skin:  No rashes or moles  Mental Status:  Normal thought process, does not appear anxious      CHRONIC MEDICAL DIAGNOSES:  Problem List as of 9/27/2019 Date Reviewed: 9/27/2019          Codes Class Noted - Resolved    Coronary artery disease involving native coronary artery of native heart without angina pectoris ICD-10-CM: I25.10  ICD-9-CM: 414.01  8/16/2018 - Present    Overview Signed 8/16/2018 11:08 PM by Maria Isabel Curran MD     August 2018 NSTEMI, TYRONE to OM2  branch of dominant Cx             Hypercholesterolemia ICD-10-CM: E78.00  ICD-9-CM: 272.0  8/16/2018 - Present        * (Principal) Chest pain ICD-10-CM: R07.9  ICD-9-CM: 786.50  8/12/2018 - Present        Alopecia areata ICD-10-CM: L63.9  ICD-9-CM: 704.01  5/12/2016 - Present    Overview Signed 5/12/2016  9:41 AM by Nataliia Patterson MD     Followed by dermatology, has gotten cortisone injections and using steroid ointment topically, also using rogaine topical liquid - Dr. Hanna Artist directive discussed with patient ICD-10-CM: Z71.89  ICD-9-CM: V65.49  5/12/2016 - Present    Overview Signed 5/12/2016  9:48 AM by Izzy Kimble RN     Patient has one already and asked to bring at next office visit             Serrated adenoma of colon ICD-10-CM: D12.6  ICD-9-CM: 211.3 2/6/2014 - Present    Overview Addendum 11/12/2015 11:36 AM by Daisy Long MD     11/17/09 - Dr. Bekah Mireles - repeat in 2/2015 normal, repeat in 5 years             Bilateral carotid artery disease [facility]) ICD-10-CM: I77.9  ICD-9-CM: 447.9  6/14/2013 - Present    Overview Signed 6/14/2013  9:47 AM by Daisy Long MD     Mild b/l 16-49% 5/2013             Vertical diplopia ICD-10-CM: H53.2  ICD-9-CM: 368.2  6/14/2013 - Present    Overview Signed 2/6/2014  4:41 PM by Daisy Long MD     Admitted 5/2013 for vertical diplopia, r/o tia/cva -  carotid dopplers with mild disease b/l, tte with mild TR only, MRI/MRA brain without evidence of aneurysm or CVA - followed up with neuroophtho               IFG (impaired fasting glucose) ICD-10-CM: R73.01  ICD-9-CM: 790.21  6/14/2013 - Present    Overview Signed 6/14/2013 10:01 AM by Daisy Long MD     a1c 6.0 5/2013             At risk for osteoporosis ICD-10-CM: Z91.89  ICD-9-CM: V49.89  3/18/2013 - Present    Overview Signed 3/18/2013 11:30 AM by Daisy Long MD     BMD 1/6/11:  T -1.7 left hip; T -0.3 lumbar spine             Essential hypertension ICD-10-CM: I10  ICD-9-CM: 401.9  3/13/2013 - Present        Asthma ICD-10-CM: J45.909  ICD-9-CM: 493.90  3/13/2013 - Present        ADD (attention deficit disorder) ICD-10-CM: F98.8  ICD-9-CM: 314.00  3/13/2013 - Present        Overactive bladder ICD-10-CM: N32.81  ICD-9-CM: 596.51  3/13/2013 - Present        Environmental allergies ICD-10-CM: Z91.09  ICD-9-CM: V15.09  3/13/2013 - Present    Overview Signed 3/13/2013 12:03 PM by Daisy Long MD     On injections - Leslee Kwonford             S/P colonoscopy ICD-10-CM: A46.900  ICD-9-CM: V45.89  3/13/2013 - Present    Overview Signed 3/13/2013 12:10 PM by Daisy Long MD     Gets q 5 years due to polyp in past - last 11/09 - Dr. Chelsea García: Elevated troponin ICD-10-CM: R74.8  ICD-9-CM: 790.6  8/12/2018 - 8/16/2018 Discussed with patient and family. Explained the importance of following up, Compliance with medications and recommendations on discharge,Side effect profile of medications were explained. Safety precautions at home and while taking pain medications also explained. All questions answered to the satisfaction of the patient/family. Discussed with consultant(s) who are agreeable to the discharge. Verbal and written instructions on discharge given. Explained about Discharge medications and side effect profile. Advised patient/family to followup with their pcp for medication refills and preauthorization of medications, Home health orders. checkups,screenign programs as appropriate for age. Thank you Shanice Huitron MD for taking care of your patient, Please call with any questions.       Greater than 35 minutes were spent with the patient on counseling and coordination of care    Signed:   Wendy Aldridge MD  9/27/2019  1:24 PM

## 2019-09-27 NOTE — H&P
[facility]  HISTORY AND PHYSICAL    Name:  Krishna Perry  MR#:  [id number]  :  1945  ACCOUNT #:  [de-identified]  ADMIT DATE:  2019      The patient was seen, evaluated and admitted by me on 2019. PRIMARY CARE PHYSICIAN:  Leela Tejada MD.    SOURCE OF INFORMATION:  The patient. CHIEF COMPLAINT:  Chest pain. HISTORY OF PRESENT ILLNESS:  This is a 60-year-old woman with a past medical history significant for coronary artery disease, status post stent placement; hypertension; asthma; dyslipidemia; was in her usual state of health until the day of presentation at the emergency room when the patient developed chest pain. The pain is located at the center of the chest, described the pain as discomfort, like indigestion with radiation to the left upper extremity, 4/10 in severity, constant. No known aggravating or relieving factors. The patient stated that the present presentation is similar to when she had heart attack and underwent stent placement 13 months ago. No associated shortness of breath, no diaphoresis. The patient came to the emergency room for further evaluation. When the patient arrived at the emergency room, the first set of troponin was negative. The emergency room physician consulted the patient's cardiologist who advised admission to the hospital.  The patient was then referred to the hospitalist service for that purpose. The patient was last admitted to the hospital from 2018 to 2018. The patient was admitted with chest pain. Evaluation revealed coronary artery disease. The patient underwent cardiac catheterization with stent placement. The patient denies fever, rigors and chills. PAST MEDICAL HISTORY:  1. Coronary artery disease, status post stent placement. 2.  Hypertension. 3.  Asthma. 4.  Dyslipidemia. ALLERGIES:  THE PATIENT IS ALLERGIC TO SULFA AND NUTS. CURRENT MEDICATIONS:  1. Aspirin 81 mg daily.   2. Wellbutrin 75 mg twice daily. 3.  Zyrtec 10 mg daily. 4.  Xopenex 1-2 puffs by inhalation every 4 hours as needed for wheezing. 5.  Lopressor 25 mg half tablet twice daily. 6.  Singulair 10 mg dosage as directed. 7.  Multivitamin 1 tablet daily. 8.  Ditropan XL 10 mg daily. 9.  Crestor 20 mg half tablet daily. FAMILY HISTORY:  This was reviewed. Her mother had hypertension, stroke, lung disease. Father had congenital heart defect and bladder cancer. PAST SURGICAL HISTORY:  This is significant for cardiac stent placement, cataract extraction. SOCIAL HISTORY:  The patient is a former smoker. Admits to social consumption of alcohol. No history of withdrawal from alcohol. REVIEW OF SYSTEMS:  HEAD, EYES, EARS, NOSE AND THROAT:  No headache, no dizziness, no blurring of vision, no photophobia. RESPIRATORY SYSTEM:  No cough, no shortness of breath, no hemoptysis. CARDIOVASCULAR SYSTEM:  This is positive for chest pain. No orthopnea, no palpitation. GASTROINTESTINAL SYSTEM:  No nausea or vomiting. No diarrhea. No constipation. GENITOURINARY SYSTEM:  No dysuria, no urgency and no frequency. All other systems are reviewed and they are negative. PHYSICAL EXAMINATION:  GENERAL APPEARANCE:  The patient appeared ill in moderate distress. VITAL SIGNS:  On arrival at the emergency room, temperature 98.3, pulse 58, respiratory rate 17, blood pressure 183/84, oxygen saturation 100% on room air. HEAD:  Normocephalic, atraumatic. EYES:  Normal eye movements. No redness, no drainage, no discharge. EARS:  Normal external ears with no evidence of drainage. NOSE:  No deformity, no drainage. MOUTH AND THROAT:  No visible oral lesion. NECK:  Neck is supple. No JVD, no thyromegaly. CHEST:  Few expiratory wheezing. No crackles. HEART:  Normal S1 and S2, regular. No clinically appreciable murmur. ABDOMEN:  Soft, nontender, normal bowel sounds. CNS:  Alert, oriented x3.   No gross focal neurological deficit. EXTREMITIES:  No edema, pulses 2+ bilaterally. MUSCULOSKELETAL SYSTEM:  No obvious joint deformity or swelling. SKIN:  No active skin lesions seen in the exposed parts of the body. PSYCHIATRY:  Normal mood and affect. LYMPHATIC SYSTEM:  No cervical lymphadenopathy. DIAGNOSTIC DATA:  EKG shows sinus bradycardia and nonspecific T-wave abnormalities. Chest x-ray, no acute findings. LABORATORY DATA:  Hematology:  WBC 6.9, hemoglobin 11.7, hematocrit 36.4, platelets 210. Magnesium 2.2. Chemistry:  Sodium 141, potassium 3.6, chloride 109, CO2 of 26, glucose 130, BUN 24, creatinine 0.91, calcium 9.2. Cardiac profile, troponin less than 0.05.    ASSESSMENT:  1. Chest pain. 2.  Hypertension. 3.  Asthma. 4.  Dyslipidemia. 5.  Hyperglycemia. PLAN:  1. Chest pain. We will place the patient on observation. We will obtain serial cardiac markers to rule out acute myocardial infarction. We will await further recommendation from the cardiologist consulted by the emergency room physician. We will continue with aspirin and beta-blocker. The patient will also be evaluated for other atypical causes of chest pain. We will check lipase level. We will check a D-dimer to evaluate the patient for thromboembolism. 2.  Hypertension. We will resume preadmission medication. We will monitor the patient's blood pressure closely. The patient will also be placed on hydralazine as needed for better blood pressure control. 3.  Asthma. We will place the patient on DuoNeb as needed. 4.  Dyslipidemia. We will continue with preadmission medication. We will check lipid profile. 5.  Hyperglycemia. We will check hemoglobin A1c level. The patient has no history of diabetes. OTHER ISSUES:  Code status, the patient is a full code. We will place the patient on Lovenox for DVT prophylaxis.     FUNCTIONAL STATUS PRIOR TO ADMISSION:  The patient is ambulatory without assistant or device.       Roxi Frost MD      [id number]  D:  09/27/2019 5:57  T:  09/27/2019 6:52  JOB #:  [id number]  CC:  Janel Dickson MD

## 2019-09-27 NOTE — PROGRESS NOTES
Removed 2 ml of air from right radial TR band. Adequate sensation and capillary refill are present.   Instructed patient to limit manipulation of right wrist.

## 2019-09-27 NOTE — PROGRESS NOTES
Removed 3 ml of air from right radial TR band. Adequate sensation and capillary refill are present.   Instructed patient to limit manipulation of right wrist.

## 2019-09-27 NOTE — PROGRESS NOTES
Cardiac Cath Lab Recovery Arrival Note:      Catalina Mello arrived to Cardiac Cath Lab, Recovery Area. Staff introduced to patient. Patient identifiers verified with NAME and DATE OF BIRTH. Procedure verified with patient. Consent forms reviewed and signed by patient or authorized representative and verified. Allergies verified. Patient and family oriented to department. Patient and family informed of procedure and plan of care. Questions answered with review. Patient prepped for procedure, per orders from physician, prior to arrival.    Patient on cardiac monitor, non-invasive blood pressure, SPO2 monitor. On RA. Patient is A&Ox 4. Patient reports no c/o's. Patient in stretcher, in low position, with side rails up, call bell within reach, patient instructed to call if assistance as needed. Patient prep in: [address], [place]. Patient family has pager # n/a  Family in: hospital.   Prep by: ZAHIRA Orona

## 2019-09-27 NOTE — ED PROVIDER NOTES
75-year-old white female presents to the emergency department with chest discomfort. Patient reports that 13 months ago she had non-ST elevation MI. She was seen by Dr. Diane Wilkins. She had a stent to her left circumflex artery. She reports she is been doing well over the past year. She does not get chest pain. About an hour ago she developed central chest discomfort. She describes it as indigestion. She says it was a dull pressure in the center of her chest.  The pain is now gone. She denies shortness of breath, sweating, or nausea vomiting. She does report she feels some discomfort to her left neck and left arm. She reports the symptoms seem similar to when she had her stent placed. Only other medical problem is hypertension. She does not smoke cigarettes or drink alcohol. Patient did call cardiology prior to coming and Dr. Sandford Koyanagi was on call. He suggested she come in. 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 Past Medical History:   Diagnosis Date    Alopecia     Asthma     CAD (coronary artery disease) 08/12/2018    NSTEMI, Cath, TYRONE OM2 of dominant Cx    H/O seasonal allergies     H/O TB skin testing 1968    treated with INH    Hypercholesterolemia     Hypertension     IFG (impaired fasting glucose) 6/14/2013    a1c 6.0 5/2013    Ill-defined condition     TIA symptoms, saw neuro-opthalmologist and was told it was probably spasm of cranial nerve.  Overactive bladder 3/13/2013    Serrated adenoma of colon 2/6/2014 11/17/09 - Dr. Jones Hair - repeat in 5 years       Past Surgical History:   Procedure Laterality Date    COLORECTAL SCRN; HI RISK IND  2/20/2015         HX CATARACT REMOVAL Bilateral     HX GI      colonscopy.  polyp removed    HX MALIGNANT SKIN LESION EXCISION Left 2018    \"non-melanoma pre-cancerous lesion removed\" per patient         Family History:   Problem Relation Age of Onset    Hypertension Mother     Stroke Mother     Lung Disease Mother  Cancer Father         bladder    Other Father         congential heart valve defect    Depression Sister     Stroke Sister 72    Hypertension Sister     COPD Sister    Donte Other Sister 79        twisted bowel    Heart Disease Maternal Grandfather     Dementia Paternal Grandfather     Cancer Maternal Grandmother         esophageal    Asthma Paternal Grandmother     Dementia Paternal Grandmother     Breast Cancer Paternal Grandmother        Social History     Socioeconomic History    Marital status:      Spouse name: Not on file    Number of children: Not on file    Years of education: Not on file    Highest education level: Not on file   Occupational History    Not on file   Social Needs    Financial resource strain: Not on file    Food insecurity:     Worry: Not on file     Inability: Not on file    Transportation needs:     Medical: Not on file     Non-medical: Not on file   Tobacco Use    Smoking status: Former Smoker     Packs/day: 0.25     Years: 8.00     Pack years: 2.00     Last attempt to quit: 1995     Years since quittin.6    Smokeless tobacco: Never Used    Tobacco comment: quit in    Substance and Sexual Activity    Alcohol use:  Yes     Alcohol/week: 17.5 standard drinks     Types: 21 Glasses of wine per week     Comment: 2-3 etoh drinks daily - burboun - no withdrawl symptoms when she does not have it    Drug use: No    Sexual activity: Yes     Partners: Male   Lifestyle    Physical activity:     Days per week: Not on file     Minutes per session: Not on file    Stress: Not on file   Relationships    Social connections:     Talks on phone: Not on file     Gets together: Not on file     Attends Alevism service: Not on file     Active member of club or organization: Not on file     Attends meetings of clubs or organizations: Not on file     Relationship status: Not on file    Intimate partner violence:     Fear of current or ex partner: Not on file Emotionally abused: Not on file     Physically abused: Not on file     Forced sexual activity: Not on file   Other Topics Concern    Not on file   Social History Narrative    Not on file         ALLERGIES: Nuts [tree nut] and Sulfa (sulfonamide antibiotics)    Review of Systems   Constitutional: Negative for fever. HENT: Negative for facial swelling. Eyes: Negative for pain. Respiratory: Positive for chest tightness. Negative for cough and shortness of breath. Cardiovascular: Positive for chest pain. Negative for leg swelling. Gastrointestinal: Negative for abdominal pain and vomiting. Endocrine: Negative for polyuria. Genitourinary: Negative for difficulty urinating and flank pain. Musculoskeletal: Negative for arthralgias and back pain. Skin: Negative for color change. Allergic/Immunologic: Negative for immunocompromised state. Neurological: Negative for dizziness and headaches. Hematological: Does not bruise/bleed easily. Psychiatric/Behavioral: Negative for confusion. All other systems reviewed and are negative. Vitals:    09/26/19 2151   BP: 183/84   Pulse: (!) 58   Resp: 17   Temp: 98.3 °F (36.8 °C)   SpO2: 100%   Weight: 59 kg (130 lb)   Height: 5' 4\" (1.626 m)            Physical Exam   Constitutional: She is oriented to person, place, and time. She appears well-developed and well-nourished. HENT:   Head: Normocephalic and atraumatic. Right Ear: External ear normal.   Left Ear: External ear normal.   Nose: Nose normal.   Mouth/Throat: Oropharynx is clear and moist.   Eyes: Pupils are equal, round, and reactive to light. EOM are normal. No scleral icterus. Neck: Normal range of motion. Neck supple. No JVD present. No tracheal deviation present. No thyromegaly present. Cardiovascular: Normal rate, regular rhythm, normal heart sounds and intact distal pulses. Exam reveals no friction rub. No murmur heard.   Pulmonary/Chest: Effort normal and breath sounds normal. No stridor. No respiratory distress. She has no wheezes. She has no rales. She exhibits no tenderness. Abdominal: Soft. Bowel sounds are normal. She exhibits no distension. There is no tenderness. There is no rebound and no guarding. Musculoskeletal: Normal range of motion. She exhibits no edema or tenderness. Lymphadenopathy:     She has no cervical adenopathy. Neurological: She is alert and oriented to person, place, and time. She has normal reflexes. No cranial nerve deficit. Coordination normal.   Skin: Skin is warm and dry. No rash noted. No erythema. Psychiatric: She has a normal mood and affect. Her behavior is normal. Judgment and thought content normal.   Nursing note and vitals reviewed. MDM  Number of Diagnoses or Management Options  Diagnosis management comments: Patient looks well and nontoxic. She has central chest discomfort. Will check EKG. Will check basic blood work. Will check chest x-ray. Will discuss disposition with patient. Patient is very savvy and her  is a retired doctor. Amount and/or Complexity of Data Reviewed  Clinical lab tests: ordered and reviewed  Tests in the radiology section of CPT®: ordered and reviewed  Tests in the medicine section of CPT®: ordered and reviewed  Decide to obtain previous medical records or to obtain history from someone other than the patient: yes  Obtain history from someone other than the patient: yes  Review and summarize past medical records: yes  Independent visualization of images, tracings, or specimens: yes    Risk of Complications, Morbidity, and/or Mortality  Presenting problems: high  Diagnostic procedures: high  Management options: high           Procedures    EKG: Sinus bradycardia, first-degree AV block, normal axis, no ST elevations or depressions.     10:09 PM  Care turned over to Dr Kolby Del Toro  Blood work is pending  CXR is pending

## 2019-09-27 NOTE — DISCHARGE INSTRUCTIONS
Dear Sailaja Arias,    Thank you for choosing [facility] for your healthcare needs. We strive to provide EXCELLENT care to you and your family. In an effort to explain clearly why you were here in the hospital, I've also written a very brief summary below. Other details including formal diagnosis, medication changes, and follow up appointment recommendations can also be found in this packet. You were admitted for Chest pain and cardiac catheterization was done. You also received care from specialist physicians in the following specialties:  [address]    Here are the updates to your medication list: Please see attached list     Remember that it is important for you to take your medications exactly as they are prescribed. It is helpful to keep a list of your medication with the names, dosages, and times to be taken in your wallet. Make sure to also see your primary care doctor for follow-up. Bring these papers with you and be sure to review your medication list with your doctor. I cannot stress the importance of follow up enough. I've included the information for your follow-up appointments below: Follow-up Information     Follow up With Specialties Details Why Contact Info    Carol Mcnally MD Internal Medicine Schedule an appointment as soon as possible for a visit in 2 weeks For regular follow up  [address] [place] [address]  [phone]      Jenny Latham MD Cardiology Schedule an appointment as soon as possible for a visit in 2 weeks Regular follow up  [address]  [phone]            At this time, the following test results are still pending: None   Again, please follow-up these results with your primary care provider.     Should you have any fever over 101 degrees for 24 hours, chest pain, shortness of breath, fever, chills, nausea, vomiting, diarrhea, change in mentation, falling, weakness, bleeding, or worsening pain, please seek medical attention immediately. Finally, as your discharging physician, you may be receiving a survey regarding my care. I would greatly value and appreciate your input in the survey as we strive for excellence. If you have any questions, I can be reached at [phone]. Thank you so much again for allowing me to care for you at [facility].    Respectfully yours,  Swati Murphy MD     DISCHARGE SUMMARY from Nurse    PATIENT INSTRUCTIONS:    After general anesthesia or intravenous sedation, for 24 hours or while taking prescription Narcotics:  · Limit your activities  · Do not drive and operate hazardous machinery  · Do not make important personal or business decisions  · Do  not drink alcoholic beverages  · If you have not urinated within 8 hours after discharge, please contact your surgeon on call. Report the following to your surgeon:  · Excessive pain, swelling, redness or odor of or around the surgical area  · Temperature over 100.5  · Nausea and vomiting lasting longer than 4 hours or if unable to take medications  · Any signs of decreased circulation or nerve impairment to extremity: change in color, persistent  numbness, tingling, coldness or increase pain  · Any questions      *  Please give a list of your current medications to your Primary Care Provider. *  Please update this list whenever your medications are discontinued, doses are      changed, or new medications (including over-the-counter products) are added. *  Please carry medication information at all times in case of emergency situations. These are general instructions for a healthy lifestyle:    No smoking/ No tobacco products/ Avoid exposure to second hand smoke  Surgeon General's Warning:  Quitting smoking now greatly reduces serious risk to your health.     Obesity, smoking, and sedentary lifestyle greatly increases your risk for illness    A healthy diet, regular physical exercise & weight monitoring are important for maintaining a healthy lifestyle    You may be retaining fluid if you have a history of heart failure or if you experience any of the following symptoms:  Weight gain of 3 pounds or more overnight or 5 pounds in a week, increased swelling in our hands or feet or shortness of breath while lying flat in bed. Please call your doctor as soon as you notice any of these symptoms; do not wait until your next office visit.

## 2019-09-27 NOTE — PROGRESS NOTES
TRANSFER - IN REPORT:    Verbal report received from Francheska RN(name) on Fabi Baker  being received from ED(unit) for routine progression of care      Report consisted of patients Situation, Background, Assessment and   Recommendations(SBAR). Information from the following report(s) SBAR, Kardex, [facility] and Cardiac Rhythm SB was reviewed with the receiving nurse. Opportunity for questions and clarification was provided. Assessment completed upon patients arrival to unit and care assumed.

## 2019-09-27 NOTE — PROGRESS NOTES
Verbal report given to Makenzie(name) on NAME  being transferred to [facility](unit) for routine progression of care       Report consisted of patients Situation, Background, Assessment and   Recommendations(SBAR). Information from the following report(s) Procedure Summary, MAR and Recent Results was reviewed with the receiving nurse. Lines:       Opportunity for questions and clarification was provided.       Patient transported with:   Monitor  Registered Nurse

## 2019-09-27 NOTE — PROGRESS NOTES
Cardiac Cath Lab Procedure Area Arrival Note:    Fela Ramsey arrived to Cardiac Cath Lab, Procedure Area. Patient identifiers verified with NAME and DATE OF BIRTH. Procedure verified with patient. Consent forms verified. Allergies verified. Patient informed of procedure and plan of care. Questions answered with review. Patient voiced understanding of procedure and plan of care. Patient on cardiac monitor, non-invasive blood pressure, SPO2 monitor. On  O2 @ 2 lpm via NC.  IV of NS on pump at 181 ml/hr. Patient status doing well without problems. Patient is A&Ox 4. Patient reports no pain. Patient medicated during procedure with orders obtained and verified by Dr. Denisse Hyman. Refer to patients Cardiac Cath Lab PROCEDURE REPORT for vital signs, assessment, status, and response during procedure, printed at end of case. Printed report on chart or scanned into chart. TRANSFER - OUT REPORT:    Verbal report given to PO Ortega on Fabi Baker being transferred to cath lab recovery for routine progression of care       Report consisted of patients Situation, Background, Assessment and   Recommendations(SBAR). Information from the following report(s) Procedure Summary was reviewed with the receiving nurse. Opportunity for questions and clarification was provided.

## 2019-09-27 NOTE — PROGRESS NOTES
TRANSFER - IN REPORT:    Verbal report received from Breann Estrada 411 on Ruby  being received from procedural area for routine progression of care. Report consisted of patients Situation, Background, Assessment and Recommendations(SBAR). Information from the following report(s) Procedure Summary, MAR and Recent Results was reviewed with the receiving clinician. Opportunity for questions and clarification was provided. Assessment completed upon patients arrival to [address] [place] [address] and care assumed. Cardiac Cath Lab Recovery Arrival Note:    Ruby arrived to [facility] recovery area. Patient procedure= LHC. Patient on cardiac monitor, non-invasive blood pressure, SPO2 monitor. On  or O2 @ 2 lpm via NC.  IV  of NS on pump at 90 ml/hr. Patient status doing well without problems. Patient is A&Ox 3. Patient reports no c/o's. PROCEDURE SITE CHECK:    Procedure site:without any bleeding and henmatoma, no pain/discomfort reported at procedure site. No change in patient status. Continue to monitor patient and status.

## 2019-09-27 NOTE — ED NOTES
[facility]ony [facility]    Hospitalist Harriet for Admission  11:48 PM    ED Room Number: ER06/06  Patient Name and age:  Zhao Emery 76 y.o.  female  Working Diagnosis:   1. Chest pain, unspecified type      Readmission: no  Isolation Requirements:  no  Recommended Level of Care:  telemetry  Code Status:  Full Code  Department:[facility] Adult ED - [phone]  Other:  76year old female with known CAD s/p stent last august, presents with recurrent cp/jaw/shoulder pain. INitial work up neg.  Spoke with Dr. Lai Brandon with cards, asked if you'd admit and they'll see in am.

## 2019-09-27 NOTE — ROUTINE PROCESS
TRANSFER - OUT REPORT: 
 
Verbal report given to Juanjose MEDELLIN(name) on Fabi Baker  being transferred to (unit) for routine progression of care Report consisted of patients Situation, Background, Assessment and  
Recommendations(SBAR). Information from the following report(s) SBAR, Kardex, ED Summary, [facility] and Recent Results was reviewed with the receiving nurse. Lines:  
Peripheral IV 09/26/19 Right Antecubital (Active) Site Assessment Clean, dry, & intact 9/26/2019 10:00 PM  
Phlebitis Assessment 0 9/26/2019 10:00 PM  
Infiltration Assessment 0 9/26/2019 10:00 PM  
  
 
Opportunity for questions and clarification was provided. Patient transported with: 
 transport (Permission given per Dr Chastity Redman)

## 2019-09-27 NOTE — ED TRIAGE NOTES
Pt comes in for CP since 9pm. Radiates to L arm. Pt denies N/V/D, SOB. Pt has PMHx of MI/Stents by Dr. Cecil Dumont and states this feels similar to that Took 2 tums PTA with no relief.  Pt ambulatory and rates pain 2/10

## 2019-09-27 NOTE — PROGRESS NOTES
Observation notice provided in writing to patient and/or caregiver as well as verbal explanation of the policy. Patients who are in outpatient status also receive the Observation notice. Patient's  will provide transportation home. Mandeep Hodges MSA, RN, CRM.

## 2019-09-30 ENCOUNTER — PATIENT OUTREACH (OUTPATIENT)
Dept: INTERNAL MEDICINE CLINIC | Facility: CLINIC | Age: 74
End: 2019-09-30

## 2019-09-30 NOTE — PROGRESS NOTES
Hospital Discharge Follow-Up      Date/Time:  2019 12:10 PM    Patient was admitted to [facility] (OBSERVATION/OUTPATIENT IN A BED) on 29 and discharged on 19 for Chest Pain. The physician discharge summary was available at the time of outreach. Patient was contacted within 2 business days of discharge. Top Challenges reviewed with the provider   - Cardiac Cath- Non-obstructive CAD overall. Medical therapies recommended. ASA, Pravastatin & Zetia, Beta Blocker,Omeprazole. D/c'd meds- Rosuvastatin (CRESTOR) 20 mg tablet, Ticagrelor (BRILINTA) 90 mg tablet    - 2 week Cardio f/u- pt to contact office to schedule appt    - Last PCP f/u 19. Next f/u on 19. Lab appt week prior. Method of communication with provider :chart routing    Inpatient RRAT score: 6  Was this a readmission? no   Patient stated reason for the readmission: n/a    Nurse Navigator (NN) contacted the patient by telephone to perform post hospital discharge assessment. Verified name and  with patient as identifiers. Provided introduction to self, and explanation of the Nurse Navigator role. Reviewed discharge instructions and red flags with patient who verbalized understanding. Patient given an opportunity to ask questions and does not have any further questions or concerns at this time. The patient agrees to contact the PCP office for questions related to their healthcare. NN provided contact information for future reference. Disease Specific:   N/A    Summary of patient's top problems:  1. Chest Pain possible GERD- admit Troponin wnl. Repeat elevated @ 0.85. CK wnl. Cardio consult. Left Heart Cath- access site Right Radial Artery: Non-obstructive CAD overall. Left dominant coronary circulation. Patent OM2 stent. Moderate stenosis in the proximal LAD 50% - negative by iFR (0.91-0.92) and by FFR using IV adenosine (0.87). Medical therapies recommended.  ASA, Pravastatin & Zetia, Beta Blocker, Omeprazole. 2. Dyslipidemia- .8. Up from 47 (6/13/19). Didn't tolerate Crestor previously. Switched to [place] but too expensive. Will try Pravastatin 40mg daily plus Ezetimibe. 3. Hyperglycemia- A1C 5.4. Was 5.3 on 5/23/18. Home Health orders at discharge: [address]: n/a  Date of initial visit: [facility] ordered/company: none  Durable Medical Equipment received: n/a    Barriers to care? none identified @ present time    Advance Care Planning:   Does patient have an Advance Directive:  not on file; education provided     Medication(s):   New Medications at Discharge:   ezetimibe (ZETIA) 10 mg tablet Take 1 Tab by mouth daily. Qty: 60 Tab, Refills: 1       Omeprazole delayed release (PRILOSEC D/R) 20 mg tablet Take 1 Tab by mouth daily. Qty: 30 Tab, Refills: 0       pravastatin (PRAVACHOL) 40 mg tablet Take 1 Tab by mouth nightly. Qty: 30 Tab, Refills: 1         Changed Medications at Discharge: none  Discontinued Medications at Discharge:   - Rosuvastatin (CRESTOR) 20 mg tablet      - Ticagrelor (BRILINTA) 90 mg tablet     Medication reconciliation was performed with patient, who verbalizes understanding of administration of home medications. There were no barriers to obtaining medications identified at this time. Referral to Pharm D needed: no     Current Outpatient Medications   Medication Sig    ezetimibe (ZETIA) 10 mg tablet Take 1 Tab by mouth daily.  Omeprazole delayed release (PRILOSEC D/R) 20 mg tablet Take 1 Tab by mouth daily.  pravastatin (PRAVACHOL) 40 mg tablet Take 1 Tab by mouth nightly.  multivitamin (ONE A DAY) tablet Take 1 Tab by mouth daily.  OAT BRAN PO Take  by mouth.  buPROPion (WELLBUTRIN) 75 mg tablet take 1 tablet by mouth twice a day    oxybutynin chloride XL (DITROPAN XL) 10 mg CR tablet TAKE ONE TABLET DAILY.     azelastine (ASTELIN) 137 mcg (0.1 %) nasal spray instill 2 sprays into each nostril twice a day    metoprolol tartrate (LOPRESSOR) 25 mg tablet Take 0.5 Tabs by mouth every twelve (12) hours.  s-adenosylmethionine sul tosyl (ROBERTO-E PO) Take 400 mg by mouth daily.  levalbuterol tartrate (XOPENEX) 45 mcg/actuation inhaler Take 1-2 Puffs by inhalation every four (4) hours as needed for Wheezing.  FLAXSEED PO Take  by mouth daily. Flaxseed meal    cetirizine (ZYRTEC) 10 mg tablet Take 10 mg by mouth daily.  aspirin 81 mg chewable tablet Take 81 mg by mouth daily.  montelukast (SINGULAIR) 10 mg tablet Take 10 mg by mouth every seven (7) days. Takes on day when she gets allergy shots     No current facility-administered medications for this visit. There are no discontinued medications. [facility] follow up appointment(s):   Future Appointments   Date Time Provider Niurka Boles   12/11/2019  8:30 AM LAB [facility] MONY KEITA   12/19/2019 10:15 AM Ana M Whittaker  [address] [place]      Non-[facility] follow up appointment(s): 2 week Cardio f/u w/ Dr Elke Bonner @ [facility]- pt to schedule appt  [facility]:  information provided as a resource       Goals      Transitions of Care- collaboration & care coordination to prevent complications post hospitalization. 9/3019- pt reported feeling ok. No CP, SOB, VELIZ, pedal edema. Med rec done. Pt confirmed started new prescribed meds Pravstatin, Zetia, Omeprazole. Confirmed d/c'd Crestor & Brilinta. Reviewed Statin side effects & action plan. Appetite good- heart healthy diet. Eats mostly chicken, fish, vegs, fruits. No problem w/ Bladder & Bowel functions. Amb independently w/o AD. Red flags s/s & action plan reviewed. Patient advised providers on call 24 hours a day / 7 days a week (M-F 5 PM to 8 AM, and from Friday 5 PM until Monday 8 AM for the weekend) should the patient have questions or concerns. [facility] information provided. Last PCP f/u 6/19/19. Next f/u 12/19/19 w/ labs week prior.  Needs to schedule 2 week Cardio f/u w/  Beltran's office. Plan- pt to adhere w/ meds as prescribed. Pt to contact Cardio if any Statin concerns. Pt to contact Cardio office to schedule 2 week f/u appt. Pt to contact [facility] w/ appt date/time. NN to collaborate w/ pt, PCP, & other Care Team Members as needed for care coordination. Next NN f/u ~ 7-10 days-ID.

## 2019-12-11 DIAGNOSIS — E78.00 HYPERCHOLESTEROLEMIA: ICD-10-CM

## 2019-12-12 LAB
ALBUMIN SERPL-MCNC: 4 G/DL (ref 3.5–4.8)
ALBUMIN/GLOB SERPL: 1.7 {RATIO} (ref 1.2–2.2)
ALP SERPL-CCNC: 65 IU/L (ref 39–117)
ALT SERPL-CCNC: 15 IU/L (ref 0–32)
AST SERPL-CCNC: 17 IU/L (ref 0–40)
BILIRUB SERPL-MCNC: 0.4 MG/DL (ref 0–1.2)
BUN SERPL-MCNC: 19 MG/DL (ref 8–27)
BUN/CREAT SERPL: 25 (ref 12–28)
CALCIUM SERPL-MCNC: 9.7 MG/DL (ref 8.7–10.3)
CHLORIDE SERPL-SCNC: 107 MMOL/L (ref 96–106)
CHOLEST SERPL-MCNC: 157 MG/DL (ref 100–199)
CO2 SERPL-SCNC: 25 MMOL/L (ref 20–29)
CREAT SERPL-MCNC: 0.75 MG/DL (ref 0.57–1)
GLOBULIN SER CALC-MCNC: 2.3 G/DL (ref 1.5–4.5)
GLUCOSE SERPL-MCNC: 97 MG/DL (ref 65–99)
HDLC SERPL-MCNC: 57 MG/DL
LDLC SERPL CALC-MCNC: 87 MG/DL (ref 0–99)
POTASSIUM SERPL-SCNC: 4.9 MMOL/L (ref 3.5–5.2)
PROT SERPL-MCNC: 6.3 G/DL (ref 6–8.5)
SODIUM SERPL-SCNC: 145 MMOL/L (ref 134–144)
TRIGL SERPL-MCNC: 63 MG/DL (ref 0–149)
VLDLC SERPL CALC-MCNC: 13 MG/DL (ref 5–40)

## 2020-01-03 DIAGNOSIS — N32.81 OVERACTIVE BLADDER: ICD-10-CM

## 2020-01-03 RX ORDER — OXYBUTYNIN CHLORIDE 10 MG/1
TABLET, EXTENDED RELEASE ORAL
Qty: 90 TAB | Refills: 4 | Status: SHIPPED | OUTPATIENT
Start: 2020-01-03 | End: 2021-02-22 | Stop reason: SDUPTHER

## 2020-02-13 ENCOUNTER — OFFICE VISIT (OUTPATIENT)
Dept: INTERNAL MEDICINE CLINIC | Facility: CLINIC | Age: 75
End: 2020-02-13

## 2020-02-13 VITALS
WEIGHT: 134 LBS | DIASTOLIC BLOOD PRESSURE: 79 MMHG | SYSTOLIC BLOOD PRESSURE: 172 MMHG | RESPIRATION RATE: 12 BRPM | BODY MASS INDEX: 22.88 KG/M2 | OXYGEN SATURATION: 99 % | HEIGHT: 64 IN | HEART RATE: 59 BPM | TEMPERATURE: 98.4 F

## 2020-02-13 DIAGNOSIS — N30.01 ACUTE CYSTITIS WITH HEMATURIA: Primary | ICD-10-CM

## 2020-02-13 DIAGNOSIS — R30.9 URINARY PAIN: ICD-10-CM

## 2020-02-13 LAB
BILIRUB UR QL STRIP: NEGATIVE
GLUCOSE UR-MCNC: NEGATIVE MG/DL
KETONES P FAST UR STRIP-MCNC: NEGATIVE MG/DL
PH UR STRIP: 5.5 [PH] (ref 4.6–8)
PROT UR QL STRIP: POSITIVE
SP GR UR STRIP: 1.02 (ref 1–1.03)
UA UROBILINOGEN AMB POC: NORMAL (ref 0.2–1)
URINALYSIS CLARITY POC: NORMAL
URINALYSIS COLOR POC: YELLOW
URINE BLOOD POC: NORMAL
URINE LEUKOCYTES POC: NORMAL
URINE NITRITES POC: POSITIVE

## 2020-02-13 RX ORDER — CEPHALEXIN 500 MG/1
500 CAPSULE ORAL 2 TIMES DAILY
Qty: 14 CAP | Refills: 0 | Status: SHIPPED | OUTPATIENT
Start: 2020-02-13 | End: 2020-02-20

## 2020-02-13 RX ORDER — PITAVASTATIN CALCIUM 2.09 MG/1
2 TABLET, FILM COATED ORAL DAILY
COMMUNITY
Start: 2020-01-09 | End: 2021-08-17

## 2020-02-13 NOTE — PATIENT INSTRUCTIONS
Urinary Tract Infection in Women: Care Instructions  Your Care Instructions    A urinary tract infection, or UTI, is a general term for an infection anywhere between the kidneys and the urethra (where urine comes out). Most UTIs are bladder infections. They often cause pain or burning when you urinate. UTIs are caused by bacteria and can be cured with antibiotics. Be sure to complete your treatment so that the infection goes away. Follow-up care is a key part of your treatment and safety. Be sure to make and go to all appointments, and call your doctor if you are having problems. It's also a good idea to know your test results and keep a list of the medicines you take. How can you care for yourself at home? · Take your antibiotics as directed. Do not stop taking them just because you feel better. You need to take the full course of antibiotics. · Drink extra water and other fluids for the next day or two. This may help wash out the bacteria that are causing the infection. (If you have kidney, heart, or liver disease and have to limit fluids, talk with your doctor before you increase your fluid intake.)  · Avoid drinks that are carbonated or have caffeine. They can irritate the bladder. · Urinate often. Try to empty your bladder each time. · To relieve pain, take a hot bath or lay a heating pad set on low over your lower belly or genital area. Never go to sleep with a heating pad in place. To prevent UTIs  · Drink plenty of water each day. This helps you urinate often, which clears bacteria from your system. (If you have kidney, heart, or liver disease and have to limit fluids, talk with your doctor before you increase your fluid intake.)  · Urinate when you need to. · Urinate right after you have sex. · Change sanitary pads often. · Avoid douches, bubble baths, feminine hygiene sprays, and other feminine hygiene products that have deodorants.   · After going to the bathroom, wipe from front to back.  When should you call for help? Call your doctor now or seek immediate medical care if:    · Symptoms such as fever, chills, nausea, or vomiting get worse or appear for the first time.     · You have new pain in your back just below your rib cage. This is called flank pain.     · There is new blood or pus in your urine.     · You have any problems with your antibiotic medicine.    Watch closely for changes in your health, and be sure to contact your doctor if:    · You are not getting better after taking an antibiotic for 2 days.     · Your symptoms go away but then come back. Where can you learn more? Go to http://audra-christal.info/. Enter Z799 in the search box to learn more about \"Urinary Tract Infection in Women: Care Instructions. \"  Current as of: December 19, 2018  Content Version: 12.2  © 3008-9538 [employer]. Care instructions adapted under license by [employer] (which disclaims liability or warranty for this information). If you have questions about a medical condition or this instruction, always ask your healthcare professional. [address] any warranty or liability for your use of this information.

## 2020-02-13 NOTE — PROGRESS NOTES
Fabi Baker  Identified pt with two pt identifiers(name and ). Chief Complaint   Patient presents with    Urinary Burning       Reviewed record In preparation for visit and have obtained necessary documentation. 1. Have you been to the ER, urgent care clinic or hospitalized since your last visit? No     2. Have you seen or consulted any other health care providers outside of the [address] [place] [address] since your last visit? Include any pap smears or colon screening. No    Vitals reviewed with provider.     Health Maintenance reviewed:     Health Maintenance Due   Topic    Shingrix Vaccine Age 49> (1 of 2)    Medicare Yearly Exam     GLAUCOMA SCREENING Q2Y     Colonoscopy           Wt Readings from Last 3 Encounters:   20 134 lb (60.8 kg)   19 133 lb 6.1 oz (60.5 kg)   19 130 lb (59 kg)        Temp Readings from Last 3 Encounters:   20 98.4 °F (36.9 °C) (Oral)   19 98 °F (36.7 °C)   19 98 °F (36.7 °C) (Oral)        BP Readings from Last 3 Encounters:   20 172/79   19 158/72   19 92/54        Pulse Readings from Last 3 Encounters:   20 (!) 59   19 (!) 53   19 70        Vitals:    20 1051   BP: 172/79   Pulse: (!) 59   Resp: 12   Temp: 98.4 °F (36.9 °C)   TempSrc: Oral   SpO2: 99%   Weight: 134 lb (60.8 kg)   Height: 5' 4\" (1.626 m)   PainSc:   0 - No pain          Learning Assessment:   :       Learning Assessment 3/20/2015   PRIMARY LEARNER Patient   HIGHEST LEVEL OF EDUCATION - PRIMARY LEARNER  4 YEARS OF COLLEGE   BARRIERS PRIMARY LEARNER NONE   PRIMARY LANGUAGE ENGLISH    NEED No   LEARNER PREFERENCE PRIMARY READING   ANSWERED BY patient   RELATIONSHIP SELF        Depression Screening:   :       3 most recent PHQ Screens 2019   Little interest or pleasure in doing things Not at all   Feeling down, depressed, irritable, or hopeless Not at all   Total Score PHQ 2 0   Trouble falling or staying asleep, or sleeping too much -   Feeling tired or having little energy -   Poor appetite, weight loss, or overeating -   Feeling bad about yourself - or that you are a failure or have let yourself or your family down -   Trouble concentrating on things such as school, work, reading, or watching TV -   Moving or speaking so slowly that other people could have noticed; or the opposite being so fidgety that others notice -   Thoughts of being better off dead, or hurting yourself in some way -   PHQ 9 Score -        Fall Risk Assessment:   :       Fall Risk Assessment, last 12 mths 8/17/2018   Able to walk? Yes   Fall in past 12 months? No   Fall with injury? -   Number of falls in past 12 months -   Fall Risk Score -        Abuse Screening:   :       Abuse Screening Questionnaire 8/17/2018 4/23/2018 11/14/2016 3/20/2015   Do you ever feel afraid of your partner? N N N N   Are you in a relationship with someone who physically or mentally threatens you? N N N N   Is it safe for you to go home?  Y Y Y Y        ADL Screening:   :       ADL Assessment 8/17/2018   Feeding yourself No Help Needed   Getting from bed to chair No Help Needed   Getting dressed No Help Needed   Bathing or showering No Help Needed   Walk across the room (includes cane/walker) No Help Needed   Using the telphone No Help Needed   Taking your medications No Help Needed   Preparing meals No Help Needed   Managing money (expenses/bills) No Help Needed   Moderately strenuous housework (laundry) No Help Needed   Shopping for personal items (toiletries/medicines) No Help Needed   Shopping for groceries No Help Needed   Driving No Help Needed   Climbing a flight of stairs No Help Needed   Getting to places beyond walking distances No Help Needed

## 2020-02-13 NOTE — PROGRESS NOTES
CC:   Chief Complaint   Patient presents with    Urinary Burning       HISTORY OF PRESENT ILLNESS  Raoul Julio is a 76 y.o. female. Patient complains of 3 day history of burning with urination and suprapubic abdominal discomfort. Denies fevers, chills, urinary frequency or urgency above baseline; has overactive bladder. Started drinking cranberry juice yesterday. Denies history of frequent UTI's. Patient Active Problem List   Diagnosis Code    Essential hypertension I10    Asthma J45.909    ADD (attention deficit disorder) F98.8    Overactive bladder N32.81    Environmental allergies Z91.09    S/P colonoscopy Z98.890    At risk for osteoporosis Z91.89    Bilateral carotid artery disease (HCC) I73.9    Vertical diplopia H53.2    IFG (impaired fasting glucose) R73.01    Serrated adenoma of colon D12.6    Alopecia areata L63.9    Advance directive discussed with patient Z71.89    Chest pain R07.9    Coronary artery disease involving native coronary artery of native heart without angina pectoris I25.10    Hypercholesterolemia E78.00     Past Medical History:   Diagnosis Date    Alopecia     Asthma     CAD (coronary artery disease) 08/12/2018    NSTEMI, Cath, TYRONE OM2 of dominant Cx    H/O seasonal allergies     H/O TB skin testing 1968    treated with INH    Hypercholesterolemia     Hypertension     IFG (impaired fasting glucose) 6/14/2013    a1c 6.0 5/2013    Ill-defined condition     TIA symptoms, saw neuro-opthalmologist and was told it was probably spasm of cranial nerve.  Overactive bladder 3/13/2013    Serrated adenoma of colon 2/6/2014 11/17/09 - Dr. Berenice Swan - repeat in 5 years     Allergies   Allergen Reactions    Nuts [Tree Nut] Other (comments)     Mouth sores.      Walnuts, pecans    Sulfa (Sulfonamide Antibiotics) Hives       Current Outpatient Medications   Medication Sig Dispense Refill    LIVALO 2 mg tablet       oxybutynin chloride XL (DITROPAN XL) 10 mg CR tablet TAKE 1 TABLET BY MOUTH ONCE DAILY 90 Tab 4    Omeprazole delayed release (PRILOSEC D/R) 20 mg tablet Take 1 Tab by mouth daily. 30 Tab 0    multivitamin (ONE A DAY) tablet Take 1 Tab by mouth daily.  OAT BRAN PO Take  by mouth.  buPROPion (WELLBUTRIN) 75 mg tablet take 1 tablet by mouth twice a day 60 Tab 11    azelastine (ASTELIN) 137 mcg (0.1 %) nasal spray instill 2 sprays into each nostril twice a day  0    metoprolol tartrate (LOPRESSOR) 25 mg tablet Take 0.5 Tabs by mouth every twelve (12) hours. 60 Tab 3    s-adenosylmethionine sul tosyl (ROBERTO-E PO) Take 400 mg by mouth daily.  levalbuterol tartrate (XOPENEX) 45 mcg/actuation inhaler Take 1-2 Puffs by inhalation every four (4) hours as needed for Wheezing. 1 Inhaler 2    FLAXSEED PO Take  by mouth daily. Flaxseed meal      cetirizine (ZYRTEC) 10 mg tablet Take 10 mg by mouth daily.  aspirin 81 mg chewable tablet Take 81 mg by mouth daily.  montelukast (SINGULAIR) 10 mg tablet Take 10 mg by mouth every seven (7) days. Takes on day when she gets allergy shots           PHYSICAL EXAM  Visit Vitals  /79 (BP 1 Location: Left arm, BP Patient Position: Sitting)   Pulse (!) 59   Temp 98.4 °F (36.9 °C) (Oral)   Resp 12   Ht 5' 4\" (1.626 m)   Wt 134 lb (60.8 kg)   SpO2 99%   BMI 23.00 kg/m²       General: Well-developed and well-nourished, no distress. HEENT:  Head normocephalic/atraumatic, no scleral icterus  Neurological: Alert and oriented. Psychiatric: Normal mood and affect.  Behavior is normal.     Results for orders placed or performed in visit on 02/13/20   AMB POC URINALYSIS DIP STICK AUTO W/O MICRO   Result Value Ref Range    Color (UA POC) Yellow     Clarity (UA POC) Cloudy     Glucose (UA POC) Negative Negative    Bilirubin (UA POC) Negative Negative    Ketones (UA POC) Negative Negative    Specific gravity (UA POC) 1.020 1.001 - 1.035    Blood (UA POC) 3+ Negative    pH (UA POC) 5.5 4.6 - 8.0    Protein (UA POC) Positive Negative    Urobilinogen (UA POC) 0.2 mg/dL 0.2 - 1    Nitrites (UA POC) Positive Negative    Leukocyte esterase (UA POC) 3+ Negative         ASSESSMENT AND PLAN    ICD-10-CM ICD-9-CM    1. Acute cystitis with hematuria N30.01 595.0 CULTURE, URINE      cephALEXin (KEFLEX) 500 mg capsule   2. Urinary pain R30.9 788.1 AMB POC URINALYSIS DIP STICK AUTO W/O MICRO     Diagnoses and all orders for this visit:    1. Acute cystitis with hematuria  Encouraged to increase intake. -     CULTURE, URINE  -     Start cephALEXin (KEFLEX) 500 mg capsule; Take 1 Cap by mouth two (2) times a day for 7 days. 2. Urinary pain  -     AMB POC URINALYSIS DIP STICK AUTO W/O MICRO      Follow-up and Dispositions    · Return if symptoms worsen or fail to improve, for Scheduled appoiontment with  [facility] on 2/19/20. I have discussed the diagnosis with the patient and the intended plan as seen in the above orders. Patient is in agreement. The patient has received an after-visit summary and questions were answered concerning future plans. I have discussed medication side effects and warnings with the patient as well.

## 2020-02-16 LAB — BACTERIA UR CULT: ABNORMAL

## 2020-02-19 ENCOUNTER — OFFICE VISIT (OUTPATIENT)
Dept: INTERNAL MEDICINE CLINIC | Facility: CLINIC | Age: 75
End: 2020-02-19

## 2020-02-19 VITALS
HEART RATE: 55 BPM | TEMPERATURE: 98.4 F | BODY MASS INDEX: 23.32 KG/M2 | WEIGHT: 136.6 LBS | OXYGEN SATURATION: 97 % | HEIGHT: 64 IN | RESPIRATION RATE: 14 BRPM | SYSTOLIC BLOOD PRESSURE: 162 MMHG | DIASTOLIC BLOOD PRESSURE: 77 MMHG

## 2020-02-19 DIAGNOSIS — I10 ESSENTIAL HYPERTENSION: ICD-10-CM

## 2020-02-19 DIAGNOSIS — E78.00 HYPERCHOLESTEROLEMIA: ICD-10-CM

## 2020-02-19 DIAGNOSIS — I25.10 CORONARY ARTERY DISEASE INVOLVING NATIVE CORONARY ARTERY OF NATIVE HEART WITHOUT ANGINA PECTORIS: ICD-10-CM

## 2020-02-19 DIAGNOSIS — F98.8 ATTENTION DEFICIT DISORDER (ADD) WITHOUT HYPERACTIVITY: ICD-10-CM

## 2020-02-19 DIAGNOSIS — Z00.00 MEDICARE ANNUAL WELLNESS VISIT, SUBSEQUENT: Primary | ICD-10-CM

## 2020-02-19 DIAGNOSIS — Z13.31 SCREENING FOR DEPRESSION: ICD-10-CM

## 2020-02-19 NOTE — PROGRESS NOTES
Message sent to patient by My Chart:  Your urine culture shows that the antibiotic you are taking gets rid of the bacteria causing your infection.     Dr. Bandar Toure

## 2020-02-19 NOTE — PROGRESS NOTES
HPI  Ms. Agnes Alicea is a 76y.o. year old female, she is seen today for follow up high cholesterol, HTN, AWV. Symptoms of UTI are almost completely resolved. Has been well. Switched to livalo by cardiology. Cholesterol is much lower and also notes muscles are stronger. Is also exercising - cardio and weights at least 3 days per week. No chest pain, sob, dizziness, weakness. No melena or brbpr. Hasn't checked bp at home lately. Was consistently 916 or lower systolic at home - higher in office than at home. Sees Dr. Reba Gaming for eye exam.   Takes wellbutrin for ADD which helps. Chief Complaint   Patient presents with    Cholesterol Problem     Room 2C    Blood Pressure Check    Bladder Infection     seen ast week by Dr Darryle Colt- would like urine culture results         Prior to Admission medications    Medication Sig Start Date End Date Taking? Authorizing Provider   LIVALO 2 mg tablet  1/9/20  Yes Provider, Historical   cephALEXin (KEFLEX) 500 mg capsule Take 1 Cap by mouth two (2) times a day for 7 days. 2/13/20 2/20/20 Yes Love Coburn MD   oxybutynin chloride XL (DITROPAN XL) 10 mg CR tablet TAKE 1 TABLET BY MOUTH ONCE DAILY 1/3/20  Yes Brittnee Suárez MD   Omeprazole delayed release (PRILOSEC D/R) 20 mg tablet Take 1 Tab by mouth daily. 9/27/19  Yes Nica Calloway MD   multivitamin (ONE A DAY) tablet Take 1 Tab by mouth daily. Yes Provider, Historical   OAT BRAN PO Take  by mouth. Yes Provider, Historical   buPROPion (WELLBUTRIN) 75 mg tablet take 1 tablet by mouth twice a day 2/7/19  Yes Brittnee Suárez MD   azelastine (ASTELIN) 137 mcg (0.1 %) nasal spray instill 2 sprays into each nostril twice a day 11/12/18  Yes Provider, Historical   metoprolol tartrate (LOPRESSOR) 25 mg tablet Take 0.5 Tabs by mouth every twelve (12) hours. 8/14/18  Yes Gerald Gong MD   s-adenosylmethionine sul tosyl (ROBERTO-E PO) Take 400 mg by mouth daily.    Yes Provider, Historical   levalbuterol tartrate (XOPENEX) 45 mcg/actuation inhaler Take 1-2 Puffs by inhalation every four (4) hours as needed for Wheezing. 1/17/17  Yes Susanne Pleitez MD   FLAXSEED PO Take  by mouth daily. Flaxseed meal   Yes Provider, Historical   cetirizine (ZYRTEC) 10 mg tablet Take 10 mg by mouth daily. Yes Provider, Historical   aspirin 81 mg chewable tablet Take 81 mg by mouth daily. Yes Provider, Historical   montelukast (SINGULAIR) 10 mg tablet Take 10 mg by mouth every seven (7) days. Takes on day when she gets allergy shots   Yes Provider, Historical         Allergies   Allergen Reactions    Nuts [Tree Nut] Other (comments)     Mouth sores. Walnuts, pecans    Sulfa (Sulfonamide Antibiotics) Hives         REVIEW OF SYSTEMS:  Per HPI    PHYSICAL EXAM:  Visit Vitals  /77 (BP 1 Location: Left arm, BP Patient Position: Sitting)   Pulse (!) 55   Temp 98.4 °F (36.9 °C) (Oral)   Resp 14   Ht 5' 4\" (1.626 m)   Wt 136 lb 9.6 oz (62 kg)   SpO2 97%   BMI 23.45 kg/m²     Constitutional: Appears well-developed and well-nourished. No distress. HENT:   Head: Normocephalic and atraumatic. Eyes: No scleral icterus. Cardiovascular: Normal S1/S2, regular rhythm. No murmurs, rubs, or gallops. Pulmonary/Chest: Effort normal and breath sounds normal. No respiratory distress. No wheezes, rhonchi, or rales. Ext: No edema. Neurological: Alert. Psychiatric: Normal mood and affect.  Behavior is normal.     Lab Results   Component Value Date/Time    Sodium 145 (H) 12/11/2019 09:52 AM    Potassium 4.9 12/11/2019 09:52 AM    Chloride 107 (H) 12/11/2019 09:52 AM    CO2 25 12/11/2019 09:52 AM    Anion gap 6 09/26/2019 09:55 PM    Glucose 97 12/11/2019 09:52 AM    BUN 19 12/11/2019 09:52 AM    Creatinine 0.75 12/11/2019 09:52 AM    BUN/Creatinine ratio 25 12/11/2019 09:52 AM    GFR est AA 91 12/11/2019 09:52 AM    GFR est non-AA 79 12/11/2019 09:52 AM    Calcium 9.7 12/11/2019 09:52 AM    Bilirubin, total 0.4 12/11/2019 09:52 AM    AST (SGOT) 17 12/11/2019 09:52 AM    Alk. phosphatase 65 12/11/2019 09:52 AM    Protein, total 6.3 12/11/2019 09:52 AM    Albumin 4.0 12/11/2019 09:52 AM    Globulin 3.3 08/12/2018 09:40 AM    A-G Ratio 1.7 12/11/2019 09:52 AM    ALT (SGPT) 15 12/11/2019 09:52 AM     Lab Results   Component Value Date/Time    Hemoglobin A1c 5.4 09/27/2019 04:09 AM    Hemoglobin A1c 5.3 05/22/2018 08:34 AM    Hemoglobin A1c 5.3 11/20/2017 09:36 AM      Lab Results   Component Value Date/Time    Cholesterol, total 157 12/11/2019 09:52 AM    HDL Cholesterol 57 12/11/2019 09:52 AM    LDL, calculated 87 12/11/2019 09:52 AM    VLDL, calculated 13 12/11/2019 09:52 AM    Triglyceride 63 12/11/2019 09:52 AM    CHOL/HDL Ratio 3.4 09/27/2019 03:54 AM          ASSESSMENT/PLAN  Diagnoses and all orders for this visit:    1. Medicare annual wellness visit, subsequent    2. Screening for depression  -     DEPRESSION SCREEN ANNUAL    3. Essential hypertension  Controlled on current regimen, continue   4. Hypercholesterolemia  -     METABOLIC PANEL, COMPREHENSIVE; Future  -     LIPID PANEL; Future  At goal - continue current medications - no side effects from livalo  5. Coronary artery disease involving native coronary artery of native heart without angina pectoris  No symptoms, on appropriate medications, followed by cardiology as well    ADD controlled with wellbutrin    Health Maintenance Due   Topic Date Due    Shingrix Vaccine Age 49> (1 of 2) 08/25/1995    GLAUCOMA SCREENING Q2Y  02/19/2020    Colonoscopy  02/21/2020        Follow-up and Dispositions    · Return in about 6 months (around 8/19/2020) for bp, chol, labs prior. Reviewed plan of care. Patient has provided input and agrees with goals. The nurse provided the patient and/or family with advanced directive information if needed and encouraged the patient to provide a copy to the office when available.            This is the Subsequent Medicare Annual Wellness Exam, performed 12 months or more after the Initial AWV or the last Subsequent AWV    I have reviewed the patient's medical history in detail and updated the computerized patient record. History     Patient Active Problem List   Diagnosis Code    Essential hypertension I10    Asthma J45.909    ADD (attention deficit disorder) F98.8    Overactive bladder N32.81    Environmental allergies Z91.09    S/P colonoscopy Z98.890    At risk for osteoporosis Z91.89    Bilateral carotid artery disease (HCC) I73.9    Vertical diplopia H53.2    IFG (impaired fasting glucose) R73.01    Serrated adenoma of colon D12.6    Alopecia areata L63.9    Advance directive discussed with patient Z71.89    Chest pain R07.9    Coronary artery disease involving native coronary artery of native heart without angina pectoris I25.10    Hypercholesterolemia E78.00     Past Medical History:   Diagnosis Date    Alopecia     Asthma     CAD (coronary artery disease) 08/12/2018    NSTEMI, Cath, TYRONE OM2 of dominant Cx    H/O seasonal allergies     H/O TB skin testing 1968    treated with INH    Hypercholesterolemia     Hypertension     IFG (impaired fasting glucose) 6/14/2013    a1c 6.0 5/2013    Ill-defined condition     TIA symptoms, saw neuro-opthalmologist and was told it was probably spasm of cranial nerve.  Overactive bladder 3/13/2013    Serrated adenoma of colon 2/6/2014 11/17/09 - Dr. Bety Fischer - repeat in 5 years      Past Surgical History:   Procedure Laterality Date    COLORECTAL SCRN; HI RISK IND  2/20/2015         HX CATARACT REMOVAL Bilateral     HX GI      colonscopy.  polyp removed    HX MALIGNANT SKIN LESION EXCISION Left 2018    \"non-melanoma pre-cancerous lesion removed\" per patient     Current Outpatient Medications   Medication Sig Dispense Refill    LIVALO 2 mg tablet       oxybutynin chloride XL (DITROPAN XL) 10 mg CR tablet TAKE 1 TABLET BY MOUTH ONCE DAILY 90 Tab 4    Omeprazole delayed release (PRILOSEC D/R) 20 mg tablet Take 1 Tab by mouth daily. 30 Tab 0    multivitamin (ONE A DAY) tablet Take 1 Tab by mouth daily.  OAT BRAN PO Take  by mouth.  buPROPion (WELLBUTRIN) 75 mg tablet take 1 tablet by mouth twice a day 60 Tab 11    azelastine (ASTELIN) 137 mcg (0.1 %) nasal spray instill 2 sprays into each nostril twice a day  0    metoprolol tartrate (LOPRESSOR) 25 mg tablet Take 0.5 Tabs by mouth every twelve (12) hours. 60 Tab 3    s-adenosylmethionine sul tosyl (ROBERTO-E PO) Take 400 mg by mouth daily.  levalbuterol tartrate (XOPENEX) 45 mcg/actuation inhaler Take 1-2 Puffs by inhalation every four (4) hours as needed for Wheezing. 1 Inhaler 2    FLAXSEED PO Take  by mouth daily. Flaxseed meal      cetirizine (ZYRTEC) 10 mg tablet Take 10 mg by mouth daily.  aspirin 81 mg chewable tablet Take 81 mg by mouth daily.  montelukast (SINGULAIR) 10 mg tablet Take 10 mg by mouth every seven (7) days. Takes on day when she gets allergy shots       Allergies   Allergen Reactions    Nuts [Tree Nut] Other (comments)     Mouth sores.      Walnuts, pecans    Sulfa (Sulfonamide Antibiotics) Hives       Family History   Problem Relation Age of Onset    Hypertension Mother     Stroke Mother     Lung Disease Mother     Cancer Father         bladder    Other Father         congential heart valve defect    Depression Sister     Stroke Sister 72    Hypertension Sister     COPD Sister    [employer] Other Sister 79        twisted bowel    Heart Disease Maternal Grandfather     Dementia Paternal Grandfather     Cancer Maternal Grandmother         esophageal    Asthma Paternal Grandmother     Dementia Paternal Grandmother     Breast Cancer Paternal Grandmother      Social History     Tobacco Use    Smoking status: Former Smoker     Packs/day: 0.25     Years: 8.00     Pack years: 2.00     Last attempt to quit: 1995     Years since quittin.0    Smokeless tobacco: Never Used  Tobacco comment: quit in 1991   Substance Use Topics    Alcohol use: Yes     Alcohol/week: 17.5 standard drinks     Types: 21 Glasses of wine per week     Comment: 2-3 etoh drinks daily - jef - no withdrawl symptoms when she does not have it       Depression Risk Factor Screening:     3 most recent PHQ Screens 2/19/2020   Little interest or pleasure in doing things Not at all   Feeling down, depressed, irritable, or hopeless Not at all   Total Score PHQ 2 0   Trouble falling or staying asleep, or sleeping too much -   Feeling tired or having little energy -   Poor appetite, weight loss, or overeating -   Feeling bad about yourself - or that you are a failure or have let yourself or your family down -   Trouble concentrating on things such as school, work, reading, or watching TV -   Moving or speaking so slowly that other people could have noticed; or the opposite being so fidgety that others notice -   Thoughts of being better off dead, or hurting yourself in some way -   PHQ 9 Score -       Alcohol Risk Factor Screening:   Do you average 1 drink per night or more than 7 drinks a week:  No    On any one occasion in the past three months have you have had more than 3 drinks containing alcohol:  No      Functional Ability and Level of Safety:   Hearing: Hearing is good. Activities of Daily Living: The home contains: no safety equipment. Patient does total self care    Ambulation: with no difficulty    Fall Risk:  Fall Risk Assessment, last 12 mths 8/17/2018   Able to walk? Yes   Fall in past 12 months?  No   Fall with injury? -   Number of falls in past 12 months -   Fall Risk Score -       Abuse Screen:  Patient is not abused    Cognitive Screening   Has your family/caregiver stated any concerns about your memory: no  Cognitive Screening: normal    Patient Care Team   Patient Care Team:  Minoo Lyle MD as PCP - General (Internal Medicine)  Minoo Lyle MD as PCP - [facility] Provider  Fish Chau MD as Physician (Cardiology)    Assessment/Plan   Education and counseling provided:  Are appropriate based on today's review and evaluation    Diagnoses and all orders for this visit:    1. Medicare annual wellness visit, subsequent    2. Screening for depression  -     DEPRESSION SCREEN ANNUAL    3. Essential hypertension    4. Hypercholesterolemia  -     METABOLIC PANEL, COMPREHENSIVE; Future  -     LIPID PANEL; Future    5. Coronary artery disease involving native coronary artery of native heart without angina pectoris    6.  Attention deficit disorder (ADD) without hyperactivity        Health Maintenance Due   Topic Date Due    Shingrix Vaccine Age 49> (1 of 2) 08/25/1995    GLAUCOMA SCREENING Q2Y  02/19/2020    Colonoscopy  02/21/2020

## 2020-02-19 NOTE — PATIENT INSTRUCTIONS
Medicare Wellness Visit, Female The best way to live healthy is to have a lifestyle where you eat a well-balanced diet, exercise regularly, limit alcohol use, and quit all forms of tobacco/nicotine, if applicable. Regular preventive services are another way to keep healthy. Preventive services (vaccines, screening tests, monitoring & exams) can help personalize your care plan, which helps you manage your own care. Screening tests can find health problems at the earliest stages, when they are easiest to treat. [facility] follows the current, evidence-based guidelines published by the [facility] Carlito Hwang ([facility]) when recommending preventive services for our patients. Because we follow these guidelines, sometimes recommendations change over time as research supports it. (For example, mammograms used to be recommended annually. Even though Medicare will still pay for an annual mammogram, the newer guidelines recommend a mammogram every two years for women of average risk). Of course, you and your doctor may decide to screen more often for some diseases, based on your risk and your co-morbidities (chronic disease you are already diagnosed with). Preventive services for you include: - Medicare offers their members a free annual wellness visit, which is time for you and your primary care provider to discuss and plan for your preventive service needs. Take advantage of this benefit every year! 
-All adults over the age of 72 should receive the recommended pneumonia vaccines. Current USPSTF guidelines recommend a series of two vaccines for the best pneumonia protection.  
-All adults should have a flu vaccine yearly and a tetanus vaccine every 10 years.  
-All adults age 48 and older should receive the shingles vaccines (series of two vaccines). -All adults age 38-68 who are overweight should have a diabetes screening test once every three years. -All adults born between [birth year] and 1965 should be screened once for Hepatitis C. 
-Other screening tests and preventive services for persons with diabetes include: an eye exam to screen for diabetic retinopathy, a kidney function test, a foot exam, and stricter control over your cholesterol.  
-Cardiovascular screening for adults with routine risk involves an electrocardiogram (ECG) at intervals determined by your doctor.  
-Colorectal cancer screenings should be done for adults age 54-65 with no increased risk factors for colorectal cancer. There are a number of acceptable methods of screening for this type of cancer. Each test has its own benefits and drawbacks. Discuss with your doctor what is most appropriate for you during your annual wellness visit. The different tests include: colonoscopy (considered the best screening method), a fecal occult blood test, a fecal DNA test, and sigmoidoscopy. 
 
-A bone mass density test is recommended when a woman turns 65 to screen for osteoporosis. This test is only recommended one time, as a screening. Some providers will use this same test as a disease monitoring tool if you already have osteoporosis. -Breast cancer screenings are recommended every other year for women of normal risk, age 54-69. 
-Cervical cancer screenings for women over age 72 are only recommended with certain risk factors. Here is a list of your current Health Maintenance items (your personalized list of preventive services) with a due date: 
Health Maintenance Due Topic Date Due  Shingles Vaccine (1 of 2) 08/25/1995 [facility] Annual Well Visit  08/18/2019  Glaucoma Screening   02/19/2020  Colonoscopy  02/21/2020

## 2020-05-04 ENCOUNTER — VIRTUAL VISIT (OUTPATIENT)
Dept: INTERNAL MEDICINE CLINIC | Facility: CLINIC | Age: 75
End: 2020-05-04

## 2020-05-04 VITALS — BODY MASS INDEX: 23.22 KG/M2 | WEIGHT: 136 LBS | HEIGHT: 64 IN

## 2020-05-04 DIAGNOSIS — N30.00 ACUTE CYSTITIS WITHOUT HEMATURIA: Primary | ICD-10-CM

## 2020-05-04 RX ORDER — AMOXICILLIN AND CLAVULANATE POTASSIUM 875; 125 MG/1; MG/1
1 TABLET, FILM COATED ORAL EVERY 12 HOURS
Qty: 14 TAB | Refills: 0 | Status: SHIPPED | OUTPATIENT
Start: 2020-05-04 | End: 2020-05-11

## 2020-05-04 NOTE — PROGRESS NOTES
Nikki Mishra is a 76 y.o. female who was seen by synchronous (real-time) audio-video technology on 5/4/2020. Consent: Nikki Mishra, who was seen by synchronous (real-time) audio-video technology, and/or her healthcare decision maker, is aware that this patient-initiated, Telehealth encounter on 5/4/2020 is a billable service, with coverage as determined by her insurance carrier. She is aware that she may receive a bill and has provided verbal consent to proceed: Yes. Assessment & Plan:   Diagnoses and all orders for this visit:    1. Acute cystitis without hematuria  -     amoxicillin-clavulanate (AUGMENTIN) 875-125 mg per tablet; Take 1 Tab by mouth every twelve (12) hours for 7 days. Sounds like UTI - not yeast - will treat as above based on last suscpetabilities and call back if not improved         Subjective:   Nikki Mishra is a 76 y.o. female who was seen for Bladder Infection (pt reports she was treated weeks but never completely went away - also used OTC 3 day fungal rx-- continued burning, lower abd aching, discharge (brownish), urgency )    UTI 2/13/20 treated with keflex and had klebsiella pneumonia susceptible to same    Symptoms had almost completely cleared when I saw her on 2/19/20. Says her symptoms got gradually worse so self treated with 3 days of miconazole last week. No vaginal itching. Has dysuria, urgency but not new. Had lower abdominal discomfort. No f/c or n/v. Says at the end of using monistat had brownish discharge - slight, none since last week. Prior to Admission medications    Medication Sig Start Date End Date Taking? Authorizing Provider   amoxicillin-clavulanate (AUGMENTIN) 875-125 mg per tablet Take 1 Tab by mouth every twelve (12) hours for 7 days. 5/4/20 5/11/20 Yes Kiko Galindo MD   LIVALO 2 mg tablet Take 2 mg by mouth daily.  1/9/20  Yes Provider, Historical   oxybutynin chloride XL (DITROPAN XL) 10 mg CR tablet TAKE 1 TABLET BY MOUTH ONCE DAILY 1/3/20  Yes Kiko Galindo MD   Omeprazole delayed release (PRILOSEC D/R) 20 mg tablet Take 1 Tab by mouth daily. 9/27/19  Yes Leena Calloway MD   multivitamin (ONE A DAY) tablet Take 1 Tab by mouth daily. Yes Provider, Historical   OAT BRAN PO Take  by mouth. Yes Provider, Historical   azelastine (ASTELIN) 137 mcg (0.1 %) nasal spray instill 2 sprays into each nostril twice a day 11/12/18  Yes Provider, Historical   metoprolol tartrate (LOPRESSOR) 25 mg tablet Take 0.5 Tabs by mouth every twelve (12) hours. 8/14/18  Yes Theresa Gong MD   s-adenosylmethionine sul tosyl (ROBERTO-E PO) Take 400 mg by mouth daily. Yes Provider, Historical   levalbuterol tartrate (XOPENEX) 45 mcg/actuation inhaler Take 1-2 Puffs by inhalation every four (4) hours as needed for Wheezing. 1/17/17  Yes Kiko Galindo MD   FLAXSEED PO Take  by mouth daily. Flaxseed meal   Yes Provider, Historical   cetirizine (ZYRTEC) 10 mg tablet Take 10 mg by mouth daily. Yes Provider, Historical   aspirin 81 mg chewable tablet Take 81 mg by mouth daily. Yes Provider, Historical   montelukast (SINGULAIR) 10 mg tablet Take 10 mg by mouth every seven (7) days. Takes on day when she gets allergy shots   Yes Provider, Historical   buPROPion [facility]) 75 mg tablet take 1 tablet by mouth twice a day 2/7/19   Kiko Galindo MD     Allergies   Allergen Reactions   Joseph Class Kelliher Terence Nut] Other (comments)     Mouth sores.      Walnuts, pecans    Sulfa (Sulfonamide Antibiotics) Hives           ROS    Objective:   Vital Signs: (As obtained by patient/caregiver at home)  Visit Vitals  Ht 5' 4\" (1.626 m)   Wt 136 lb (61.7 kg)   BMI 23.34 kg/m²        [INSTRUCTIONS:  \"[x]\" Indicates a positive item  \"[]\" Indicates a negative item  -- DELETE ALL ITEMS NOT EXAMINED]    Constitutional: [x] Appears well-developed and well-nourished [x] No apparent distress      [] Abnormal -     Mental status: [x] Alert and awake  [x] Oriented to person/place/time [x] Able to follow commands    [] Abnormal -     Eyes:   EOM    [x]  Normal    [] Abnormal -   Sclera  [x]  Normal    [] Abnormal -          Discharge [x]  None visible   [] Abnormal -     HENT: [x] Normocephalic, atraumatic  [] Abnormal -   [] Mouth/Throat: Mucous membranes are moist    External Ears [x] Normal  [] Abnormal -    Neck: [x] No visualized mass [] Abnormal -     Pulmonary/Chest: [x] Respiratory effort normal   [x] No visualized signs of difficulty breathing or respiratory distress        [] Abnormal -      Musculoskeletal:   [x] Normal gait with no signs of ataxia         [x] Normal range of motion of neck        [] Abnormal -     Neurological:        [x] No Facial Asymmetry (Cranial nerve 7 motor function) (limited exam due to video visit)          [x] No gaze palsy        [] Abnormal -          Skin:        [x] No significant exanthematous lesions or discoloration noted on facial skin         [] Abnormal -            Psychiatric:       [x] Normal Affect [] Abnormal -        [x] No Hallucinations    Other pertinent observable physical exam findings:-        We discussed the expected course, resolution and complications of the diagnosis(es) in detail. Medication risks, benefits, costs, interactions, and alternatives were discussed as indicated. I advised her to contact the office if her condition worsens, changes or fails to improve as anticipated. She expressed understanding with the diagnosis(es) and plan. Elise Mazariegos is a 76 y.o. female who was evaluated by a video visit encounter for concerns as above. Patient identification was verified prior to start of the visit. A caregiver was present when appropriate. Due to this being a TeleHealth encounter (During YHTGK-58 public health emergency), evaluation of the following organ systems was limited: Vitals/Constitutional/EENT/Resp/CV/GI//MS/Neuro/Skin/Heme-Lymph-Imm.   Pursuant to the emergency declaration under the [facility] Act and the [address] waiver authority and the [employer] and [employer] Act, this Virtual  Visit was conducted, with patient's (and/or legal guardian's) consent, to reduce the patient's risk of exposure to COVID-19 and provide necessary medical care. Services were provided through a video synchronous discussion virtually to substitute for in-person clinic visit. Patient and provider were located at their individual homes.       Arelis Moss MD

## 2020-05-04 NOTE — PROGRESS NOTES
Fabi Baker  Identified pt with two pt identifiers(name and ). Chief Complaint   Patient presents with    Bladder Infection     pt reports she was treated weeks but never completely went away - also used OTC 3 day fungal rx-- continued burning, lower abd aching, discharge (brownish), urgency        1. Have you been to the ER, urgent care clinic since your last visit? Hospitalized since your last visit? NO    2. Have you seen or consulted any other health care providers outside of the [address] [place] [address] since your last visit? Include any pap smears or colon screening. NO      Provider notified of reason for visit, vitals and flowsheets obtained on patients.      Patient received paperwork for advance directive during previous visit but has not completed at this time     Reviewed record In preparation for visit, huddled with provider and have obtained necessary documentation      Health Maintenance Due   Topic    Colonoscopy        Wt Readings from Last 3 Encounters:   20 136 lb (61.7 kg)   20 136 lb 9.6 oz (62 kg)   20 134 lb (60.8 kg)     Temp Readings from Last 3 Encounters:   20 98.4 °F (36.9 °C) (Oral)   20 98.4 °F (36.9 °C) (Oral)   19 98 °F (36.7 °C)     BP Readings from Last 3 Encounters:   20 162/77   20 172/79   19 158/72     Pulse Readings from Last 3 Encounters:   20 (!) 55   20 (!) 59   19 (!) 53     Vitals:    20 1317   Weight: 136 lb (61.7 kg)   Height: 5' 4\" (1.626 m)   PainSc:   3   PainLoc: Abdomen         Learning Assessment:  :     Learning Assessment 3/20/2015   PRIMARY LEARNER Patient   HIGHEST LEVEL OF EDUCATION - PRIMARY LEARNER  4 YEARS OF COLLEGE   BARRIERS PRIMARY LEARNER NONE   PRIMARY LANGUAGE ENGLISH    NEED No   LEARNER PREFERENCE PRIMARY READING   ANSWERED BY patient   RELATIONSHIP SELF       Depression Screening:  :     3 most recent PHQ Screens 2020   Little interest or pleasure in doing things Not at all   Feeling down, depressed, irritable, or hopeless Not at all   Total Score PHQ 2 0   Trouble falling or staying asleep, or sleeping too much -   Feeling tired or having little energy -   Poor appetite, weight loss, or overeating -   Feeling bad about yourself - or that you are a failure or have let yourself or your family down -   Trouble concentrating on things such as school, work, reading, or watching TV -   Moving or speaking so slowly that other people could have noticed; or the opposite being so fidgety that others notice -   Thoughts of being better off dead, or hurting yourself in some way -   PHQ 9 Score -       Fall Risk Assessment:  :     Fall Risk Assessment, last 12 mths 5/4/2020   Able to walk? Yes   Fall in past 12 months? No   Fall with injury? -   Number of falls in past 12 months -   Fall Risk Score -       Abuse Screening:  :     Abuse Screening Questionnaire 5/4/2020 8/17/2018 4/23/2018 11/14/2016 3/20/2015   Do you ever feel afraid of your partner? N N N N N   Are you in a relationship with someone who physically or mentally threatens you? N N N N N   Is it safe for you to go home?  Y Y Y Y Y       ADL Screening:  :     ADL Assessment 8/17/2018   Feeding yourself No Help Needed   Getting from bed to chair No Help Needed   Getting dressed No Help Needed   Bathing or showering No Help Needed   Walk across the room (includes cane/walker) No Help Needed   Using the telphone No Help Needed   Taking your medications No Help Needed   Preparing meals No Help Needed   Managing money (expenses/bills) No Help Needed   Moderately strenuous housework (laundry) No Help Needed   Shopping for personal items (toiletries/medicines) No Help Needed   Shopping for groceries No Help Needed   Driving No Help Needed   Climbing a flight of stairs No Help Needed   Getting to places beyond walking distances No Help Needed         Medication reconciliation up to date and corrected with patient at this time.

## 2020-05-26 ENCOUNTER — HOSPITAL ENCOUNTER (OUTPATIENT)
Dept: MAMMOGRAPHY | Age: 75
Discharge: HOME | End: 2020-05-26
Attending: INTERNAL MEDICINE
Payer: MEDICARE

## 2020-05-26 DIAGNOSIS — Z12.31 VISIT FOR SCREENING MAMMOGRAM: ICD-10-CM

## 2020-05-26 PROCEDURE — 77063 BREAST TOMOSYNTHESIS BI: CPT

## 2020-08-06 NOTE — PATIENT INSTRUCTIONS
Bronchitis: Care Instructions  Your Care Instructions    Bronchitis is inflammation of the bronchial tubes, which carry air to the lungs. The tubes swell and produce mucus, or phlegm. The mucus and inflamed bronchial tubes make you cough. You may have trouble breathing. Most cases of bronchitis are caused by viruses like those that cause colds. Antibiotics usually do not help and they may be harmful. Bronchitis usually develops rapidly and lasts about 2 to 3 weeks in otherwise healthy people. Follow-up care is a key part of your treatment and safety. Be sure to make and go to all appointments, and call your doctor if you are having problems. It's also a good idea to know your test results and keep a list of the medicines you take. How can you care for yourself at home? · Take all medicines exactly as prescribed. Call your doctor if you think you are having a problem with your medicine. · Get some extra rest.  · Take an over-the-counter pain medicine, such as acetaminophen (Tylenol), ibuprofen (Advil, Motrin), or naproxen (Aleve) to reduce fever and relieve body aches. Read and follow all instructions on the label. · Do not take two or more pain medicines at the same time unless the doctor told you to. Many pain medicines have acetaminophen, which is Tylenol. Too much acetaminophen (Tylenol) can be harmful. · Take an over-the-counter cough medicine that contains dextromethorphan to help quiet a dry, hacking cough so that you can sleep. Avoid cough medicines that have more than one active ingredient. Read and follow all instructions on the label. · Breathe moist air from a humidifier, hot shower, or sink filled with hot water. The heat and moisture will thin mucus so you can cough it out. · Do not smoke. Smoking can make bronchitis worse. If you need help quitting, talk to your doctor about stop-smoking programs and medicines. These can increase your chances of quitting for good.   When should you call for help? Call 911 anytime you think you may need emergency care. For example, call if:  · You have severe trouble breathing. Call your doctor now or seek immediate medical care if:  · You have new or worse trouble breathing. · You cough up dark brown or bloody mucus (sputum). · You have a new or higher fever. · You have a new rash. Watch closely for changes in your health, and be sure to contact your doctor if:  · You cough more deeply or more often, especially if you notice more mucus or a change in the color of your mucus. · You are not getting better as expected. Where can you learn more? Go to http://audra-christal.info/. Enter H333 in the search box to learn more about \"Bronchitis: Care Instructions. \"  Current as of: May 23, 2016  Content Version: 11.1  © 0984-3793 [employer]. Care instructions adapted under license by [employer] (which disclaims liability or warranty for this information). If you have questions about a medical condition or this instruction, always ask your healthcare professional. [address] any warranty or liability for your use of this information. Price (Do Not Change): 0.00 Detail Level: Simple Instructions: This plan will send the code FBSE to the PM system.  DO NOT or CHANGE the price.

## 2020-08-13 LAB
ALBUMIN SERPL-MCNC: 4.2 G/DL (ref 3.7–4.7)
ALBUMIN/GLOB SERPL: 2.2 {RATIO} (ref 1.2–2.2)
ALP SERPL-CCNC: 76 IU/L (ref 39–117)
ALT SERPL-CCNC: 19 IU/L (ref 0–32)
AST SERPL-CCNC: 20 IU/L (ref 0–40)
BILIRUB SERPL-MCNC: <0.2 MG/DL (ref 0–1.2)
BUN SERPL-MCNC: 27 MG/DL (ref 8–27)
BUN/CREAT SERPL: 34 (ref 12–28)
CALCIUM SERPL-MCNC: 9.4 MG/DL (ref 8.7–10.3)
CHLORIDE SERPL-SCNC: 108 MMOL/L (ref 96–106)
CHOLEST SERPL-MCNC: 161 MG/DL (ref 100–199)
CO2 SERPL-SCNC: 24 MMOL/L (ref 20–29)
CREAT SERPL-MCNC: 0.79 MG/DL (ref 0.57–1)
GLOBULIN SER CALC-MCNC: 1.9 G/DL (ref 1.5–4.5)
GLUCOSE SERPL-MCNC: 85 MG/DL (ref 65–99)
HDLC SERPL-MCNC: 67 MG/DL
LDLC SERPL CALC-MCNC: 79 MG/DL (ref 0–99)
POTASSIUM SERPL-SCNC: 4.6 MMOL/L (ref 3.5–5.2)
PROT SERPL-MCNC: 6.1 G/DL (ref 6–8.5)
SODIUM SERPL-SCNC: 143 MMOL/L (ref 134–144)
TRIGL SERPL-MCNC: 73 MG/DL (ref 0–149)
VLDLC SERPL CALC-MCNC: 15 MG/DL (ref 5–40)

## 2020-08-19 ENCOUNTER — OFFICE VISIT (OUTPATIENT)
Dept: INTERNAL MEDICINE CLINIC | Age: 75
End: 2020-08-19
Payer: MEDICARE

## 2020-08-19 ENCOUNTER — TELEPHONE (OUTPATIENT)
Dept: INTERNAL MEDICINE CLINIC | Age: 75
End: 2020-08-19

## 2020-08-19 VITALS
HEIGHT: 64 IN | TEMPERATURE: 98.7 F | SYSTOLIC BLOOD PRESSURE: 140 MMHG | RESPIRATION RATE: 16 BRPM | OXYGEN SATURATION: 97 % | BODY MASS INDEX: 23.29 KG/M2 | HEART RATE: 71 BPM | WEIGHT: 136.4 LBS | DIASTOLIC BLOOD PRESSURE: 80 MMHG

## 2020-08-19 DIAGNOSIS — J45.30 MILD PERSISTENT ASTHMA WITHOUT COMPLICATION: ICD-10-CM

## 2020-08-19 DIAGNOSIS — M79.604 RIGHT LEG PAIN: ICD-10-CM

## 2020-08-19 DIAGNOSIS — E78.00 HYPERCHOLESTEROLEMIA: ICD-10-CM

## 2020-08-19 DIAGNOSIS — I10 ESSENTIAL HYPERTENSION: Primary | ICD-10-CM

## 2020-08-19 PROCEDURE — 3017F COLORECTAL CA SCREEN DOC REV: CPT | Performed by: INTERNAL MEDICINE

## 2020-08-19 PROCEDURE — G8432 DEP SCR NOT DOC, RNG: HCPCS | Performed by: INTERNAL MEDICINE

## 2020-08-19 PROCEDURE — G9899 SCRN MAM PERF RSLTS DOC: HCPCS | Performed by: INTERNAL MEDICINE

## 2020-08-19 PROCEDURE — 1090F PRES/ABSN URINE INCON ASSESS: CPT | Performed by: INTERNAL MEDICINE

## 2020-08-19 PROCEDURE — G8536 NO DOC ELDER MAL SCRN: HCPCS | Performed by: INTERNAL MEDICINE

## 2020-08-19 PROCEDURE — G8399 PT W/DXA RESULTS DOCUMENT: HCPCS | Performed by: INTERNAL MEDICINE

## 2020-08-19 PROCEDURE — 99214 OFFICE O/P EST MOD 30 MIN: CPT | Performed by: INTERNAL MEDICINE

## 2020-08-19 PROCEDURE — 1101F PT FALLS ASSESS-DOCD LE1/YR: CPT | Performed by: INTERNAL MEDICINE

## 2020-08-19 PROCEDURE — G8427 DOCREV CUR MEDS BY ELIG CLIN: HCPCS | Performed by: INTERNAL MEDICINE

## 2020-08-19 PROCEDURE — G8753 SYS BP > OR = 140: HCPCS | Performed by: INTERNAL MEDICINE

## 2020-08-19 PROCEDURE — G8420 CALC BMI NORM PARAMETERS: HCPCS | Performed by: INTERNAL MEDICINE

## 2020-08-19 PROCEDURE — G8754 DIAS BP LESS 90: HCPCS | Performed by: INTERNAL MEDICINE

## 2020-08-19 RX ORDER — METOPROLOL TARTRATE 25 MG/1
25 TABLET, FILM COATED ORAL EVERY 12 HOURS
Qty: 180 TAB | Refills: 4 | Status: SHIPPED | OUTPATIENT
Start: 2020-08-19 | End: 2021-08-23

## 2020-08-19 NOTE — TELEPHONE ENCOUNTER
Pt called back and wanted to let . [email or web address] know she will be seeing Dr. Rico Doyle at [facility]. Please give a call back if there are any concerns.

## 2020-08-19 NOTE — PATIENT INSTRUCTIONS

## 2020-08-19 NOTE — TELEPHONE ENCOUNTER
Pt called to request a different referral for an orthopedist. Pt stated provider referred her to Dr. Rio Silver, but he has retired. Please give pt a call back at [phone] to discuss a different provider.

## 2020-08-19 NOTE — PROGRESS NOTES
HPI  Ms. Kareen Kelly is a 76y.o. year old female, she is seen today for follow up HTN, high cholesterol. Had pain right hip radiating to medial thigh on right which has persisted for a couple of months. Worse walking up stairs, feels unsteady at times. Leg feels weak sometimes on the right. No known injury. Worse when walking more. No back pain. Motrin helps. No n/v/abd pain. Scheduled for colonoscopy 11/3/20. No chest pain or sob. Stopped mary-e and wellbutrin and feels fine. [facility]. No recent asthma flares. BP has been higher at home - as high as 486 systolic. Hasn't been able to walk as much and this helps her BP but now with pain can't walk as much. Lately had been lower in the 130s range. Chief Complaint   Patient presents with    Blood Pressure Check     Room 2A //     Cholesterol Problem        Prior to Admission medications    Medication Sig Start Date End Date Taking? Authorizing Provider   metoprolol tartrate (LOPRESSOR) 25 mg tablet Take 1 Tab by mouth every twelve (12) hours. 8/19/20  Yes Aniyah Pelaez MD   LIVALO 2 mg tablet Take 2 mg by mouth daily. 1/9/20  Yes Provider, Historical   oxybutynin chloride XL (DITROPAN XL) 10 mg CR tablet TAKE 1 TABLET BY MOUTH ONCE DAILY 1/3/20  Yes Aniyah Pelaez MD   Omeprazole delayed release (PRILOSEC D/R) 20 mg tablet Take 1 Tab by mouth daily. 9/27/19  Yes Steve Calloway MD   multivitamin (ONE A DAY) tablet Take 1 Tab by mouth daily. Yes Provider, Historical   OAT BRAN PO Take  by mouth. Yes Provider, Historical   azelastine (ASTELIN) 137 mcg (0.1 %) nasal spray instill 2 sprays into each nostril twice a day 11/12/18  Yes Provider, Historical   levalbuterol tartrate (XOPENEX) 45 mcg/actuation inhaler Take 1-2 Puffs by inhalation every four (4) hours as needed for Wheezing. 1/17/17  Yes Aniyah Pelaez MD   FLAXSEED PO Take  by mouth daily.  Flaxseed meal   Yes Provider, Historical   cetirizine (ZYRTEC) 10 mg tablet Take 10 mg by mouth daily. Yes Provider, Historical   aspirin 81 mg chewable tablet Take 81 mg by mouth daily. Yes Provider, Historical   montelukast (SINGULAIR) 10 mg tablet Take 10 mg by mouth every seven (7) days. Takes on day when she gets allergy shots    Provider, Historical         Allergies   Allergen Reactions    Nuts [Tree Nut] Other (comments)     Mouth sores. Walnuts, pecans    Sulfa (Sulfonamide Antibiotics) Hives         REVIEW OF SYSTEMS:  Per HPI    PHYSICAL EXAM:  Visit Vitals  /80   Pulse 71   Temp 98.7 °F (37.1 °C) (Oral)   Resp 16   Ht 5' 4\" (1.626 m)   Wt 136 lb 6.4 oz (61.9 kg)   SpO2 97%   BMI 23.41 kg/m²     Constitutional: Appears well-developed and well-nourished. No distress. HENT:   Head: Normocephalic and atraumatic. Eyes: No scleral icterus. Cardiovascular: Normal S1/S2, regular rhythm. No murmurs, rubs, or gallops. Pulmonary/Chest: Effort normal and breath sounds normal. No respiratory distress. No wheezes, rhonchi, or rales. Abdomen: Soft, NT/ND, +BS, no rebound or guarding, no masses, no HSM appreciated. MSK: FROM right hip, no pain on palpation  Ext: No edema. Neurological: Alert. Strength 5/5 b/l LE, SLE neg b/l. Psychiatric: Normal mood and affect. Behavior is normal.     Lab Results   Component Value Date/Time    Sodium 143 08/12/2020 09:13 AM    Potassium 4.6 08/12/2020 09:13 AM    Chloride 108 (H) 08/12/2020 09:13 AM    CO2 24 08/12/2020 09:13 AM    Anion gap 6 09/26/2019 09:55 PM    Glucose 85 08/12/2020 09:13 AM    BUN 27 08/12/2020 09:13 AM    Creatinine 0.79 08/12/2020 09:13 AM    BUN/Creatinine ratio 34 (H) 08/12/2020 09:13 AM    GFR est AA 85 08/12/2020 09:13 AM    GFR est non-AA 74 08/12/2020 09:13 AM    Calcium 9.4 08/12/2020 09:13 AM    Bilirubin, total <0.2 08/12/2020 09:13 AM    Alk.  phosphatase 76 08/12/2020 09:13 AM    Protein, total 6.1 08/12/2020 09:13 AM    Albumin 4.2 08/12/2020 09:13 AM    Globulin 3.3 08/12/2018 09:40 AM    A-G Ratio 2.2 08/12/2020 09:13 AM    ALT (SGPT) 19 08/12/2020 09:13 AM     Lab Results   Component Value Date/Time    Hemoglobin A1c 5.4 09/27/2019 04:09 AM    Hemoglobin A1c 5.3 05/22/2018 08:34 AM    Hemoglobin A1c 5.3 11/20/2017 09:36 AM      Lab Results   Component Value Date/Time    Cholesterol, total 161 08/12/2020 09:13 AM    HDL Cholesterol 67 08/12/2020 09:13 AM    LDL, calculated 79 08/12/2020 09:13 AM    VLDL, calculated 15 08/12/2020 09:13 AM    Triglyceride 73 08/12/2020 09:13 AM    CHOL/HDL Ratio 3.4 09/27/2019 03:54 AM          ASSESSMENT/PLAN  Diagnoses and all orders for this visit:    1. Essential hypertension  -     metoprolol tartrate (LOPRESSOR) 25 mg tablet; Take 1 Tab by mouth every twelve (12) hours. Not to goal - increase metoprolol as above    2. Right leg pain  -     REFERRAL TO ORTHOPEDICS    3. Hypercholesterolemia  Lipids at goal - continue current medications   4. Mild persistent asthma without complication  controlled        Health Maintenance Due   Topic Date Due    Shingrix Vaccine Age 49> (1 of 2) 08/25/1995    Colonoscopy  02/21/2020        Follow-up and Dispositions    · Return in about 6 weeks (around 9/30/2020) for bp. Reviewed plan of care. Patient has provided input and agrees with goals. The nurse provided the patient and/or family with advanced directive information if needed and encouraged the patient to provide a copy to the office when available.

## 2020-08-19 NOTE — PROGRESS NOTES
Fabi Baker  Identified pt with two pt identifiers(name and ). Chief Complaint   Patient presents with    Blood Pressure Check     Room 2A //     Cholesterol Problem       Reviewed record In preparation for visit and have obtained necessary documentation. 1. Have you been to the ER, urgent care clinic or hospitalized since your last visit? No     2. Have you seen or consulted any other health care providers outside of the [address] [place] [address] since your last visit? Include any pap smears or colon screening. No    Patient has an advance directive. Vitals reviewed with provider.     Health Maintenance reviewed:     Health Maintenance Due   Topic    Shingrix Vaccine Age 49> (1 of 2)    Colonoscopy           Wt Readings from Last 3 Encounters:   20 136 lb 6.4 oz (61.9 kg)   20 136 lb (61.7 kg)   20 136 lb 9.6 oz (62 kg)        Temp Readings from Last 3 Encounters:   20 98.7 °F (37.1 °C) (Oral)   20 98.4 °F (36.9 °C) (Oral)   20 98.4 °F (36.9 °C) (Oral)        BP Readings from Last 3 Encounters:   20 158/72   20 162/77   20 172/79        Pulse Readings from Last 3 Encounters:   20 71   20 (!) 55   20 (!) 59        Vitals:    20 0931   BP: 158/72   Pulse: 71   Resp: 16   Temp: 98.7 °F (37.1 °C)   TempSrc: Oral   SpO2: 97%   Weight: 136 lb 6.4 oz (61.9 kg)   Height: 5' 4\" (1.626 m)   PainSc:   9   PainLoc: Hip          Learning Assessment:   :       Learning Assessment 3/20/2015   PRIMARY LEARNER Patient   HIGHEST LEVEL OF EDUCATION - PRIMARY LEARNER  4 YEARS OF COLLEGE   BARRIERS PRIMARY LEARNER NONE   PRIMARY LANGUAGE ENGLISH    NEED No   LEARNER PREFERENCE PRIMARY READING   ANSWERED BY patient   RELATIONSHIP SELF        Depression Screening:   :       3 most recent PHQ Screens 2020   Little interest or pleasure in doing things Not at all   Feeling down, depressed, irritable, or hopeless Not at all Total Score PHQ 2 0   Trouble falling or staying asleep, or sleeping too much -   Feeling tired or having little energy -   Poor appetite, weight loss, or overeating -   Feeling bad about yourself - or that you are a failure or have let yourself or your family down -   Trouble concentrating on things such as school, work, reading, or watching TV -   Moving or speaking so slowly that other people could have noticed; or the opposite being so fidgety that others notice -   Thoughts of being better off dead, or hurting yourself in some way -   PHQ 9 Score -        Fall Risk Assessment:   :       Fall Risk Assessment, last 12 mths 5/4/2020   Able to walk? Yes   Fall in past 12 months? No   Fall with injury? -   Number of falls in past 12 months -   Fall Risk Score -        Abuse Screening:   :       Abuse Screening Questionnaire 5/4/2020 8/17/2018 4/23/2018 11/14/2016 3/20/2015   Do you ever feel afraid of your partner? N N N N N   Are you in a relationship with someone who physically or mentally threatens you? N N N N N   Is it safe for you to go home?  Y Y Y Y Y        ADL Screening:   :       ADL Assessment 8/17/2018   Feeding yourself No Help Needed   Getting from bed to chair No Help Needed   Getting dressed No Help Needed   Bathing or showering No Help Needed   Walk across the room (includes cane/walker) No Help Needed   Using the telphone No Help Needed   Taking your medications No Help Needed   Preparing meals No Help Needed   Managing money (expenses/bills) No Help Needed   Moderately strenuous housework (laundry) No Help Needed   Shopping for personal items (toiletries/medicines) No Help Needed   Shopping for groceries No Help Needed   Driving No Help Needed   Climbing a flight of stairs No Help Needed   Getting to places beyond walking distances No Help Needed

## 2020-09-07 ENCOUNTER — TELEPHONE (OUTPATIENT)
Dept: INTERNAL MEDICINE CLINIC | Age: 75
End: 2020-09-07

## 2020-09-07 RX ORDER — AMOXICILLIN AND CLAVULANATE POTASSIUM 875; 125 MG/1; MG/1
1 TABLET, FILM COATED ORAL EVERY 12 HOURS
Qty: 14 TAB | Refills: 0 | Status: SHIPPED | OUTPATIENT
Start: 2020-09-07 | End: 2020-09-17 | Stop reason: SDUPTHER

## 2020-09-07 NOTE — TELEPHONE ENCOUNTER
Complains of pain in left lower tooth x 4 days and also area of lower lip which she bit. Worried about tooth abscess. Will call in abx, see dentist for follow up.

## 2020-09-17 ENCOUNTER — TELEPHONE (OUTPATIENT)
Dept: INTERNAL MEDICINE CLINIC | Age: 75
End: 2020-09-17

## 2020-09-17 NOTE — TELEPHONE ENCOUNTER
[facility] on file sent a fax requesting a refill of Amox-Clav 875mg tablets be faxed to [phone]

## 2020-09-18 RX ORDER — AMOXICILLIN AND CLAVULANATE POTASSIUM 875; 125 MG/1; MG/1
1 TABLET, FILM COATED ORAL EVERY 12 HOURS
Qty: 14 TAB | Refills: 0 | Status: SHIPPED | OUTPATIENT
Start: 2020-09-18 | End: 2020-09-25

## 2020-09-18 RX ORDER — AMOXICILLIN AND CLAVULANATE POTASSIUM 875; 125 MG/1; MG/1
1 TABLET, FILM COATED ORAL EVERY 12 HOURS
Qty: 14 TAB | Refills: 0 | Status: CANCELLED | OUTPATIENT
Start: 2020-09-18 | End: 2020-09-25

## 2020-09-18 NOTE — TELEPHONE ENCOUNTER
PCP: Home Cadena MD     Last appt: 8/19/2020   Future Appointments   Date Time Provider Niurka Boles   10/7/2020 11:00 AM Home Cadena MD [facility] BS AMB        Requested Prescriptions     Pending Prescriptions Disp Refills    amoxicillin-clavulanate (AUGMENTIN) 875-125 mg per tablet 14 Tab 0     Sig: Take 1 Tab by mouth every twelve (12) hours for 7 days.

## 2020-10-07 ENCOUNTER — OFFICE VISIT (OUTPATIENT)
Dept: INTERNAL MEDICINE CLINIC | Age: 75
End: 2020-10-07
Payer: MEDICARE

## 2020-10-07 VITALS
HEART RATE: 60 BPM | SYSTOLIC BLOOD PRESSURE: 169 MMHG | RESPIRATION RATE: 16 BRPM | TEMPERATURE: 98.2 F | OXYGEN SATURATION: 96 % | BODY MASS INDEX: 23.93 KG/M2 | HEIGHT: 64 IN | WEIGHT: 140.2 LBS | DIASTOLIC BLOOD PRESSURE: 77 MMHG

## 2020-10-07 DIAGNOSIS — I10 ESSENTIAL HYPERTENSION: Primary | ICD-10-CM

## 2020-10-07 DIAGNOSIS — I25.10 CORONARY ARTERY DISEASE INVOLVING NATIVE CORONARY ARTERY OF NATIVE HEART WITHOUT ANGINA PECTORIS: ICD-10-CM

## 2020-10-07 PROCEDURE — 1090F PRES/ABSN URINE INCON ASSESS: CPT | Performed by: INTERNAL MEDICINE

## 2020-10-07 PROCEDURE — G8536 NO DOC ELDER MAL SCRN: HCPCS | Performed by: INTERNAL MEDICINE

## 2020-10-07 PROCEDURE — 99214 OFFICE O/P EST MOD 30 MIN: CPT | Performed by: INTERNAL MEDICINE

## 2020-10-07 PROCEDURE — 3017F COLORECTAL CA SCREEN DOC REV: CPT | Performed by: INTERNAL MEDICINE

## 2020-10-07 PROCEDURE — G8754 DIAS BP LESS 90: HCPCS | Performed by: INTERNAL MEDICINE

## 2020-10-07 PROCEDURE — G8510 SCR DEP NEG, NO PLAN REQD: HCPCS | Performed by: INTERNAL MEDICINE

## 2020-10-07 PROCEDURE — G8399 PT W/DXA RESULTS DOCUMENT: HCPCS | Performed by: INTERNAL MEDICINE

## 2020-10-07 PROCEDURE — 1100F PTFALLS ASSESS-DOCD GE2>/YR: CPT | Performed by: INTERNAL MEDICINE

## 2020-10-07 PROCEDURE — G8420 CALC BMI NORM PARAMETERS: HCPCS | Performed by: INTERNAL MEDICINE

## 2020-10-07 PROCEDURE — G8427 DOCREV CUR MEDS BY ELIG CLIN: HCPCS | Performed by: INTERNAL MEDICINE

## 2020-10-07 PROCEDURE — 3288F FALL RISK ASSESSMENT DOCD: CPT | Performed by: INTERNAL MEDICINE

## 2020-10-07 PROCEDURE — G8753 SYS BP > OR = 140: HCPCS | Performed by: INTERNAL MEDICINE

## 2020-10-07 RX ORDER — LISINOPRIL 10 MG/1
10 TABLET ORAL DAILY
Qty: 30 TAB | Refills: 2 | Status: SHIPPED | OUTPATIENT
Start: 2020-10-07 | End: 2020-12-09 | Stop reason: DRUGHIGH

## 2020-10-07 NOTE — PROGRESS NOTES
HPI  Ms. Laura Ferguson is a 76y.o. year old female, she is seen today for follow up HTN. Last visit about 6 weeks ago increased metoprolol to 25mg bid for uncontrolled HTN. BP was 146/74 in waiting room    BP has been 120s-140s/60s-70s. Had been in 170-180 range earlier in summer. HR 54-76  Has been feeling well other than b/l thigh pain, some days worse than others, has seen ortho and diagnosed with strain of right hip adductor muscle. Overall leg pain is improving. Weight is up 4# since last visit. Chief Complaint   Patient presents with    Hypertension     6 week f/u        Prior to Admission medications    Medication Sig Start Date End Date Taking? Authorizing Provider   lisinopriL (PRINIVIL, ZESTRIL) 10 mg tablet Take 1 Tab by mouth daily. 10/7/20  Yes Miranda Bronson MD   metoprolol tartrate (LOPRESSOR) 25 mg tablet Take 1 Tab by mouth every twelve (12) hours. Patient taking differently: Take 25 mg by mouth two (2) times a day. 8/19/20  Yes Miranda Bronson MD   LIVALO 2 mg tablet Take 2 mg by mouth daily. 1/9/20  Yes Provider, Historical   oxybutynin chloride XL (DITROPAN XL) 10 mg CR tablet TAKE 1 TABLET BY MOUTH ONCE DAILY 1/3/20  Yes Miranda Bronson MD   Omeprazole delayed release (PRILOSEC D/R) 20 mg tablet Take 1 Tab by mouth daily. 9/27/19  Yes Ananda Calloway MD   multivitamin (ONE A DAY) tablet Take 1 Tab by mouth daily. Yes Provider, Historical   OAT BRAN PO Take  by mouth. Yes Provider, Historical   azelastine (ASTELIN) 137 mcg (0.1 %) nasal spray instill 2 sprays into each nostril twice a day 11/12/18  Yes Provider, Historical   levalbuterol tartrate (XOPENEX) 45 mcg/actuation inhaler Take 1-2 Puffs by inhalation every four (4) hours as needed for Wheezing. 1/17/17  Yes Miranda Bronson MD   FLAXSEED PO Take  by mouth daily. Flaxseed meal   Yes Provider, Historical   cetirizine (ZYRTEC) 10 mg tablet Take 10 mg by mouth daily.    Yes Provider, Historical aspirin 81 mg chewable tablet Take 81 mg by mouth daily. Yes Provider, Historical   montelukast (SINGULAIR) 10 mg tablet Take 10 mg by mouth every seven (7) days. Takes on day when she gets allergy shots    Provider, Historical         Allergies   Allergen Reactions    Nuts [Tree Nut] Other (comments)     Mouth sores. Walnuts, pecans    Sulfa (Sulfonamide Antibiotics) Hives         REVIEW OF SYSTEMS:  Per HPI    PHYSICAL EXAM:  Visit Vitals  BP (!) 169/77 (BP 1 Location: Left arm, BP Patient Position: Sitting)   Pulse 60   Temp 98.2 °F (36.8 °C) (Oral)   Resp 16   Ht 5' 4\" (1.626 m)   Wt 140 lb 3.2 oz (63.6 kg)   SpO2 96%   BMI 24.07 kg/m²     Constitutional: Appears well-developed and well-nourished. No distress. HENT:   Head: Normocephalic and atraumatic. Eyes: No scleral icterus. Cardiovascular: Normal S1/S2, regular rhythm. No murmurs, rubs, or gallops. Pulmonary/Chest: Effort normal and breath sounds normal. No respiratory distress. No wheezes, rhonchi, or rales. Ext: No edema. Neurological: Alert. Psychiatric: Normal mood and affect. Behavior is normal.     Lab Results   Component Value Date/Time    Sodium 143 08/12/2020 09:13 AM    Potassium 4.6 08/12/2020 09:13 AM    Chloride 108 (H) 08/12/2020 09:13 AM    CO2 24 08/12/2020 09:13 AM    Anion gap 6 09/26/2019 09:55 PM    Glucose 85 08/12/2020 09:13 AM    BUN 27 08/12/2020 09:13 AM    Creatinine 0.79 08/12/2020 09:13 AM    BUN/Creatinine ratio 34 (H) 08/12/2020 09:13 AM    GFR est AA 85 08/12/2020 09:13 AM    GFR est non-AA 74 08/12/2020 09:13 AM    Calcium 9.4 08/12/2020 09:13 AM    Bilirubin, total <0.2 08/12/2020 09:13 AM    Alk.  phosphatase 76 08/12/2020 09:13 AM    Protein, total 6.1 08/12/2020 09:13 AM    Albumin 4.2 08/12/2020 09:13 AM    Globulin 3.3 08/12/2018 09:40 AM    A-G Ratio 2.2 08/12/2020 09:13 AM    ALT (SGPT) 19 08/12/2020 09:13 AM     Lab Results   Component Value Date/Time    Hemoglobin A1c 5.4 09/27/2019 04:09 AM Hemoglobin A1c 5.3 05/22/2018 08:34 AM    Hemoglobin A1c 5.3 11/20/2017 09:36 AM      Lab Results   Component Value Date/Time    Cholesterol, total 161 08/12/2020 09:13 AM    HDL Cholesterol 67 08/12/2020 09:13 AM    LDL, calculated 79 08/12/2020 09:13 AM    VLDL, calculated 15 08/12/2020 09:13 AM    Triglyceride 73 08/12/2020 09:13 AM    CHOL/HDL Ratio 3.4 09/27/2019 03:54 AM          ASSESSMENT/PLAN  Diagnoses and all orders for this visit:    1. Essential hypertension  -     lisinopriL (PRINIVIL, ZESTRIL) 10 mg tablet; Take 1 Tab by mouth daily. Not controlled, add above, get bmp at follow up  Potential side effects discussed  2. Coronary artery disease involving native coronary artery of native heart without angina pectoris  Followed by cardiology - Dr. Kenyon Matute Maintenance Due   Topic Date Due    A1C test (Diabetic or Prediabetic)  09/27/2020        Follow-up and Dispositions    · Return in about 6 weeks (around 11/18/2020) for bp - in person. Reviewed plan of care. Patient has provided input and agrees with goals. The nurse provided the patient and/or family with advanced directive information if needed and encouraged the patient to provide a copy to the office when available.

## 2020-10-07 NOTE — PROGRESS NOTES
Chief Complaint   Patient presents with    Hypertension     6 week f/u     1. Have you been to the ER, urgent care clinic since your last visit? Hospitalized since your last visit? Yes When: No    2. Have you seen or consulted any other health care providers outside of the [address] [place] [address] since your last visit? Include any pap smears or colon screening.  Yes When: Orthopaedic  for leg strain last week Dr. Amador Lizarraga    Visit Vitals  BP (!) 169/77 (BP 1 Location: Left arm, BP Patient Position: Sitting)   Pulse 60   Temp 98.2 °F (36.8 °C) (Oral)   Resp 16   Ht 5' 4\" (1.626 m)   Wt 140 lb 3.2 oz (63.6 kg)   SpO2 96%   BMI 24.07 kg/m²

## 2020-10-30 ENCOUNTER — HOSPITAL ENCOUNTER (OUTPATIENT)
Dept: PREADMISSION TESTING | Age: 75
Discharge: HOME | End: 2020-10-30
Payer: MEDICARE

## 2020-10-30 PROCEDURE — 87635 SARS-COV-2 COVID-19 AMP PRB: CPT

## 2020-10-31 LAB
HEALTH STATUS, XMCV2T: NORMAL
SARS-COV-2, COV2NT: NOT DETECTED
SOURCE, COVRS: NORMAL
SPECIMEN SOURCE, FCOV2M: NORMAL
SPECIMEN TYPE, XMCV1T: NORMAL

## 2020-11-02 RX ORDER — CHOLECALCIFEROL (VITAMIN D3) 50 MCG
CAPSULE ORAL DAILY
COMMUNITY
End: 2021-10-31

## 2020-11-03 ENCOUNTER — ANESTHESIA (OUTPATIENT)
Dept: ENDOSCOPY | Age: 75
End: 2020-11-03
Payer: MEDICARE

## 2020-11-03 ENCOUNTER — HOSPITAL ENCOUNTER (OUTPATIENT)
Age: 75
Setting detail: OUTPATIENT SURGERY
Discharge: HOME | End: 2020-11-03
Attending: INTERNAL MEDICINE | Admitting: INTERNAL MEDICINE
Payer: MEDICARE

## 2020-11-03 ENCOUNTER — ANESTHESIA EVENT (OUTPATIENT)
Dept: ENDOSCOPY | Age: 75
End: 2020-11-03
Payer: MEDICARE

## 2020-11-03 VITALS
RESPIRATION RATE: 13 BRPM | TEMPERATURE: 98.8 F | DIASTOLIC BLOOD PRESSURE: 67 MMHG | SYSTOLIC BLOOD PRESSURE: 155 MMHG | OXYGEN SATURATION: 99 % | HEART RATE: 57 BPM

## 2020-11-03 PROCEDURE — 76060000031 HC ANESTHESIA FIRST 0.5 HR: Performed by: INTERNAL MEDICINE

## 2020-11-03 PROCEDURE — 2709999900 HC NON-CHARGEABLE SUPPLY: Performed by: INTERNAL MEDICINE

## 2020-11-03 PROCEDURE — 74011250636 HC RX REV CODE- 250/636: Performed by: NURSE ANESTHETIST, CERTIFIED REGISTERED

## 2020-11-03 PROCEDURE — 77030013992 HC SNR POLYP ENDOSC BSC -B: Performed by: INTERNAL MEDICINE

## 2020-11-03 PROCEDURE — 77030009426 HC FCPS BIOP ENDOSC BSC -B: Performed by: INTERNAL MEDICINE

## 2020-11-03 PROCEDURE — 74011250636 HC RX REV CODE- 250/636: Performed by: INTERNAL MEDICINE

## 2020-11-03 PROCEDURE — 74011000250 HC RX REV CODE- 250: Performed by: NURSE ANESTHETIST, CERTIFIED REGISTERED

## 2020-11-03 PROCEDURE — 88305 TISSUE EXAM BY PATHOLOGIST: CPT

## 2020-11-03 PROCEDURE — 76040000019: Performed by: INTERNAL MEDICINE

## 2020-11-03 RX ORDER — DEXTROMETHORPHAN/PSEUDOEPHED 2.5-7.5/.8
1.2 DROPS ORAL
Status: DISCONTINUED | OUTPATIENT
Start: 2020-11-03 | End: 2020-11-04 | Stop reason: HOSPADM

## 2020-11-03 RX ORDER — ATROPINE SULFATE 0.1 MG/ML
0.5 INJECTION INTRAVENOUS
Status: DISCONTINUED | OUTPATIENT
Start: 2020-11-03 | End: 2020-11-04 | Stop reason: HOSPADM

## 2020-11-03 RX ORDER — SODIUM CHLORIDE 0.9 % (FLUSH) 0.9 %
5-40 SYRINGE (ML) INJECTION EVERY 8 HOURS
Status: DISCONTINUED | OUTPATIENT
Start: 2020-11-03 | End: 2020-11-04 | Stop reason: HOSPADM

## 2020-11-03 RX ORDER — LIDOCAINE HYDROCHLORIDE 20 MG/ML
INJECTION, SOLUTION EPIDURAL; INFILTRATION; INTRACAUDAL; PERINEURAL AS NEEDED
Status: DISCONTINUED | OUTPATIENT
Start: 2020-11-03 | End: 2020-11-03 | Stop reason: HOSPADM

## 2020-11-03 RX ORDER — SODIUM CHLORIDE 9 MG/ML
100 INJECTION, SOLUTION INTRAVENOUS CONTINUOUS
Status: DISCONTINUED | OUTPATIENT
Start: 2020-11-03 | End: 2020-11-03 | Stop reason: HOSPADM

## 2020-11-03 RX ORDER — PROPOFOL 10 MG/ML
INJECTION, EMULSION INTRAVENOUS AS NEEDED
Status: DISCONTINUED | OUTPATIENT
Start: 2020-11-03 | End: 2020-11-03 | Stop reason: HOSPADM

## 2020-11-03 RX ORDER — FLUMAZENIL 0.1 MG/ML
0.2 INJECTION INTRAVENOUS
Status: DISCONTINUED | OUTPATIENT
Start: 2020-11-03 | End: 2020-11-03 | Stop reason: HOSPADM

## 2020-11-03 RX ORDER — PHENYLEPHRINE HCL IN 0.9% NACL 0.4MG/10ML
SYRINGE (ML) INTRAVENOUS AS NEEDED
Status: DISCONTINUED | OUTPATIENT
Start: 2020-11-03 | End: 2020-11-03 | Stop reason: HOSPADM

## 2020-11-03 RX ORDER — NALOXONE HYDROCHLORIDE 0.4 MG/ML
0.4 INJECTION, SOLUTION INTRAMUSCULAR; INTRAVENOUS; SUBCUTANEOUS
Status: DISCONTINUED | OUTPATIENT
Start: 2020-11-03 | End: 2020-11-03 | Stop reason: HOSPADM

## 2020-11-03 RX ORDER — SODIUM CHLORIDE 0.9 % (FLUSH) 0.9 %
5-40 SYRINGE (ML) INJECTION AS NEEDED
Status: DISCONTINUED | OUTPATIENT
Start: 2020-11-03 | End: 2020-11-04 | Stop reason: HOSPADM

## 2020-11-03 RX ADMIN — SODIUM CHLORIDE 100 ML/HR: 900 INJECTION, SOLUTION INTRAVENOUS at 13:48

## 2020-11-03 RX ADMIN — PROPOFOL 40 MG: 10 INJECTION, EMULSION INTRAVENOUS at 13:59

## 2020-11-03 RX ADMIN — PROPOFOL 80 MG: 10 INJECTION, EMULSION INTRAVENOUS at 13:55

## 2020-11-03 RX ADMIN — PROPOFOL 20 MG: 10 INJECTION, EMULSION INTRAVENOUS at 14:01

## 2020-11-03 RX ADMIN — PROPOFOL 20 MG: 10 INJECTION, EMULSION INTRAVENOUS at 14:03

## 2020-11-03 RX ADMIN — PROPOFOL 20 MG: 10 INJECTION, EMULSION INTRAVENOUS at 13:57

## 2020-11-03 RX ADMIN — PROPOFOL 20 MG: 10 INJECTION, EMULSION INTRAVENOUS at 14:05

## 2020-11-03 RX ADMIN — LIDOCAINE HYDROCHLORIDE 20 MG: 20 INJECTION, SOLUTION EPIDURAL; INFILTRATION; INTRACAUDAL; PERINEURAL at 13:55

## 2020-11-03 RX ADMIN — Medication 40 MCG: at 14:00

## 2020-11-03 RX ADMIN — SODIUM CHLORIDE: 900 INJECTION, SOLUTION INTRAVENOUS at 13:49

## 2020-11-03 NOTE — DISCHARGE INSTRUCTIONS
Jorje Noriega MD  [facility], [address] [place] [address], 200 [facility]  [phone]  [email or web address]  [id number]  1945    COLON DISCHARGE INSTRUCTIONS  Discomfort:  Redness at IV site- apply warm compress to area; if redness or soreness persist- contact your physician  There may be a slight amount of blood passed from the rectum  Gaseous discomfort- walking, belching will help relieve any discomfort  You may not operate a vehicle for 12 hours  You may not engage in an occupation involving machinery or appliances for rest of today  You may not drink alcoholic beverages for at least 12 hours  Avoid making any critical decisions for at least 24 hour  DIET:   High fiber diet. - however -  remember your colon is empty and a heavy meal will produce gas. Avoid these foods:  vegetables, fried / greasy foods, carbonated drinks for today      ACTIVITY:  You may resume your normal daily activities it is recommended that you spend the remainder of the day resting -  avoid any strenuous activity. CALL M.D. ANY SIGN OF:   Increasing pain, nausea, vomiting  Abdominal distension (swelling)  New increased bleeding (oral or rectal)  Fever (chills)  Pain in chest area  Bloody discharge from nose or mouth  Shortness of breath     COLONOSCOPY FINDINGS:  Your colonoscopy showed: two small polyps which were removed and some hemorrhoids. Follow-up Instructions:   Call Dr. Jorje Noriega if any questions or problems. Telephone # [phone]  Dr. [facility] office will notify you of the biopsy results within 7 to 10 days. Should have a repeat colonoscopy in 5 years.

## 2020-11-03 NOTE — ANESTHESIA POSTPROCEDURE EVALUATION
Procedure(s):  COLONOSCOPY  ENDOSCOPIC POLYPECTOMY.     total IV anesthesia, general    Anesthesia Post Evaluation      Multimodal analgesia: multimodal analgesia used between 6 hours prior to anesthesia start to PACU discharge  Patient location during evaluation: bedside  Patient participation: complete - patient participated  Level of consciousness: awake  Pain management: adequate  Airway patency: patent  Anesthetic complications: no  Cardiovascular status: acceptable  Respiratory status: acceptable  Hydration status: acceptable  Post anesthesia nausea and vomiting:  controlled  Final Post Anesthesia Temperature Assessment:  Normothermia (36.0-37.5 degrees C)      INITIAL Post-op Vital signs:   Vitals Value Taken Time   /54 11/3/2020  2:09 PM   Temp     Pulse 65 11/3/2020  2:09 PM   Resp 21 11/3/2020  2:09 PM   SpO2 99 % 11/3/2020  2:09 PM

## 2020-11-03 NOTE — PROCEDURES
[facility]                  Colonoscopy Operative Report    11/3/2020      Jazmine Briseno  [id number]  1945    Procedure Type:   Colonoscopy --screening     Indications:    Personal history of colon polyps (screening only)     Pre-operative Diagnosis: see indication above    Post-operative Diagnosis:  See findings below    :  Brook Chavira MD    Referring Provider: Joseph Nicole MD      Sedation:  MAC anesthesia Propofol    Pre-Procedural Exam:      Airway: clear,  No airway problems anticipated  Heart: RRR, without gallops or rubs  Lungs: clear bilaterally without wheezes, crackles, or rhonchi  Abdomen: soft, nontender, nondistended, bowel sounds present  Mental Status: awake, alert and oriented to person, place and time     Procedure Details:  After informed consent was obtained with all risks and benefits of procedure explained and preoperative exam completed, the patient was taken to the endoscopy suite and placed in the left lateral decubitus position. Upon sequential sedation as per above, a digital rectal exam was performed . The Olympus videocolonoscope  was inserted in the rectum and carefully advanced to the cecum, which was identified by the ileocecal valve and appendiceal orifice. The cecum was identified by the ileocecal valve and appendiceal orifice. The quality of preparation was good. The colonoscope was slowly withdrawn with careful evaluation between folds. Retroflexion in the rectum was completed demonstrating internal and external hemorrhoids. Findings:   Rectum: Grade 1 internal and external hemorrhoid(s); Sigmoid: normal  Descending Colon: normal  Transverse Colon: 3 mm and 6 mm polyps, removed with cold snare and cold forceps  Ascending Colon: normal  Cecum: normal  Terminal Ileum: not intubated      Specimen Removed:  transverse colonic polyps    Complications: None. EBL:  None.     Impression:    hemorrhoids internal and external, Moderate in size  two small transverse colon polyps removed    Recommendations: --Await pathology. , -Repeat colonoscopy in 5 years. High fiber diet. Resume normal medication(s). Discharge Disposition:  Home in the company of a  when able to ambulate. Louie Jenkins MD    11/3/2020     SHIRIN Mistry MD  [facility], [address] [facility] [address]  [phone]  [email or web address]

## 2020-11-03 NOTE — PERIOP NOTES
Endoscope was pre-cleaned at bedside immediately following procedure by MICHELLE Cali   Medications     Medication Rate/Dose/Volume Action Route Date Time   Administering User Audit    lidocaine (PF) 2% (mg) 20 mg Given IntraVENous 11/03/20  1355  Conchis Bui, CRNA     propofol 10 mg/mL (mg) 80 mg Given IntraVENous 11/03/20  1355  Spero Paniagua, CRNA      20 mg Given IntraVENous  1357  Spero Paniagua, CRNA      40 mg Given IntraVENous  1359  Spero Paniagua, CRNA      20 mg Given IntraVENous  1401  Spero Paniagua, CRNA      20 mg Given IntraVENous  3746  Spero Paniagua, CRNA      20 mg Given IntraVENous  1405  Spero Paniagua, CRNA     PHENYLephrine (NEOSYNEPHRINE) in NS syringe (mcg) 40 mcg Given IntraVENous 11/03/20  1400  Spero Paniagua, CRNA     0.9% sodium chloride infusion (mL)  New Bag IntraVENous 11/03/20  1349  Spero Paniagua, CRNA      300 mL Anesthesia Volume Adjustment IntraVENous  1402  Spero Paniagua, CRNA      100 mL Anesthesia Volume Adjustment IntraVENous  1406  Spero Paniagua, CRNA

## 2020-11-03 NOTE — ANESTHESIA PREPROCEDURE EVALUATION
Anesthetic History   No history of anesthetic complications            Review of Systems / Medical History  Patient summary reviewed, nursing notes reviewed and pertinent labs reviewed    Pulmonary            Asthma : well controlled       Neuro/Psych         TIA     Cardiovascular    Hypertension          Past MI, CAD, PAD and hyperlipidemia    Exercise tolerance: >4 METS  Comments: TTE 2018  Left ventricle: Systolic function was normal. Ejection fraction was  estimated in the range of 55 % to 60 %. There was severe hypokinesis of  the basal-mid inferoseptal wall(s). There was severe hypokinesis of the  entire inferior wall(s).    Left atrium: The atrium was dilated.     INDICATIONS: CAD Assess left ventricular function. [facility] 2019  · Non-obstructive CAD overall. Left dominant coronary circulation. · Patent OM2 stent  · Moderate stenosis in the proximal LAD 50% - negative by iFR (0.91-0.92) and by FFR using IV adenosine (0.87)  · Medical therapy recommeonded.    GI/Hepatic/Renal     GERD: well controlled           Endo/Other        Arthritis     Other Findings            Physical Exam    Airway  Mallampati: II  TM Distance: 4 - 6 cm  Neck ROM: normal range of motion   Mouth opening: Normal     Cardiovascular  Regular rate and rhythm,  S1 and S2 normal,  no murmur, click, rub, or gallop             Dental    Dentition: Caps/crowns     Pulmonary  Breath sounds clear to auscultation               Abdominal  GI exam deferred       Other Findings            Anesthetic Plan    ASA: 3  Anesthesia type: total IV anesthesia and general          Induction: Intravenous  Anesthetic plan and risks discussed with: Patient

## 2020-11-03 NOTE — H&P
Octavio Jurados, MD  [facility], [address] [facility]  [phone]  [email or web address]    Gastroenterology Outpatient History and Physical    Patient: Noe Samuel    Physician: Neena Curtis MD    Vital Signs: Blood pressure (!) 174/59, pulse 73, temperature 98.8 °F (37.1 °C), resp. rate 13, SpO2 99 %, not currently breastfeeding. Allergies: Allergies   Allergen Reactions    Nuts [Tree Nut] Other (comments)     Mouth sores. Walnuts, pecans    Sulfa (Sulfonamide Antibiotics) Nausea and Vomiting       Chief Complaint: Hx of polyps    History of Present Illness: Personal history of colonic polyps. Last colonoscopy was 2015 and showed no polyps. Currently has no GI symptoms. No FH of colon cancer or polyps. History:  Past Medical History:   Diagnosis Date    Alopecia     Arthritis     Asthma     CAD (coronary artery disease) 08/12/2018    NSTEMI, Cath, TYRONE OM2 of dominant Cx    GERD (gastroesophageal reflux disease)     H/O seasonal allergies     H/O TB skin testing 1968    treated with INH    Hypercholesterolemia     Hypertension     IFG (impaired fasting glucose) 6/14/2013    a1c 6.0 5/2013    Ill-defined condition     TIA symptoms, saw neuro-opthalmologist and was told it was probably spasm of cranial nerve.  Overactive bladder 3/13/2013    Serrated adenoma of colon 2/6/2014 11/17/09 - Dr. Slaughter Score - repeat in 5 years    Stroke [facility])     TIA      Past Surgical History:   Procedure Laterality Date    COLORECTAL SCRN; HI RISK IND  2/20/2015         HX CATARACT REMOVAL Bilateral     HX GI      colonscopy.  polyp removed    HX HEART CATHETERIZATION  2018    stent x 1    HX MALIGNANT SKIN LESION EXCISION Left 2018    \"non-melanoma pre-cancerous lesion removed\" per patient    HX ORTHOPAEDIC Right 2015    great toe replacement      Social History     Socioeconomic History    Marital status:      Spouse name: Not on file    Number of children: Not on file    Years of education: Not on file    Highest education level: Not on file   Tobacco Use    Smoking status: Former Smoker     Packs/day: 0.25     Years: 8.00     Pack years: 2.00     Last attempt to quit: 1995     Years since quittin.7    Smokeless tobacco: Never Used    Tobacco comment: quit in    Substance and Sexual Activity    Alcohol use:  Yes     Alcohol/week: 28.0 standard drinks     Types: 21 Glasses of wine, 7 Shots of liquor per week     Comment: 2-3 etoh drinks daily - burboun - no withdrawl symptoms when she does not have it    Drug use: No    Sexual activity: Yes     Partners: Male      Family History   Problem Relation Age of Onset    Hypertension Mother     Stroke Mother     Lung Disease Mother     Cancer Father         bladder    Other Father         congential heart valve defect    Depression Sister     Stroke Sister 72    Hypertension Sister     COPD Sister     Other Sister 79        twisted bowel    Heart Disease Maternal Grandfather     Dementia Paternal Grandfather     Cancer Maternal Grandmother         esophageal    Asthma Paternal Grandmother     Dementia Paternal Grandmother     Breast Cancer Paternal Grandmother       Patient Active Problem List   Diagnosis Code    Essential hypertension I10    Asthma J45.909    ADD (attention deficit disorder) F98.8    Overactive bladder N32.81    Environmental allergies Z91.09    S/P colonoscopy Z98.890    At risk for osteoporosis Z91.89    Bilateral carotid artery disease (HCC) I77.9    Vertical diplopia H53.2    IFG (impaired fasting glucose) R73.01    Serrated adenoma of colon D12.6    Alopecia areata L63.9    Advance directive discussed with patient Z71.89    Chest pain R07.9    Coronary artery disease involving native coronary artery of native heart without angina pectoris I25.10    Hypercholesterolemia E78.00 Medications:   Prior to Admission medications    Medication Sig Start Date End Date Taking? Authorizing Provider   B.infantis-B.ani-B.long-Ivonei (Probiotic 4X) 10-15 mg TbEC Take  by mouth daily. Yes Provider, Historical   lisinopriL (PRINIVIL, ZESTRIL) 10 mg tablet Take 1 Tab by mouth daily. 10/7/20  Yes Susanne Pleitez MD   metoprolol tartrate (LOPRESSOR) 25 mg tablet Take 1 Tab by mouth every twelve (12) hours. Patient taking differently: Take 25 mg by mouth two (2) times a day. 8/19/20  Yes Susanne Pleitez MD   LIVALO 2 mg tablet Take 2 mg by mouth daily. 1/9/20  Yes Provider, Historical   oxybutynin chloride XL (DITROPAN XL) 10 mg CR tablet TAKE 1 TABLET BY MOUTH ONCE DAILY 1/3/20  Yes Susanne Pleitez MD   Omeprazole delayed release (PRILOSEC D/R) 20 mg tablet Take 1 Tab by mouth daily. Patient taking differently: Take 20 mg by mouth every evening. 9/27/19  Yes Sudha Calloway MD   multivitamin (ONE A DAY) tablet Take 1 Tab by mouth daily. Yes Provider, Historical   OAT BRAN PO Take  by mouth. Yes Provider, Historical   FLAXSEED PO Take  by mouth daily. Flaxseed meal   Yes Provider, Historical   cetirizine (ZYRTEC) 10 mg tablet Take 10 mg by mouth daily. Yes Provider, Historical   aspirin 81 mg chewable tablet Take 81 mg by mouth daily. Yes Provider, Historical   levalbuterol tartrate (XOPENEX) 45 mcg/actuation inhaler Take 1-2 Puffs by inhalation every four (4) hours as needed for Wheezing.  1/17/17   Susanne Pleitez MD       Physical Exam:     General: well developed, well nourished   HEENT: unremarkable   Heart: regular rhythm no mumur    Lungs: clear   Abdominal:  benign   Neurological: unremarkable   Extremities: no edema     Findings/Diagnosis: Personal history of colonic polyps  Plan of Care/Planned Procedure: Colonoscopy with monitored anesthesia care sedation    Signed:  Lakeshia Stringer MD 11/3/2020

## 2020-11-24 ENCOUNTER — TELEPHONE (OUTPATIENT)
Dept: INTERNAL MEDICINE CLINIC | Age: 75
End: 2020-11-24

## 2020-11-24 ENCOUNTER — CLINICAL SUPPORT (OUTPATIENT)
Dept: INTERNAL MEDICINE CLINIC | Age: 75
End: 2020-11-24
Payer: MEDICARE

## 2020-11-24 DIAGNOSIS — R30.0 DYSURIA: ICD-10-CM

## 2020-11-24 DIAGNOSIS — R35.0 URINARY FREQUENCY: Primary | ICD-10-CM

## 2020-11-24 LAB
BILIRUB UR QL STRIP: NEGATIVE
GLUCOSE UR-MCNC: NEGATIVE MG/DL
KETONES P FAST UR STRIP-MCNC: NEGATIVE MG/DL
PH UR STRIP: 7 [PH] (ref 4.6–8)
PROT UR QL STRIP: NEGATIVE
SP GR UR STRIP: 1.01 (ref 1–1.03)
UA UROBILINOGEN AMB POC: NORMAL (ref 0.2–1)
URINALYSIS CLARITY POC: CLEAR
URINALYSIS COLOR POC: YELLOW
URINE BLOOD POC: NORMAL
URINE LEUKOCYTES POC: NORMAL
URINE NITRITES POC: NEGATIVE

## 2020-11-24 PROCEDURE — 81003 URINALYSIS AUTO W/O SCOPE: CPT | Performed by: INTERNAL MEDICINE

## 2020-11-24 NOTE — TELEPHONE ENCOUNTER
Per Dr Nery Stark ok for patient to bring in urine sample. Advised patient to bring in sample as soon as possible and let  staff know when she comes in to office.

## 2020-11-24 NOTE — TELEPHONE ENCOUNTER
Called pt back and explained we have no open appt times today/tomorrow. Pt stated that she doesn't want to make an appt, she wants the provider to order a urine test and then call in an antibiotic w/o an appt.  Pt requested a call back from the nurse to confirm this is okay at [phone].      ----- Message from [facility] sent at 11/24/2020  2:04 PM EST -----  Regarding: Dr. Yasir Ramirez / telephone  Caller's first and last name and relationship (if not the patient): NA  Best contact number(s): [phone]  What are the symptoms: frequent urination and burning   Transfer successful - yes/no (include outcome): N, No answer  Transfer declined - yes/no (include reason): N, No answer at backline  Was caller advised to seek appropriate level of care - yes/no:  Y  Details to clarify the request: symptoms started a week ago and getting worse, would like to come leave a urine sample

## 2020-11-24 NOTE — TELEPHONE ENCOUNTER
Verified patients name and date of birth. Patient states she has had urinary frequency and burning for a couple weeks and it has gotten worse. She previously declined making an appt and wants to bring in a urine sample. Please advise.

## 2020-11-27 ENCOUNTER — TELEPHONE (OUTPATIENT)
Dept: INTERNAL MEDICINE CLINIC | Age: 75
End: 2020-11-27

## 2020-11-27 RX ORDER — NITROFURANTOIN 25; 75 MG/1; MG/1
100 CAPSULE ORAL 2 TIMES DAILY
Qty: 14 CAP | Refills: 0 | Status: SHIPPED | OUTPATIENT
Start: 2020-11-27 | End: 2020-12-04

## 2020-11-27 NOTE — TELEPHONE ENCOUNTER
Patient wants to know if she needs blood work prior to her scheduled appt on 12/09. Patient asked nurse to notify her via My Chart.

## 2020-11-28 LAB — BACTERIA UR CULT: ABNORMAL

## 2020-12-09 ENCOUNTER — OFFICE VISIT (OUTPATIENT)
Dept: INTERNAL MEDICINE CLINIC | Age: 75
End: 2020-12-09
Payer: MEDICARE

## 2020-12-09 VITALS
HEIGHT: 64 IN | TEMPERATURE: 98.2 F | HEART RATE: 92 BPM | RESPIRATION RATE: 16 BRPM | SYSTOLIC BLOOD PRESSURE: 164 MMHG | OXYGEN SATURATION: 98 % | WEIGHT: 140.2 LBS | BODY MASS INDEX: 23.93 KG/M2 | DIASTOLIC BLOOD PRESSURE: 80 MMHG

## 2020-12-09 DIAGNOSIS — I25.10 CORONARY ARTERY DISEASE INVOLVING NATIVE CORONARY ARTERY OF NATIVE HEART WITHOUT ANGINA PECTORIS: ICD-10-CM

## 2020-12-09 DIAGNOSIS — I10 ESSENTIAL HYPERTENSION: Primary | ICD-10-CM

## 2020-12-09 PROCEDURE — G8510 SCR DEP NEG, NO PLAN REQD: HCPCS | Performed by: INTERNAL MEDICINE

## 2020-12-09 PROCEDURE — 1100F PTFALLS ASSESS-DOCD GE2>/YR: CPT | Performed by: INTERNAL MEDICINE

## 2020-12-09 PROCEDURE — G8536 NO DOC ELDER MAL SCRN: HCPCS | Performed by: INTERNAL MEDICINE

## 2020-12-09 PROCEDURE — G8754 DIAS BP LESS 90: HCPCS | Performed by: INTERNAL MEDICINE

## 2020-12-09 PROCEDURE — G8420 CALC BMI NORM PARAMETERS: HCPCS | Performed by: INTERNAL MEDICINE

## 2020-12-09 PROCEDURE — G8427 DOCREV CUR MEDS BY ELIG CLIN: HCPCS | Performed by: INTERNAL MEDICINE

## 2020-12-09 PROCEDURE — 1090F PRES/ABSN URINE INCON ASSESS: CPT | Performed by: INTERNAL MEDICINE

## 2020-12-09 PROCEDURE — G8753 SYS BP > OR = 140: HCPCS | Performed by: INTERNAL MEDICINE

## 2020-12-09 PROCEDURE — 3017F COLORECTAL CA SCREEN DOC REV: CPT | Performed by: INTERNAL MEDICINE

## 2020-12-09 PROCEDURE — G8399 PT W/DXA RESULTS DOCUMENT: HCPCS | Performed by: INTERNAL MEDICINE

## 2020-12-09 PROCEDURE — 99213 OFFICE O/P EST LOW 20 MIN: CPT | Performed by: INTERNAL MEDICINE

## 2020-12-09 PROCEDURE — 3288F FALL RISK ASSESSMENT DOCD: CPT | Performed by: INTERNAL MEDICINE

## 2020-12-09 RX ORDER — LISINOPRIL 20 MG/1
20 TABLET ORAL DAILY
Qty: 90 TAB | Refills: 1 | Status: SHIPPED | OUTPATIENT
Start: 2020-12-09 | End: 2021-01-11 | Stop reason: SINTOL

## 2020-12-09 NOTE — PROGRESS NOTES
HPI  Ms. Sarah Main is a 76y.o. year old female, she is seen today for follow up HTN. Has been up and down. 114-170s/  HR 60s-80s  No chest pain, sob, HA, dizziness, lightheadedness. Saw cardiologist since last visit - he wanted her to increase one of her medications but she doesn't remember which one. Has been trying to walk more, has gained a bit of weight. Notes bp better with lower weight and regular exercise. Chief Complaint   Patient presents with    Hypertension     discuss medication        Prior to Admission medications    Medication Sig Start Date End Date Taking? Authorizing Provider   lisinopriL (PRINIVIL, ZESTRIL) 20 mg tablet Take 1 Tab by mouth daily. 12/9/20  Yes MD Kam Hutchison-B.long-B.bifi (Probiotic 4X) 10-15 mg TbEC Take  by mouth daily. Yes Provider, Historical   metoprolol tartrate (LOPRESSOR) 25 mg tablet Take 1 Tab by mouth every twelve (12) hours. Patient taking differently: Take 25 mg by mouth two (2) times a day. 8/19/20  Yes Madhav Ann MD   LIVALO 2 mg tablet Take 2 mg by mouth daily. 1/9/20  Yes Provider, Historical   oxybutynin chloride XL (DITROPAN XL) 10 mg CR tablet TAKE 1 TABLET BY MOUTH ONCE DAILY 1/3/20  Yes Madhav Ann MD   Omeprazole delayed release (PRILOSEC D/R) 20 mg tablet Take 1 Tab by mouth daily. Patient taking differently: Take 20 mg by mouth every evening. 9/27/19  Yes Zechariah Calloway Do, MD   multivitamin (ONE A DAY) tablet Take 1 Tab by mouth daily. Yes Provider, Historical   OAT BRAN PO Take  by mouth. Yes Provider, Historical   FLAXSEED PO Take  by mouth daily. Flaxseed meal   Yes Provider, Historical   cetirizine (ZYRTEC) 10 mg tablet Take 10 mg by mouth daily. Yes Provider, Historical   aspirin 81 mg chewable tablet Take 81 mg by mouth daily.    Yes Provider, Historical   levalbuterol tartrate (XOPENEX) 45 mcg/actuation inhaler Take 1-2 Puffs by inhalation every four (4) hours as needed for Wheezing. 1/17/17   Bola Hooks MD         Allergies   Allergen Reactions   [place] Price Nut] Other (comments)     Mouth sores. Walnuts, pecans    Sulfa (Sulfonamide Antibiotics) Nausea and Vomiting         REVIEW OF SYSTEMS:  Per HPI    PHYSICAL EXAM:  Visit Vitals  BP (!) 164/80   Pulse 92   Temp 98.2 °F (36.8 °C) (Oral)   Resp 16   Ht 5' 4\" (1.626 m)   Wt 140 lb 3.2 oz (63.6 kg)   SpO2 98%   BMI 24.07 kg/m²     Constitutional: Appears well-developed and well-nourished. No distress. HENT:   Head: Normocephalic and atraumatic. Eyes: No scleral icterus. Cardiovascular: Normal S1/S2, regular rhythm. No murmurs, rubs, or gallops. Pulmonary/Chest: Effort normal and breath sounds normal. No respiratory distress. No wheezes, rhonchi, or rales. Ext: No edema. Neurological: Alert. Psychiatric: Normal mood and affect. Behavior is normal.     Lab Results   Component Value Date/Time    Sodium 143 08/12/2020 09:13 AM    Potassium 4.6 08/12/2020 09:13 AM    Chloride 108 (H) 08/12/2020 09:13 AM    CO2 24 08/12/2020 09:13 AM    Anion gap 6 09/26/2019 09:55 PM    Glucose 85 08/12/2020 09:13 AM    BUN 27 08/12/2020 09:13 AM    Creatinine 0.79 08/12/2020 09:13 AM    BUN/Creatinine ratio 34 (H) 08/12/2020 09:13 AM    GFR est AA 85 08/12/2020 09:13 AM    GFR est non-AA 74 08/12/2020 09:13 AM    Calcium 9.4 08/12/2020 09:13 AM    Bilirubin, total <0.2 08/12/2020 09:13 AM    Alk.  phosphatase 76 08/12/2020 09:13 AM    Protein, total 6.1 08/12/2020 09:13 AM    Albumin 4.2 08/12/2020 09:13 AM    Globulin 3.3 08/12/2018 09:40 AM    A-G Ratio 2.2 08/12/2020 09:13 AM    ALT (SGPT) 19 08/12/2020 09:13 AM     Lab Results   Component Value Date/Time    Hemoglobin A1c 5.4 09/27/2019 04:09 AM    Hemoglobin A1c 5.3 05/22/2018 08:34 AM    Hemoglobin A1c 5.3 11/20/2017 09:36 AM      Lab Results   Component Value Date/Time    Cholesterol, total 161 08/12/2020 09:13 AM    HDL Cholesterol 67 08/12/2020 09:13 AM    LDL, calculated 79 08/12/2020 09:13 AM    VLDL, calculated 15 08/12/2020 09:13 AM    Triglyceride 73 08/12/2020 09:13 AM    CHOL/HDL Ratio 3.4 09/27/2019 03:54 AM          ASSESSMENT/PLAN  Diagnoses and all orders for this visit:    1. Essential hypertension  -     lisinopriL (PRINIVIL, ZESTRIL) 20 mg tablet; Take 1 Tab by mouth daily. 2. Coronary artery disease involving native coronary artery of native heart without angina pectoris    Blood pressure is not controlled. Will increase lisinopril to 20 mg daily. Continue to monitor blood pressure at home. CAD managed by cardiology. No recurrent symptoms. Health Maintenance Due   Topic Date Due    A1C test (Diabetic or Prediabetic)  09/27/2020    Shingrix Vaccine Age 50> (2 of 2) 10/30/2020        Follow-up and Dispositions    · Return in about 1 month (around 1/9/2021) for bp. Reviewed plan of care. Patient has provided input and agrees with goals. The nurse provided the patient and/or family with advanced directive information if needed and encouraged the patient to provide a copy to the office when available.

## 2020-12-09 NOTE — PROGRESS NOTES
Room: 2B    Identified pt with two pt identifiers(name and ). Reviewed record in preparation for visit and have obtained necessary documentation. All patient medications has been reviewed. Chief Complaint   Patient presents with    Hypertension     discuss medication       Health Maintenance Due   Topic    A1C test (Diabetic or Prediabetic)     Shingrix Vaccine Age 50> (2 of 2)       Vitals:    20 0904   BP: (!) 174/82   Pulse: 92   Resp: 16   Temp: 98.2 °F (36.8 °C)   TempSrc: Oral   SpO2: 98%   Weight: 140 lb 3.2 oz (63.6 kg)   Height: 5' 4\" (1.626 m)   PainSc:   0 - No pain       4. Have you been to the ER, urgent care clinic since your last visit? Hospitalized since your last visit? No    5. Have you seen or consulted any other health care providers outside of the [address] [place] [address] since your last visit? Include any pap smears or colon screening. No    Patient is accompanied by self I have received verbal consent from [address] [place] [address] to discuss any/all medical information while they are present in the room.

## 2021-01-11 ENCOUNTER — OFFICE VISIT (OUTPATIENT)
Dept: INTERNAL MEDICINE CLINIC | Age: 76
End: 2021-01-11
Payer: MEDICARE

## 2021-01-11 VITALS
HEART RATE: 62 BPM | RESPIRATION RATE: 16 BRPM | TEMPERATURE: 97.9 F | WEIGHT: 139.8 LBS | OXYGEN SATURATION: 98 % | DIASTOLIC BLOOD PRESSURE: 60 MMHG | BODY MASS INDEX: 23.87 KG/M2 | SYSTOLIC BLOOD PRESSURE: 150 MMHG | HEIGHT: 64 IN

## 2021-01-11 DIAGNOSIS — I10 ESSENTIAL HYPERTENSION: Primary | ICD-10-CM

## 2021-01-11 PROCEDURE — G8754 DIAS BP LESS 90: HCPCS | Performed by: INTERNAL MEDICINE

## 2021-01-11 PROCEDURE — G8399 PT W/DXA RESULTS DOCUMENT: HCPCS | Performed by: INTERNAL MEDICINE

## 2021-01-11 PROCEDURE — 99214 OFFICE O/P EST MOD 30 MIN: CPT | Performed by: INTERNAL MEDICINE

## 2021-01-11 PROCEDURE — G8753 SYS BP > OR = 140: HCPCS | Performed by: INTERNAL MEDICINE

## 2021-01-11 PROCEDURE — 1100F PTFALLS ASSESS-DOCD GE2>/YR: CPT | Performed by: INTERNAL MEDICINE

## 2021-01-11 PROCEDURE — 3288F FALL RISK ASSESSMENT DOCD: CPT | Performed by: INTERNAL MEDICINE

## 2021-01-11 PROCEDURE — G8427 DOCREV CUR MEDS BY ELIG CLIN: HCPCS | Performed by: INTERNAL MEDICINE

## 2021-01-11 PROCEDURE — G8536 NO DOC ELDER MAL SCRN: HCPCS | Performed by: INTERNAL MEDICINE

## 2021-01-11 PROCEDURE — G8432 DEP SCR NOT DOC, RNG: HCPCS | Performed by: INTERNAL MEDICINE

## 2021-01-11 PROCEDURE — 1090F PRES/ABSN URINE INCON ASSESS: CPT | Performed by: INTERNAL MEDICINE

## 2021-01-11 PROCEDURE — G8420 CALC BMI NORM PARAMETERS: HCPCS | Performed by: INTERNAL MEDICINE

## 2021-01-11 PROCEDURE — 3017F COLORECTAL CA SCREEN DOC REV: CPT | Performed by: INTERNAL MEDICINE

## 2021-01-11 RX ORDER — LOSARTAN POTASSIUM 50 MG/1
50 TABLET ORAL DAILY
Qty: 90 TAB | Refills: 1 | Status: SHIPPED | OUTPATIENT
Start: 2021-01-11 | End: 2021-01-28 | Stop reason: DRUGHIGH

## 2021-01-11 RX ORDER — EZETIMIBE 10 MG/1
1 TABLET ORAL
COMMUNITY
End: 2021-01-11

## 2021-01-11 RX ORDER — MOMETASONE FUROATE 1 MG/G
CREAM TOPICAL
COMMUNITY
End: 2021-08-31 | Stop reason: SDUPTHER

## 2021-01-11 RX ORDER — MONTELUKAST SODIUM 10 MG/1
1 TABLET ORAL
COMMUNITY
End: 2021-10-31

## 2021-01-11 RX ORDER — UREA 10 %
1 LOTION (ML) TOPICAL
COMMUNITY
End: 2021-01-11

## 2021-01-11 NOTE — PATIENT INSTRUCTIONS
Start losartan 50mg daily. If blood pressure not less than 140/90 after 3 days increase to 100mg daily.

## 2021-01-11 NOTE — PROGRESS NOTES
HPI  Ms. Elena Lombardi is a 76y.o. year old female, she is seen today for follow up HTN. BP has remained high  No chest pain, sob, dizziness, lightheadedness, weakness. Weight is lower to #133. Has been getting cough with lisinopril. Hasn't been able to walk as much as usual due to right knee pain - laterally. Does better with shorter walks. Has been doing home exercises for right leg pain - much better than in the past. Knee pain is new and occurs only with longer walks. Knee pain resolves when she stops. Chief Complaint   Patient presents with    Hypertension        Prior to Admission medications    Medication Sig Start Date End Date Taking? Authorizing Provider   montelukast (Singulair) 10 mg tablet Take 1 Tab by mouth. Yes Provider, Historical   mometasone (ELOCON) 0.1 % topical cream Apply  to affected area. Yes Provider, Historical   losartan (COZAAR) 50 mg tablet Take 1 Tab by mouth daily. 1/11/21  Yes Didier Delgado MD   B.infantis-B.ani-B.long-B.bifi (Probiotic 4X) 10-15 mg TbEC Take  by mouth daily. Yes Provider, Historical   metoprolol tartrate (LOPRESSOR) 25 mg tablet Take 1 Tab by mouth every twelve (12) hours. Patient taking differently: Take 25 mg by mouth two (2) times a day. 8/19/20  Yes Didier Delgado MD   LIVALO 2 mg tablet Take 2 mg by mouth daily. 1/9/20  Yes Provider, Historical   oxybutynin chloride XL (DITROPAN XL) 10 mg CR tablet TAKE 1 TABLET BY MOUTH ONCE DAILY 1/3/20  Yes Didier Delgado MD   Omeprazole delayed release (PRILOSEC D/R) 20 mg tablet Take 1 Tab by mouth daily. Patient taking differently: Take 20 mg by mouth every evening. 9/27/19  Yes Michelle Calloway MD   multivitamin (ONE A DAY) tablet Take 1 Tab by mouth daily. Yes Provider, Historical   OAT BRAN PO Take  by mouth. Yes Provider, Historical   levalbuterol tartrate (XOPENEX) 45 mcg/actuation inhaler Take 1-2 Puffs by inhalation every four (4) hours as needed for Wheezing.  1/17/17  Yes Shweta Martinez MD   FLAXSEED PO Take  by mouth daily. Flaxseed meal   Yes Provider, Historical   cetirizine (ZYRTEC) 10 mg tablet Take 10 mg by mouth daily. Yes Provider, Historical   aspirin 81 mg chewable tablet Take 81 mg by mouth daily. Yes Provider, Historical   folic acid (FOLVITE) 235 mcg tablet Take 1 Tab by mouth. 1/11/21  Provider, Historical   ezetimibe (Zetia) 10 mg tablet Take 1 Tab by mouth. 1/11/21  Provider, Historical   s-adenosylmethionine 200 mg tab   1/11/21  Provider, Historical   lisinopriL (PRINIVIL, ZESTRIL) 20 mg tablet Take 1 Tab by mouth daily. 12/9/20 1/11/21  Shweta Martinez MD         Allergies   Allergen Reactions   Praveenjoel Wang Brayan Nut] Other (comments)     Mouth sores. Walnuts, pecans    Sulfa (Sulfonamide Antibiotics) Nausea and Vomiting         REVIEW OF SYSTEMS:  Per HPI    PHYSICAL EXAM:  Visit Vitals  BP (!) 150/60   Pulse 62   Temp 97.9 °F (36.6 °C) (Oral)   Resp 16   Ht 5' 4\" (1.626 m)   Wt 139 lb 12.8 oz (63.4 kg)   SpO2 98%   BMI 24.00 kg/m²     Constitutional: Appears well-developed and well-nourished. No distress. HENT:   Head: Normocephalic and atraumatic. Eyes: No scleral icterus. Cardiovascular: Normal S1/S2, regular rhythm. No murmurs, rubs, or gallops. Pulmonary/Chest: Effort normal and breath sounds normal. No respiratory distress. No wheezes, rhonchi, or rales. Ext: No edema. Neurological: Alert. Psychiatric: Normal mood and affect.  Behavior is normal.     Lab Results   Component Value Date/Time    Sodium 143 08/12/2020 09:13 AM    Potassium 4.6 08/12/2020 09:13 AM    Chloride 108 (H) 08/12/2020 09:13 AM    CO2 24 08/12/2020 09:13 AM    Anion gap 6 09/26/2019 09:55 PM    Glucose 85 08/12/2020 09:13 AM    BUN 27 08/12/2020 09:13 AM    Creatinine 0.79 08/12/2020 09:13 AM    BUN/Creatinine ratio 34 (H) 08/12/2020 09:13 AM    GFR est AA 85 08/12/2020 09:13 AM    GFR est non-AA 74 08/12/2020 09:13 AM    Calcium 9.4 08/12/2020 09:13 AM Bilirubin, total <0.2 08/12/2020 09:13 AM    Alk. phosphatase 76 08/12/2020 09:13 AM    Protein, total 6.1 08/12/2020 09:13 AM    Albumin 4.2 08/12/2020 09:13 AM    Globulin 3.3 08/12/2018 09:40 AM    A-G Ratio 2.2 08/12/2020 09:13 AM    ALT (SGPT) 19 08/12/2020 09:13 AM     Lab Results   Component Value Date/Time    Hemoglobin A1c 5.4 09/27/2019 04:09 AM    Hemoglobin A1c 5.3 05/22/2018 08:34 AM    Hemoglobin A1c 5.3 11/20/2017 09:36 AM      Lab Results   Component Value Date/Time    Cholesterol, total 161 08/12/2020 09:13 AM    HDL Cholesterol 67 08/12/2020 09:13 AM    LDL, calculated 79 08/12/2020 09:13 AM    VLDL, calculated 15 08/12/2020 09:13 AM    Triglyceride 73 08/12/2020 09:13 AM    CHOL/HDL Ratio 3.4 09/27/2019 03:54 AM          ASSESSMENT/PLAN  Diagnoses and all orders for this visit:    1. Essential hypertension  -     METABOLIC PANEL, BASIC; Future  -     losartan (COZAAR) 50 mg tablet; Take 1 Tab by mouth daily. Not to goal - reviewed all bp values in [facility] - will stop lisinopril due to cough, start losartan 50mg daily and if not to goal less than 140/90 in a few days increase to 100mg and get bmp in about 2 weeks    Health Maintenance Due   Topic Date Due    A1C test (Diabetic or Prediabetic)  09/27/2020        Follow-up and Dispositions    · Return in about 1 month (around 2/11/2021) for bp - in person. Reviewed plan of care. Patient has provided input and agrees with goals. The nurse provided the patient and/or family with advanced directive information if needed and encouraged the patient to provide a copy to the office when available.

## 2021-01-11 NOTE — PROGRESS NOTES
Fabi Baker  Identified pt with two pt identifiers(name and ). Chief Complaint   Patient presents with    Hypertension       Reviewed record In preparation for visit and have obtained necessary documentation. 1. Have you been to the ER, urgent care clinic or hospitalized since your last visit? No     2. Have you seen or consulted any other health care providers outside of the [address] [place] [address] since your last visit? Include any pap smears or colon screening. No    Patient does not have an advance directive. Vitals reviewed with provider.     Health Maintenance reviewed:     Health Maintenance Due   Topic    A1C test (Diabetic or Prediabetic)           Wt Readings from Last 3 Encounters:   21 139 lb 12.8 oz (63.4 kg)   20 140 lb 3.2 oz (63.6 kg)   10/07/20 140 lb 3.2 oz (63.6 kg)        Temp Readings from Last 3 Encounters:   21 97.9 °F (36.6 °C) (Oral)   20 98.2 °F (36.8 °C) (Oral)   20 98.8 °F (37.1 °C)        BP Readings from Last 3 Encounters:   21 (!) 163/80   20 (!) 164/80   20 (!) 155/67        Pulse Readings from Last 3 Encounters:   21 62   20 92   20 (!) 57        Vitals:    21 1119   BP: (!) 163/80   Pulse: 62   Resp: 16   Temp: 97.9 °F (36.6 °C)   TempSrc: Oral   SpO2: 98%   Weight: 139 lb 12.8 oz (63.4 kg)   Height: 5' 4\" (1.626 m)          Learning Assessment:   :       Learning Assessment 3/20/2015   PRIMARY LEARNER Patient   HIGHEST LEVEL OF EDUCATION - PRIMARY LEARNER  4 YEARS OF COLLEGE   BARRIERS PRIMARY LEARNER NONE   PRIMARY LANGUAGE ENGLISH    NEED No   LEARNER PREFERENCE PRIMARY READING   ANSWERED BY patient   RELATIONSHIP SELF        Depression Screening:   :       3 most recent PHQ Screens 2021   Little interest or pleasure in doing things Not at all   Feeling down, depressed, irritable, or hopeless Nearly every day   Total Score PHQ 2 3   Trouble falling or staying asleep, or sleeping too much -   Feeling tired or having little energy -   Poor appetite, weight loss, or overeating -   Feeling bad about yourself - or that you are a failure or have let yourself or your family down -   Trouble concentrating on things such as school, work, reading, or watching TV -   Moving or speaking so slowly that other people could have noticed; or the opposite being so fidgety that others notice -   Thoughts of being better off dead, or hurting yourself in some way -   PHQ 9 Score -        Fall Risk Assessment:   :       Fall Risk Assessment, last 12 mths 12/9/2020   Able to walk? Yes   Fall in past 12 months? Yes   Number of falls in past 12 months 2   Fall with injury? 0        Abuse Screening:   :       Abuse Screening Questionnaire 5/4/2020 8/17/2018 4/23/2018 11/14/2016 3/20/2015   Do you ever feel afraid of your partner? N N N N N   Are you in a relationship with someone who physically or mentally threatens you? N N N N N   Is it safe for you to go home?  Y Y Y Y Y        ADL Screening:   :       ADL Assessment 8/17/2018   Feeding yourself No Help Needed   Getting from bed to chair No Help Needed   Getting dressed No Help Needed   Bathing or showering No Help Needed   Walk across the room (includes cane/walker) No Help Needed   Using the telphone No Help Needed   Taking your medications No Help Needed   Preparing meals No Help Needed   Managing money (expenses/bills) No Help Needed   Moderately strenuous housework (laundry) No Help Needed   Shopping for personal items (toiletries/medicines) No Help Needed   Shopping for groceries No Help Needed   Driving No Help Needed   Climbing a flight of stairs No Help Needed   Getting to places beyond walking distances No Help Needed

## 2021-01-28 RX ORDER — LOSARTAN POTASSIUM 50 MG/1
50 TABLET ORAL 2 TIMES DAILY
Qty: 180 TAB | Refills: 1 | Status: SHIPPED | OUTPATIENT
Start: 2021-01-28 | End: 2021-02-22 | Stop reason: SDUPTHER

## 2021-02-02 ENCOUNTER — TELEPHONE (OUTPATIENT)
Dept: INTERNAL MEDICINE CLINIC | Age: 76
End: 2021-02-02

## 2021-02-02 NOTE — TELEPHONE ENCOUNTER
Pt called in and states that she was at [employer] [employer] and that Dr. Daljit Mims was supposed to have ordered the Metabolic Comprehensive lab instead of the Basic. Advised that Dr. Daljit Mims had ordered the basic and that the nurse could not override the order. Pt verified understanding and states that she will go back to get more if needed. Verified understanding.

## 2021-02-03 LAB
BUN SERPL-MCNC: 22 MG/DL (ref 8–27)
BUN/CREAT SERPL: 25 (ref 12–28)
CALCIUM SERPL-MCNC: 9.8 MG/DL (ref 8.7–10.3)
CHLORIDE SERPL-SCNC: 106 MMOL/L (ref 96–106)
CO2 SERPL-SCNC: 25 MMOL/L (ref 20–29)
CREAT SERPL-MCNC: 0.89 MG/DL (ref 0.57–1)
GLUCOSE SERPL-MCNC: 94 MG/DL (ref 65–99)
POTASSIUM SERPL-SCNC: 4.3 MMOL/L (ref 3.5–5.2)
SODIUM SERPL-SCNC: 142 MMOL/L (ref 134–144)

## 2021-02-22 ENCOUNTER — OFFICE VISIT (OUTPATIENT)
Dept: INTERNAL MEDICINE CLINIC | Age: 76
End: 2021-02-22
Payer: MEDICARE

## 2021-02-22 VITALS
SYSTOLIC BLOOD PRESSURE: 150 MMHG | TEMPERATURE: 98 F | WEIGHT: 141.6 LBS | OXYGEN SATURATION: 90 % | BODY MASS INDEX: 24.17 KG/M2 | HEART RATE: 76 BPM | HEIGHT: 64 IN | DIASTOLIC BLOOD PRESSURE: 62 MMHG | RESPIRATION RATE: 16 BRPM

## 2021-02-22 DIAGNOSIS — I10 ESSENTIAL HYPERTENSION: Primary | ICD-10-CM

## 2021-02-22 DIAGNOSIS — N32.81 OVERACTIVE BLADDER: Primary | ICD-10-CM

## 2021-02-22 PROCEDURE — 99213 OFFICE O/P EST LOW 20 MIN: CPT | Performed by: INTERNAL MEDICINE

## 2021-02-22 PROCEDURE — G8754 DIAS BP LESS 90: HCPCS | Performed by: INTERNAL MEDICINE

## 2021-02-22 PROCEDURE — 1090F PRES/ABSN URINE INCON ASSESS: CPT | Performed by: INTERNAL MEDICINE

## 2021-02-22 PROCEDURE — 3017F COLORECTAL CA SCREEN DOC REV: CPT | Performed by: INTERNAL MEDICINE

## 2021-02-22 PROCEDURE — G8753 SYS BP > OR = 140: HCPCS | Performed by: INTERNAL MEDICINE

## 2021-02-22 PROCEDURE — G8399 PT W/DXA RESULTS DOCUMENT: HCPCS | Performed by: INTERNAL MEDICINE

## 2021-02-22 PROCEDURE — G8536 NO DOC ELDER MAL SCRN: HCPCS | Performed by: INTERNAL MEDICINE

## 2021-02-22 PROCEDURE — G8427 DOCREV CUR MEDS BY ELIG CLIN: HCPCS | Performed by: INTERNAL MEDICINE

## 2021-02-22 PROCEDURE — 1100F PTFALLS ASSESS-DOCD GE2>/YR: CPT | Performed by: INTERNAL MEDICINE

## 2021-02-22 PROCEDURE — G8432 DEP SCR NOT DOC, RNG: HCPCS | Performed by: INTERNAL MEDICINE

## 2021-02-22 PROCEDURE — 3288F FALL RISK ASSESSMENT DOCD: CPT | Performed by: INTERNAL MEDICINE

## 2021-02-22 PROCEDURE — G8420 CALC BMI NORM PARAMETERS: HCPCS | Performed by: INTERNAL MEDICINE

## 2021-02-22 RX ORDER — OXYBUTYNIN CHLORIDE 10 MG/1
TABLET, EXTENDED RELEASE ORAL
Qty: 90 TAB | Refills: 4 | Status: SHIPPED | OUTPATIENT
Start: 2021-02-22 | End: 2022-06-13 | Stop reason: SDUPTHER

## 2021-02-22 RX ORDER — LOSARTAN POTASSIUM 50 MG/1
100 TABLET ORAL DAILY
Qty: 180 TAB | Refills: 1
Start: 2021-02-22 | End: 2021-10-31

## 2021-02-22 RX ORDER — AMLODIPINE BESYLATE 5 MG/1
5 TABLET ORAL DAILY
Qty: 90 TAB | Refills: 1 | Status: SHIPPED | OUTPATIENT
Start: 2021-02-22 | End: 2021-09-02 | Stop reason: SDUPTHER

## 2021-02-22 NOTE — TELEPHONE ENCOUNTER
[facility] on file sent a fax requesting a refill of   Requested Prescriptions     Pending Prescriptions Disp Refills    oxybutynin chloride XL (DITROPAN XL) 10 mg CR tablet 90 Tab 4     Sig: TAKE 1 TABLET BY MOUTH ONCE DAILY
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
## 2021-04-12 ENCOUNTER — OFFICE VISIT (OUTPATIENT)
Dept: INTERNAL MEDICINE CLINIC | Age: 76
End: 2021-04-12
Payer: MEDICARE

## 2021-04-12 VITALS
OXYGEN SATURATION: 96 % | RESPIRATION RATE: 16 BRPM | DIASTOLIC BLOOD PRESSURE: 73 MMHG | HEART RATE: 69 BPM | SYSTOLIC BLOOD PRESSURE: 128 MMHG | WEIGHT: 142 LBS | TEMPERATURE: 97.8 F | HEIGHT: 64 IN | BODY MASS INDEX: 24.24 KG/M2

## 2021-04-12 DIAGNOSIS — I10 ESSENTIAL HYPERTENSION: ICD-10-CM

## 2021-04-12 DIAGNOSIS — Z00.00 MEDICARE ANNUAL WELLNESS VISIT, SUBSEQUENT: Primary | ICD-10-CM

## 2021-04-12 DIAGNOSIS — E78.00 HYPERCHOLESTEROLEMIA: ICD-10-CM

## 2021-04-12 PROCEDURE — G8432 DEP SCR NOT DOC, RNG: HCPCS | Performed by: INTERNAL MEDICINE

## 2021-04-12 PROCEDURE — 1100F PTFALLS ASSESS-DOCD GE2>/YR: CPT | Performed by: INTERNAL MEDICINE

## 2021-04-12 PROCEDURE — G8752 SYS BP LESS 140: HCPCS | Performed by: INTERNAL MEDICINE

## 2021-04-12 PROCEDURE — G8427 DOCREV CUR MEDS BY ELIG CLIN: HCPCS | Performed by: INTERNAL MEDICINE

## 2021-04-12 PROCEDURE — G8536 NO DOC ELDER MAL SCRN: HCPCS | Performed by: INTERNAL MEDICINE

## 2021-04-12 PROCEDURE — 3288F FALL RISK ASSESSMENT DOCD: CPT | Performed by: INTERNAL MEDICINE

## 2021-04-12 PROCEDURE — 3017F COLORECTAL CA SCREEN DOC REV: CPT | Performed by: INTERNAL MEDICINE

## 2021-04-12 PROCEDURE — G8754 DIAS BP LESS 90: HCPCS | Performed by: INTERNAL MEDICINE

## 2021-04-12 PROCEDURE — G0439 PPPS, SUBSEQ VISIT: HCPCS | Performed by: INTERNAL MEDICINE

## 2021-04-12 PROCEDURE — G8399 PT W/DXA RESULTS DOCUMENT: HCPCS | Performed by: INTERNAL MEDICINE

## 2021-04-12 PROCEDURE — G8420 CALC BMI NORM PARAMETERS: HCPCS | Performed by: INTERNAL MEDICINE

## 2021-04-12 RX ORDER — MINOXIDIL 50 MG/G
AEROSOL, FOAM TOPICAL
COMMUNITY
End: 2022-02-11

## 2021-04-12 NOTE — PROGRESS NOTES
Fabi Baker  Identified pt with two pt identifiers(name and ). Chief Complaint   Patient presents with    Hypertension    Annual Wellness Visit       Reviewed record In preparation for visit and have obtained necessary documentation. 1. Have you been to the ER, urgent care clinic or hospitalized since your last visit? No     2. Have you seen or consulted any other health care providers outside of the [address] [place] [address] since your last visit? Include any pap smears or colon screening. No    Patient has an advance directive. Vitals reviewed with provider.     Health Maintenance reviewed:     Health Maintenance Due   Topic    COVID-19 Vaccine (1)    A1C test (Diabetic or Prediabetic)     Medicare Yearly Exam           Wt Readings from Last 3 Encounters:   21 142 lb (64.4 kg)   21 141 lb 9.6 oz (64.2 kg)   21 139 lb 12.8 oz (63.4 kg)        Temp Readings from Last 3 Encounters:   21 97.8 °F (36.6 °C) (Oral)   21 98 °F (36.7 °C) (Oral)   21 97.9 °F (36.6 °C) (Oral)        BP Readings from Last 3 Encounters:   21 128/73   21 (!) 150/62   21 (!) 150/60        Pulse Readings from Last 3 Encounters:   21 69   21 76   21 62        Vitals:    21 0935   BP: 128/73   Pulse: 69   Resp: 16   Temp: 97.8 °F (36.6 °C)   TempSrc: Oral   SpO2: 96%   Weight: 142 lb (64.4 kg)   Height: 5' 4\" (1.626 m)   PainSc:   0 - No pain          Learning Assessment:   :       Learning Assessment 3/20/2015   PRIMARY LEARNER Patient   HIGHEST LEVEL OF EDUCATION - PRIMARY LEARNER  4 YEARS OF COLLEGE   BARRIERS PRIMARY LEARNER NONE   PRIMARY LANGUAGE ENGLISH    NEED No   LEARNER PREFERENCE PRIMARY READING   ANSWERED BY patient   RELATIONSHIP SELF        Depression Screening:   :       3 most recent PHQ Screens 2021   Little interest or pleasure in doing things Not at all   Feeling down, depressed, irritable, or hopeless Nearly every day   Total Score PHQ 2 3   Trouble falling or staying asleep, or sleeping too much -   Feeling tired or having little energy -   Poor appetite, weight loss, or overeating -   Feeling bad about yourself - or that you are a failure or have let yourself or your family down -   Trouble concentrating on things such as school, work, reading, or watching TV -   Moving or speaking so slowly that other people could have noticed; or the opposite being so fidgety that others notice -   Thoughts of being better off dead, or hurting yourself in some way -   PHQ 9 Score -        Fall Risk Assessment:   :       Fall Risk Assessment, last 12 mths 12/9/2020   Able to walk? Yes   Fall in past 12 months? Yes   Number of falls in past 12 months 2   Fall with injury? 0        Abuse Screening:   :       Abuse Screening Questionnaire 5/4/2020 8/17/2018 4/23/2018 11/14/2016 3/20/2015   Do you ever feel afraid of your partner? N N N N N   Are you in a relationship with someone who physically or mentally threatens you? N N N N N   Is it safe for you to go home?  Y Y Y Y Y        ADL Screening:   :       ADL Assessment 8/17/2018   Feeding yourself No Help Needed   Getting from bed to chair No Help Needed   Getting dressed No Help Needed   Bathing or showering No Help Needed   Walk across the room (includes cane/walker) No Help Needed   Using the telphone No Help Needed   Taking your medications No Help Needed   Preparing meals No Help Needed   Managing money (expenses/bills) No Help Needed   Moderately strenuous housework (laundry) No Help Needed   Shopping for personal items (toiletries/medicines) No Help Needed   Shopping for groceries No Help Needed   Driving No Help Needed   Climbing a flight of stairs No Help Needed   Getting to places beyond walking distances No Help Needed

## 2021-04-12 NOTE — PATIENT INSTRUCTIONS
Medicare Wellness Visit, Female The best way to live healthy is to have a lifestyle where you eat a well-balanced diet, exercise regularly, limit alcohol use, and quit all forms of tobacco/nicotine, if applicable. Regular preventive services are another way to keep healthy. Preventive services (vaccines, screening tests, monitoring & exams) can help personalize your care plan, which helps you manage your own care. Screening tests can find health problems at the earliest stages, when they are easiest to treat. [facility] follows the current, evidence-based guidelines published by the [facility] Carlito Hwang ([facility]) when recommending preventive services for our patients. Because we follow these guidelines, sometimes recommendations change over time as research supports it. (For example, mammograms used to be recommended annually. Even though Medicare will still pay for an annual mammogram, the newer guidelines recommend a mammogram every two years for women of average risk). Of course, you and your doctor may decide to screen more often for some diseases, based on your risk and your co-morbidities (chronic disease you are already diagnosed with). Preventive services for you include: - Medicare offers their members a free annual wellness visit, which is time for you and your primary care provider to discuss and plan for your preventive service needs. Take advantage of this benefit every year! 
-All adults over the age of 72 should receive the recommended pneumonia vaccines. Current USPSTF guidelines recommend a series of two vaccines for the best pneumonia protection.  
-All adults should have a flu vaccine yearly and a tetanus vaccine every 10 years.  
-All adults age 48 and older should receive the shingles vaccines (series of two vaccines).      
-All adults age 38-68 who are overweight should have a diabetes screening test once every three years.  
-All adults born between [birth year] and 1965 should be screened once for Hepatitis C. 
-Other screening tests and preventive services for persons with diabetes include: an eye exam to screen for diabetic retinopathy, a kidney function test, a foot exam, and stricter control over your cholesterol.  
-Cardiovascular screening for adults with routine risk involves an electrocardiogram (ECG) at intervals determined by your doctor.  
-Colorectal cancer screenings should be done for adults age 54-65 with no increased risk factors for colorectal cancer. There are a number of acceptable methods of screening for this type of cancer. Each test has its own benefits and drawbacks. Discuss with your doctor what is most appropriate for you during your annual wellness visit. The different tests include: colonoscopy (considered the best screening method), a fecal occult blood test, a fecal DNA test, and sigmoidoscopy. 
 
-A bone mass density test is recommended when a woman turns 65 to screen for osteoporosis. This test is only recommended one time, as a screening. Some providers will use this same test as a disease monitoring tool if you already have osteoporosis. -Breast cancer screenings are recommended every other year for women of normal risk, age 54-69. 
-Cervical cancer screenings for women over age 72 are only recommended with certain risk factors. Here is a list of your current Health Maintenance items (your personalized list of preventive services) with a due date: 
Health Maintenance Due Topic Date Due  
 COVID-19 Vaccine (1) Never done  Hemoglobin A1C    09/27/2020

## 2021-04-12 NOTE — PROGRESS NOTES
HPI  Ms. Carla Johnson is a 76y.o. year old female, she is seen today for follow up HTN, AWV. Has been well. No chest pain, sob, dizziness, weakness, lightheadedness. BP has been 116/62, 120/64, 132/64, 119/59. Has been well controlled. Has been walking for exercise. Chief Complaint   Patient presents with    Hypertension    Annual Wellness Visit        Prior to Admission medications    Medication Sig Start Date End Date Taking? Authorizing Provider   Minoxidil 5 % foam by Apply Externally route. Yes Provider, Historical   oxybutynin chloride XL (DITROPAN XL) 10 mg CR tablet TAKE 1 TABLET BY MOUTH ONCE DAILY 2/22/21  Yes Severiano Hanson MD   losartan (COZAAR) 50 mg tablet Take 2 Tabs by mouth daily. 2/22/21  Yes Severiano Hanson MD   amLODIPine (NORVASC) 5 mg tablet Take 1 Tab by mouth daily. 2/22/21  Yes Severiano Hanson MD   montelukast (Singulair) 10 mg tablet Take 1 Tab by mouth. Yes Provider, Historical   mometasone (ELOCON) 0.1 % topical cream Apply  to affected area. Yes Provider, Historical   B.infantis-B.ani-B.long-B.bifi (Probiotic 4X) 10-15 mg TbEC Take  by mouth daily. Yes Provider, Historical   metoprolol tartrate (LOPRESSOR) 25 mg tablet Take 1 Tab by mouth every twelve (12) hours. Patient taking differently: Take 25 mg by mouth two (2) times a day. 8/19/20  Yes Severiano Hanson MD   LIVALO 2 mg tablet Take 2 mg by mouth daily. 1/9/20  Yes Provider, Historical   Omeprazole delayed release (PRILOSEC D/R) 20 mg tablet Take 1 Tab by mouth daily. Patient taking differently: Take 20 mg by mouth every evening. 9/27/19  Yes Bandar Calloway MD   multivitamin (ONE A DAY) tablet Take 1 Tab by mouth daily. Yes Provider, Historical   OAT BRAN PO Take  by mouth. Yes Provider, Historical   levalbuterol tartrate (XOPENEX) 45 mcg/actuation inhaler Take 1-2 Puffs by inhalation every four (4) hours as needed for Wheezing.  1/17/17  Yes MD BRISEYDA Thornton by mouth daily. Flaxseed meal   Yes Provider, Historical   cetirizine (ZYRTEC) 10 mg tablet Take 10 mg by mouth daily. Yes Provider, Historical   aspirin 81 mg chewable tablet Take 81 mg by mouth daily. Yes Provider, Historical         Allergies   Allergen Reactions    Nuts [Tree Nut] Other (comments)     Mouth sores. Walnuts, pecans    Sulfa (Sulfonamide Antibiotics) Nausea and Vomiting         REVIEW OF SYSTEMS:  Per HPI    PHYSICAL EXAM:  Visit Vitals  /73 (BP 1 Location: Right arm, BP Patient Position: Sitting, BP Cuff Size: Adult)   Pulse 69   Temp 97.8 °F (36.6 °C) (Oral)   Resp 16   Ht 5' 4\" (1.626 m)   Wt 142 lb (64.4 kg)   SpO2 96%   BMI 24.37 kg/m²     Constitutional: Appears well-developed and well-nourished. No distress. HENT:   Head: Normocephalic and atraumatic. Eyes: No scleral icterus. Neck: no lad, no tm, supple   Cardiovascular: Normal S1/S2, regular rhythm. No murmurs, rubs, or gallops. Pulmonary/Chest: Effort normal and breath sounds normal. No respiratory distress. No wheezes, rhonchi, or rales. Ext: No edema. Neurological: Alert. Psychiatric: Normal mood and affect. Behavior is normal.     Lab Results   Component Value Date/Time    Sodium 142 02/02/2021 09:27 AM    Potassium 4.3 02/02/2021 09:27 AM    Chloride 106 02/02/2021 09:27 AM    CO2 25 02/02/2021 09:27 AM    Anion gap 6 09/26/2019 09:55 PM    Glucose 94 02/02/2021 09:27 AM    BUN 22 02/02/2021 09:27 AM    Creatinine 0.89 02/02/2021 09:27 AM    BUN/Creatinine ratio 25 02/02/2021 09:27 AM    GFR est AA 73 02/02/2021 09:27 AM    GFR est non-AA 64 02/02/2021 09:27 AM    Calcium 9.8 02/02/2021 09:27 AM    Bilirubin, total <0.2 08/12/2020 09:13 AM    Alk.  phosphatase 76 08/12/2020 09:13 AM    Protein, total 6.1 08/12/2020 09:13 AM    Albumin 4.2 08/12/2020 09:13 AM    Globulin 3.3 08/12/2018 09:40 AM    A-G Ratio 2.2 08/12/2020 09:13 AM    ALT (SGPT) 19 08/12/2020 09:13 AM     Lab Results   Component Value Date/Time    Hemoglobin A1c 5.4 09/27/2019 04:09 AM    Hemoglobin A1c 5.3 05/22/2018 08:34 AM    Hemoglobin A1c 5.3 11/20/2017 09:36 AM      Lab Results   Component Value Date/Time    Cholesterol, total 161 08/12/2020 09:13 AM    HDL Cholesterol 67 08/12/2020 09:13 AM    LDL, calculated 79 08/12/2020 09:13 AM    VLDL, calculated 15 08/12/2020 09:13 AM    Triglyceride 73 08/12/2020 09:13 AM    CHOL/HDL Ratio 3.4 09/27/2019 03:54 AM          ASSESSMENT/PLAN  Diagnoses and all orders for this visit:    1. Medicare annual wellness visit, subsequent    2. Hypercholesterolemia  -     METABOLIC PANEL, COMPREHENSIVE; Future  -     LIPID PANEL; Future    3. Essential hypertension  Well controlled - continue current medications         Health Maintenance Due   Topic Date Due    COVID-19 Vaccine (1) Never done    A1C test (Diabetic or Prediabetic)  09/27/2020        Follow-up and Dispositions    · Return in about 4 months (around 8/12/2021) for chol, bp, labs prior. Reviewed plan of care. Patient has provided input and agrees with goals. The nurse provided the patient and/or family with advanced directive information if needed and encouraged the patient to provide a copy to the office when available. This is the Subsequent Medicare Annual Wellness Exam, performed 12 months or more after the Initial AWV or the last Subsequent AWV    I have reviewed the patient's medical history in detail and updated the computerized patient record. Assessment/Plan   Education and counseling provided:  Are appropriate based on today's review and evaluation    1. Medicare annual wellness visit, subsequent  2. Hypercholesterolemia  -     METABOLIC PANEL, COMPREHENSIVE; Future  -     LIPID PANEL; Future  3.  Essential hypertension       Depression Risk Factor Screening     3 most recent PHQ Screens 1/11/2021   Little interest or pleasure in doing things Not at all   Feeling down, depressed, irritable, or hopeless Nearly every day   Total Score PHQ 2 3   Trouble falling or staying asleep, or sleeping too much -   Feeling tired or having little energy -   Poor appetite, weight loss, or overeating -   Feeling bad about yourself - or that you are a failure or have let yourself or your family down -   Trouble concentrating on things such as school, work, reading, or watching TV -   Moving or speaking so slowly that other people could have noticed; or the opposite being so fidgety that others notice -   Thoughts of being better off dead, or hurting yourself in some way -   PHQ 9 Score -       Alcohol Risk Screen    Do you average more than 1 drink per night or more than 7 drinks a week:  No    On any one occasion in the past three months have you have had more than 3 drinks containing alcohol:  No        Functional Ability and Level of Safety    Hearing: Hearing is good. Activities of Daily Living: The home contains: no safety equipment. Patient does total self care      Ambulation: with no difficulty     Fall Risk:  Fall Risk Assessment, last 12 mths 12/9/2020   Able to walk? Yes   Fall in past 12 months? Yes   Number of falls in past 12 months 2   Fall with injury?  0      Abuse Screen:  Patient is not abused       Cognitive Screening    Has your family/caregiver stated any concerns about your memory: no     Cognitive Screening: N/A    Health Maintenance Due     Health Maintenance Due   Topic Date Due    COVID-19 Vaccine (1) Never done    A1C test (Diabetic or Prediabetic)  09/27/2020       Patient Care Team   Patient Care Team:  Amador Alicea MD as PCP - General (Internal Medicine)  Amador Alicea MD as PCP - [facility] Empaneled Provider  Scheryl Collet, MD as Physician (Cardiology)    History     Patient Active Problem List   Diagnosis Code    Essential hypertension I10    Asthma J45.909    ADD (attention deficit disorder) F98.8    Overactive bladder N32.81    Environmental allergies Z91.09    S/P colonoscopy Z98.890    At risk for osteoporosis Z91.89    Bilateral carotid artery disease (HCC) I77.9    Vertical diplopia H53.2    IFG (impaired fasting glucose) R73.01    Serrated adenoma of colon D12.6    Alopecia areata L63.9    Advance directive discussed with patient Z71.89    Chest pain R07.9    Coronary artery disease involving native coronary artery of native heart without angina pectoris I25.10    Hypercholesterolemia E78.00     Past Medical History:   Diagnosis Date    Alopecia     Arthritis     Asthma     CAD (coronary artery disease) 08/12/2018    NSTEMI, Cath, TYRONE OM2 of dominant Cx    GERD (gastroesophageal reflux disease)     H/O seasonal allergies     H/O TB skin testing 1968    treated with INH    Hypercholesterolemia     Hypertension     IFG (impaired fasting glucose) 6/14/2013    a1c 6.0 5/2013    Ill-defined condition     TIA symptoms, saw neuro-opthalmologist and was told it was probably spasm of cranial nerve.  Overactive bladder 3/13/2013    Serrated adenoma of colon 2/6/2014 11/17/09 - Dr. Shelby Lauren - repeat in 5 years    Stroke [facility])     TIA      Past Surgical History:   Procedure Laterality Date    COLONOSCOPY N/A 11/3/2020    COLONOSCOPY performed by Loreta Nissen., MD at [address] [place] [address]  11/3/2020         COLORECTAL SCRN; HI RISK IND  2/20/2015         HX CATARACT REMOVAL Bilateral     HX GI      colonscopy. polyp removed    HX HEART CATHETERIZATION  2018    stent x 1    HX MALIGNANT SKIN LESION EXCISION Left 2018    \"non-melanoma pre-cancerous lesion removed\" per patient    HX ORTHOPAEDIC Right 2015    great toe replacement     Current Outpatient Medications   Medication Sig Dispense Refill    Minoxidil 5 % foam by Apply Externally route.  oxybutynin chloride XL (DITROPAN XL) 10 mg CR tablet TAKE 1 TABLET BY MOUTH ONCE DAILY 90 Tab 4    losartan (COZAAR) 50 mg tablet Take 2 Tabs by mouth daily.  [address] [place] [address] Tab 1    amLODIPine (NORVASC) 5 mg tablet Take 1 Tab by mouth daily. 90 Tab 1    montelukast (Singulair) 10 mg tablet Take 1 Tab by mouth.  mometasone (ELOCON) 0.1 % topical cream Apply  to affected area.  B.infantis-B.ani-B.long-B.bifi (Probiotic 4X) 10-15 mg TbEC Take  by mouth daily.  metoprolol tartrate (LOPRESSOR) 25 mg tablet Take 1 Tab by mouth every twelve (12) hours. (Patient taking differently: Take 25 mg by mouth two (2) times a day.) 180 Tab 4    LIVALO 2 mg tablet Take 2 mg by mouth daily.  Omeprazole delayed release (PRILOSEC D/R) 20 mg tablet Take 1 Tab by mouth daily. (Patient taking differently: Take 20 mg by mouth every evening.) 30 Tab 0    multivitamin (ONE A DAY) tablet Take 1 Tab by mouth daily.  OAT BRAN PO Take  by mouth.  levalbuterol tartrate (XOPENEX) 45 mcg/actuation inhaler Take 1-2 Puffs by inhalation every four (4) hours as needed for Wheezing. 1 Inhaler 2    FLAXSEED PO Take  by mouth daily. Flaxseed meal      cetirizine (ZYRTEC) 10 mg tablet Take 10 mg by mouth daily.  aspirin 81 mg chewable tablet Take 81 mg by mouth daily. Allergies   Allergen Reactions    Nuts [Tree Nut] Other (comments)     Mouth sores.      Walnuts, pecans    Sulfa (Sulfonamide Antibiotics) Nausea and Vomiting       Family History   Problem Relation Age of Onset    Hypertension Mother     Stroke Mother     Lung Disease Mother     Cancer Father         bladder    Other Father         congential heart valve defect    Depression Sister     Stroke Sister 72    Hypertension Sister     COPD Sister    Estrellita Hark Other Sister 79        twisted bowel    Heart Disease Maternal Grandfather     Dementia Paternal Grandfather     Cancer Maternal Grandmother         esophageal    Asthma Paternal Grandmother     Dementia Paternal Grandmother     Breast Cancer Paternal Grandmother      Social History     Tobacco Use    Smoking status: Former Smoker     Packs/day: 0.25 Years: 8.00     Pack years: 2.00     Quit date: 1995     Years since quittin.1    Smokeless tobacco: Never Used    Tobacco comment: quit in    Substance Use Topics    Alcohol use:  Yes     Alcohol/week: 28.0 standard drinks     Types: 21 Glasses of wine, 7 Shots of liquor per week     Comment: 2-3 etoh drinks daily - [place] - no withdrawl symptoms when she does not have it         Black Teixeira MD

## 2021-05-06 NOTE — TELEPHONE ENCOUNTER
Bariatric Progress Report     The patient was seen from 1030 to 1114 for bariatric nutrition 2 week postoperative follow-up. The patient was referred by Mala Patten PA-C.     Referral Diagnosis:   Obesity (BMI 30-39.9) [E66.9]   Status post bariatric surgery [Z98.84]    Barriers and Readiness to Learning:   The instruction was given to patient.    Barriers to self-care and learning limitations: None   Preferences for Learning: no preference   Readiness to learn: The patient demonstrates the ability to understand and asks questions.   The education will be provided in an individual setting   Material was presented using verbal and written     Learning Topics:   Rationale for Diet, Guidelines for Diet, Label Reading/Product Information, Food Preparation, Eating Out and Lifestyle changes     Assessment:  Area(s) and level of knowledge: Pt shows Inadequate level of knowledge regarding soft diet recommendations following Toshia-en-Y gastric bypass prior to education.    Oral fluids: 4 bottled peralta daily (68 ounces).   Does better with room temperature fluids.   Inquired about what other beverages she is able to consume.  Provided and reviewed handout with examples of appropriate beverage choices.    Liquid meal replacement or supplement: Only drinking 1/2 Ensure MAX once daily.  No longer likes the taste.  Provided alternative recommendations and instructed to consume 2 per day.        Amount of food: 1 to 1.5 ounces at meals.      Type of food/meals: On a puree diet: sugar free jello, baby food (fruits, chicken in gravy), blended scrambled egg, grits, mashed potatoes, unsweetened applesauce.   Very eager to advance her diet.   Reviewed the rationale for adhering to the recommended diet progression, pt verbalized understanding.   Reviewed recommended foods for the pureed diet.  Discussed meal size and appropriate foods for the soft diet.  Reviewed consuming protein first at meals, followed by vegetables/fruits,  PCP: Marguerite Dumont MD    Last appt: 1/11/2021  Future Appointments   Date Time Provider Niurka Boles   2/22/2021  1:00 PM Marguerite Dumont MD [facility] BS AMB       Requested Prescriptions     Pending Prescriptions Disp Refills    oxybutynin chloride XL (DITROPAN XL) 10 mg CR tablet 90 Tab 4     Sig: TAKE 1 TABLET BY MOUTH ONCE DAILY and lastly starch/grain only if room.  Discussed that starches/grains should remain very limited on the soft diet.      Meal/Snack pattern: Not following a set pattern.  Attempting to have something pureed every few hours.      Total protein: ~25 grams per day or less since she is only consuming 1/2 protein shake per day.    Total energy intake: ~300 calories per day or less since she is not consuming protein shakes.  Encouraged to have 2 protein shakes per day.     Vitamins/Minerals:  D (3): D3 50 mcg BID  B12 (11): 500 mcg daily   Multivitamin (12): not taking.  Reviewed recommendations.    Calcium (1): liquid calcium citrate 2 times per day   Iron: 18 mg daily     Allergies/Intolerances: None.     Physical Activity  Type of physical activity: Walking and ADL's.      Anthropometric Measurements:   Estimated body mass index is 32.15 kg/m² as calculated from the following:    Height as of this encounter: 5' 2.5\" (1.588 m).    Weight as of this encounter: 81 kg.    Weight change: Down 17.4 lbs since surgery, which is 30.5% of her excess body weight.     Nutrition-Focused Physical Findings:   Overall appearance: healthy, nourished.     Skin: incisions healing well      Client History:   S/p Toshia-en-Y gastric bypass on 4/22/21.       Nutrition Diagnosis:   Altered GI function related to s/p Toshia-en-Y gastric bypass as evidenced by need for small portions and daily vitamin/mineral supplementation.    Food-and-nutrition-related knowledge deficit  related to soft diet guidelines following Toshia-en-Y gastric bypass as evidenced by no previous education on this topic.    Nutrition Prescription:   Eat 3 small meals per day, Drink 48-64 ounces of liquids per day, No carbonated beverages, Choose foods low in sugar and fat, Take vitamins as directed, Aim for 60-80 grams of protein per day, Eat meals very slowly and Be physically active     Intervention:   Recommended modifications:   1. Continue Pureed Diet until 5/12.   -Start  Soft Diet on 5/13 and follow until I see you again (increase portions as tolerated at meals to 3-4 ounces)  2. Aim for 600-800 calories per day on the pureed and soft diet.    3. Always aim for 60-80 grams of protein per day.   -Premier Protein, 8 ounces, 2 times per day    -continue to drink protein shakes until you are able to get enough protein from your foods   4. Keep food records daily and bring to your next nutrition visit for review.   5. Add in children's complete chewable multivitamin (NOT GUMMY) 2 times per day.      Print/Written Resources Provided:   AVS  Bariatric Surgery Diet Guidelines  Low Sugar/Sugar Free Drinks    Monitoring and Evaluation:   Self-reported adherence score: The patient will comply with interventions in order to facilitate weight loss and maintain nutrition status.    Area(s) and level of knowledge: Pt shows Basic level of knowledge regarding diet recommendations following Toshia-en-Y gastric bypass after education.    Recommended Follow-up:   The patient will follow up with the dietitian at 6 weeks post op for further nutrition evaluation.

## 2021-05-24 DIAGNOSIS — E78.00 HYPERCHOLESTEROLEMIA: Primary | ICD-10-CM

## 2021-08-11 LAB — LDLC SERPL DIRECT ASSAY-MCNC: 89 MG/DL (ref 0–99)

## 2021-08-17 ENCOUNTER — OFFICE VISIT (OUTPATIENT)
Dept: INTERNAL MEDICINE CLINIC | Age: 76
End: 2021-08-17
Payer: MEDICARE

## 2021-08-17 VITALS
TEMPERATURE: 98 F | HEIGHT: 64 IN | SYSTOLIC BLOOD PRESSURE: 133 MMHG | WEIGHT: 142.2 LBS | OXYGEN SATURATION: 96 % | DIASTOLIC BLOOD PRESSURE: 76 MMHG | RESPIRATION RATE: 16 BRPM | BODY MASS INDEX: 24.28 KG/M2 | HEART RATE: 67 BPM

## 2021-08-17 DIAGNOSIS — R73.01 IFG (IMPAIRED FASTING GLUCOSE): ICD-10-CM

## 2021-08-17 DIAGNOSIS — I10 ESSENTIAL HYPERTENSION: Primary | ICD-10-CM

## 2021-08-17 DIAGNOSIS — I25.10 CORONARY ARTERY DISEASE INVOLVING NATIVE CORONARY ARTERY OF NATIVE HEART WITHOUT ANGINA PECTORIS: ICD-10-CM

## 2021-08-17 DIAGNOSIS — E78.00 HYPERCHOLESTEROLEMIA: ICD-10-CM

## 2021-08-17 PROCEDURE — 1100F PTFALLS ASSESS-DOCD GE2>/YR: CPT | Performed by: INTERNAL MEDICINE

## 2021-08-17 PROCEDURE — 99214 OFFICE O/P EST MOD 30 MIN: CPT | Performed by: INTERNAL MEDICINE

## 2021-08-17 PROCEDURE — G8427 DOCREV CUR MEDS BY ELIG CLIN: HCPCS | Performed by: INTERNAL MEDICINE

## 2021-08-17 PROCEDURE — G8752 SYS BP LESS 140: HCPCS | Performed by: INTERNAL MEDICINE

## 2021-08-17 PROCEDURE — G8399 PT W/DXA RESULTS DOCUMENT: HCPCS | Performed by: INTERNAL MEDICINE

## 2021-08-17 PROCEDURE — G8754 DIAS BP LESS 90: HCPCS | Performed by: INTERNAL MEDICINE

## 2021-08-17 PROCEDURE — G8536 NO DOC ELDER MAL SCRN: HCPCS | Performed by: INTERNAL MEDICINE

## 2021-08-17 PROCEDURE — 3017F COLORECTAL CA SCREEN DOC REV: CPT | Performed by: INTERNAL MEDICINE

## 2021-08-17 PROCEDURE — 3288F FALL RISK ASSESSMENT DOCD: CPT | Performed by: INTERNAL MEDICINE

## 2021-08-17 PROCEDURE — 1090F PRES/ABSN URINE INCON ASSESS: CPT | Performed by: INTERNAL MEDICINE

## 2021-08-17 PROCEDURE — G8432 DEP SCR NOT DOC, RNG: HCPCS | Performed by: INTERNAL MEDICINE

## 2021-08-17 PROCEDURE — G8420 CALC BMI NORM PARAMETERS: HCPCS | Performed by: INTERNAL MEDICINE

## 2021-08-17 RX ORDER — PRAVASTATIN SODIUM 20 MG/1
20 TABLET ORAL
Qty: 30 TABLET | Refills: 2 | Status: SHIPPED | OUTPATIENT
Start: 2021-08-17 | End: 2021-11-28

## 2021-08-17 NOTE — PROGRESS NOTES
Fabi Baker  Identified pt with two pt identifiers(name and ). Chief Complaint   Patient presents with    Hypertension    Cholesterol Problem       Reviewed record In preparation for visit and have obtained necessary documentation. 1. Have you been to the ER, urgent care clinic or hospitalized since your last visit? No     2. Have you seen or consulted any other health care providers outside of the [address] [place] [address] since your last visit? Include any pap smears or colon screening. No    Patient has an advance directive. Vitals reviewed with provider.     Health Maintenance reviewed:     Health Maintenance Due   Topic    A1C test (Diabetic or Prediabetic)     Lipid Screen           Wt Readings from Last 3 Encounters:   21 142 lb 3.2 oz (64.5 kg)   21 142 lb (64.4 kg)   21 141 lb 9.6 oz (64.2 kg)        Temp Readings from Last 3 Encounters:   21 98 °F (36.7 °C) (Oral)   21 97.8 °F (36.6 °C) (Oral)   21 98 °F (36.7 °C) (Oral)        BP Readings from Last 3 Encounters:   21 133/76   21 128/73   21 (!) 150/62        Pulse Readings from Last 3 Encounters:   21 67   21 69   21 76        Vitals:    21 1026   BP: 133/76   Pulse: 67   Resp: 16   Temp: 98 °F (36.7 °C)   TempSrc: Oral   SpO2: 96%   Weight: 142 lb 3.2 oz (64.5 kg)   Height: 5' 4\" (1.626 m)   PainSc:   0 - No pain          Learning Assessment:   :       Learning Assessment 3/20/2015   PRIMARY LEARNER Patient   HIGHEST LEVEL OF EDUCATION - PRIMARY LEARNER  4 YEARS OF COLLEGE   BARRIERS PRIMARY LEARNER NONE   PRIMARY LANGUAGE ENGLISH    NEED No   LEARNER PREFERENCE PRIMARY READING   ANSWERED BY patient   RELATIONSHIP SELF        Depression Screening:   :       3 most recent PHQ Screens 2021   Little interest or pleasure in doing things Not at all   Feeling down, depressed, irritable, or hopeless Nearly every day   Total Score PHQ 2 3   Trouble falling or staying asleep, or sleeping too much -   Feeling tired or having little energy -   Poor appetite, weight loss, or overeating -   Feeling bad about yourself - or that you are a failure or have let yourself or your family down -   Trouble concentrating on things such as school, work, reading, or watching TV -   Moving or speaking so slowly that other people could have noticed; or the opposite being so fidgety that others notice -   Thoughts of being better off dead, or hurting yourself in some way -   PHQ 9 Score -        Fall Risk Assessment:   :       Fall Risk Assessment, last 12 mths 12/9/2020   Able to walk? Yes   Fall in past 12 months? Yes   Number of falls in past 12 months 2   Fall with injury? 0        Abuse Screening:   :       Abuse Screening Questionnaire 5/4/2020 8/17/2018 4/23/2018 11/14/2016 3/20/2015   Do you ever feel afraid of your partner? N N N N N   Are you in a relationship with someone who physically or mentally threatens you? N N N N N   Is it safe for you to go home?  Y Y Y Y Y        ADL Screening:   :       ADL Assessment 8/17/2018   Feeding yourself No Help Needed   Getting from bed to chair No Help Needed   Getting dressed No Help Needed   Bathing or showering No Help Needed   Walk across the room (includes cane/walker) No Help Needed   Using the telphone No Help Needed   Taking your medications No Help Needed   Preparing meals No Help Needed   Managing money (expenses/bills) No Help Needed   Moderately strenuous housework (laundry) No Help Needed   Shopping for personal items (toiletries/medicines) No Help Needed   Shopping for groceries No Help Needed   Driving No Help Needed   Climbing a flight of stairs No Help Needed   Getting to places beyond walking distances No Help Needed

## 2021-08-17 NOTE — PROGRESS NOTES
HPI  Ms. Gautam Schwartz is a 76y.o. year old female, she is seen today for follow up HTN, high cholesterol. Couple weeks ago stopped morning of metoprolol due to feeling more tired, dizzy with low BP with systolic under 762. Feels better now and BP avg 117/62 in last month. Also stopped taking livalo due to concern of possible dementia associated with lipophilic statins. Would like to try hydrophilic such as pravastatin - couldn't tolerate rosuvastatin. Legs feel stronger off statin. No chest pain, sob, dizziness, weakness. Chief Complaint   Patient presents with    Hypertension    Cholesterol Problem        Prior to Admission medications    Medication Sig Start Date End Date Taking? Authorizing Provider   pravastatin (PRAVACHOL) 20 mg tablet Take 1 Tablet by mouth nightly. 8/17/21  Yes Aurora Vegas MD   Minoxidil 5 % foam by Apply Externally route. Yes Provider, Historical   oxybutynin chloride XL (DITROPAN XL) 10 mg CR tablet TAKE 1 TABLET BY MOUTH ONCE DAILY 2/22/21  Yes Aurora Vegas MD   amLODIPine (NORVASC) 5 mg tablet Take 1 Tab by mouth daily. 2/22/21  Yes Aurora Vegas MD   metoprolol tartrate (LOPRESSOR) 25 mg tablet Take 1 Tab by mouth every twelve (12) hours. Patient taking differently: Take 25 mg by mouth two (2) times a day. 8/19/20  Yes Aurora Vegas MD   Omeprazole delayed release (PRILOSEC D/R) 20 mg tablet Take 1 Tab by mouth daily. Patient taking differently: Take 20 mg by mouth every evening. 9/27/19  Yes Terrance Calloway MD   multivitamin (ONE A DAY) tablet Take 1 Tab by mouth daily. Yes Provider, Historical   OAT BRAN PO Take  by mouth. Yes Provider, Historical   levalbuterol tartrate (XOPENEX) 45 mcg/actuation inhaler Take 1-2 Puffs by inhalation every four (4) hours as needed for Wheezing. 1/17/17  Yes Aurora Vegas MD   FLAXSEED PO Take  by mouth daily.  Flaxseed meal   Yes Provider, Historical   cetirizine (ZYRTEC) 10 mg tablet Take 10 mg by mouth daily. Yes Provider, Historical   aspirin 81 mg chewable tablet Take 81 mg by mouth daily. Yes Provider, Historical   losartan (COZAAR) 50 mg tablet Take 2 Tabs by mouth daily. Patient not taking: Reported on 8/17/2021 2/22/21   MD keagan Vazquezst (Singulair) 10 mg tablet Take 1 Tab by mouth. Patient not taking: Reported on 8/17/2021    Provider, Historical   mometasone (ELOCON) 0.1 % topical cream Apply  to affected area. Patient not taking: Reported on 8/17/2021    Provider, Historical   B.infantis-B.ani-B.long-B.bifi (Probiotic 4X) 10-15 mg TbEC Take  by mouth daily. Patient not taking: Reported on 8/17/2021    Provider, Historical   LIVALO 2 mg tablet Take 2 mg by mouth daily. Patient not taking: Reported on 8/17/2021 1/9/20 8/17/21  Provider, Historical         Allergies   Allergen Reactions    Nuts Zigmund Mings Nut] Other (comments)     Mouth sores. Walnuts, pecans    Sulfa (Sulfonamide Antibiotics) Nausea and Vomiting         REVIEW OF SYSTEMS:  Per HPI    PHYSICAL EXAM:  Visit Vitals  /76 (BP 1 Location: Right arm, BP Patient Position: Sitting, BP Cuff Size: Adult)   Pulse 67   Temp 98 °F (36.7 °C) (Oral)   Resp 16   Ht 5' 4\" (1.626 m)   Wt 142 lb 3.2 oz (64.5 kg)   SpO2 96%   BMI 24.41 kg/m²     Constitutional: Appears well-developed and well-nourished. No distress. HENT:   Head: Normocephalic and atraumatic. Eyes: No scleral icterus. Cardiovascular: Normal S1/S2, regular rhythm. No murmurs, rubs, or gallops. Pulmonary/Chest: Effort normal and breath sounds normal. No respiratory distress. No wheezes, rhonchi, or rales. Ext: No edema. Neurological: Alert. Psychiatric: Normal mood and affect.  Behavior is normal.     Lab Results   Component Value Date/Time    Sodium 142 02/02/2021 09:27 AM    Potassium 4.3 02/02/2021 09:27 AM    Chloride 106 02/02/2021 09:27 AM    CO2 25 02/02/2021 09:27 AM    Anion gap 6 09/26/2019 09:55 PM    Glucose 94 02/02/2021 09:27 AM    BUN 22 02/02/2021 09:27 AM    Creatinine 0.89 02/02/2021 09:27 AM    BUN/Creatinine ratio 25 02/02/2021 09:27 AM    GFR est AA 73 02/02/2021 09:27 AM    GFR est non-AA 64 02/02/2021 09:27 AM    Calcium 9.8 02/02/2021 09:27 AM    Bilirubin, total <0.2 08/12/2020 09:13 AM    Alk. phosphatase 76 08/12/2020 09:13 AM    Protein, total 6.1 08/12/2020 09:13 AM    Albumin 4.2 08/12/2020 09:13 AM    Globulin 3.3 08/12/2018 09:40 AM    A-G Ratio 2.2 08/12/2020 09:13 AM    ALT (SGPT) 19 08/12/2020 09:13 AM     Lab Results   Component Value Date/Time    Hemoglobin A1c 5.4 09/27/2019 04:09 AM    Hemoglobin A1c 5.3 05/22/2018 08:34 AM    Hemoglobin A1c 5.3 11/20/2017 09:36 AM      Lab Results   Component Value Date/Time    Cholesterol, total 161 08/12/2020 09:13 AM    HDL Cholesterol 67 08/12/2020 09:13 AM    LDL,Direct 89 08/10/2021 10:45 AM    LDL, calculated 79 08/12/2020 09:13 AM    VLDL, calculated 15 08/12/2020 09:13 AM    Triglyceride 73 08/12/2020 09:13 AM    CHOL/HDL Ratio 3.4 09/27/2019 03:54 AM          ASSESSMENT/PLAN  Diagnoses and all orders for this visit:    1. Essential hypertension  -     METABOLIC PANEL, COMPREHENSIVE; Future    2. Coronary artery disease involving native coronary artery of native heart without angina pectoris    3. Hypercholesterolemia  -     pravastatin (PRAVACHOL) 20 mg tablet; Take 1 Tablet by mouth nightly. -     LIPID PANEL; Future  -     LDL, DIRECT; Future    4. IFG (impaired fasting glucose)  -     HEMOGLOBIN A1C WITH EAG; Future      BP controlled on current regimen - continue  Switch to pravastatin, check labs in 2 mos prior to follow up  CAD managed by [facility]  Get A1C prior to next visit  Continue exercise    Health Maintenance Due   Topic Date Due    A1C test (Diabetic or Prediabetic)  09/27/2020    Lipid Screen  08/12/2021               Reviewed plan of care. Patient has provided input and agrees with goals.      The nurse provided the patient and/or family with advanced directive information if needed and encouraged the patient to provide a copy to the office when available.

## 2021-08-31 ENCOUNTER — PATIENT MESSAGE (OUTPATIENT)
Dept: INTERNAL MEDICINE CLINIC | Age: 76
End: 2021-08-31

## 2021-08-31 NOTE — TELEPHONE ENCOUNTER
From: Livia Gonzalez  To: Norma Hicks MD  Sent: 8/31/2021 10:06 AM EDT  Subject: Prescription Question    Could you give me another prescription for Mometazonde (its the liquid form of Elocon)? It is the ONLY thing that works for [employer]. The 1% hydrocortisone cream just doesn't do a thing. Thanks. [place], [phone]. Also, I'm getting my Covid booster today at [place]! It's been 6 months, so I am eligible. Fabi Baker, [birth year].

## 2021-08-31 NOTE — TELEPHONE ENCOUNTER
PCP: David Emerson MD     Last appt: 8/17/2021   Future Appointments   Date Time Provider Niurka Boles   10/21/2021 11:30 AM David Emerson MD [facility] AMB        Requested Prescriptions     Pending Prescriptions Disp Refills    mometasone (ELOCON) 0.1 % topical cream 15 g      Sig: Apply  to affected area.

## 2021-09-01 RX ORDER — MOMETASONE FUROATE 1 MG/G
CREAM TOPICAL DAILY
Qty: 15 G | Refills: 1 | Status: SHIPPED | OUTPATIENT
Start: 2021-09-01 | End: 2022-08-03

## 2021-09-02 DIAGNOSIS — I10 ESSENTIAL HYPERTENSION: ICD-10-CM

## 2021-09-02 RX ORDER — AMLODIPINE BESYLATE 5 MG/1
5 TABLET ORAL DAILY
Qty: 90 TABLET | Refills: 1 | Status: SHIPPED | OUTPATIENT
Start: 2021-09-02 | End: 2022-03-24

## 2021-09-02 NOTE — TELEPHONE ENCOUNTER
[facility] requests refill of   Requested Prescriptions     Pending Prescriptions Disp Refills    amLODIPine (NORVASC) 5 mg tablet 90 Tablet 1     Sig: Take 1 Tablet by mouth daily.

## 2021-09-02 NOTE — TELEPHONE ENCOUNTER
PCP: Ally Chong MD     Last appt: 8/17/2021   Future Appointments   Date Time Provider Niurka Boles   10/21/2021 11:30 AM Ally Chong MD [facility] AMB        Requested Prescriptions     Pending Prescriptions Disp Refills    amLODIPine (NORVASC) 5 mg tablet 90 Tablet 1     Sig: Take 1 Tablet by mouth daily.

## 2021-10-19 LAB
ALBUMIN SERPL-MCNC: 4.3 G/DL (ref 3.7–4.7)
ALBUMIN/GLOB SERPL: 1.7 {RATIO} (ref 1.2–2.2)
ALP SERPL-CCNC: 67 IU/L (ref 44–121)
ALT SERPL-CCNC: 17 IU/L (ref 0–32)
AST SERPL-CCNC: 15 IU/L (ref 0–40)
BILIRUB SERPL-MCNC: 0.4 MG/DL (ref 0–1.2)
BUN SERPL-MCNC: 24 MG/DL (ref 8–27)
BUN/CREAT SERPL: 29 (ref 12–28)
CALCIUM SERPL-MCNC: 9.8 MG/DL (ref 8.7–10.3)
CHLORIDE SERPL-SCNC: 106 MMOL/L (ref 96–106)
CHOLEST SERPL-MCNC: 196 MG/DL (ref 100–199)
CO2 SERPL-SCNC: 24 MMOL/L (ref 20–29)
CREAT SERPL-MCNC: 0.82 MG/DL (ref 0.57–1)
EST. AVERAGE GLUCOSE BLD GHB EST-MCNC: 111 MG/DL
GLOBULIN SER CALC-MCNC: 2.5 G/DL (ref 1.5–4.5)
GLUCOSE SERPL-MCNC: 100 MG/DL (ref 65–99)
HBA1C MFR BLD: 5.5 % (ref 4.8–5.6)
HDLC SERPL-MCNC: 64 MG/DL
LDLC SERPL CALC-MCNC: 116 MG/DL (ref 0–99)
LDLC SERPL DIRECT ASSAY-MCNC: 108 MG/DL (ref 0–99)
POTASSIUM SERPL-SCNC: 4.9 MMOL/L (ref 3.5–5.2)
PROT SERPL-MCNC: 6.8 G/DL (ref 6–8.5)
SODIUM SERPL-SCNC: 140 MMOL/L (ref 134–144)
TRIGL SERPL-MCNC: 92 MG/DL (ref 0–149)
VLDLC SERPL CALC-MCNC: 16 MG/DL (ref 5–40)

## 2021-10-21 ENCOUNTER — OFFICE VISIT (OUTPATIENT)
Dept: INTERNAL MEDICINE CLINIC | Age: 76
End: 2021-10-21
Payer: MEDICARE

## 2021-10-21 VITALS
RESPIRATION RATE: 16 BRPM | HEIGHT: 64 IN | DIASTOLIC BLOOD PRESSURE: 60 MMHG | BODY MASS INDEX: 24.24 KG/M2 | HEART RATE: 55 BPM | TEMPERATURE: 97.8 F | WEIGHT: 142 LBS | OXYGEN SATURATION: 97 % | SYSTOLIC BLOOD PRESSURE: 136 MMHG

## 2021-10-21 DIAGNOSIS — I65.23 BILATERAL CAROTID ARTERY STENOSIS: ICD-10-CM

## 2021-10-21 DIAGNOSIS — I10 ESSENTIAL HYPERTENSION: Primary | ICD-10-CM

## 2021-10-21 DIAGNOSIS — E78.00 HYPERCHOLESTEROLEMIA: ICD-10-CM

## 2021-10-21 PROCEDURE — G8427 DOCREV CUR MEDS BY ELIG CLIN: HCPCS | Performed by: INTERNAL MEDICINE

## 2021-10-21 PROCEDURE — 1090F PRES/ABSN URINE INCON ASSESS: CPT | Performed by: INTERNAL MEDICINE

## 2021-10-21 PROCEDURE — G8432 DEP SCR NOT DOC, RNG: HCPCS | Performed by: INTERNAL MEDICINE

## 2021-10-21 PROCEDURE — G8752 SYS BP LESS 140: HCPCS | Performed by: INTERNAL MEDICINE

## 2021-10-21 PROCEDURE — G8399 PT W/DXA RESULTS DOCUMENT: HCPCS | Performed by: INTERNAL MEDICINE

## 2021-10-21 PROCEDURE — 99214 OFFICE O/P EST MOD 30 MIN: CPT | Performed by: INTERNAL MEDICINE

## 2021-10-21 PROCEDURE — G8754 DIAS BP LESS 90: HCPCS | Performed by: INTERNAL MEDICINE

## 2021-10-21 PROCEDURE — G8536 NO DOC ELDER MAL SCRN: HCPCS | Performed by: INTERNAL MEDICINE

## 2021-10-21 PROCEDURE — G8420 CALC BMI NORM PARAMETERS: HCPCS | Performed by: INTERNAL MEDICINE

## 2021-10-21 PROCEDURE — 3288F FALL RISK ASSESSMENT DOCD: CPT | Performed by: INTERNAL MEDICINE

## 2021-10-21 PROCEDURE — 1100F PTFALLS ASSESS-DOCD GE2>/YR: CPT | Performed by: INTERNAL MEDICINE

## 2021-10-21 NOTE — PROGRESS NOTES
Fabi Baker  Identified pt with two pt identifiers(name and ). Chief Complaint   Patient presents with    Hypertension    Results       Reviewed record In preparation for visit and have obtained necessary documentation. 1. Have you been to the ER, urgent care clinic or hospitalized since your last visit? No     2. Have you seen or consulted any other health care providers outside of the [address] [place] [address] since your last visit? Include any pap smears or colon screening. No    Patient has an advance directive. Vitals reviewed with provider.     Health Maintenance reviewed:     Health Maintenance Due   Topic    COVID-19 Vaccine (3 - Pfizer booster)     Wt Readings from Last 3 Encounters:   10/21/21 142 lb (64.4 kg)   21 142 lb 3.2 oz (64.5 kg)   21 142 lb (64.4 kg)   ate  Temp Readings from Last 3 Encounters:   10/21/21 97.8 °F (36.6 °C) (Oral)   21 98 °F (36.7 °C) (Oral)   21 97.8 °F (36.6 °C) (Oral)   gifty  BP Readings from Last 3 Encounters:   10/21/21 (!) 173/77   21 133/76   21 128/73   inc  Pulse Readings from Last 3 Encounters:   10/21/21 (!) 55   21 67   21 69    me  Vitals:    10/21/21 1131   BP: (!) 173/77   Pulse: (!) 55   Resp: 16   Temp: 97.8 °F (36.6 °C)   TempSrc: Oral   SpO2: 97%   Weight: 142 lb (64.4 kg)   Height: 5' 4\" (1.626 m)   PainSc:   0 - No pain   ately strenuous housework (laun  Learning Assessment 3/20/2015   PRIMARY LEARNER Patient   HIGHEST LEVEL OF EDUCATION - PRIMARY LEARNER  4 YEARS OF COLLEGE   BARRIERS PRIMARY LEARNER NONE   PRIMARY LANGUAGE ENGLISH    NEED No   LEARNER PREFERENCE PRIMARY READING   ANSWERED BY patient   RELATIONSHIP SELF   sari 2020 2018   4/  3 most recent PHQ Screens 2021   Little interest or pleasure in doing things Not at all   Feeling down, depressed, irritable, or hopeless Nearly every day   Total Score PHQ 2 3   Trouble falling or staying asleep, or sleeping too much -   Feeling tired or having little energy -   Poor appetite, weight loss, or overeating -   Feeling bad about yourself - or that you are a failure or have let yourself or your family down -   Trouble concentrating on things such as school, work, reading, or watching TV -   Moving or speaking so slowly that other people could have noticed; or the opposite being so fidgety that others notice -   Thoughts of being better off dead, or hurting yourself in some way -   PHQ 9 Score -

## 2021-10-21 NOTE — PROGRESS NOTES
HPI  Ms. Arleth Pandya is a 68y.o. year old female, she is seen today for follow up HTN. Feeling well. Less leg pain than she had at last visit. No longer having leg weakness. Has been having trouble with knee pain b/l and lateral knees, worse on right. Starts after about 30 minutes of walking. The first time had knee pain it occurred while walking 7 miles on uneven terrain. When she took aleve prior to walking could walk 5 miles without any pain. On pravastatin instead of livalo due to concerns of dementia with livalo. BP has overall been well controlled at home. No chest pain or sob, no dizziness. Chief Complaint   Patient presents with    Hypertension    Results        Prior to Admission medications    Medication Sig Start Date End Date Taking? Authorizing Provider   amLODIPine (NORVASC) 5 mg tablet Take 1 Tablet by mouth daily. 9/2/21  Yes Lauro Peterson MD   mometasone (ELOCON) 0.1 % topical cream Apply  to affected area daily. 9/1/21  Yes Lauro Peterson MD   metoprolol tartrate (LOPRESSOR) 25 mg tablet Take 1 Tablet by mouth daily. 8/23/21  Yes Lauro Peterson MD   pravastatin (PRAVACHOL) 20 mg tablet Take 1 Tablet by mouth nightly. 8/17/21  Yes Lauro Peterson MD   Minoxidil 5 % foam by Apply Externally route. Yes Provider, Historical   oxybutynin chloride XL (DITROPAN XL) 10 mg CR tablet TAKE 1 TABLET BY MOUTH ONCE DAILY 2/22/21  Yes Lauro Peterson MD   Omeprazole delayed release (PRILOSEC D/R) 20 mg tablet Take 1 Tab by mouth daily. Patient taking differently: Take 20 mg by mouth every evening. 9/27/19  Yes Diego Calloway MD   multivitamin (ONE A DAY) tablet Take 1 Tab by mouth daily. Yes Provider, Historical   OAT BRAN PO Take  by mouth. Yes Provider, Historical   levalbuterol tartrate (XOPENEX) 45 mcg/actuation inhaler Take 1-2 Puffs by inhalation every four (4) hours as needed for Wheezing.  1/17/17  Yes Lauro Peterson MD   FLAXSEED PO Take  by mouth daily. Flaxseed meal   Yes Provider, Historical   cetirizine (ZYRTEC) 10 mg tablet Take 10 mg by mouth daily. Yes Provider, Historical   aspirin 81 mg chewable tablet Take 81 mg by mouth daily. Yes Provider, Historical   losartan (COZAAR) 50 mg tablet Take 2 Tabs by mouth daily. Patient not taking: Reported on 8/17/2021 2/22/21 10/31/21  Tereza Orozco MD   montelukast (Singulair) 10 mg tablet Take 1 Tab by mouth. Patient not taking: Reported on 8/17/2021  10/31/21  Provider, Historical   B.infantis-B.ani-B.long-B.bifi (Probiotic 4X) 10-15 mg TbEC Take  by mouth daily. Patient not taking: Reported on 8/17/2021  10/31/21  Provider, Historical         Allergies   Allergen Reactions    Nuts [place] Nut] Other (comments)     Mouth sores. Walnuts, pecans    Sulfa (Sulfonamide Antibiotics) Nausea and Vomiting         REVIEW OF SYSTEMS:  Per HPI    PHYSICAL EXAM:  Visit Vitals  /60   Pulse (!) 55   Temp 97.8 °F (36.6 °C) (Oral)   Resp 16   Ht 5' 4\" (1.626 m)   Wt 142 lb (64.4 kg)   SpO2 97%   BMI 24.37 kg/m²     Constitutional: Appears well-developed and well-nourished. No distress. HENT:   Head: Normocephalic and atraumatic. Eyes: No scleral icterus. Cardiovascular: Normal S1/S2, regular rhythm. No murmurs, rubs, or gallops. Pulmonary/Chest: Effort normal and breath sounds normal. No respiratory distress. No wheezes, rhonchi, or rales. Ext: No edema. Neurological: Alert. Psychiatric: Normal mood and affect.  Behavior is normal.     Lab Results   Component Value Date/Time    Sodium 140 10/18/2021 09:57 AM    Potassium 4.9 10/18/2021 09:57 AM    Chloride 106 10/18/2021 09:57 AM    CO2 24 10/18/2021 09:57 AM    Anion gap 6 09/26/2019 09:55 PM    Glucose 100 (H) 10/18/2021 09:57 AM    BUN 24 10/18/2021 09:57 AM    Creatinine 0.82 10/18/2021 09:57 AM    BUN/Creatinine ratio 29 (H) 10/18/2021 09:57 AM    GFR est AA 80 10/18/2021 09:57 AM    GFR est non-AA 70 10/18/2021 09:57 AM    Calcium 9.8 10/18/2021 09:57 AM    Bilirubin, total 0.4 10/18/2021 09:57 AM    Alk. phosphatase 67 10/18/2021 09:57 AM    Protein, total 6.8 10/18/2021 09:57 AM    Albumin 4.3 10/18/2021 09:57 AM    Globulin 3.3 08/12/2018 09:40 AM    A-G Ratio 1.7 10/18/2021 09:57 AM    ALT (SGPT) 17 10/18/2021 09:57 AM     Lab Results   Component Value Date/Time    Hemoglobin A1c 5.5 10/18/2021 09:57 AM    Hemoglobin A1c 5.4 09/27/2019 04:09 AM    Hemoglobin A1c 5.3 05/22/2018 08:34 AM      Lab Results   Component Value Date/Time    Cholesterol, total 196 10/18/2021 09:57 AM    HDL Cholesterol 64 10/18/2021 09:57 AM    LDL,Direct 108 (H) 10/18/2021 09:57 AM    LDL, calculated 116 (H) 10/18/2021 09:57 AM    LDL, calculated 79 08/12/2020 09:13 AM    VLDL, calculated 16 10/18/2021 09:57 AM    VLDL, calculated 15 08/12/2020 09:13 AM    Triglyceride 92 10/18/2021 09:57 AM    CHOL/HDL Ratio 3.4 09/27/2019 03:54 AM          ASSESSMENT/PLAN  Diagnoses and all orders for this visit:    1. Essential hypertension    2. Bilateral carotid artery stenosis    3. Hypercholesterolemia  -     LIPID PANEL; Future  -     LDL, DIRECT; Future      LDL not to goal - will work on diet changes, continue pravastatin at current dose for now  bp at goal - continue current medications     Health Maintenance Due   Topic Date Due    COVID-19 Vaccine (3 - Pfizer booster) 08/27/2021        Follow-up and Dispositions    · Return in about 3 months (around 1/21/2022) for bp, chol, labs prior. Reviewed plan of care. Patient has provided input and agrees with goals. The nurse provided the patient and/or family with advanced directive information if needed and encouraged the patient to provide a copy to the office when available.

## 2021-11-26 DIAGNOSIS — E78.00 HYPERCHOLESTEROLEMIA: ICD-10-CM

## 2021-11-28 RX ORDER — PRAVASTATIN SODIUM 20 MG/1
TABLET ORAL
Qty: 30 TABLET | Refills: 2 | Status: SHIPPED | OUTPATIENT
Start: 2021-11-28 | End: 2022-02-11

## 2021-12-06 ENCOUNTER — TELEPHONE (OUTPATIENT)
Dept: INTERNAL MEDICINE CLINIC | Age: 76
End: 2021-12-06

## 2021-12-06 NOTE — TELEPHONE ENCOUNTER
I called the patient and verified them by name and date of birth. Has been having problems with diarrhea recently but this morning there was blood in the stool. It happened a couple of times this morning. Knows that she has internal hemorrhoids and it was bright red blood. Is having some lower abdominal pain and it is a 2 on a 0 to 10 scale. Has a GI specialist (Dr. Temo Christianson) and has not reached out. I suggested calling them first to get an appointment and if they cannot get her in soon to go to the ER or urgent care due to needing imaging done. We do not want her to get dehydrated and we need to find the source of bleeding. She stated understanding and had no further questions.

## 2021-12-08 ENCOUNTER — TRANSCRIBE ORDER (OUTPATIENT)
Dept: SCHEDULING | Age: 76
End: 2021-12-08

## 2021-12-08 DIAGNOSIS — R13.10 DYSPHAGIA: ICD-10-CM

## 2021-12-08 DIAGNOSIS — K62.5 RECTAL BLEED: Primary | ICD-10-CM

## 2021-12-15 ENCOUNTER — TRANSCRIBE ORDER (OUTPATIENT)
Dept: SCHEDULING | Age: 76
End: 2021-12-15

## 2021-12-15 DIAGNOSIS — R13.10 DYSPHAGIA: ICD-10-CM

## 2021-12-15 DIAGNOSIS — K62.5 RECTAL BLEED: Primary | ICD-10-CM

## 2021-12-15 DIAGNOSIS — K64.5 INTERNAL THROMBOSED HEMORRHOIDS: ICD-10-CM

## 2021-12-16 ENCOUNTER — TRANSCRIBE ORDER (OUTPATIENT)
Dept: SCHEDULING | Age: 76
End: 2021-12-16

## 2021-12-16 DIAGNOSIS — K62.5 RECTAL BLEED: Primary | ICD-10-CM

## 2021-12-16 DIAGNOSIS — K64.8 INTERNAL HEMORRHOID: ICD-10-CM

## 2021-12-16 DIAGNOSIS — R13.10 DYSPHAGIA: ICD-10-CM

## 2021-12-17 ENCOUNTER — TRANSCRIBE ORDER (OUTPATIENT)
Dept: SCHEDULING | Age: 76
End: 2021-12-17

## 2021-12-17 DIAGNOSIS — R10.9 ABDOMINAL PAIN: ICD-10-CM

## 2021-12-17 DIAGNOSIS — R10.30 GROIN PAIN: ICD-10-CM

## 2021-12-17 DIAGNOSIS — R19.4 FREQUENT BOWEL MOVEMENTS: Primary | ICD-10-CM

## 2021-12-28 ENCOUNTER — HOSPITAL ENCOUNTER (OUTPATIENT)
Dept: CT IMAGING | Age: 76
Discharge: HOME | End: 2021-12-28
Attending: INTERNAL MEDICINE
Payer: MEDICARE

## 2021-12-28 DIAGNOSIS — R19.4 FREQUENT BOWEL MOVEMENTS: ICD-10-CM

## 2021-12-28 DIAGNOSIS — R10.9 ABDOMINAL PAIN: ICD-10-CM

## 2021-12-28 DIAGNOSIS — R10.30 GROIN PAIN: ICD-10-CM

## 2021-12-28 PROCEDURE — 74177 CT ABD & PELVIS W/CONTRAST: CPT

## 2021-12-28 PROCEDURE — 74011000636 HC RX REV CODE- 636: Performed by: RADIOLOGY

## 2021-12-28 RX ADMIN — IOHEXOL 50 ML: 240 INJECTION, SOLUTION INTRATHECAL; INTRAVASCULAR; INTRAVENOUS; ORAL at 11:20

## 2021-12-28 RX ADMIN — IOPAMIDOL 100 ML: 755 INJECTION, SOLUTION INTRAVENOUS at 11:20

## 2022-01-20 LAB
CHOLEST SERPL-MCNC: 240 MG/DL (ref 100–199)
HDLC SERPL-MCNC: 64 MG/DL
LDLC SERPL CALC-MCNC: 155 MG/DL (ref 0–99)
LDLC SERPL DIRECT ASSAY-MCNC: 150 MG/DL (ref 0–99)
TRIGL SERPL-MCNC: 120 MG/DL (ref 0–149)
VLDLC SERPL CALC-MCNC: 21 MG/DL (ref 5–40)

## 2022-01-21 ENCOUNTER — OFFICE VISIT (OUTPATIENT)
Dept: INTERNAL MEDICINE CLINIC | Age: 77
End: 2022-01-21
Payer: MEDICARE

## 2022-01-21 VITALS
WEIGHT: 144 LBS | TEMPERATURE: 97.8 F | BODY MASS INDEX: 24.59 KG/M2 | DIASTOLIC BLOOD PRESSURE: 60 MMHG | SYSTOLIC BLOOD PRESSURE: 150 MMHG | OXYGEN SATURATION: 95 % | RESPIRATION RATE: 16 BRPM | HEART RATE: 61 BPM | HEIGHT: 64 IN

## 2022-01-21 DIAGNOSIS — I25.10 CORONARY ARTERY DISEASE INVOLVING NATIVE CORONARY ARTERY OF NATIVE HEART WITHOUT ANGINA PECTORIS: ICD-10-CM

## 2022-01-21 DIAGNOSIS — I10 ESSENTIAL HYPERTENSION: Primary | ICD-10-CM

## 2022-01-21 DIAGNOSIS — I65.23 BILATERAL CAROTID ARTERY STENOSIS: ICD-10-CM

## 2022-01-21 DIAGNOSIS — E78.00 HYPERCHOLESTEROLEMIA: ICD-10-CM

## 2022-01-21 PROCEDURE — G8420 CALC BMI NORM PARAMETERS: HCPCS | Performed by: INTERNAL MEDICINE

## 2022-01-21 PROCEDURE — 1090F PRES/ABSN URINE INCON ASSESS: CPT | Performed by: INTERNAL MEDICINE

## 2022-01-21 PROCEDURE — G8536 NO DOC ELDER MAL SCRN: HCPCS | Performed by: INTERNAL MEDICINE

## 2022-01-21 PROCEDURE — 1101F PT FALLS ASSESS-DOCD LE1/YR: CPT | Performed by: INTERNAL MEDICINE

## 2022-01-21 PROCEDURE — G8399 PT W/DXA RESULTS DOCUMENT: HCPCS | Performed by: INTERNAL MEDICINE

## 2022-01-21 PROCEDURE — G8753 SYS BP > OR = 140: HCPCS | Performed by: INTERNAL MEDICINE

## 2022-01-21 PROCEDURE — G8427 DOCREV CUR MEDS BY ELIG CLIN: HCPCS | Performed by: INTERNAL MEDICINE

## 2022-01-21 PROCEDURE — G8510 SCR DEP NEG, NO PLAN REQD: HCPCS | Performed by: INTERNAL MEDICINE

## 2022-01-21 PROCEDURE — G8754 DIAS BP LESS 90: HCPCS | Performed by: INTERNAL MEDICINE

## 2022-01-21 PROCEDURE — 99214 OFFICE O/P EST MOD 30 MIN: CPT | Performed by: INTERNAL MEDICINE

## 2022-01-21 RX ORDER — EZETIMIBE 10 MG/1
10 TABLET ORAL DAILY
Qty: 90 TABLET | Refills: 0 | Status: SHIPPED | OUTPATIENT
Start: 2022-01-21 | End: 2022-04-13 | Stop reason: SDUPTHER

## 2022-01-21 NOTE — PROGRESS NOTES
Fabi Baker  Identified pt with two pt identifiers(name and ). Chief Complaint   Patient presents with    Hypertension    Cholesterol Problem    Results    Medication Evaluation     Zetia        Reviewed record In preparation for visit and have obtained necessary documentation. 1. Have you been to the ER, urgent care clinic or hospitalized since your last visit? No     2. Have you seen or consulted any other health care providers outside of the [address] [place] [address] since your last visit? Include any pap smears or colon screening. No    Patient has an advance directive. Vitals reviewed with provider.     Health Maintenance reviewed:     Health Maintenance Due   Topic    COVID-19 Vaccine (3 - Booster for Pfizer series)        Wt Readings from Last 3 Encounters:   22 144 lb (65.3 kg)   10/21/21 142 lb (64.4 kg)   21 142 lb 3.2 oz (64.5 kg)      Temp Readings from Last 3 Encounters:   22 97.8 °F (36.6 °C) (Oral)   10/21/21 97.8 °F (36.6 °C) (Oral)   21 98 °F (36.7 °C) (Oral)      BP Readings from Last 3 Encounters:   22 (!) 183/83   10/21/21 136/60   21 133/76      Pulse Readings from Last 3 Encounters:   22 61   10/21/21 (!) 55   21 67      Vitals:    22 1039   BP: (!) 183/83   Pulse: 61   Resp: 16   Temp: 97.8 °F (36.6 °C)   TempSrc: Oral   SpO2: 95%   Weight: 144 lb (65.3 kg)   Height: 5' 4\" (1.626 m)   PainSc:   0 - No pain          Learning Assessment:   :     Learning Assessment 3/20/2015   PRIMARY LEARNER Patient   HIGHEST LEVEL OF EDUCATION - PRIMARY LEARNER  4 YEARS OF COLLEGE   BARRIERS PRIMARY LEARNER NONE   PRIMARY LANGUAGE ENGLISH    NEED No   LEARNER PREFERENCE PRIMARY READING   ANSWERED BY patient   RELATIONSHIP SELF        Depression Screening:   :     3 most recent PHQ Screens 2022   Little interest or pleasure in doing things Not at all   Feeling down, depressed, irritable, or hopeless Several days   Total Score PHQ 2 1   Trouble falling or staying asleep, or sleeping too much -   Feeling tired or having little energy -   Poor appetite, weight loss, or overeating -   Feeling bad about yourself - or that you are a failure or have let yourself or your family down -   Trouble concentrating on things such as school, work, reading, or watching TV -   Moving or speaking so slowly that other people could have noticed; or the opposite being so fidgety that others notice -   Thoughts of being better off dead, or hurting yourself in some way -   PHQ 9 Score -        Fall Risk Assessment:   :     Fall Risk Assessment, last 12 mths 1/21/2022   Able to walk? Yes   Fall in past 12 months? 0   Do you feel unsteady? 0   Are you worried about falling 0   Number of falls in past 12 months -   Fall with injury? -        Abuse Screening:   :     Abuse Screening Questionnaire 5/4/2020 8/17/2018 4/23/2018 11/14/2016 3/20/2015   Do you ever feel afraid of your partner? N N N N N   Are you in a relationship with someone who physically or mentally threatens you? N N N N N   Is it safe for you to go home?  Y Y Y Y Y        ADL Screening:   :     ADL Assessment 8/17/2018   Feeding yourself No Help Needed   Getting from bed to chair No Help Needed   Getting dressed No Help Needed   Bathing or showering No Help Needed   Walk across the room (includes cane/walker) No Help Needed   Using the telphone No Help Needed   Taking your medications No Help Needed   Preparing meals No Help Needed   Managing money (expenses/bills) No Help Needed   Moderately strenuous housework (laundry) No Help Needed   Shopping for personal items (toiletries/medicines) No Help Needed   Shopping for groceries No Help Needed   Driving No Help Needed   Climbing a flight of stairs No Help Needed   Getting to places beyond walking distances No Help Needed

## 2022-01-21 NOTE — PROGRESS NOTES
HPI  Ms. Tam Dumont is a 68y.o. year old female, she is seen today for follow up HTN, cholesterol. Has been off statin for about 6 weeks and no longer having leg pain - had myalgia as well as tendon pain. Spoke to Dr. Jeff cao who recommended zetia. Has been off exercise routine for several weeks. Had lower abdominal pain and cramping with blood in stool - saw Dr. Moses Carballo - had CT abd/pelvis which was unremarkable other than moderate amount of stool in colon. Has bouts of alternating loose and firm stools - chronic. Prior to blood in stool had taken immodium with no BM in 2 weeks. Has been taking omeprazole per GI - will get EGD in February - had cough - improved since being on omeprazole. BP has been 497N-878L systolic at home. No chest pain, sob, dizziness, weakness. Chief Complaint   Patient presents with    Hypertension    Cholesterol Problem    Results    Medication Evaluation     Zetia         Prior to Admission medications    Medication Sig Start Date End Date Taking? Authorizing Provider   ezetimibe (ZETIA) 10 mg tablet Take 1 Tablet by mouth daily. 1/21/22  Yes Geoffrey Farmer MD   amLODIPine (NORVASC) 5 mg tablet Take 1 Tablet by mouth daily. 9/2/21  Yes Geoffrey Farmer MD   mometasone (ELOCON) 0.1 % topical cream Apply  to affected area daily. 9/1/21  Yes Geoffrey Farmer MD   metoprolol tartrate (LOPRESSOR) 25 mg tablet Take 1 Tablet by mouth daily. 8/23/21  Yes Geoffrey Farmer MD   Minoxidil 5 % foam by Apply Externally route. Yes Provider, Historical   oxybutynin chloride XL (DITROPAN XL) 10 mg CR tablet TAKE 1 TABLET BY MOUTH ONCE DAILY 2/22/21  Yes Geoffrey Farmer MD   multivitamin (ONE A DAY) tablet Take 1 Tab by mouth daily. Yes Provider, Historical   levalbuterol tartrate (XOPENEX) 45 mcg/actuation inhaler Take 1-2 Puffs by inhalation every four (4) hours as needed for Wheezing.  1/17/17  Yes Geoffrey Farmer MD   FLAXSEED PO Take  by mouth daily. Flaxseed meal   Yes Provider, Historical   cetirizine (ZYRTEC) 10 mg tablet Take 10 mg by mouth daily. Yes Provider, Historical   pravastatin (PRAVACHOL) 20 mg tablet TAKE 1 TABLET BY MOUTH EVERY NIGHT  Patient not taking: Reported on 1/21/2022 11/28/21   Terri Darby MD   Omeprazole delayed release (PRILOSEC D/R) 20 mg tablet Take 1 Tab by mouth daily. Patient taking differently: Take 20 mg by mouth every evening. 9/27/19   Tony Calloway MD   OAT BRAN PO Take  by mouth. Patient not taking: Reported on 1/21/2022    Provider, Historical   aspirin 81 mg chewable tablet Take 81 mg by mouth daily. Patient not taking: Reported on 1/21/2022    Provider, Historical         Allergies   Allergen Reactions    Nuts [Tree Nut] Other (comments)     Mouth sores. Walnuts, pecans    Sulfa (Sulfonamide Antibiotics) Nausea and Vomiting         REVIEW OF SYSTEMS:  Per HPI    PHYSICAL EXAM:  Visit Vitals  BP (!) 150/60   Pulse 61   Temp 97.8 °F (36.6 °C) (Oral)   Resp 16   Ht 5' 4\" (1.626 m)   Wt 144 lb (65.3 kg)   SpO2 95%   BMI 24.72 kg/m²     Constitutional: Appears well-developed and well-nourished. No distress. HENT:   Head: Normocephalic and atraumatic. Eyes: No scleral icterus. Neck: no lad, no tm, supple, no carotid bruits  Cardiovascular: Normal S1/S2, regular rhythm. No murmurs, rubs, or gallops. Pulmonary/Chest: Effort normal and breath sounds normal. No respiratory distress. No wheezes, rhonchi, or rales. Abdomen: Soft, NT/ND, +BS, no rebound or guarding, no masses, no HSM appreciated. Ext: No edema. Neurological: Alert. Psychiatric: Normal mood and affect.  Behavior is normal.     Lab Results   Component Value Date/Time    Sodium 140 10/18/2021 09:57 AM    Potassium 4.9 10/18/2021 09:57 AM    Chloride 106 10/18/2021 09:57 AM    CO2 24 10/18/2021 09:57 AM    Anion gap 6 09/26/2019 09:55 PM    Glucose 100 (H) 10/18/2021 09:57 AM    BUN 24 10/18/2021 09:57 AM    Creatinine 0.82 10/18/2021 09:57 AM    BUN/Creatinine ratio 29 (H) 10/18/2021 09:57 AM    GFR est AA 80 10/18/2021 09:57 AM    GFR est non-AA 70 10/18/2021 09:57 AM    Calcium 9.8 10/18/2021 09:57 AM    Bilirubin, total 0.4 10/18/2021 09:57 AM    Alk. phosphatase 67 10/18/2021 09:57 AM    Protein, total 6.8 10/18/2021 09:57 AM    Albumin 4.3 10/18/2021 09:57 AM    Globulin 3.3 08/12/2018 09:40 AM    A-G Ratio 1.7 10/18/2021 09:57 AM    ALT (SGPT) 17 10/18/2021 09:57 AM     Lab Results   Component Value Date/Time    Hemoglobin A1c 5.5 10/18/2021 09:57 AM    Hemoglobin A1c 5.4 09/27/2019 04:09 AM    Hemoglobin A1c 5.3 05/22/2018 08:34 AM      Lab Results   Component Value Date/Time    Cholesterol, total 240 (H) 01/19/2022 01:30 PM    HDL Cholesterol 64 01/19/2022 01:30 PM    LDL,Direct 150 (H) 01/19/2022 01:30 PM    LDL, calculated 155 (H) 01/19/2022 01:30 PM    LDL, calculated 79 08/12/2020 09:13 AM    VLDL, calculated 21 01/19/2022 01:30 PM    VLDL, calculated 15 08/12/2020 09:13 AM    Triglyceride 120 01/19/2022 01:30 PM    CHOL/HDL Ratio 3.4 09/27/2019 03:54 AM          ASSESSMENT/PLAN  Diagnoses and all orders for this visit:    1. Essential hypertension    2. Bilateral carotid artery stenosis  -     DUPLEX CAROTID BILATERAL; Future    3. Hypercholesterolemia  -     ezetimibe (ZETIA) 10 mg tablet; Take 1 Tablet by mouth daily.  -     METABOLIC PANEL, COMPREHENSIVE; Future  -     LIPID PANEL; Future    4. Coronary artery disease involving native coronary artery of native heart without angina pectoris      Blood pressure is slightly high but well controlled at home, continue current medications and continue to monitor at home. Get Dopplers for follow-up carotid artery stenosis. Add Zetia for high cholesterol. No symptoms of CAD, managed by Dr. Lamont Pisano. There are no preventive care reminders to display for this patient. Follow-up and Dispositions    · Return for 3-4 mos BP, cholesterol - labs prior.             Reviewed plan of care. Patient has provided input and agrees with goals. The nurse provided the patient and/or family with advanced directive information if needed and encouraged the patient to provide a copy to the office when available.

## 2022-02-10 ENCOUNTER — HOSPITAL ENCOUNTER (OUTPATIENT)
Dept: PREADMISSION TESTING | Age: 77
Discharge: HOME | End: 2022-02-10
Payer: MEDICARE

## 2022-02-10 PROCEDURE — U0005 INFEC AGEN DETEC AMPLI PROBE: HCPCS

## 2022-02-11 LAB
SARS-COV-2, XPLCVT: NOT DETECTED
SOURCE, COVRS: NORMAL

## 2022-02-11 RX ORDER — DIAPER,BRIEF,ADULT, DISPOSABLE
500 EACH MISCELLANEOUS 2 TIMES DAILY
COMMUNITY

## 2022-02-11 RX ORDER — PANTOPRAZOLE SODIUM 40 MG/1
40 TABLET, DELAYED RELEASE ORAL DAILY
COMMUNITY
End: 2022-09-13 | Stop reason: SDUPTHER

## 2022-02-11 RX ORDER — MINOXIDIL 5 %
SOLUTION, NON-ORAL TOPICAL DAILY
COMMUNITY
End: 2022-08-03

## 2022-02-14 ENCOUNTER — HOSPITAL ENCOUNTER (OUTPATIENT)
Age: 77
Setting detail: OUTPATIENT SURGERY
Discharge: HOME | End: 2022-02-14
Attending: INTERNAL MEDICINE | Admitting: INTERNAL MEDICINE
Payer: MEDICARE

## 2022-02-14 ENCOUNTER — ANESTHESIA (OUTPATIENT)
Dept: ENDOSCOPY | Age: 77
End: 2022-02-14
Payer: MEDICARE

## 2022-02-14 ENCOUNTER — ANESTHESIA EVENT (OUTPATIENT)
Dept: ENDOSCOPY | Age: 77
End: 2022-02-14
Payer: MEDICARE

## 2022-02-14 VITALS
TEMPERATURE: 97.4 F | DIASTOLIC BLOOD PRESSURE: 75 MMHG | RESPIRATION RATE: 18 BRPM | OXYGEN SATURATION: 100 % | HEART RATE: 63 BPM | WEIGHT: 141.5 LBS | SYSTOLIC BLOOD PRESSURE: 184 MMHG | BODY MASS INDEX: 24.16 KG/M2 | HEIGHT: 64 IN

## 2022-02-14 PROCEDURE — 88305 TISSUE EXAM BY PATHOLOGIST: CPT

## 2022-02-14 PROCEDURE — 74011250636 HC RX REV CODE- 250/636: Performed by: ANESTHESIOLOGY

## 2022-02-14 PROCEDURE — 2709999900 HC NON-CHARGEABLE SUPPLY: Performed by: INTERNAL MEDICINE

## 2022-02-14 PROCEDURE — 76040000007: Performed by: INTERNAL MEDICINE

## 2022-02-14 PROCEDURE — 76060000032 HC ANESTHESIA 0.5 TO 1 HR: Performed by: INTERNAL MEDICINE

## 2022-02-14 PROCEDURE — 77030019988 HC FCPS ENDOSC DISP BSC -B: Performed by: INTERNAL MEDICINE

## 2022-02-14 PROCEDURE — 74011250636 HC RX REV CODE- 250/636: Performed by: INTERNAL MEDICINE

## 2022-02-14 PROCEDURE — 74011000250 HC RX REV CODE- 250: Performed by: ANESTHESIOLOGY

## 2022-02-14 RX ORDER — LIDOCAINE HYDROCHLORIDE 20 MG/ML
INJECTION, SOLUTION EPIDURAL; INFILTRATION; INTRACAUDAL; PERINEURAL AS NEEDED
Status: DISCONTINUED | OUTPATIENT
Start: 2022-02-14 | End: 2022-02-14 | Stop reason: HOSPADM

## 2022-02-14 RX ORDER — FLUMAZENIL 0.1 MG/ML
0.2 INJECTION INTRAVENOUS
Status: DISCONTINUED | OUTPATIENT
Start: 2022-02-14 | End: 2022-02-14 | Stop reason: HOSPADM

## 2022-02-14 RX ORDER — MIDAZOLAM HYDROCHLORIDE 1 MG/ML
.25-5 INJECTION, SOLUTION INTRAMUSCULAR; INTRAVENOUS
Status: DISCONTINUED | OUTPATIENT
Start: 2022-02-14 | End: 2022-02-14 | Stop reason: HOSPADM

## 2022-02-14 RX ORDER — DEXTROMETHORPHAN/PSEUDOEPHED 2.5-7.5/.8
1.2 DROPS ORAL
Status: DISCONTINUED | OUTPATIENT
Start: 2022-02-14 | End: 2022-02-15 | Stop reason: HOSPADM

## 2022-02-14 RX ORDER — SODIUM CHLORIDE 9 MG/ML
25 INJECTION, SOLUTION INTRAVENOUS CONTINUOUS
Status: DISCONTINUED | OUTPATIENT
Start: 2022-02-14 | End: 2022-02-15 | Stop reason: HOSPADM

## 2022-02-14 RX ORDER — SODIUM CHLORIDE 0.9 % (FLUSH) 0.9 %
5-40 SYRINGE (ML) INJECTION EVERY 8 HOURS
Status: DISCONTINUED | OUTPATIENT
Start: 2022-02-14 | End: 2022-02-15 | Stop reason: HOSPADM

## 2022-02-14 RX ORDER — NALOXONE HYDROCHLORIDE 0.4 MG/ML
0.4 INJECTION, SOLUTION INTRAMUSCULAR; INTRAVENOUS; SUBCUTANEOUS
Status: DISCONTINUED | OUTPATIENT
Start: 2022-02-14 | End: 2022-02-14 | Stop reason: HOSPADM

## 2022-02-14 RX ORDER — PROPOFOL 10 MG/ML
INJECTION, EMULSION INTRAVENOUS AS NEEDED
Status: DISCONTINUED | OUTPATIENT
Start: 2022-02-14 | End: 2022-02-14 | Stop reason: HOSPADM

## 2022-02-14 RX ORDER — SODIUM CHLORIDE 9 MG/ML
100 INJECTION, SOLUTION INTRAVENOUS CONTINUOUS
Status: DISCONTINUED | OUTPATIENT
Start: 2022-02-14 | End: 2022-02-14 | Stop reason: HOSPADM

## 2022-02-14 RX ORDER — ATROPINE SULFATE 0.1 MG/ML
0.5 INJECTION INTRAVENOUS
Status: DISCONTINUED | OUTPATIENT
Start: 2022-02-14 | End: 2022-02-15 | Stop reason: HOSPADM

## 2022-02-14 RX ORDER — SODIUM CHLORIDE 0.9 % (FLUSH) 0.9 %
5-40 SYRINGE (ML) INJECTION AS NEEDED
Status: DISCONTINUED | OUTPATIENT
Start: 2022-02-14 | End: 2022-02-15 | Stop reason: HOSPADM

## 2022-02-14 RX ADMIN — LIDOCAINE HYDROCHLORIDE 80 MG: 20 INJECTION, SOLUTION EPIDURAL; INFILTRATION; INTRACAUDAL; PERINEURAL at 15:27

## 2022-02-14 RX ADMIN — SODIUM CHLORIDE 25 ML/HR: 9 INJECTION, SOLUTION INTRAVENOUS at 15:03

## 2022-02-14 RX ADMIN — PROPOFOL 200 MG: 10 INJECTION, EMULSION INTRAVENOUS at 15:39

## 2022-02-14 NOTE — PROCEDURES
[facility]                   Endoscopic Gastroduodenoscopy Procedure Note      2/14/2022  Lexus Mi  1945  [id number]    Procedure: Endoscopic Gastroduodenoscopy with biopsy    Indication:  GERD     Pre-operative Diagnosis: see indication above    Post-operative Diagnosis: see findings below    : SHIRIN Andres MD    Referring Provider: Fletcher Ch MD      Anesthesia/Sedation:  MAC anesthesia Propofol    Airway assessment: No airway problems anticipated    Pre-Procedural Exam:      Airway: clear, no airway problems anticipated  Heart: RRR, without gallops or rubs  Lungs: clear bilaterally without wheezes, crackles, or rhonchi  Abdomen: soft, nontender, nondistended, bowel sounds present  Mental Status: awake, alert and oriented to person, place and time       Procedure Details     After infomed consent was obtained for the procedure, with all risks and benefits of procedure explained the patient was taken to the endoscopy suite and placed in the left lateral decubitus position. Following sequential administration of sedation as per above, the endoscope was inserted into the mouth and advanced under direct vision to second portion of the duodenum. A careful inspection was made as the gastroscope was withdrawn, including a retroflexed view of the proximal stomach; findings and interventions are described below. Findings:   Esophagus:  Focal esophagitis at the GE junction, biopsied. No stricture or ring  Stomach: Mild patchy erythema in antrum, biopsied  Duodenum: normal    Therapies:  biopsy of esophagus  biopsy of stomach antrum    Specimens: 1. Gastric antral biopsies  2 biopsies of GE junction           Complications:   None; patient tolerated the procedure well. EBL:  None. Impression:      1. Focal esophagitis at the GE junction  2.  Normal stomach  3 Normal duodenum    Recommendations:  -Continue acid suppression---with the pantoprazole 40 mg daily, -Await pathology. , -Follow symptoms.     Adolfo Horowitz., MD2/14/2022

## 2022-02-14 NOTE — H&P
Lars Mendiola MD  [facility] [address] [facility] [address]  [phone]  [email or web address]      Gastroenterology Outpatient History and Physical    Patient: Melekali Donovans    Physician: Rosy Kathleen MD    Vital Signs: Blood pressure (!) 169/75, pulse 73, temperature 97.5 °F (36.4 °C), resp. rate 17, height 5' 4\" (1.626 m), weight 64.2 kg (141 lb 8 oz), SpO2 97 %, not currently breastfeeding. Allergies: Allergies   Allergen Reactions    Nuts [Tree Nut] Other (comments)     Mouth sores. Walnuts, pecans    Sulfa (Sulfonamide Antibiotics) Nausea and Vomiting       Chief Complaint: difficulty swallowing    History of Present Illness: Here for EGD with dilation and biopsy for esophageal dysphagia. Takes pantoprazole 40 mg daily for GERD    History:  Past Medical History:   Diagnosis Date    Alopecia     Arthritis     Asthma     CAD (coronary artery disease) 08/12/2018    NSTEMI, Cath, TYRONE OM2 of dominant Cx    GERD (gastroesophageal reflux disease)     H/O seasonal allergies     H/O TB skin testing 1968    treated with INH    Hypercholesterolemia     Hypertension     IFG (impaired fasting glucose) 6/14/2013    a1c 6.0 5/2013    Overactive bladder 3/13/2013    Serrated adenoma of colon 2/6/2014 11/17/09 - Dr. Ludwin Whatley - repeat in 5 years    Stroke [facility])     TIA    TB (tuberculosis) 1966    took [facility] for 12 months      Past Surgical History:   Procedure Laterality Date    COLONOSCOPY N/A 11/3/2020    COLONOSCOPY performed [facility] Llu[facility]Zleta [facility] ENDOSCOPY    COLONOSCOPY,REMMATT Hairston  11/3/2020         COLORECTAL SCRN; HI RISK IND  2/20/2015         HX CATARACT REMOVAL Bilateral     HX GI      colonscopy.  polyp removed    HX HEART CATHETERIZATION  2018    stent x 1    HX MALIGNANT SKIN LESION EXCISION Left 2018    \"non-melanoma pre-cancerous lesion removed\" per patient    HX ORTHOPAEDIC Right 2015    great toe replacement      Social History     Socioeconomic History    Marital status:    Tobacco Use    Smoking status: Former Smoker     Packs/day: 0.25     Years: 10.00     Pack years: 2.50    Smokeless tobacco: Never Used    Tobacco comment: Quit in the 1990's    Vaping Use    Vaping Use: Never used   Substance and Sexual Activity    Alcohol use: Yes     Alcohol/week: 14.0 standard drinks     Types: 14 Shots of liquor per week     Comment: 1-2 [place] per ay    Drug use: Yes     Types: Prescription, OTC      Family History   Problem Relation Age of Onset    Hypertension Mother     Cancer Father         bladder    Other Father         congential heart valve defect    Heart Disease Father     Depression Sister     Stroke Sister 72    Hypertension Sister     COPD Sister    Baez Other Sister 79        twisted bowel, hiatal hernia    Heart Disease Maternal Grandfather     Dementia Paternal Grandfather     Cancer Maternal Grandmother         esophageal    Asthma Paternal Grandmother     Dementia Paternal Grandmother     Breast Cancer Paternal Grandmother       Patient Active Problem List   Diagnosis Code    Essential hypertension I10    Asthma J45.909    ADD (attention deficit disorder) F98.8    Overactive bladder N32.81    Environmental allergies Z91.09    S/P colonoscopy Z98.890    At risk for osteoporosis Z91.89    Bilateral carotid artery disease ([facility] [address] I77.9    Vertical diplopia H53.2    IFG (impaired fasting glucose) R73.01    Serrated adenoma of colon D12.6    Alopecia areata L63.9    Advance directive discussed with patient Z71.89    Chest pain R07.9    Coronary artery disease involving native coronary artery of native heart without angina pectoris I25.10    Hypercholesterolemia E78.00         Medications:   Prior to Admission medications    Medication Sig Start Date End Date Taking? Authorizing Provider   LOSARTAN PO Take  by mouth.    Yes Provider, Historical   minoxidiL 5 % soln by Apply Externally route daily. Yes Provider, Historical   pantoprazole (PROTONIX) 40 mg tablet Take 40 mg by mouth daily. Yes Provider, Historical   lysine (L-LYSINE) 500 mg tab tablet Take 500 mg by mouth two (2) times a day. Yes Provider, Historical   ezetimibe (ZETIA) 10 mg tablet Take 1 Tablet by mouth daily. 1/21/22  Yes Marlene Garibay MD   amLODIPine (NORVASC) 5 mg tablet Take 1 Tablet by mouth daily. 9/2/21  Yes Marlene Garibay MD   mometasone (ELOCON) 0.1 % topical cream Apply  to affected area daily. 9/1/21  Yes Marlene Garibay MD   metoprolol tartrate (LOPRESSOR) 25 mg tablet Take 1 Tablet by mouth daily. 8/23/21  Yes Marlene Garibay MD   oxybutynin chloride XL (DITROPAN XL) 10 mg CR tablet TAKE 1 TABLET BY MOUTH ONCE DAILY 2/22/21  Yes Marlene Garibay MD   multivitamin (ONE A DAY) tablet Take 1 Tab by mouth daily. Yes Provider, Historical   OAT BRAN PO Take  by mouth. Yes Provider, Historical   FLAXSEED PO Take  by mouth daily. Flaxseed meal   Yes Provider, Historical   cetirizine (ZYRTEC) 10 mg tablet Take 10 mg by mouth daily. Yes Provider, Historical   levalbuterol tartrate (XOPENEX) 45 mcg/actuation inhaler Take 1-2 Puffs by inhalation every four (4) hours as needed for Wheezing.  1/17/17   Marlene Garibay MD       Physical Exam:     General: well developed, well nourished   HEENT: unremarkable   Heart: regular rhythm no mumur    Lungs: clear   Abdominal:  benign   Neurological: unremarkable   Extremities: no edema     Findings/Diagnosis: GERD, esophageal dysphagis    Plan of Care/Planned Procedure: EGD with biopsies and dilation with  monitored anesthesia care sedation       Signed:  Adrianne Salazar MD 2/14/2022

## 2022-02-14 NOTE — ANESTHESIA POSTPROCEDURE EVALUATION
Procedure(s):  ESOPHAGOGASTRODUODENOSCOPY (EGD)  ESOPHAGOGASTRODUODENAL (EGD) BIOPSY. total IV anesthesia    Anesthesia Post Evaluation        Patient location during evaluation: PACU  Note status: Adequate. Level of consciousness: responsive to verbal stimuli and sleepy but conscious  Pain management: satisfactory to patient  Airway patency: patent  Anesthetic complications: no  Cardiovascular status: acceptable  Respiratory status: acceptable  Hydration status: acceptable  Comments: +Post-Anesthesia Evaluation and Assessment    Patient: Rosi Salter MRN: [id number]  SSN: [id number]   YOB: 1945  Age: 68 y.o. Sex: female      Cardiovascular Function/Vital Signs    BP (!) 172/80   Pulse 64   Temp 36.3 °C (97.4 °F)   Resp 18   Ht 5' 4\" (1.626 m)   Wt 64.2 kg (141 lb 8 oz)   SpO2 100%   Breastfeeding No   BMI 24.29 kg/m²     Patient is status post Procedure(s):  ESOPHAGOGASTRODUODENOSCOPY (EGD)  ESOPHAGOGASTRODUODENAL (EGD) BIOPSY. Nausea/Vomiting: Controlled. Postoperative hydration reviewed and adequate. Pain:  Pain Scale 1: Numeric (0 - 10) (02/14/22 1545)  Pain Intensity 1: 0 (02/14/22 1545)   Managed. Neurological Status: At baseline. Mental Status and Level of Consciousness: Arousable. Pulmonary Status:   O2 Device: None (Room air) (02/14/22 1545)   Adequate oxygenation and airway patent. Complications related to anesthesia: None    Post-anesthesia assessment completed. No concerns. Signed By: Carlos Vargas DO    2/14/2022  Post anesthesia nausea and vomiting:  controlled      INITIAL Post-op Vital signs:   Vitals Value Taken Time   /82 02/14/22 1548   Temp 36.3 °C (97.4 °F) 02/14/22 1545   Pulse 70 02/14/22 1549   Resp 19 02/14/22 1549   SpO2 100 % 02/14/22 1549   Vitals shown include unvalidated device data.

## 2022-02-14 NOTE — PROGRESS NOTES
Rehana Mcclure  1945  [id number]    Situation:  Procedure: Procedure(s):  ESOPHAGOGASTRODUODENOSCOPY (EGD)  ESOPHAGOGASTRODUODENAL (EGD) BIOPSY    Background:    Preoperative diagnosis: Anal bleeding [K62.5]  Dysphagia, unspecified type [R13.10]  Postoperative diagnosis: esophagitis    :  Dr. Natalia Londono  Assistant(s): Endoscopy Technician-1: Carmne Sharpe  Endoscopy RN-1: Dara Ormond, RN    Specimens:   ID Type Source Tests Collected by Time Destination   1 : gastric antrim bx Preservative Gastric  Black Ross MD 2/14/2022 1502 Pathology   2 : GE Junction bx Preservative Yoselyn Gifford MD 2/14/2022 1533 Pathology     H. Pylori  no    Assessment:  Intra-procedure medications   Anesthesia gave intra-procedure sedation and medications, see anesthesia flow sheet Intravenous fluids: NS@ KVO     Vital signs stable     Abdominal assessment: round and soft     Recommendation:  Discharge patient per MD order.   Family or Friend - agueda   Permission to share finding with family or friend

## 2022-02-14 NOTE — PROGRESS NOTES
Endoscope was pre-cleaned at bedside immediately following procedure by Sukhjinder Reno ET. See Anesthesia report. Received report from anesthesia staff on vital signs and status of patient.

## 2022-02-14 NOTE — ANESTHESIA PREPROCEDURE EVALUATION
Anesthetic History   No history of anesthetic complications            Review of Systems / Medical History  Patient summary reviewed, nursing notes reviewed and pertinent labs reviewed    Pulmonary            Asthma : well controlled       Neuro/Psych         TIA     Cardiovascular    Hypertension          Past MI, CAD, PAD and hyperlipidemia    Exercise tolerance: >4 METS  Comments: TTE 2018  Left ventricle: Systolic function was normal. Ejection fraction was  estimated in the range of 55 % to 60 %. There was severe hypokinesis of  the basal-mid inferoseptal wall(s). There was severe hypokinesis of the  entire inferior wall(s).    Left atrium: The atrium was dilated.     INDICATIONS: CAD Assess left ventricular function. [address] [place] [address] 2019  · Non-obstructive CAD overall. Left dominant coronary circulation. · Patent OM2 stent  · Moderate stenosis in the proximal LAD 50% - negative by iFR (0.91-0.92) and by FFR using IV adenosine (0.87)  · Medical therapy recommeonded.    GI/Hepatic/Renal     GERD: well controlled           Endo/Other        Arthritis     Other Findings              Physical Exam    Airway  Mallampati: II  TM Distance: 4 - 6 cm  Neck ROM: normal range of motion   Mouth opening: Normal     Cardiovascular  Regular rate and rhythm,  S1 and S2 normal,  no murmur, click, rub, or gallop             Dental    Dentition: Caps/crowns     Pulmonary  Breath sounds clear to auscultation               Abdominal  GI exam deferred       Other Findings            Anesthetic Plan    ASA: 3  Anesthesia type: total IV anesthesia and general          Induction: Intravenous  Anesthetic plan and risks discussed with: Patient

## 2022-02-14 NOTE — DISCHARGE INSTRUCTIONS
Char Gilbert MD  [facility], [address]  [facility] [address] [facility]  [phone]  www. [employer] Dexter Aguilera  [id number]  1945    EGD DISCHARGE INSTRUCTIONS    Discomfort:  Sore throat- throat lozenges or warm salt water gargle  redness at IV site- apply warm compress to area; if redness or soreness persist- contact your physician  Gaseous discomfort- walking, belching will help relieve any discomfort  You may not operate a vehicle for 12 hours  You may not engage in an occupation involving machinery or appliances for rest of today  You may not drink alcoholic beverages for at least 12 hours  Avoid making any critical decisions for at least 24 hour  DIET  You may have anything by mouth. You may eat and drink immediately. You may resume your regular diet - however -  remember your colon is empty and a heavy meal will produce gas. Avoid these foods:  vegetables, fried / greasy foods, carbonated drinks      ACTIVITY  You may resume your normal daily activities   Spend the remainder of the day resting -  avoid any strenuous activity. CALL M.D. ANY SIGN OF   Increasing pain, nausea, vomiting  Abdominal distension (swelling)  New increased bleeding (oral or rectal)  Fever (chills)  Pain in chest area  Bloody discharge from nose or mouth  Shortness of breath    Follow-up Instructions:   Call Dr. Char Gilbert for any questions or problems. Telephone # [phone]  Dr. Tomy Oneil office will notify you of the biopsy results available  Within 7 to 10 days. We will call you or send a letter   Continue same medications. ENDOSCOPY FINDINGS:   Your endoscopy showed chronic appearing esophagitis which was biopsied. Also biopsied the stomach to check for H pylori infection.       DISCHARGE SUMMARY from Nurse    The following personal items collected during your admission are returned to you:   Dental Appliance: Dental Appliances: None  Vision: Visual Aid: None  Hearing Aid:    Jewelry:    Clothing:    Other Valuables:    Valuables sent to safe:

## 2022-03-19 PROBLEM — I25.10 CORONARY ARTERY DISEASE INVOLVING NATIVE CORONARY ARTERY OF NATIVE HEART WITHOUT ANGINA PECTORIS: Status: ACTIVE | Noted: 2018-08-16

## 2022-03-19 PROBLEM — R07.9 CHEST PAIN: Status: ACTIVE | Noted: 2018-08-12

## 2022-03-19 PROBLEM — E78.00 HYPERCHOLESTEROLEMIA: Status: ACTIVE | Noted: 2018-08-16

## 2022-03-24 DIAGNOSIS — I10 ESSENTIAL HYPERTENSION: ICD-10-CM

## 2022-03-24 RX ORDER — AMLODIPINE BESYLATE 5 MG/1
5 TABLET ORAL DAILY
Qty: 90 TABLET | Refills: 1 | Status: SHIPPED | OUTPATIENT
Start: 2022-03-24 | End: 2022-07-25

## 2022-04-13 DIAGNOSIS — E78.00 HYPERCHOLESTEROLEMIA: ICD-10-CM

## 2022-04-13 RX ORDER — EZETIMIBE 10 MG/1
10 TABLET ORAL DAILY
Qty: 90 TABLET | Refills: 3 | Status: SHIPPED | OUTPATIENT
Start: 2022-04-13 | End: 2022-04-26 | Stop reason: SDUPTHER

## 2022-04-13 NOTE — TELEPHONE ENCOUNTER
PCP: Victorina Rai MD     Last appt: 1/21/2022   Future Appointments   Date Time Provider Niurka Boles   5/3/2022 11:30 AM Victorina Rai MD [facility] BS AMB        Requested Prescriptions     Pending Prescriptions Disp Refills    ezetimibe (ZETIA) 10 mg tablet 90 Tablet 0     Sig: Take 1 Tablet by mouth daily.

## 2022-04-13 NOTE — TELEPHONE ENCOUNTER
Requested Prescriptions     Pending Prescriptions Disp Refills    ezetimibe (ZETIA) 10 mg tablet 90 Tablet 0     Sig: Take 1 Tablet by mouth daily.

## 2022-04-26 RX ORDER — EZETIMIBE 10 MG/1
10 TABLET ORAL DAILY
Qty: 90 TABLET | Refills: 3 | Status: SHIPPED | OUTPATIENT
Start: 2022-04-26

## 2022-04-26 NOTE — TELEPHONE ENCOUNTER
prescription needs to be sent to [facility] on [address] in [facility]

## 2022-04-26 NOTE — TELEPHONE ENCOUNTER
PCP: Victorina Rai MD     Last appt: 1/21/2022   Future Appointments   Date Time Provider Niurka Boles   5/3/2022 11:30 AM Victorina Rai MD [facility] BS AMB        Requested Prescriptions     Pending Prescriptions Disp Refills    ezetimibe (ZETIA) 10 mg tablet 90 Tablet 3     Sig: Take 1 Tablet by mouth daily. Signed Prescriptions Disp Refills    ezetimibe (ZETIA) 10 mg tablet 90 Tablet 3     Sig: Take 1 Tablet by mouth daily.      Authorizing Provider: Renny Pink

## 2022-04-28 LAB
ALBUMIN SERPL-MCNC: 4.1 G/DL (ref 3.7–4.7)
ALBUMIN/GLOB SERPL: 1.7 {RATIO} (ref 1.2–2.2)
ALP SERPL-CCNC: 84 IU/L (ref 44–121)
ALT SERPL-CCNC: 17 IU/L (ref 0–32)
AST SERPL-CCNC: 15 IU/L (ref 0–40)
BILIRUB SERPL-MCNC: 0.2 MG/DL (ref 0–1.2)
BUN SERPL-MCNC: 21 MG/DL (ref 8–27)
BUN/CREAT SERPL: 23 (ref 12–28)
CALCIUM SERPL-MCNC: 9.6 MG/DL (ref 8.7–10.3)
CHLORIDE SERPL-SCNC: 106 MMOL/L (ref 96–106)
CHOLEST SERPL-MCNC: 190 MG/DL (ref 100–199)
CO2 SERPL-SCNC: 22 MMOL/L (ref 20–29)
CREAT SERPL-MCNC: 0.92 MG/DL (ref 0.57–1)
EGFR: 65 ML/MIN/1.73
GLOBULIN SER CALC-MCNC: 2.4 G/DL (ref 1.5–4.5)
GLUCOSE SERPL-MCNC: 102 MG/DL (ref 65–99)
HDLC SERPL-MCNC: 59 MG/DL
LDLC SERPL CALC-MCNC: 119 MG/DL (ref 0–99)
POTASSIUM SERPL-SCNC: 5 MMOL/L (ref 3.5–5.2)
PROT SERPL-MCNC: 6.5 G/DL (ref 6–8.5)
SODIUM SERPL-SCNC: 140 MMOL/L (ref 134–144)
TRIGL SERPL-MCNC: 66 MG/DL (ref 0–149)
VLDLC SERPL CALC-MCNC: 12 MG/DL (ref 5–40)

## 2022-05-03 ENCOUNTER — OFFICE VISIT (OUTPATIENT)
Dept: INTERNAL MEDICINE CLINIC | Age: 77
End: 2022-05-03
Payer: MEDICARE

## 2022-05-03 VITALS
WEIGHT: 142.8 LBS | DIASTOLIC BLOOD PRESSURE: 70 MMHG | SYSTOLIC BLOOD PRESSURE: 148 MMHG | OXYGEN SATURATION: 98 % | RESPIRATION RATE: 16 BRPM | HEIGHT: 64 IN | BODY MASS INDEX: 24.38 KG/M2 | TEMPERATURE: 97.6 F | HEART RATE: 62 BPM

## 2022-05-03 DIAGNOSIS — I77.9 BILATERAL CAROTID ARTERY DISEASE, UNSPECIFIED TYPE (HCC): ICD-10-CM

## 2022-05-03 DIAGNOSIS — Z00.00 MEDICARE ANNUAL WELLNESS VISIT, SUBSEQUENT: Primary | ICD-10-CM

## 2022-05-03 DIAGNOSIS — E78.00 HYPERCHOLESTEROLEMIA: ICD-10-CM

## 2022-05-03 DIAGNOSIS — M77.42 METATARSALGIA, LEFT FOOT: ICD-10-CM

## 2022-05-03 DIAGNOSIS — I10 ESSENTIAL HYPERTENSION: ICD-10-CM

## 2022-05-03 PROCEDURE — 1090F PRES/ABSN URINE INCON ASSESS: CPT | Performed by: INTERNAL MEDICINE

## 2022-05-03 PROCEDURE — 99213 OFFICE O/P EST LOW 20 MIN: CPT | Performed by: INTERNAL MEDICINE

## 2022-05-03 PROCEDURE — G8427 DOCREV CUR MEDS BY ELIG CLIN: HCPCS | Performed by: INTERNAL MEDICINE

## 2022-05-03 PROCEDURE — 1101F PT FALLS ASSESS-DOCD LE1/YR: CPT | Performed by: INTERNAL MEDICINE

## 2022-05-03 PROCEDURE — G0439 PPPS, SUBSEQ VISIT: HCPCS | Performed by: INTERNAL MEDICINE

## 2022-05-03 PROCEDURE — G8432 DEP SCR NOT DOC, RNG: HCPCS | Performed by: INTERNAL MEDICINE

## 2022-05-03 PROCEDURE — G8753 SYS BP > OR = 140: HCPCS | Performed by: INTERNAL MEDICINE

## 2022-05-03 PROCEDURE — G8399 PT W/DXA RESULTS DOCUMENT: HCPCS | Performed by: INTERNAL MEDICINE

## 2022-05-03 PROCEDURE — G8420 CALC BMI NORM PARAMETERS: HCPCS | Performed by: INTERNAL MEDICINE

## 2022-05-03 PROCEDURE — G8536 NO DOC ELDER MAL SCRN: HCPCS | Performed by: INTERNAL MEDICINE

## 2022-05-03 PROCEDURE — G8754 DIAS BP LESS 90: HCPCS | Performed by: INTERNAL MEDICINE

## 2022-05-03 NOTE — PROGRESS NOTES
HPI  Ms. Dipesh Petit is a 68y.o. year old female, she is seen today for follow up HTN, cholesterol. Tripped on roots walking outside recently and then also in her daughter's house on marble steps. Had some side pain afterwards, getting better. Complains of left foot pain base of foot distally for a few weeks. Only hurts with applying pressure such as walking. No chest pain, sob, dizziness, weakness, lightheadedness. 130s/60s was BP at home last week. Felt she was getting leg weakness on statin - also had myalgia - no myalgia off statins and doing well with zetia. Chief Complaint   Patient presents with    Blood Pressure Check     Room 2A //     Cholesterol Problem    Side Pain    Foot Pain        Prior to Admission medications    Medication Sig Start Date End Date Taking? Authorizing Provider   psyllium (METAMUCIL) powd Take  by mouth. Yes Provider, Historical   ezetimibe (ZETIA) 10 mg tablet Take 1 Tablet by mouth daily. 4/26/22  Yes Sam Ornelas MD   amLODIPine (NORVASC) 5 mg tablet TAKE 1 TABLET BY MOUTH DAILY 3/24/22  Yes Sam Ornelas MD   LOSARTAN PO Take  by mouth. Yes Provider, Historical   minoxidiL 5 % soln by Apply Externally route daily. Yes Provider, Historical   pantoprazole (PROTONIX) 40 mg tablet Take 40 mg by mouth daily. Yes Provider, Historical   lysine (L-LYSINE) 500 mg tab tablet Take 500 mg by mouth two (2) times a day. Yes Provider, Historical   metoprolol tartrate (LOPRESSOR) 25 mg tablet Take 1 Tablet by mouth daily. 8/23/21  Yes Sam Ornelas MD   oxybutynin chloride XL (DITROPAN XL) 10 mg CR tablet TAKE 1 TABLET BY MOUTH ONCE DAILY 2/22/21  Yes Sma Ornelas MD   multivitamin (ONE A DAY) tablet Take 1 Tab by mouth daily. Yes Provider, Historical   OAT BRAN PO Take  by mouth. Yes Provider, Historical   levalbuterol tartrate (XOPENEX) 45 mcg/actuation inhaler Take 1-2 Puffs by inhalation every four (4) hours as needed for Wheezing. 1/17/17  Yes Amauri Molina MD   FLAXSEED PO Take  by mouth daily. Flaxseed meal   Yes Provider, Historical   cetirizine (ZYRTEC) 10 mg tablet Take 10 mg by mouth daily. Yes Provider, Historical   mometasone (ELOCON) 0.1 % topical cream Apply  to affected area daily. Patient not taking: Reported on 5/3/2022 9/1/21   Amauri Molina MD         Allergies   Allergen Reactions   Fremariano [place]] Other (comments)     Mouth sores. Walnuts, pecans    Sulfa (Sulfonamide Antibiotics) Nausea and Vomiting         REVIEW OF SYSTEMS:  Per HPI    PHYSICAL EXAM:  Visit Vitals  BP (!) 148/70   Pulse 62   Temp 97.6 °F (36.4 °C) (Oral)   Resp 16   Ht 5' 4\" (1.626 m)   Wt 142 lb 12.8 oz (64.8 kg)   SpO2 98%   BMI 24.51 kg/m²     Constitutional: Appears well-developed and well-nourished. No distress. HENT:   Head: Normocephalic and atraumatic. Eyes: No scleral icterus. Cardiovascular: Normal S1/S2, regular rhythm. No murmurs, rubs, or gallops. Pulmonary/Chest: Effort normal and breath sounds normal. No respiratory distress. No wheezes, rhonchi, or rales. Ext: No edema. Left foot: +pain base of metatarsals, no lesion  Neurological: Alert. Psychiatric: Normal mood and affect. Behavior is normal.     Lab Results   Component Value Date/Time    Sodium 140 04/27/2022 10:33 AM    Potassium 5.0 04/27/2022 10:33 AM    Chloride 106 04/27/2022 10:33 AM    CO2 22 04/27/2022 10:33 AM    Anion gap 6 09/26/2019 09:55 PM    Glucose 102 (H) 04/27/2022 10:33 AM    BUN 21 04/27/2022 10:33 AM    Creatinine 0.92 04/27/2022 10:33 AM    BUN/Creatinine ratio 23 04/27/2022 10:33 AM    GFR est AA 80 10/18/2021 09:57 AM    GFR est non-AA 70 10/18/2021 09:57 AM    Calcium 9.6 04/27/2022 10:33 AM    Bilirubin, total 0.2 04/27/2022 10:33 AM    Alk.  phosphatase 84 04/27/2022 10:33 AM    Protein, total 6.5 04/27/2022 10:33 AM    Albumin 4.1 04/27/2022 10:33 AM    Globulin 3.3 08/12/2018 09:40 AM    A-G Ratio 1.7 04/27/2022 10:33 AM ALT (SGPT) 17 04/27/2022 10:33 AM     Lab Results   Component Value Date/Time    Hemoglobin A1c 5.5 10/18/2021 09:57 AM    Hemoglobin A1c 5.4 09/27/2019 04:09 AM    Hemoglobin A1c 5.3 05/22/2018 08:34 AM      Lab Results   Component Value Date/Time    Cholesterol, total 190 04/27/2022 10:33 AM    HDL Cholesterol 59 04/27/2022 10:33 AM    LDL,Direct 150 (H) 01/19/2022 01:30 PM    LDL, calculated 119 (H) 04/27/2022 10:33 AM    LDL, calculated 79 08/12/2020 09:13 AM    VLDL, calculated 12 04/27/2022 10:33 AM    VLDL, calculated 15 08/12/2020 09:13 AM    Triglyceride 66 04/27/2022 10:33 AM    CHOL/HDL Ratio 3.4 09/27/2019 03:54 AM          ASSESSMENT/PLAN  Diagnoses and all orders for this visit:    1. Medicare annual wellness visit, subsequent    2. Essential hypertension    3. Bilateral carotid artery disease, unspecified type ([facility] [address]    4. Metatarsalgia, left foot  -     REFERRAL TO PODIATRY    5. Hypercholesterolemia      BP had been well controlled until past 1-2 weeks - will continue current meds, monitor at home and send Feidee message with values - may need adjustment in medications  Will get doppler for #3 - didn't realize it was ordered  Will try metatarsal pad for #4 and refer to podiatry  Doesn't want statin for #5 - LDL improved but not to goal - wants to continue diet/exercise changes  There are no preventive care reminders to display for this patient. Follow-up and Dispositions    · Return in about 3 months (around 8/3/2022) for bp. Reviewed plan of care. Patient has provided input and agrees with goals. The nurse provided the patient and/or family with advanced directive information if needed and encouraged the patient to provide a copy to the office when available.            This is the Subsequent Medicare Annual Wellness Exam, performed 12 months or more after the Initial AWV or the last Subsequent AWV    I have reviewed the patient's medical history in detail and updated the computerized patient record. Assessment/Plan   Education and counseling provided:  Are appropriate based on today's review and evaluation    1. Medicare annual wellness visit, subsequent  2. Essential hypertension  3. Bilateral carotid artery disease, unspecified type ([facility] [address]  4. Metatarsalgia, left foot  -     REFERRAL TO PODIATRY  5. Hypercholesterolemia       Depression Risk Factor Screening     3 most recent PHQ Screens 1/21/2022   Little interest or pleasure in doing things Not at all   Feeling down, depressed, irritable, or hopeless Several days   Total Score PHQ 2 1   Trouble falling or staying asleep, or sleeping too much -   Feeling tired or having little energy -   Poor appetite, weight loss, or overeating -   Feeling bad about yourself - or that you are a failure or have let yourself or your family down -   Trouble concentrating on things such as school, work, reading, or watching TV -   Moving or speaking so slowly that other people could have noticed; or the opposite being so fidgety that others notice -   Thoughts of being better off dead, or hurting yourself in some way -   PHQ 9 Score -       Alcohol & Drug Abuse Risk Screen    Do you average more than 1 drink per night or more than 7 drinks a week:  No    On any one occasion in the past three months have you have had more than 3 drinks containing alcohol:  No          Functional Ability and Level of Safety    Hearing: Hearing is good. Activities of Daily Living: The home contains: no safety equipment. Patient does total self care      Ambulation: with no difficulty     Fall Risk:  Fall Risk Assessment, last 12 mths 5/3/2022   Able to walk? Yes   Fall in past 12 months? 1   Do you feel unsteady? 0   Are you worried about falling 0   Number of falls in past 12 months 2   Fall with injury?  0      Abuse Screen:  Patient is not abused       Cognitive Screening    Has your family/caregiver stated any concerns about your memory: no     Cognitive Screening: N/A    Health Maintenance Due     There are no preventive care reminders to display for this patient. Patient Care Team   Patient Care Team:  Nevin Szymanski MD as PCP - General (Internal Medicine Physician)  Nevin Szymanski MD as PCP - [address] [place] [address] Provider  Bridget Russell MD as Physician (Cardiovascular Disease Physician)    History     Patient Active Problem List   Diagnosis Code    Essential hypertension I10    Asthma J45.909    ADD (attention deficit disorder) F98.8    Overactive bladder N32.81    Environmental allergies Z91.09    S/P colonoscopy Z98.890    At risk for osteoporosis Z91.89    Bilateral carotid artery disease ([facility] [address] I77.9    Vertical diplopia H53.2    IFG (impaired fasting glucose) R73.01    Serrated adenoma of colon D12.6    Alopecia areata L63.9    Advance directive discussed with patient Z71.89    Chest pain R07.9    Coronary artery disease involving native coronary artery of native heart without angina pectoris I25.10    Hypercholesterolemia E78.00     Past Medical History:   Diagnosis Date    Alopecia     Arthritis     Asthma     CAD (coronary artery disease) 08/12/2018    NSTEMI, Cath, TYRONE OM2 of dominant Cx    GERD (gastroesophageal reflux disease)     H/O seasonal allergies     H/O TB skin testing 1968    treated with INH    Hypercholesterolemia     Hypertension     IFG (impaired fasting glucose) 6/14/2013    a1c 6.0 5/2013    Overactive bladder 3/13/2013    Serrated adenoma of colon 2/6/2014 11/17/09 - Dr. Emely Garza - repeat in 5 years    Stroke [facility])     TIA    TB (tuberculosis) 1966    took [address] for 12 months      Past Surgical History:   Procedure Laterality Date    COLONOSCOPY N/A 11/3/2020    COLONOSCOPY performed by Gera Dixon MD at [facility] ENDOSCOPY    COLONOSCOPY,REMV NATANAEL,SNARE  11/3/2020         COLORECTAL SCRN; HI RISK IND  2/20/2015         HX CATARACT REMOVAL Bilateral     HX GI      colonscopy. polyp removed    HX HEART CATHETERIZATION  2018    stent x 1    HX MALIGNANT SKIN LESION EXCISION Left 2018    \"non-melanoma pre-cancerous lesion removed\" per patient    HX ORTHOPAEDIC Right 2015    great toe replacement    UPPER GI ENDOSCOPY,BIOPSY  2/14/2022          Current Outpatient Medications   Medication Sig Dispense Refill    psyllium (METAMUCIL) powd Take  by mouth.  ezetimibe (ZETIA) 10 mg tablet Take 1 Tablet by mouth daily. 90 Tablet 3    amLODIPine (NORVASC) 5 mg tablet TAKE 1 TABLET BY MOUTH DAILY 90 Tablet 1    LOSARTAN PO Take  by mouth.  minoxidiL 5 % soln by Apply Externally route daily.  pantoprazole (PROTONIX) 40 mg tablet Take 40 mg by mouth daily.  lysine (L-LYSINE) 500 mg tab tablet Take 500 mg by mouth two (2) times a day.  metoprolol tartrate (LOPRESSOR) 25 mg tablet Take 1 Tablet by mouth daily. 90 Tablet 4    oxybutynin chloride XL (DITROPAN XL) 10 mg CR tablet TAKE 1 TABLET BY MOUTH ONCE DAILY 90 Tab 4    multivitamin (ONE A DAY) tablet Take 1 Tab by mouth daily.  OAT BRAN PO Take  by mouth.  levalbuterol tartrate (XOPENEX) 45 mcg/actuation inhaler Take 1-2 Puffs by inhalation every four (4) hours as needed for Wheezing. 1 Inhaler 2    FLAXSEED PO Take  by mouth daily. Flaxseed meal      cetirizine (ZYRTEC) 10 mg tablet Take 10 mg by mouth daily.  mometasone (ELOCON) 0.1 % topical cream Apply  to affected area daily. (Patient not taking: Reported on 5/3/2022) 15 g 1     Allergies   Allergen Reactions    Nuts [Tree Nut] Other (comments)     Mouth sores.      Walnuts, pecans    Sulfa (Sulfonamide Antibiotics) Nausea and Vomiting       Family History   Problem Relation Age of Onset    Hypertension Mother     Cancer Father         bladder    Other Father         congential heart valve defect    Heart Disease Father     Depression Sister     Stroke Sister 72    Hypertension Sister     COPD Sister    [facility] Other Sister 79        twisted bowel, hiatal hernia    Heart Disease Maternal Grandfather     Dementia Paternal Grandfather     Cancer Maternal Grandmother         esophageal    Asthma Paternal Grandmother     Dementia Paternal Grandmother     Breast Cancer Paternal Grandmother      Social History     Tobacco Use    Smoking status: Former Smoker     Packs/day: 0.25     Years: 10.00     Pack years: 2.50    Smokeless tobacco: Never Used    Tobacco comment: Quit in the 1990's    Substance Use Topics    Alcohol use:  Yes     Alcohol/week: 14.0 standard drinks     Types: 14 Shots of liquor per week     Comment: 1-2 [place] per ay         Aissatou Laurent MD

## 2022-05-03 NOTE — PROGRESS NOTES
Fabi Baker  Identified pt with two pt identifiers(name and ). Chief Complaint   Patient presents with    Blood Pressure Check     Room 2A //     Cholesterol Problem    Side Pain    Foot Pain       Reviewed record In preparation for visit and have obtained necessary documentation. 1. Have you been to the ER, urgent care clinic or hospitalized since your last visit? No     2. Have you seen or consulted any other health care providers outside of the [address] [place] [address] since your last visit? Include any pap smears or colon screening. No    Patient has an advance directive. Vitals reviewed with provider.     Health Maintenance reviewed:     Health Maintenance Due   Topic    Medicare Yearly Exam           Wt Readings from Last 3 Encounters:   22 142 lb 12.8 oz (64.8 kg)   22 141 lb 8 oz (64.2 kg)   22 144 lb (65.3 kg)        Temp Readings from Last 3 Encounters:   22 97.6 °F (36.4 °C) (Oral)   22 97.4 °F (36.3 °C)   22 97.8 °F (36.6 °C) (Oral)        BP Readings from Last 3 Encounters:   22 (!) 164/76   22 (!) 184/75   22 (!) 150/60        Pulse Readings from Last 3 Encounters:   22 62   22 63   22 61        Vitals:    22 1126   BP: (!) 164/76   Pulse: 62   Resp: 16   Temp: 97.6 °F (36.4 °C)   TempSrc: Oral   SpO2: 98%   Weight: 142 lb 12.8 oz (64.8 kg)   Height: 5' 4\" (1.626 m)   PainSc:   4   PainLoc: Foot          Learning Assessment:   :       Learning Assessment 3/20/2015   PRIMARY LEARNER Patient   HIGHEST LEVEL OF EDUCATION - PRIMARY LEARNER  4 YEARS OF COLLEGE   BARRIERS PRIMARY LEARNER NONE   PRIMARY LANGUAGE ENGLISH    NEED No   LEARNER PREFERENCE PRIMARY READING   ANSWERED BY patient   RELATIONSHIP SELF        Depression Screening:   :       3 most recent PHQ Screens 2022   Little interest or pleasure in doing things Not at all   Feeling down, depressed, irritable, or hopeless Several days Total Score PHQ 2 1   Trouble falling or staying asleep, or sleeping too much -   Feeling tired or having little energy -   Poor appetite, weight loss, or overeating -   Feeling bad about yourself - or that you are a failure or have let yourself or your family down -   Trouble concentrating on things such as school, work, reading, or watching TV -   Moving or speaking so slowly that other people could have noticed; or the opposite being so fidgety that others notice -   Thoughts of being better off dead, or hurting yourself in some way -   PHQ 9 Score -        Fall Risk Assessment:   :       Fall Risk Assessment, last 12 mths 5/3/2022   Able to walk? Yes   Fall in past 12 months? 1   Do you feel unsteady? 0   Are you worried about falling 0   Number of falls in past 12 months 2   Fall with injury? 0        Abuse Screening:   :       Abuse Screening Questionnaire 5/3/2022 5/4/2020 8/17/2018 4/23/2018 11/14/2016 3/20/2015   Do you ever feel afraid of your partner? N N N N N N   Are you in a relationship with someone who physically or mentally threatens you? N N N N N N   Is it safe for you to go home?  Y Y Y Y Y Y        ADL Screening:   :       ADL Assessment 5/3/2022   Feeding yourself No Help Needed   Getting from bed to chair No Help Needed   Getting dressed No Help Needed   Bathing or showering No Help Needed   Walk across the room (includes cane/walker) No Help Needed   Using the telphone No Help Needed   Taking your medications No Help Needed   Preparing meals No Help Needed   Managing money (expenses/bills) No Help Needed   Moderately strenuous housework (laundry) No Help Needed   Shopping for personal items (toiletries/medicines) No Help Needed   Shopping for groceries No Help Needed   Driving No Help Needed   Climbing a flight of stairs No Help Needed   Getting to places beyond walking distances No Help Needed

## 2022-05-03 NOTE — PATIENT INSTRUCTIONS
Medicare Wellness Visit, Female     The best way to live healthy is to have a lifestyle where you eat a well-balanced diet, exercise regularly, limit alcohol use, and quit all forms of tobacco/nicotine, if applicable. Regular preventive services are another way to keep healthy. Preventive services (vaccines, screening tests, monitoring & exams) can help personalize your care plan, which helps you manage your own care. Screening tests can find health problems at the earliest stages, when they are easiest to treat. [facility] follows the current, evidence-based guidelines published by the [facility] Carlito Hwang ([facility]) when recommending preventive services for our patients. Because we follow these guidelines, sometimes recommendations change over time as research supports it. (For example, mammograms used to be recommended annually. Even though Medicare will still pay for an annual mammogram, the newer guidelines recommend a mammogram every two years for women of average risk). Of course, you and your doctor may decide to screen more often for some diseases, based on your risk and your co-morbidities (chronic disease you are already diagnosed with). Preventive services for you include:  - Medicare offers their members a free annual wellness visit, which is time for you and your primary care provider to discuss and plan for your preventive service needs. Take advantage of this benefit every year!  -All adults over the age of 72 should receive the recommended pneumonia vaccines. Current USPSTF guidelines recommend a series of two vaccines for the best pneumonia protection.   -All adults should have a flu vaccine yearly and a tetanus vaccine every 10 years.   -All adults age 48 and older should receive the shingles vaccines (series of two vaccines).       -All adults age 38-68 who are overweight should have a diabetes screening test once every three years.   -All adults born between [birth year] and 1965 should be screened once for Hepatitis C.  -Other screening tests and preventive services for persons with diabetes include: an eye exam to screen for diabetic retinopathy, a kidney function test, a foot exam, and stricter control over your cholesterol.   -Cardiovascular screening for adults with routine risk involves an electrocardiogram (ECG) at intervals determined by your doctor.   -Colorectal cancer screenings should be done for adults age 54-65 with no increased risk factors for colorectal cancer. There are a number of acceptable methods of screening for this type of cancer. Each test has its own benefits and drawbacks. Discuss with your doctor what is most appropriate for you during your annual wellness visit. The different tests include: colonoscopy (considered the best screening method), a fecal occult blood test, a fecal DNA test, and sigmoidoscopy.    -A bone mass density test is recommended when a woman turns 65 to screen for osteoporosis. This test is only recommended one time, as a screening. Some providers will use this same test as a disease monitoring tool if you already have osteoporosis. -Breast cancer screenings are recommended every other year for women of normal risk, age 54-69.  -Cervical cancer screenings for women over age 72 are only recommended with certain risk factors. Here is a list of your current Health Maintenance items (your personalized list of preventive services) with a due date: There are no preventive care reminders to display for this patient. Metatarsalgia: Care Instructions  Your Care Instructions     Metatarsalgia (say \"met--tar-SAL-edilia-\") is pain in the ball of the foot. It sometimes spreads to the toes. The ball of the foot is the bottom of the foot, where the toes join the foot. While walking might be very painful, the pain is usually not a sign of a serious or permanent problem.  But any pain can affect your life, so it is important that you treat it. Pain in this area can be caused by many things. For example, you may have this pain if you stand or walk a lot or wear tight shoes or high heels. Your pain might be caused by inflammation of a joint (capsulitis). It is most common in the joint at the base of the second toe, near the ball of the foot. Capsulitis happens when ligaments that go around the joint become inflamed. The joint may be swollen. It may feel like there is a small rock under it. You may have had an X-ray if your doctor wanted to make sure a more serious problem is not causing your pain. Treatment may consist of home care, such as rest, wearing different shoes, and taking over-the-counter pain medicines. It can take months for the pain to go away. If the ligaments around a joint are torn, or if a toe has started to slant toward the toe next to it, you may need surgery. Follow-up care is a key part of your treatment and safety. Be sure to make and go to all appointments, and call your doctor if you are having problems. It's also a good idea to know your test results and keep a list of the medicines you take. How can you care for yourself at home? · Rest your foot. If an activity is causing the pain, find another one to do that does not put so much pressure on your foot. · Put ice or a cold pack on your foot when it hurts or after you've done something that usually causes pain. Do this for 10 to 20 minutes at a time. Put a thin cloth between the ice and your skin. · Take an over-the-counter pain medicine, such as acetaminophen (Tylenol), ibuprofen (Advil, Motrin), or naproxen (Aleve). Be safe with medicines. Read and follow all instructions on the label. · Do not take two or more pain medicines at the same time unless the doctor told you to. Many pain medicines have acetaminophen, which is Tylenol. Too much acetaminophen (Tylenol) can be harmful. · Wear roomy, comfortable shoes.   · If your doctor recommends it, use special pads to relieve the pressure on your foot. The pads may fit into your shoes, or they may stick to the soles of your feet. · Ask your doctor about using orthotic shoe devices. These are molded pieces of rubber, leather, metal, plastic, or other synthetic material that are inserted into a shoe. · Wear shoes with good arch support. · Try not to wear high heels or narrow shoes. · Follow your doctor's or physical therapist's directions for exercise. When should you call for help? Watch closely for changes in your health, and be sure to contact your doctor if:    · You have new or worse symptoms.     · You do not get better as expected. Where can you learn more? Go to http://www.gray.com/  Enter R284 in the search box to learn more about \"Metatarsalgia: Care Instructions. \"  Current as of: July 1, 2021               Content Version: 13.2  © 2006-2022 [employer]. Care instructions adapted under license by [employer] (which disclaims liability or warranty for this information). If you have questions about a medical condition or this instruction, always ask your healthcare professional. [address] any warranty or liability for your use of this information.

## 2022-06-13 DIAGNOSIS — N32.81 OVERACTIVE BLADDER: ICD-10-CM

## 2022-06-13 RX ORDER — OXYBUTYNIN CHLORIDE 10 MG/1
TABLET, EXTENDED RELEASE ORAL
Qty: 90 TABLET | Refills: 4 | Status: SHIPPED | OUTPATIENT
Start: 2022-06-13

## 2022-06-13 NOTE — TELEPHONE ENCOUNTER
PCP: Sam Ornelas MD     Last appt: 5/3/2022   Future Appointments   Date Time Provider Niurka Boles   8/3/2022 10:30 AM Sam Ornelas MD [facility] AMB        Requested Prescriptions     Pending Prescriptions Disp Refills    oxybutynin chloride XL (DITROPAN XL) 10 mg CR tablet 90 Tablet 4     Sig: TAKE 1 TABLET BY MOUTH ONCE DAILY

## 2022-06-13 NOTE — TELEPHONE ENCOUNTER
Requested Prescriptions     Pending Prescriptions Disp Refills    oxybutynin chloride XL (DITROPAN XL) 10 mg CR tablet 90 Tablet 4     Sig: TAKE 1 TABLET BY MOUTH ONCE DAILY

## 2022-06-29 ENCOUNTER — HOSPITAL ENCOUNTER (OUTPATIENT)
Dept: VASCULAR SURGERY | Age: 77
Discharge: HOME | End: 2022-06-29
Attending: INTERNAL MEDICINE
Payer: MEDICARE

## 2022-06-29 DIAGNOSIS — I65.23 BILATERAL CAROTID ARTERY STENOSIS: ICD-10-CM

## 2022-06-29 LAB
LEFT CCA DIST DIAS: 16.2 CM/S
LEFT CCA DIST SYS: 52.9 CM/S
LEFT CCA PROX DIAS: 10.1 CM/S
LEFT CCA PROX SYS: 59 CM/S
LEFT ECA DIAS: 9.4 CM/S
LEFT ECA SYS: 108.8 CM/S
LEFT ICA DIST DIAS: 21.2 CM/S
LEFT ICA DIST SYS: 59.5 CM/S
LEFT ICA MID DIAS: 24.2 CM/S
LEFT ICA MID SYS: 74.3 CM/S
LEFT ICA PROX DIAS: 17.1 CM/S
LEFT ICA PROX SYS: 50.1 CM/S
LEFT ICA/CCA SYS: 1.4 NO UNITS
LEFT VERTEBRAL DIAS: 20.2 CM/S
LEFT VERTEBRAL SYS: 62.5 CM/S
RIGHT CCA DIST DIAS: 15.7 CM/S
RIGHT CCA DIST SYS: 52.5 CM/S
RIGHT CCA PROX DIAS: 15.1 CM/S
RIGHT CCA PROX SYS: 60.8 CM/S
RIGHT ECA DIAS: 14.7 CM/S
RIGHT ECA SYS: 87.4 CM/S
RIGHT ICA DIST DIAS: 21.6 CM/S
RIGHT ICA DIST SYS: 81.8 CM/S
RIGHT ICA MID DIAS: 17.9 CM/S
RIGHT ICA MID SYS: 65.8 CM/S
RIGHT ICA PROX DIAS: 34 CM/S
RIGHT ICA PROX SYS: 112.5 CM/S
RIGHT ICA/CCA SYS: 2.1 NO UNITS

## 2022-06-29 PROCEDURE — 93880 EXTRACRANIAL BILAT STUDY: CPT

## 2022-07-01 DIAGNOSIS — I10 ESSENTIAL HYPERTENSION: ICD-10-CM

## 2022-07-01 RX ORDER — LOSARTAN POTASSIUM 50 MG/1
50 TABLET ORAL 2 TIMES DAILY
Qty: 180 TABLET | Refills: 0 | Status: SHIPPED | OUTPATIENT
Start: 2022-07-01

## 2022-07-01 RX ORDER — LOSARTAN POTASSIUM 50 MG/1
100 TABLET ORAL DAILY
Qty: 180 TABLET | Refills: 1 | Status: CANCELLED | OUTPATIENT
Start: 2022-07-01

## 2022-07-01 NOTE — TELEPHONE ENCOUNTER
I called the patient and verified them by name and date of birth. Cardiologist was the original prescriber but she would like for Dr. Jerry Friedman to take over. I called the pharmacy and the patient is taking Losartan 50mg - 1 tablet by mouth twice daily     PCP: Marysol Marcial MD     Last appt: 5/3/2022   Future Appointments   Date Time Provider Niurka Boles   8/3/2022 10:30 AM Marysol Marcial MD [facility] AMB        Requested Prescriptions     Pending Prescriptions Disp Refills    losartan (COZAAR) 50 mg tablet 180 Tablet 0     Sig: Take 1 Tablet by mouth two (2) times a day.

## 2022-07-01 NOTE — TELEPHONE ENCOUNTER
----- Message from Silvia Patterson sent at 7/1/2022  1:20 PM EDT -----  Regarding: Medications  I just got off the phone with Michelle. They said they sent a request for a refill of LOSARTIN, not Lisinopril, and Losartin is what I need a refill for. I no longer take Lisinopril. So I don't know what happened, but could you send in a refill for Losartin for me? Thanks.  R

## 2022-07-01 NOTE — TELEPHONE ENCOUNTER
Requested Prescriptions     Pending Prescriptions Disp Refills    losartan (COZAAR) 50 mg tablet 180 Tablet 1     Sig: Take 2 Tablets by mouth daily.

## 2022-07-03 DIAGNOSIS — I65.02 VERTEBRAL ARTERY OCCLUSION, LEFT: Primary | ICD-10-CM

## 2022-07-03 DIAGNOSIS — E04.1 THYROID NODULE: ICD-10-CM

## 2022-07-03 NOTE — PROGRESS NOTES
Tell her mild narrowing of the internal carotid arteries b/l but apparent blockage in right vertebral artery. Refer to vascular surgery for further evaluation. Also fax results to cardiologist Dr. Christina Tavarez. Will order thyroid US for small thyroid nodule.

## 2022-07-07 ENCOUNTER — HOSPITAL ENCOUNTER (OUTPATIENT)
Dept: ULTRASOUND IMAGING | Age: 77
Discharge: HOME | End: 2022-07-07
Attending: INTERNAL MEDICINE
Payer: MEDICARE

## 2022-07-07 DIAGNOSIS — E04.1 THYROID NODULE: ICD-10-CM

## 2022-07-07 PROCEDURE — 76536 US EXAM OF HEAD AND NECK: CPT

## 2022-07-09 ENCOUNTER — PATIENT MESSAGE (OUTPATIENT)
Dept: INTERNAL MEDICINE CLINIC | Age: 77
End: 2022-07-09

## 2022-07-11 PROBLEM — E04.2 MULTINODULAR THYROID: Status: ACTIVE | Noted: 2022-07-11

## 2022-07-11 NOTE — TELEPHONE ENCOUNTER
From: Jordon Geller  To: Marisela Moran MD  Sent: 7/9/2022 4:57 PM EDT  Subject: Thyroid sonogram    I just read the thyroid sonogram results and it looks like I do not have an enlarged thyroid. Unless I do not understand it.  Fabi

## 2022-07-25 DIAGNOSIS — I10 ESSENTIAL HYPERTENSION: ICD-10-CM

## 2022-07-25 RX ORDER — AMLODIPINE BESYLATE 5 MG/1
5 TABLET ORAL DAILY
Qty: 90 TABLET | Refills: 1 | Status: SHIPPED | OUTPATIENT
Start: 2022-07-25

## 2022-08-03 ENCOUNTER — OFFICE VISIT (OUTPATIENT)
Dept: INTERNAL MEDICINE CLINIC | Age: 77
End: 2022-08-03
Payer: MEDICARE

## 2022-08-03 VITALS
SYSTOLIC BLOOD PRESSURE: 128 MMHG | BODY MASS INDEX: 23.7 KG/M2 | HEART RATE: 61 BPM | TEMPERATURE: 98.2 F | OXYGEN SATURATION: 98 % | RESPIRATION RATE: 16 BRPM | WEIGHT: 138.8 LBS | HEIGHT: 64 IN | DIASTOLIC BLOOD PRESSURE: 60 MMHG

## 2022-08-03 DIAGNOSIS — E04.2 MULTINODULAR THYROID: ICD-10-CM

## 2022-08-03 DIAGNOSIS — M79.672 LEFT FOOT PAIN: ICD-10-CM

## 2022-08-03 DIAGNOSIS — R73.01 IFG (IMPAIRED FASTING GLUCOSE): ICD-10-CM

## 2022-08-03 DIAGNOSIS — E78.00 HYPERCHOLESTEROLEMIA: ICD-10-CM

## 2022-08-03 DIAGNOSIS — I10 ESSENTIAL HYPERTENSION: Primary | ICD-10-CM

## 2022-08-03 PROCEDURE — 99214 OFFICE O/P EST MOD 30 MIN: CPT | Performed by: INTERNAL MEDICINE

## 2022-08-03 PROCEDURE — 1101F PT FALLS ASSESS-DOCD LE1/YR: CPT | Performed by: INTERNAL MEDICINE

## 2022-08-03 PROCEDURE — G8432 DEP SCR NOT DOC, RNG: HCPCS | Performed by: INTERNAL MEDICINE

## 2022-08-03 PROCEDURE — 1123F ACP DISCUSS/DSCN MKR DOCD: CPT | Performed by: INTERNAL MEDICINE

## 2022-08-03 PROCEDURE — G8399 PT W/DXA RESULTS DOCUMENT: HCPCS | Performed by: INTERNAL MEDICINE

## 2022-08-03 PROCEDURE — G8752 SYS BP LESS 140: HCPCS | Performed by: INTERNAL MEDICINE

## 2022-08-03 PROCEDURE — G8427 DOCREV CUR MEDS BY ELIG CLIN: HCPCS | Performed by: INTERNAL MEDICINE

## 2022-08-03 PROCEDURE — 1090F PRES/ABSN URINE INCON ASSESS: CPT | Performed by: INTERNAL MEDICINE

## 2022-08-03 PROCEDURE — G8420 CALC BMI NORM PARAMETERS: HCPCS | Performed by: INTERNAL MEDICINE

## 2022-08-03 PROCEDURE — G8536 NO DOC ELDER MAL SCRN: HCPCS | Performed by: INTERNAL MEDICINE

## 2022-08-03 PROCEDURE — G8754 DIAS BP LESS 90: HCPCS | Performed by: INTERNAL MEDICINE

## 2022-08-03 NOTE — PROGRESS NOTES
HPI  Ms. Trina Lopez is a 68y.o. year old female, she is seen today for follow-up hypertension. Had recent carotid Dopplers:  Consistent with less than 50% stenosis of the internal carotids bilaterally. Left vertebral is patent with antegrade flow. Minimal to no flow is seen in the right vertebral and is suggestive of occlusion. Incidenal finding of a right thyroid nodule measuring approximately 1.0 x 0.9 cm in diameter. Seen by vascular surgeon - Dr. Louann Alpers, no intervention needed but will get yearly dopplers. Advised restarting asa 81mg daily. No h/o GI bleeding or stomach ulcer. Ultrasound of thyroid:     IMPRESSION  Multinodular thyroid gland without dominant or suspicious nodule. Saw podiatry for foot pain. Planning on foot surgery in November for bunion left foot and re-alignment of toes. Wearing orthotic and metatarsalgia and pain improved. No chest pain, sob, dizziness, weakness. Has been going to PT and less thigh pain and legs are stronger. Has poor anal sphincter control since childbirth, has just started taking metamucil bid which has helped. Chief Complaint   Patient presents with    Blood Pressure Check     Room 2A //         Prior to Admission medications    Medication Sig Start Date End Date Taking? Authorizing Provider   amLODIPine (NORVASC) 5 mg tablet TAKE 1 TABLET BY MOUTH DAILY 7/25/22  Yes Cedric Hernandez MD   losartan (COZAAR) 50 mg tablet Take 1 Tablet by mouth two (2) times a day. 7/1/22  Yes Nany Manning NP   oxybutynin chloride XL (DITROPAN XL) 10 mg CR tablet TAKE 1 TABLET BY MOUTH ONCE DAILY 6/13/22  Yes Cedric Hernandez MD   psyllium (METAMUCIL) powd Take  by mouth. Yes Provider, Historical   ezetimibe (ZETIA) 10 mg tablet Take 1 Tablet by mouth daily. 4/26/22  Yes Cedric Hernandez MD   pantoprazole (PROTONIX) 40 mg tablet Take 40 mg by mouth daily.    Yes Provider, Historical   lysine (L-LYSINE) 500 mg tab tablet Take 500 mg by mouth two (2) times a day. Yes Provider, Historical   metoprolol tartrate (LOPRESSOR) 25 mg tablet Take 1 Tablet by mouth daily. 8/23/21  Yes Kieran Palacios MD   multivitamin (ONE A DAY) tablet Take 1 Tab by mouth daily. Yes Provider, Historical   OAT BRAN PO Take  by mouth. Yes Provider, Historical   levalbuterol tartrate (XOPENEX) 45 mcg/actuation inhaler Take 1-2 Puffs by inhalation every four (4) hours as needed for Wheezing. 1/17/17  Yes Kieran Palacios MD   FLAXSEED PO Take  by mouth daily. Flaxseed meal   Yes Provider, Historical   cetirizine (ZYRTEC) 10 mg tablet Take 10 mg by mouth daily. Yes Provider, Historical   minoxidiL 5 % soln by Apply Externally route daily. Patient not taking: Reported on 8/3/2022  8/3/22  Provider, Historical   mometasone (ELOCON) 0.1 % topical cream Apply  to affected area daily. Patient not taking: No sig reported 9/1/21 8/3/22  Kieran Palacios MD         Allergies   Allergen Reactions    Randall Aquino Ivminerva Nut] Other (comments)     Mouth sores. Walnuts, pecans    Sulfa (Sulfonamide Antibiotics) Nausea and Vomiting         REVIEW OF SYSTEMS:  Per HPI    PHYSICAL EXAM:  Visit Vitals  /60   Pulse 61   Temp 98.2 °F (36.8 °C) (Oral)   Resp 16   Ht 5' 4\" (1.626 m)   Wt 138 lb 12.8 oz (63 kg)   SpO2 98%   BMI 23.82 kg/m²     Constitutional: Appears well-developed and well-nourished. No distress. HENT:   Head: Normocephalic and atraumatic. Eyes: No scleral icterus. Cardiovascular: Normal S1/S2, regular rhythm. No murmurs, rubs, or gallops. Pulmonary/Chest: Effort normal and breath sounds normal. No respiratory distress. No wheezes, rhonchi, or rales. Ext: No edema. Neurological: Alert. Psychiatric: Normal mood and affect.  Behavior is normal.     Lab Results   Component Value Date/Time    Sodium 140 04/27/2022 10:33 AM    Potassium 5.0 04/27/2022 10:33 AM    Chloride 106 04/27/2022 10:33 AM    CO2 22 04/27/2022 10:33 AM    Anion gap 6 09/26/2019 09:55 PM    Glucose 102 (H) 04/27/2022 10:33 AM    BUN 21 04/27/2022 10:33 AM    Creatinine 0.92 04/27/2022 10:33 AM    BUN/Creatinine ratio 23 04/27/2022 10:33 AM    GFR est AA 80 10/18/2021 09:57 AM    GFR est non-AA 70 10/18/2021 09:57 AM    Calcium 9.6 04/27/2022 10:33 AM    Bilirubin, total 0.2 04/27/2022 10:33 AM    Alk. phosphatase 84 04/27/2022 10:33 AM    Protein, total 6.5 04/27/2022 10:33 AM    Albumin 4.1 04/27/2022 10:33 AM    Globulin 3.3 08/12/2018 09:40 AM    A-G Ratio 1.7 04/27/2022 10:33 AM    ALT (SGPT) 17 04/27/2022 10:33 AM     Lab Results   Component Value Date/Time    Hemoglobin A1c 5.5 10/18/2021 09:57 AM    Hemoglobin A1c 5.4 09/27/2019 04:09 AM    Hemoglobin A1c 5.3 05/22/2018 08:34 AM      Lab Results   Component Value Date/Time    Cholesterol, total 190 04/27/2022 10:33 AM    HDL Cholesterol 59 04/27/2022 10:33 AM    LDL,Direct 150 (H) 01/19/2022 01:30 PM    LDL, calculated 119 (H) 04/27/2022 10:33 AM    LDL, calculated 79 08/12/2020 09:13 AM    VLDL, calculated 12 04/27/2022 10:33 AM    VLDL, calculated 15 08/12/2020 09:13 AM    Triglyceride 66 04/27/2022 10:33 AM    CHOL/HDL Ratio 3.4 09/27/2019 03:54 AM          ASSESSMENT/PLAN  Diagnoses and all orders for this visit:    1. Essential hypertension    2. Multinodular thyroid  Comments:  7/8/22 - repeat in one year, no suspicious nodules  Orders:  -     TSH 3RD GENERATION; Future  -     T4, FREE; Future    3. Hypercholesterolemia  -     METABOLIC PANEL, COMPREHENSIVE; Future  -     LIPID PANEL; Future    4. IFG (impaired fasting glucose)  -     HEMOGLOBIN A1C WITH EAG; Future    5. Left foot pain      Foot pain improved  BP at goal - continue current medications   Lipids improving - cannot tolerate statin  A1c has been normal lately - check prior to follow up  Health Maintenance Due   Topic Date Due    COVID-19 Vaccine (4 - Booster for Pfizer series) 12/31/2021               Reviewed plan of care.  Patient has provided input and agrees with goals. The nurse provided the patient and/or family with advanced directive information if needed and encouraged the patient to provide a copy to the office when available.

## 2022-08-03 NOTE — PROGRESS NOTES
Fabi Baker  Identified pt with two pt identifiers(name and ). Chief Complaint   Patient presents with    Blood Pressure Check     Room 2A //        Reviewed record In preparation for visit and have obtained necessary documentation. 1. Have you been to the ER, urgent care clinic or hospitalized since your last visit? No     2. Have you seen or consulted any other health care providers outside of the [address] [place] [address] since your last visit? Include any pap smears or colon screening. Yes. Podiatry - Dr. Jocelyn Rivera. Carotid Artery - Dr. Rambo Vincent    Patient has an advance directive. Vitals reviewed with provider.     Health Maintenance reviewed:     Health Maintenance Due   Topic    COVID-19 Vaccine (4 - Booster for Pfizer series)          Wt Readings from Last 3 Encounters:   22 138 lb 12.8 oz (63 kg)   22 142 lb 12.8 oz (64.8 kg)   22 141 lb 8 oz (64.2 kg)        Temp Readings from Last 3 Encounters:   22 97.6 °F (36.4 °C) (Oral)   22 97.4 °F (36.3 °C)   22 97.8 °F (36.6 °C) (Oral)        BP Readings from Last 3 Encounters:   22 (!) 148/70   22 (!) 184/75   22 (!) 150/60        Pulse Readings from Last 3 Encounters:   22 62   22 63   22 61        Vitals:    22 1044   Resp: 16   Weight: 138 lb 12.8 oz (63 kg)   Height: 5' 4\" (1.626 m)   PainSc:   0 - No pain          Learning Assessment:   :       Learning Assessment 3/20/2015   PRIMARY LEARNER Patient   HIGHEST LEVEL OF EDUCATION - PRIMARY LEARNER  4 YEARS OF COLLEGE   BARRIERS PRIMARY LEARNER NONE   PRIMARY LANGUAGE ENGLISH    NEED No   LEARNER PREFERENCE PRIMARY READING   ANSWERED BY patient   RELATIONSHIP SELF        Depression Screening:   :       3 most recent PHQ Screens 2022   Little interest or pleasure in doing things Not at all   Feeling down, depressed, irritable, or hopeless Several days   Total Score PHQ 2 1   Trouble falling or staying asleep, or sleeping too much -   Feeling tired or having little energy -   Poor appetite, weight loss, or overeating -   Feeling bad about yourself - or that you are a failure or have let yourself or your family down -   Trouble concentrating on things such as school, work, reading, or watching TV -   Moving or speaking so slowly that other people could have noticed; or the opposite being so fidgety that others notice -   Thoughts of being better off dead, or hurting yourself in some way -   PHQ 9 Score -        Fall Risk Assessment:   :       Fall Risk Assessment, last 12 mths 5/3/2022   Able to walk? Yes   Fall in past 12 months? 1   Do you feel unsteady? 0   Are you worried about falling 0   Number of falls in past 12 months 2   Fall with injury? 0        Abuse Screening:   :       Abuse Screening Questionnaire 5/3/2022 5/4/2020 8/17/2018 4/23/2018 11/14/2016 3/20/2015   Do you ever feel afraid of your partner? N N N N N N   Are you in a relationship with someone who physically or mentally threatens you? N N N N N N   Is it safe for you to go home?  Y Y Y Y Y Y        ADL Screening:   :       ADL Assessment 5/3/2022   Feeding yourself No Help Needed   Getting from bed to chair No Help Needed   Getting dressed No Help Needed   Bathing or showering No Help Needed   Walk across the room (includes cane/walker) No Help Needed   Using the telphone No Help Needed   Taking your medications No Help Needed   Preparing meals No Help Needed   Managing money (expenses/bills) No Help Needed   Moderately strenuous housework (laundry) No Help Needed   Shopping for personal items (toiletries/medicines) No Help Needed   Shopping for groceries No Help Needed   Driving No Help Needed   Climbing a flight of stairs No Help Needed   Getting to places beyond walking distances No Help Needed

## 2022-09-13 ENCOUNTER — PATIENT MESSAGE (OUTPATIENT)
Dept: INTERNAL MEDICINE CLINIC | Age: 77
End: 2022-09-13

## 2022-09-13 RX ORDER — PANTOPRAZOLE SODIUM 40 MG/1
40 TABLET, DELAYED RELEASE ORAL DAILY
Qty: 90 TABLET | Refills: 3 | Status: SHIPPED | OUTPATIENT
Start: 2022-09-13

## 2022-09-13 NOTE — TELEPHONE ENCOUNTER
PCP: Filipe Joiner MD     Last appt: 8/3/2022     Future Appointments   Date Time Provider Niurka Boles   10/25/2022 11:00 AM MD CIARAN Watters   2/3/2023 11:00 AM MD CIARAN Watters AMB          Requested Prescriptions     Pending Prescriptions Disp Refills    pantoprazole (PROTONIX) 40 mg tablet 90 Tablet 3     Sig: Take 1 Tablet by mouth daily.

## 2022-09-13 NOTE — TELEPHONE ENCOUNTER
From: Silvia Patterson  To: Ady Mansfield MD  Sent: 9/13/2022 2:12 PM EDT  Subject: pantoprazole 40 mg tablet    I am out of refills for this medication. Can you please renew the prescription and send it to [facility] in [place]. I am having lots of acid reflux again and this is the only thing that works. [email or web address]. [address], [phone]

## 2022-10-14 ENCOUNTER — OFFICE VISIT (OUTPATIENT)
Dept: ORTHOPEDIC SURGERY | Age: 77
End: 2022-10-14
Payer: MEDICARE

## 2022-10-14 ENCOUNTER — TELEPHONE (OUTPATIENT)
Dept: INTERNAL MEDICINE CLINIC | Age: 77
End: 2022-10-14

## 2022-10-14 ENCOUNTER — OFFICE VISIT (OUTPATIENT)
Dept: URGENT CARE | Age: 77
End: 2022-10-14

## 2022-10-14 VITALS
SYSTOLIC BLOOD PRESSURE: 164 MMHG | DIASTOLIC BLOOD PRESSURE: 76 MMHG | WEIGHT: 135 LBS | BODY MASS INDEX: 23.05 KG/M2 | TEMPERATURE: 98.1 F | HEIGHT: 64 IN | RESPIRATION RATE: 18 BRPM | OXYGEN SATURATION: 99 % | HEART RATE: 64 BPM

## 2022-10-14 VITALS — HEIGHT: 64 IN | BODY MASS INDEX: 23.05 KG/M2 | WEIGHT: 135 LBS

## 2022-10-14 DIAGNOSIS — M25.512 LEFT SHOULDER PAIN, UNSPECIFIED CHRONICITY: Primary | ICD-10-CM

## 2022-10-14 DIAGNOSIS — S42.292A CLOSED 3-PART FRACTURE OF PROXIMAL HUMERUS, LEFT, INITIAL ENCOUNTER: ICD-10-CM

## 2022-10-14 DIAGNOSIS — U07.1 COVID-19: Primary | ICD-10-CM

## 2022-10-14 DIAGNOSIS — U07.1 POSITIVE SELF-ADMINISTERED ANTIGEN TEST FOR COVID-19: ICD-10-CM

## 2022-10-14 PROCEDURE — G8753 SYS BP > OR = 140: HCPCS | Performed by: NURSE PRACTITIONER

## 2022-10-14 PROCEDURE — G8754 DIAS BP LESS 90: HCPCS | Performed by: NURSE PRACTITIONER

## 2022-10-14 PROCEDURE — 1090F PRES/ABSN URINE INCON ASSESS: CPT | Performed by: NURSE PRACTITIONER

## 2022-10-14 PROCEDURE — 1090F PRES/ABSN URINE INCON ASSESS: CPT | Performed by: ORTHOPAEDIC SURGERY

## 2022-10-14 PROCEDURE — G8399 PT W/DXA RESULTS DOCUMENT: HCPCS | Performed by: NURSE PRACTITIONER

## 2022-10-14 PROCEDURE — 99202 OFFICE O/P NEW SF 15 MIN: CPT | Performed by: NURSE PRACTITIONER

## 2022-10-14 PROCEDURE — G8399 PT W/DXA RESULTS DOCUMENT: HCPCS | Performed by: ORTHOPAEDIC SURGERY

## 2022-10-14 PROCEDURE — 1101F PT FALLS ASSESS-DOCD LE1/YR: CPT | Performed by: NURSE PRACTITIONER

## 2022-10-14 PROCEDURE — G8420 CALC BMI NORM PARAMETERS: HCPCS | Performed by: ORTHOPAEDIC SURGERY

## 2022-10-14 PROCEDURE — G8756 NO BP MEASURE DOC: HCPCS | Performed by: ORTHOPAEDIC SURGERY

## 2022-10-14 PROCEDURE — 23600 CLTX PROX HUMRL FX W/O MNPJ: CPT | Performed by: ORTHOPAEDIC SURGERY

## 2022-10-14 PROCEDURE — 1101F PT FALLS ASSESS-DOCD LE1/YR: CPT | Performed by: ORTHOPAEDIC SURGERY

## 2022-10-14 PROCEDURE — G8427 DOCREV CUR MEDS BY ELIG CLIN: HCPCS | Performed by: ORTHOPAEDIC SURGERY

## 2022-10-14 PROCEDURE — G8427 DOCREV CUR MEDS BY ELIG CLIN: HCPCS | Performed by: NURSE PRACTITIONER

## 2022-10-14 PROCEDURE — 99214 OFFICE O/P EST MOD 30 MIN: CPT | Performed by: ORTHOPAEDIC SURGERY

## 2022-10-14 PROCEDURE — G8432 DEP SCR NOT DOC, RNG: HCPCS | Performed by: ORTHOPAEDIC SURGERY

## 2022-10-14 PROCEDURE — G8536 NO DOC ELDER MAL SCRN: HCPCS | Performed by: ORTHOPAEDIC SURGERY

## 2022-10-14 PROCEDURE — G8432 DEP SCR NOT DOC, RNG: HCPCS | Performed by: NURSE PRACTITIONER

## 2022-10-14 PROCEDURE — 1123F ACP DISCUSS/DSCN MKR DOCD: CPT | Performed by: NURSE PRACTITIONER

## 2022-10-14 PROCEDURE — G8420 CALC BMI NORM PARAMETERS: HCPCS | Performed by: NURSE PRACTITIONER

## 2022-10-14 PROCEDURE — G8536 NO DOC ELDER MAL SCRN: HCPCS | Performed by: NURSE PRACTITIONER

## 2022-10-14 PROCEDURE — 1123F ACP DISCUSS/DSCN MKR DOCD: CPT | Performed by: ORTHOPAEDIC SURGERY

## 2022-10-14 RX ORDER — ASPIRIN 81 MG/1
TABLET ORAL DAILY
COMMUNITY

## 2022-10-14 RX ORDER — IBUPROFEN 200 MG
TABLET ORAL
COMMUNITY

## 2022-10-14 RX ORDER — TRAMADOL HYDROCHLORIDE 50 MG/1
50 TABLET ORAL
Qty: 28 TABLET | Refills: 0 | Status: SHIPPED | OUTPATIENT
Start: 2022-10-14 | End: 2022-10-21

## 2022-10-14 NOTE — PROGRESS NOTES
Subjective: (As above and below)     The patient/guardian gave verbal consent to treat. Chief Complaint   Patient presents with    Positive For Covid-19     Pt here today after testing positive for COVID just earlier today. Symptoms include sneezing, hoarseness, runny nose. Would like treatment if possible. Sade Summers is a 68 y.o. female who presents for treatment of covid. Tested positive today after 1-2 days of nasal congestion, runny nose , sneezing, hoarseness. . Preceding illness: none. No other identified aggravating or alleviating factors. Symptoms are constant and overall unchanged. Promotes no decrease in PO intake of fluids. Denies: severe lethargy, SOB, vomiting/diarrhea, chest pain, chest pain with breathing, severe headache, fevers . ROS  Review of Systems - negative except as listed above    Reviewed PmHx, RxHx, FmHx, SocHx, AllgHx and updated in chart.   Family History   Problem Relation Age of Onset    Hypertension Mother     Cancer Father         bladder    Other Father         congential heart valve defect    Heart Disease Father     Depression Sister     Stroke Sister 72    Hypertension Sister     COPD Sister     Other Sister 79        twisted bowel, hiatal hernia    Heart Disease Maternal Grandfather     Dementia Paternal Grandfather     Cancer Maternal Grandmother         esophageal    Asthma Paternal Grandmother     Dementia Paternal Grandmother     Breast Cancer Paternal Grandmother        Past Medical History:   Diagnosis Date    Alopecia     Arthritis     Asthma     CAD (coronary artery disease) 08/12/2018    NSTEMI, Cath, TYRONE OM2 of dominant Cx    GERD (gastroesophageal reflux disease)     H/O seasonal allergies     H/O TB skin testing 1968    treated with INH    Humeral head fracture 10/2022    Left humeral head fracture    Hypercholesterolemia     Hypertension     IFG (impaired fasting glucose) 06/14/2013    a1c 6.0 5/2013    Overactive bladder 03/13/2013 Serrated adenoma of colon 02/06/2014 11/17/09 - Dr. Paulette Rojo - repeat in 5 years    Stroke [facility])     TIA    TB (tuberculosis) 1966    took [facility] for 12 months      Social History     Socioeconomic History    Marital status:    Tobacco Use    Smoking status: Former     Packs/day: 0.25     Years: 10.00     Pack years: 2.50     Types: Cigarettes    Smokeless tobacco: Never    Tobacco comments:     Quit in the 1990's    Vaping Use    Vaping Use: Never used   Substance and Sexual Activity    Alcohol use: Yes     Alcohol/week: 14.0 standard drinks     Types: 14 Shots of liquor per week     Comment: 1-2 Manhattans per ay    Drug use: Yes     Types: Prescription, OTC          Current Outpatient Medications   Medication Sig    molnupiravir 200 mg capsule Take 4 Capsules by mouth every twelve (12) hours for 5 days. ibuprofen (MOTRIN) 200 mg tablet Take  by mouth. aspirin delayed-release 81 mg tablet Take  by mouth daily. traMADoL (ULTRAM) 50 mg tablet Take 1 Tablet by mouth every six (6) hours as needed for Pain for up to 7 days. Max Daily Amount: 200 mg.    pantoprazole (PROTONIX) 40 mg tablet Take 1 Tablet by mouth daily. amLODIPine (NORVASC) 5 mg tablet TAKE 1 TABLET BY MOUTH DAILY    losartan (COZAAR) 50 mg tablet Take 1 Tablet by mouth two (2) times a day. oxybutynin chloride XL (DITROPAN XL) 10 mg CR tablet TAKE 1 TABLET BY MOUTH ONCE DAILY    psyllium (METAMUCIL) powd Take  by mouth.    ezetimibe (ZETIA) 10 mg tablet Take 1 Tablet by mouth daily. lysine (L-LYSINE) 500 mg tab tablet Take 500 mg by mouth two (2) times a day. metoprolol tartrate (LOPRESSOR) 25 mg tablet Take 1 Tablet by mouth daily. multivitamin (ONE A DAY) tablet Take 1 Tab by mouth daily. OAT BRAN PO Take  by mouth.    levalbuterol tartrate (XOPENEX) 45 mcg/actuation inhaler Take 1-2 Puffs by inhalation every four (4) hours as needed for Wheezing. FLAXSEED PO Take  by mouth daily.  Flaxseed meal    cetirizine (ZYRTEC) 10 mg tablet Take 10 mg by mouth daily. No current facility-administered medications for this visit. Objective:     Vitals:    10/14/22 1709   BP: (!) 164/76   Pulse: 64   Resp: 18   Temp: 98.1 °F (36.7 °C)   SpO2: 99%   Weight: 135 lb (61.2 kg)   Height: 5' 4\" (1.626 m)       Physical Exam  General appearance - appears well hydrated and does not appear toxic, no acute distress  Eyes - EOMs intact. Non injected. No scleral icterus   Ears - no external swelling. TMs normal bilat. Nose - nasal congestion. No purulent drainage  Mouth - OP clear without swelling, exudate or lesion. Mucus membranes moist. Uvula midline. Neck/Lymphatics - trachea midline, full AROM, no LAD of neck  Chest - Normal breathing effort no wheeze rales, rhonchi or diminishments bilaterally. Heart - RRR, no murmurs  Skin - no observable rashes or pallor  Neurologic- alert and oriented x 3  Psychiatric- normal mood, behavior and though content. Assessment/ Plan:     1. COVID-19    - molnupiravir 200 mg capsule; Take 4 Capsules by mouth every twelve (12) hours for 5 days. Dispense: 40 Capsule; Refill: 0    2. Positive self-administered antigen test for COVID-19          Informed decision to start covid 19 antiviral treatment  Molnupiravir chosen due to multiple interactions with paxlovid  No evidence suggesting complication of illness at this time. Will discharge home with close monitoring and follow up. Supportive home care for mild symptoms advised- maintain adequate fluid intake, over the counter Tylenol (for fever, aches, pains, chills), deep breathing exercises  Recommendation for self isolation/quarantine based on current CDC guidelines      Follow up: Follow up immediately for any new, worsening or changes or if symptoms are not improving over the next 5-7 days.          Yuliya Freeman NP

## 2022-10-14 NOTE — PROGRESS NOTES
Sophy Moss (: 1945) is a 68 y.o. female, new patient, here for evaluation of the following chief complaint(s):  Shoulder Pain       ASSESSMENT/PLAN:  Below is the assessment and plan developed based on review of pertinent history, physical exam, labs, studies, and medications. Findings were discussed with the patient and her  today. We discussed continuing with ice and sling. She may work on elbow range of motion on a daily basis to decrease chance of stiffness. We discussed taking calcium and vitamin D supplement. I will plan to see her back in 2 weeks to keep a close eye on overall fracture healing. She seems to have decent overall alignment on x-ray today which should heal without surgical treatment if she continues to heal with this overall alignment    1. Left shoulder pain, unspecified chronicity  -     XR SHOULDER LT AP/LAT MIN 2 V; Future  2. Closed 3-part fracture of proximal humerus, left, initial encounter  -     traMADoL (ULTRAM) 50 mg tablet; Take 1 Tablet by mouth every six (6) hours as needed for Pain for up to 7 days. Max Daily Amount: 200 mg., Normal, Disp-28 Tablet, R-0  -     REFERRAL TO DME      Return in about 2 weeks (around 10/28/2022). SUBJECTIVE/OBJECTIVE:  Sophy Moss (: 1945) is a 68 y.o. female. She notes a fall that occurred 5 days ago when she was in [facility]. She landed awkwardly on the left shoulder and since then she has noted sharp pains in the left shoulder. She was seen through the ER and diagnosed with a fracture. She has been in a sling. She notes normal function in the shoulder prior to the injury. She is right-hand dominant        Allergies   Allergen Reactions    Nuts [Tree Nut] Other (comments)     Mouth sores. Walnuts, pecans    Sulfa (Sulfonamide Antibiotics) Nausea and Vomiting       Current Outpatient Medications   Medication Sig    ibuprofen (MOTRIN) 200 mg tablet Take  by mouth.     aspirin delayed-release 81 mg tablet Take  by mouth daily. traMADoL (ULTRAM) 50 mg tablet Take 1 Tablet by mouth every six (6) hours as needed for Pain for up to 7 days. Max Daily Amount: 200 mg.    pantoprazole (PROTONIX) 40 mg tablet Take 1 Tablet by mouth daily. amLODIPine (NORVASC) 5 mg tablet TAKE 1 TABLET BY MOUTH DAILY    losartan (COZAAR) 50 mg tablet Take 1 Tablet by mouth two (2) times a day. oxybutynin chloride XL (DITROPAN XL) 10 mg CR tablet TAKE 1 TABLET BY MOUTH ONCE DAILY    psyllium (METAMUCIL) powd Take  by mouth.    ezetimibe (ZETIA) 10 mg tablet Take 1 Tablet by mouth daily. lysine (L-LYSINE) 500 mg tab tablet Take 500 mg by mouth two (2) times a day. metoprolol tartrate (LOPRESSOR) 25 mg tablet Take 1 Tablet by mouth daily. multivitamin (ONE A DAY) tablet Take 1 Tab by mouth daily. OAT BRAN PO Take  by mouth.    levalbuterol tartrate (XOPENEX) 45 mcg/actuation inhaler Take 1-2 Puffs by inhalation every four (4) hours as needed for Wheezing. FLAXSEED PO Take  by mouth daily. Flaxseed meal    cetirizine (ZYRTEC) 10 mg tablet Take 10 mg by mouth daily. No current facility-administered medications for this visit. Social History     Socioeconomic History    Marital status:      Spouse name: Not on file    Number of children: Not on file    Years of education: Not on file    Highest education level: Not on file   Occupational History    Not on file   Tobacco Use    Smoking status: Former     Packs/day: 0.25     Years: 10.00     Pack years: 2.50     Types: Cigarettes    Smokeless tobacco: Never    Tobacco comments:     Quit in the 1990's    Vaping Use    Vaping Use: Never used   Substance and Sexual Activity    Alcohol use:  Yes     Alcohol/week: 14.0 standard drinks     Types: 14 Shots of liquor per week     Comment: 1-2 Manhattans per ay    Drug use: Yes     Types: Prescription, OTC    Sexual activity: Not on file   Other Topics Concern    Not on file   Social History Narrative    Not on file Social Determinants of Health     Financial Resource Strain: Not on file   Food Insecurity: Not on file   Transportation Needs: Not on file   Physical Activity: Not on file   Stress: Not on file   Social Connections: Not on file   Intimate Partner Violence: Not on file   Housing Stability: Not on file       Past Surgical History:   Procedure Laterality Date    COLONOSCOPY N/A 11/3/2020    COLONOSCOPY performed by Terri Whitfield MD at [facility],[address]  11/3/2020         COLORECTAL SCRN; HI RISK IND  2/20/2015         HX CATARACT REMOVAL Bilateral     HX GI      colonscopy. polyp removed    HX HEART CATHETERIZATION  2018    stent x 1    HX MALIGNANT SKIN LESION EXCISION Left 2018    \"non-melanoma pre-cancerous lesion removed\" per patient    HX ORTHOPAEDIC Right 2015    great toe replacement    UPPER GI ENDOSCOPY,BIOPSY  2/14/2022            Family History   Problem Relation Age of Onset    Hypertension Mother     Cancer Father         bladder    Other Father         congential heart valve defect    Heart Disease Father     Depression Sister     Stroke Sister 72    Hypertension Sister     COPD Sister     Other Sister 79        twisted bowel, hiatal hernia    Heart Disease Maternal Grandfather     Dementia Paternal Grandfather     Cancer Maternal Grandmother         esophageal    Asthma Paternal Grandmother     Dementia Paternal Grandmother     Breast Cancer Paternal Grandmother         OB History    No obstetric history on file. REVIEW OF SYSTEMS:  ROS    Positive for: Musculoskeletal  Last edited by Freya Kennedy on 10/14/2022 10:06 AM.        Patient denies any recent fever, chills, nausea, vomiting, chest pain, or shortness of breath. Vitals:  Ht 5' 4\" (1.626 m)   Wt 135 lb (61.2 kg)   BMI 23.17 kg/m²    Body mass index is 23.17 kg/m². PHYSICAL EXAM:  General exam: Patient is awake, alert, and oriented x3. Well-appearing. No acute distress. Ambulates with a normal gait. Left shoulder: Grossly neurovascular and sensory intact. There is generalized tenderness to palpation about the shoulder. Decreased range of motion is noted in all planes. Rotator cuff strength testing was painful and difficult to evaluate. Swelling and ecchymosis present. IMAGING:    XR Results (most recent):  Results from Appointment encounter on 10/14/22    XR SHOULDER LT AP/LAT MIN 2 V    Narrative  X-rays of the left shoulder 3 views done today show evidence of a three-part proximal humerus fracture with slight valgus impaction and a greater tuberosity segment with slight displacement. Results from [place] encounter on 09/26/19    XR CHEST PA LAT    Narrative  EXAM: XR CHEST PA LAT    INDICATION: chest pain    COMPARISON: Chest x-ray 9/13/2018. FINDINGS: PA and lateral radiographs of the chest demonstrate clear lungs. The  cardiac and mediastinal contours and pulmonary vascularity are normal.  Atherosclerotic calcifications affect the aortic arch and the thoracic aorta is  tortuous. The bones and soft tissues are within normal limits. Impression  IMPRESSION: No acute findings. Results from Appointment encounter on 09/13/18    XR CHEST PA LAT    Narrative  Clinical indication: Left side chest pain acute. Frontal and lateral views of the chest obtained, comparison August 12. The a  heart size is normal. There is no acute infiltrate or shift. Impression  impression: Negative and no change.          Orders Placed This Encounter    XR SHOULDER LT AP/LAT MIN 2 V     Standing Status:   Future     Number of Occurrences:   1     Standing Expiration Date:   1/14/2023    REFERRAL TO DME     Referral Priority:   Routine     Referral Type:   Consultation     Referral Reason:   Specialty Services Required     Number of Visits Requested:   1    traMADoL (ULTRAM) 50 mg tablet     Sig: Take 1 Tablet by mouth every six (6) hours as needed for Pain for up to 7 days. Max Daily Amount: 200 mg. Dispense:  28 Tablet     Refill:  0              An electronic signature was used to authenticate this note.   -- Darcy Campos, DO

## 2022-10-14 NOTE — TELEPHONE ENCOUNTER
I called the patient and verified them by name and date of birth. I informed her that her request will require an appointment. By it being the end of the day, we do not have any openings. Suggested calling urgent care to see if they prescribe the medication or treat symptoms with over the counter medications. She stated understanding and had no further questions.

## 2022-10-14 NOTE — LETTER
10/14/2022    Patient: Argentina Polanco   YOB: 1945   Date of Visit: 10/14/2022     Mohit Braxton MD  [address]  [place]    Dear Mohit Braxton MD,      Thank you for referring Ms. Fabi Baker to [facility] for evaluation. My notes for this consultation are attached. If you have questions, please do not hesitate to call me. I look forward to following your patient along with you.       Sincerely,    Kostas Gordon, DO

## 2022-10-28 ENCOUNTER — OFFICE VISIT (OUTPATIENT)
Dept: ORTHOPEDIC SURGERY | Age: 77
End: 2022-10-28
Payer: MEDICARE

## 2022-10-28 VITALS — WEIGHT: 135 LBS | HEIGHT: 64 IN | BODY MASS INDEX: 23.05 KG/M2

## 2022-10-28 DIAGNOSIS — S42.292A CLOSED 3-PART FRACTURE OF PROXIMAL HUMERUS, LEFT, INITIAL ENCOUNTER: Primary | ICD-10-CM

## 2022-10-28 PROCEDURE — 99024 POSTOP FOLLOW-UP VISIT: CPT | Performed by: ORTHOPAEDIC SURGERY

## 2022-10-28 NOTE — PROGRESS NOTES
Aleksandr Snyder (: 1945) is a 68 y.o. female, established patient, here for evaluation of the following chief complaint(s):  Shoulder Pain       ASSESSMENT/PLAN:  Below is the assessment and plan developed based on review of pertinent history, physical exam, labs, studies, and medications. Findings were discussed with the patient and her  today. She will continue with elbow and wrist range of motion. She will continue in her sling for comfort. She will start physical therapy in the next week to week and a half and they work on passive range of motion only. I will see her back in 5 weeks    1. Closed 3-part fracture of proximal humerus, left, initial encounter  -     XR SHOULDER LT AP/LAT MIN 2 V; Future  -     REFERRAL TO PHYSICAL THERAPY      Return in about 5 weeks (around 2022). SUBJECTIVE/OBJECTIVE:  Aleksandr Snyder (: 1945) is a 68 y.o. female. She presents today for 2-week follow-up of her left proximal humerus fracture that occurred about 3 weeks ago. She notes that overall her pain has significantly improved. She continues with ice and anti-inflammatories as needed. She has been taking a vitamin D and calcium supplement. Allergies   Allergen Reactions    Nuts [Tree Nut] Other (comments)     Mouth sores. Walnuts, pecans    Sulfa (Sulfonamide Antibiotics) Nausea and Vomiting       Current Outpatient Medications   Medication Sig    ibuprofen (MOTRIN) 200 mg tablet Take  by mouth. aspirin delayed-release 81 mg tablet Take  by mouth daily. pantoprazole (PROTONIX) 40 mg tablet Take 1 Tablet by mouth daily. amLODIPine (NORVASC) 5 mg tablet TAKE 1 TABLET BY MOUTH DAILY    losartan (COZAAR) 50 mg tablet Take 1 Tablet by mouth two (2) times a day. oxybutynin chloride XL (DITROPAN XL) 10 mg CR tablet TAKE 1 TABLET BY MOUTH ONCE DAILY    psyllium (METAMUCIL) powd Take  by mouth.    ezetimibe (ZETIA) 10 mg tablet Take 1 Tablet by mouth daily.     lysine (L-LYSINE) 500 mg tab tablet Take 500 mg by mouth two (2) times a day. metoprolol tartrate (LOPRESSOR) 25 mg tablet Take 1 Tablet by mouth daily. multivitamin (ONE A DAY) tablet Take 1 Tab by mouth daily. OAT BRAN PO Take  by mouth.    levalbuterol tartrate (XOPENEX) 45 mcg/actuation inhaler Take 1-2 Puffs by inhalation every four (4) hours as needed for Wheezing. FLAXSEED PO Take  by mouth daily. Flaxseed meal    cetirizine (ZYRTEC) 10 mg tablet Take 10 mg by mouth daily. No current facility-administered medications for this visit. Social History     Socioeconomic History    Marital status:      Spouse name: Not on file    Number of children: Not on file    Years of education: Not on file    Highest education level: Not on file   Occupational History    Not on file   Tobacco Use    Smoking status: Former     Packs/day: 0.25     Years: 10.00     Pack years: 2.50     Types: Cigarettes    Smokeless tobacco: Never    Tobacco comments:     Quit in the 1990's    Vaping Use    Vaping Use: Never used   Substance and Sexual Activity    Alcohol use:  Yes     Alcohol/week: 14.0 standard drinks     Types: 14 Shots of liquor per week     Comment: 1-2 Manhattans per ay    Drug use: Yes     Types: Prescription, OTC    Sexual activity: Not on file   Other Topics Concern    Not on file   Social History Narrative    Not on file     Social Determinants of Health     Financial Resource Strain: Not on file   Food Insecurity: Not on file   Transportation Needs: Not on file   Physical Activity: Not on file   Stress: Not on file   Social Connections: Not on file   Intimate Partner Violence: Not on file   Housing Stability: Not on file       Past Surgical History:   Procedure Laterality Date    COLONOSCOPY N/A 11/3/2020    COLONOSCOPY performed by Kp Willoughby MD at [address] [place] [address],[address]  11/3/2020         COLORECTAL SCRN; HI RISK IND  2/20/2015         HX CATARACT REMOVAL Bilateral     HX GI      colonscopy. polyp removed    HX HEART CATHETERIZATION  2018    stent x 1    HX MALIGNANT SKIN LESION EXCISION Left 2018    \"non-melanoma pre-cancerous lesion removed\" per patient    HX ORTHOPAEDIC Right 2015    great toe replacement    UPPER GI ENDOSCOPY,BIOPSY  2/14/2022            Family History   Problem Relation Age of Onset    Hypertension Mother     Cancer Father         bladder    Other Father         congential heart valve defect    Heart Disease Father     Depression Sister     Stroke Sister 72    Hypertension Sister     COPD Sister     Other Sister 79        twisted bowel, hiatal hernia    Heart Disease Maternal Grandfather     Dementia Paternal Grandfather     Cancer Maternal Grandmother         esophageal    Asthma Paternal Grandmother     Dementia Paternal Grandmother     Breast Cancer Paternal Grandmother         OB History    No obstetric history on file. REVIEW OF SYSTEMS:  ROS    Positive for: Musculoskeletal  Last edited by Olman Delgado on 10/28/2022 12:55 PM.        Patient denies any recent fever, chills, nausea, vomiting, chest pain, or shortness of breath. Vitals:  Ht 5' 4\" (1.626 m)   Wt 135 lb (61.2 kg)   BMI 23.17 kg/m²    Body mass index is 23.17 kg/m². PHYSICAL EXAM:  General exam: Patient is awake, alert, and oriented x3. Well-appearing. No acute distress. Ambulates with a normal gait. Left shoulder: Grossly neurovascular and sensory intact. There is generalized tenderness to palpation about the shoulder. Decreased range of motion is noted in all planes. Rotator cuff strength testing was painful and difficult to evaluate. Swelling and ecchymosis has improved. IMAGING:    XR Results (most recent):  Results from Appointment encounter on 10/28/22    XR SHOULDER LT AP/LAT MIN 2 V    Narrative  X-rays of the left shoulder 3 views done today show evidence of maintained glenohumeral joint space.   Adequate overall alignment of the comminuted proximal humerus fracture with greater tuberosity segment. No significant changes compared to previous x-rays      Results from Appointment encounter on 10/14/22    XR SHOULDER LT AP/LAT MIN 2 V    Narrative  X-rays of the left shoulder 3 views done today show evidence of a three-part proximal humerus fracture with slight valgus impaction and a greater tuberosity segment with slight displacement. Results from [place] encounter on 09/26/19    XR CHEST PA LAT    Narrative  EXAM: XR CHEST PA LAT    INDICATION: chest pain    COMPARISON: Chest x-ray 9/13/2018. FINDINGS: PA and lateral radiographs of the chest demonstrate clear lungs. The  cardiac and mediastinal contours and pulmonary vascularity are normal.  Atherosclerotic calcifications affect the aortic arch and the thoracic aorta is  tortuous. The bones and soft tissues are within normal limits. Impression  IMPRESSION: No acute findings. Orders Placed This Encounter    XR SHOULDER LT AP/LAT MIN 2 V     2     Standing Status:   Future     Number of Occurrences:   1     Standing Expiration Date:   12/28/2022    REFERRAL TO PHYSICAL THERAPY     Referral Priority:   Routine     Referral Type:   PT/OT/ST     Referral Reason:   Specialty Services Required     Number of Visits Requested:   1              An electronic signature was used to authenticate this note.   -- Carlota Murray DO

## 2022-10-28 NOTE — LETTER
10/28/2022    Patient: Angelina Galarza   YOB: 1945   Date of Visit: 10/28/2022     Iona Stone MD  [address] [place] [address]    Dear Iona Stone MD,      Thank you for referring Ms. Fabi Baker to [facility] for evaluation. My notes for this consultation are attached. If you have questions, please do not hesitate to call me. I look forward to following your patient along with you.       Sincerely,    Dora Garcia, DO

## 2022-11-03 DIAGNOSIS — I10 ESSENTIAL HYPERTENSION: ICD-10-CM

## 2022-11-03 RX ORDER — METOPROLOL TARTRATE 25 MG/1
25 TABLET, FILM COATED ORAL DAILY
Qty: 90 TABLET | Refills: 4 | Status: SHIPPED | OUTPATIENT
Start: 2022-11-03

## 2022-11-03 NOTE — TELEPHONE ENCOUNTER
PCP: Pau Clark MD     Last appt: 8/3/2022     Future Appointments   Date Time Provider Niurka Boles   12/5/2022 11:00 AM DO HERMAN Barbosa   2/3/2023 11:00 AM Pau Clark MD [facility] ANU MARINELLI          Requested Prescriptions     Pending Prescriptions Disp Refills    metoprolol tartrate (LOPRESSOR) 25 mg tablet 90 Tablet 4     Sig: Take 1 Tablet by mouth daily.

## 2022-12-05 ENCOUNTER — OFFICE VISIT (OUTPATIENT)
Dept: ORTHOPEDIC SURGERY | Age: 77
End: 2022-12-05
Payer: MEDICARE

## 2022-12-05 VITALS — HEIGHT: 64 IN | WEIGHT: 135 LBS | BODY MASS INDEX: 23.05 KG/M2

## 2022-12-05 DIAGNOSIS — S42.292A CLOSED 3-PART FRACTURE OF PROXIMAL HUMERUS, LEFT, INITIAL ENCOUNTER: Primary | ICD-10-CM

## 2022-12-05 PROCEDURE — 99024 POSTOP FOLLOW-UP VISIT: CPT | Performed by: ORTHOPAEDIC SURGERY

## 2022-12-05 NOTE — PROGRESS NOTES
Celia Hernandez (: 1945) is a 68 y.o. female, established patient, here for evaluation of the following chief complaint(s):  Shoulder Pain       ASSESSMENT/PLAN:  Below is the assessment and plan developed based on review of pertinent history, physical exam, labs, studies, and medications. Findings were discussed with the patient today. Overall she seems to be healing well and we will plan to see her back in 1 month for repeat x-rays of the left shoulder. If she has any setbacks or concerns and we will plan to see her back sooner. She will continue with her therapy regimen. 1. Closed 3-part fracture of proximal humerus, left, initial encounter  -     XR SHOULDER LT AP/LAT MIN 2 V; Future      Return in about 4 weeks (around 2023). SUBJECTIVE/OBJECTIVE:  Celia Hernandez (: 1945) is a 68 y.o. female. She notes significant improvement in the pain in the left shoulder. She continues to work with therapy and notes improved motion as well. Allergies   Allergen Reactions    Nuts [Tree Nut] Other (comments)     Mouth sores. Walnuts, pecans    Sulfa (Sulfonamide Antibiotics) Nausea and Vomiting       Current Outpatient Medications   Medication Sig    metoprolol tartrate (LOPRESSOR) 25 mg tablet Take 1 Tablet by mouth daily. ibuprofen (MOTRIN) 200 mg tablet Take  by mouth. aspirin delayed-release 81 mg tablet Take  by mouth daily. pantoprazole (PROTONIX) 40 mg tablet Take 1 Tablet by mouth daily. amLODIPine (NORVASC) 5 mg tablet TAKE 1 TABLET BY MOUTH DAILY    losartan (COZAAR) 50 mg tablet Take 1 Tablet by mouth two (2) times a day. oxybutynin chloride XL (DITROPAN XL) 10 mg CR tablet TAKE 1 TABLET BY MOUTH ONCE DAILY    psyllium (METAMUCIL) powd Take  by mouth.    ezetimibe (ZETIA) 10 mg tablet Take 1 Tablet by mouth daily. lysine (L-LYSINE) 500 mg tab tablet Take 500 mg by mouth two (2) times a day. multivitamin (ONE A DAY) tablet Take 1 Tab by mouth daily. OAT BRAN PO Take  by mouth.    levalbuterol tartrate (XOPENEX) 45 mcg/actuation inhaler Take 1-2 Puffs by inhalation every four (4) hours as needed for Wheezing. FLAXSEED PO Take  by mouth daily. Flaxseed meal    cetirizine (ZYRTEC) 10 mg tablet Take 10 mg by mouth daily. No current facility-administered medications for this visit. Social History     Socioeconomic History    Marital status:      Spouse name: Not on file    Number of children: Not on file    Years of education: Not on file    Highest education level: Not on file   Occupational History    Not on file   Tobacco Use    Smoking status: Former     Packs/day: 0.25     Years: 10.00     Pack years: 2.50     Types: Cigarettes    Smokeless tobacco: Never    Tobacco comments:     Quit in the 1990's    Vaping Use    Vaping Use: Never used   Substance and Sexual Activity    Alcohol use: Yes     Alcohol/week: 14.0 standard drinks     Types: 14 Shots of liquor per week     Comment: 1-2 Manhattans per ay    Drug use: Yes     Types: Prescription, OTC    Sexual activity: Not on file   Other Topics Concern    Not on file   Social History Narrative    Not on file     Social Determinants of Health     Financial Resource Strain: Not on file   Food Insecurity: Not on file   Transportation Needs: Not on file   Physical Activity: Not on file   Stress: Not on file   Social Connections: Not on file   Intimate Partner Violence: Not on file   Housing Stability: Not on file       Past Surgical History:   Procedure Laterality Date    COLONOSCOPY N/A 11/3/2020    COLONOSCOPY performed by Black Ross MD at [address] [place] [address],[address]  11/3/2020         COLORECTAL SCRN; HI RISK IND  2/20/2015         HX CATARACT REMOVAL Bilateral     HX GI      colonscopy.  polyp removed    HX HEART CATHETERIZATION  2018    stent x 1    HX MALIGNANT SKIN LESION EXCISION Left 2018    \"non-melanoma pre-cancerous lesion removed\" per patient    HX ORTHOPAEDIC Right 2015    great toe replacement    UPPER GI ENDOSCOPY,BIOPSY  2/14/2022            Family History   Problem Relation Age of Onset    Hypertension Mother     Cancer Father         bladder    Other Father         congential heart valve defect    Heart Disease Father     Depression Sister     Stroke Sister 72    Hypertension Sister     COPD Sister     Other Sister 79        twisted bowel, hiatal hernia    Heart Disease Maternal Grandfather     Dementia Paternal Grandfather     Cancer Maternal Grandmother         esophageal    Asthma Paternal Grandmother     Dementia Paternal Grandmother     Breast Cancer Paternal Grandmother         OB History    No obstetric history on file. REVIEW OF SYSTEMS:  ROS    Positive for: Musculoskeletal  Last edited by Fernando Sánchez on 12/5/2022 11:17 AM.        Patient denies any recent fever, chills, nausea, vomiting, chest pain, or shortness of breath. Vitals:  Ht 5' 4\" (1.626 m)   Wt 135 lb (61.2 kg)   BMI 23.17 kg/m²    Body mass index is 23.17 kg/m². PHYSICAL EXAM:  General exam: Patient is awake, alert, and oriented x3. Well-appearing. No acute distress. Ambulates with a normal gait. Heart/Lungs:  no respiratory distress, palpable pulses    Left shoulder: Active forward flexion abduction 90 degrees today. Normal stability. IMAGING:    XR Results (most recent):  Results from Appointment encounter on 12/05/22    XR SHOULDER LT AP/LAT MIN 2 V    Narrative  X-rays of the left shoulder 3 views done today show evidence of a comminuted proximal humerus fracture with varus alignment and a greater tuberosity segment with no significant changes in alignment compared to previous x-rays      Results from Appointment encounter on 10/28/22    XR SHOULDER LT AP/LAT MIN 2 V    Narrative  X-rays of the left shoulder 3 views done today show evidence of maintained glenohumeral joint space.   Adequate overall alignment of the comminuted proximal humerus fracture with greater tuberosity segment. No significant changes compared to previous x-rays      Results from Appointment encounter on 10/14/22    XR SHOULDER LT AP/LAT MIN 2 V    Narrative  X-rays of the left shoulder 3 views done today show evidence of a three-part proximal humerus fracture with slight valgus impaction and a greater tuberosity segment with slight displacement. Orders Placed This Encounter    XR SHOULDER LT AP/LAT MIN 2 V     Standing Status:   Future     Number of Occurrences:   1     Standing Expiration Date:   2/5/2023              An electronic signature was used to authenticate this note.   -- Venora Massing, DO

## 2022-12-05 NOTE — LETTER
12/5/2022    Patient: Philippe Hirsch   YOB: 1945   Date of Visit: 12/5/2022     Ayanna Glaser MD  [address] [place] [address]    Dear Ayanna Glaser MD,      Thank you for referring Ms. Fabi Baker to [facility] for evaluation. My notes for this consultation are attached. If you have questions, please do not hesitate to call me. I look forward to following your patient along with you.       Sincerely,    Trino Yarbrough, DO

## 2023-01-06 ENCOUNTER — OFFICE VISIT (OUTPATIENT)
Dept: ORTHOPEDIC SURGERY | Age: 78
End: 2023-01-06
Payer: MEDICARE

## 2023-01-06 VITALS — HEIGHT: 64 IN | WEIGHT: 135 LBS | BODY MASS INDEX: 23.05 KG/M2

## 2023-01-06 DIAGNOSIS — S42.292D CLOSED 3-PART FRACTURE OF PROXIMAL HUMERUS, LEFT, WITH ROUTINE HEALING, SUBSEQUENT ENCOUNTER: Primary | ICD-10-CM

## 2023-01-06 NOTE — PROGRESS NOTES
Nav Farooq (: 1945) is a 68 y.o. female, established patient, here for evaluation of the following chief complaint(s):  Shoulder Pain       ASSESSMENT/PLAN:  Below is the assessment and plan developed based on review of pertinent history, physical exam, labs, studies, and medications. Overall she seems to be progressing well with her therapy regimen. I will plan to see her back in 6 to 8 weeks as needed. She will continue with her therapy regimen and home exercise program.    1. Closed 3-part fracture of proximal humerus, left, with routine healing, subsequent encounter  -     XR SHOULDER LT AP/LAT MIN 2 V; Future      Return in about 6 weeks (around 2023), or if symptoms worsen or fail to improve. SUBJECTIVE/OBJECTIVE:  Nav Farooq (: 1945) is a 68 y.o. female. She presents today for 3-month follow-up of her left proximal humerus fracture. Overall doing well and she has progressed back to normal daily activities        Allergies   Allergen Reactions    Nuts [Tree Nut] Other (comments)     Mouth sores. Walnuts, pecans    Sulfa (Sulfonamide Antibiotics) Nausea and Vomiting       Current Outpatient Medications   Medication Sig    metoprolol tartrate (LOPRESSOR) 25 mg tablet Take 1 Tablet by mouth daily. ibuprofen (MOTRIN) 200 mg tablet Take  by mouth. aspirin delayed-release 81 mg tablet Take  by mouth daily. pantoprazole (PROTONIX) 40 mg tablet Take 1 Tablet by mouth daily. amLODIPine (NORVASC) 5 mg tablet TAKE 1 TABLET BY MOUTH DAILY    losartan (COZAAR) 50 mg tablet Take 1 Tablet by mouth two (2) times a day. oxybutynin chloride XL (DITROPAN XL) 10 mg CR tablet TAKE 1 TABLET BY MOUTH ONCE DAILY    psyllium (METAMUCIL) powd Take  by mouth.    ezetimibe (ZETIA) 10 mg tablet Take 1 Tablet by mouth daily. lysine (L-LYSINE) 500 mg tab tablet Take 500 mg by mouth two (2) times a day. multivitamin (ONE A DAY) tablet Take 1 Tab by mouth daily.     OAT BRAN PO Take  by mouth.    levalbuterol tartrate (XOPENEX) 45 mcg/actuation inhaler Take 1-2 Puffs by inhalation every four (4) hours as needed for Wheezing. FLAXSEED PO Take  by mouth daily. Flaxseed meal    cetirizine (ZYRTEC) 10 mg tablet Take 10 mg by mouth daily. No current facility-administered medications for this visit. Social History     Socioeconomic History    Marital status:      Spouse name: Not on file    Number of children: Not on file    Years of education: Not on file    Highest education level: Not on file   Occupational History    Not on file   Tobacco Use    Smoking status: Former     Packs/day: 0.25     Years: 10.00     Pack years: 2.50     Types: Cigarettes    Smokeless tobacco: Never    Tobacco comments:     Quit in the 1990's    Vaping Use    Vaping Use: Never used   Substance and Sexual Activity    Alcohol use: Yes     Alcohol/week: 14.0 standard drinks     Types: 14 Shots of liquor per week     Comment: 1-2 Manhattans per ay    Drug use: Yes     Types: Prescription, OTC    Sexual activity: Not on file   Other Topics Concern    Not on file   Social History Narrative    Not on file     Social Determinants of Health     Financial Resource Strain: Not on file   Food Insecurity: Not on file   Transportation Needs: Not on file   Physical Activity: Not on file   Stress: Not on file   Social Connections: Not on file   Intimate Partner Violence: Not on file   Housing Stability: Not on file       Past Surgical History:   Procedure Laterality Date    COLONOSCOPY N/A 11/3/2020    COLONOSCOPY performed by Chayo Mckeon MD at [address] [place] [address],[address]  11/3/2020         COLORECTAL SCRN; HI RISK IND  2/20/2015         HX CATARACT REMOVAL Bilateral     HX GI      colonscopy.  polyp removed    HX HEART CATHETERIZATION  2018    stent x 1    HX MALIGNANT SKIN LESION EXCISION Left 2018    \"non-melanoma pre-cancerous lesion removed\" per patient    HX ORTHOPAEDIC Right 2015    great toe replacement    UPPER GI ENDOSCOPY,BIOPSY  2/14/2022            Family History   Problem Relation Age of Onset    Hypertension Mother     Cancer Father         bladder    Other Father         congential heart valve defect    Heart Disease Father     Depression Sister     Stroke Sister 72    Hypertension Sister     COPD Sister     Other Sister 79        twisted bowel, hiatal hernia    Heart Disease Maternal Grandfather     Dementia Paternal Grandfather     Cancer Maternal Grandmother         esophageal    Asthma Paternal Grandmother     Dementia Paternal Grandmother     Breast Cancer Paternal Grandmother         OB History    No obstetric history on file. REVIEW OF SYSTEMS:  ROS    Positive for: Musculoskeletal  Last edited by Gibson Lombard on 1/6/2023  1:24 PM.        Patient denies any recent fever, chills, nausea, vomiting, chest pain, or shortness of breath. Vitals:  Ht 5' 4\" (1.626 m)   Wt 135 lb (61.2 kg)   BMI 23.17 kg/m²    Body mass index is 23.17 kg/m². PHYSICAL EXAM:  General exam: Patient is awake, alert, and oriented x3. Well-appearing. No acute distress. Ambulates with a normal gait. Heart/Lungs:  no respiratory distress, palpable pulses    Left shoulder: Active forward flexion 120 degrees today. Abduction is similar. Internal rotation to the sacrum. External rotation 60 degrees    IMAGING:    XR Results (most recent):  Results from Appointment encounter on 01/06/23    XR SHOULDER LT AP/LAT MIN 2 V    Narrative  Rays of the left shoulder 3 views done today show evidence of an impacted and varus angulated proximal humerus fracture with no signs of worsening compared to previous x-rays.   Callus formation is noted      Results from Appointment encounter on 12/05/22    XR SHOULDER LT AP/LAT MIN 2 V    Narrative  X-rays of the left shoulder 3 views done today show evidence of a comminuted proximal humerus fracture with varus alignment and a greater tuberosity segment with no significant changes in alignment compared to previous x-rays      Results from Appointment encounter on 10/28/22    XR SHOULDER LT AP/LAT MIN 2 V    Narrative  X-rays of the left shoulder 3 views done today show evidence of maintained glenohumeral joint space. Adequate overall alignment of the comminuted proximal humerus fracture with greater tuberosity segment. No significant changes compared to previous x-rays         Orders Placed This Encounter    XR SHOULDER LT AP/LAT MIN 2 V     Standing Status:   Future     Number of Occurrences:   1     Standing Expiration Date:   4/6/2023              An electronic signature was used to authenticate this note.   -- Bradley Lam, DO

## 2023-01-06 NOTE — LETTER
1/6/2023    Patient: Nav Farooq   YOB: 1945   Date of Visit: 1/6/2023     Nasir Geiger MD  [address] [place] [address]    Dear Nasir Geiger MD,      Thank you for referring Ms. Fabi Baker to [facility] for evaluation. My notes for this consultation are attached. If you have questions, please do not hesitate to call me. I look forward to following your patient along with you.       Sincerely,    Zafar Leone, DO

## 2023-01-18 RX ORDER — LOSARTAN POTASSIUM 50 MG/1
50 TABLET ORAL 2 TIMES DAILY
Qty: 180 TABLET | Refills: 0 | Status: SHIPPED | OUTPATIENT
Start: 2023-01-18

## 2023-01-18 NOTE — TELEPHONE ENCOUNTER
PCP: Elizabeth Andrade MD     Last appt: 8/3/2022     Future Appointments   Date Time Provider Niurka Boles   2/3/2023 11:00 AM Elizabeth Andrade MD [facility] BS AMB          Requested Prescriptions     Pending Prescriptions Disp Refills    losartan (COZAAR) 50 mg tablet 180 Tablet 0     Sig: Take 1 Tablet by mouth two (2) times a day.

## 2023-01-31 LAB
ALBUMIN SERPL-MCNC: 4.2 G/DL (ref 3.7–4.7)
ALBUMIN/GLOB SERPL: 1.8 {RATIO} (ref 1.2–2.2)
ALP SERPL-CCNC: 63 IU/L (ref 44–121)
ALT SERPL-CCNC: 19 IU/L (ref 0–32)
AST SERPL-CCNC: 20 IU/L (ref 0–40)
BILIRUB SERPL-MCNC: 0.5 MG/DL (ref 0–1.2)
BUN SERPL-MCNC: 21 MG/DL (ref 8–27)
BUN/CREAT SERPL: 25 (ref 12–28)
CALCIUM SERPL-MCNC: 9.4 MG/DL (ref 8.7–10.3)
CHLORIDE SERPL-SCNC: 106 MMOL/L (ref 96–106)
CHOLEST SERPL-MCNC: 189 MG/DL (ref 100–199)
CO2 SERPL-SCNC: 19 MMOL/L (ref 20–29)
CREAT SERPL-MCNC: 0.85 MG/DL (ref 0.57–1)
EGFR: 71 ML/MIN/1.73
EST. AVERAGE GLUCOSE BLD GHB EST-MCNC: 114 MG/DL
GLOBULIN SER CALC-MCNC: 2.3 G/DL (ref 1.5–4.5)
GLUCOSE SERPL-MCNC: 91 MG/DL (ref 70–99)
HBA1C MFR BLD: 5.6 % (ref 4.8–5.6)
HDLC SERPL-MCNC: 63 MG/DL
LDLC SERPL CALC-MCNC: 113 MG/DL (ref 0–99)
POTASSIUM SERPL-SCNC: 4.2 MMOL/L (ref 3.5–5.2)
PROT SERPL-MCNC: 6.5 G/DL (ref 6–8.5)
SODIUM SERPL-SCNC: 141 MMOL/L (ref 134–144)
T4 FREE SERPL-MCNC: 1.22 NG/DL (ref 0.82–1.77)
TRIGL SERPL-MCNC: 69 MG/DL (ref 0–149)
TSH SERPL DL<=0.005 MIU/L-ACNC: 1.52 UIU/ML (ref 0.45–4.5)
VLDLC SERPL CALC-MCNC: 13 MG/DL (ref 5–40)

## 2023-02-03 ENCOUNTER — OFFICE VISIT (OUTPATIENT)
Dept: INTERNAL MEDICINE CLINIC | Age: 78
End: 2023-02-03
Payer: MEDICARE

## 2023-02-03 VITALS
HEART RATE: 61 BPM | TEMPERATURE: 98.1 F | OXYGEN SATURATION: 100 % | SYSTOLIC BLOOD PRESSURE: 132 MMHG | WEIGHT: 140 LBS | HEIGHT: 64 IN | DIASTOLIC BLOOD PRESSURE: 60 MMHG | BODY MASS INDEX: 23.9 KG/M2 | RESPIRATION RATE: 12 BRPM

## 2023-02-03 DIAGNOSIS — I10 ESSENTIAL HYPERTENSION: Primary | ICD-10-CM

## 2023-02-03 DIAGNOSIS — I77.9 BILATERAL CAROTID ARTERY DISEASE, UNSPECIFIED TYPE (HCC): ICD-10-CM

## 2023-02-03 DIAGNOSIS — E04.2 MULTINODULAR THYROID: ICD-10-CM

## 2023-02-03 DIAGNOSIS — R73.01 IFG (IMPAIRED FASTING GLUCOSE): ICD-10-CM

## 2023-02-03 DIAGNOSIS — E78.00 HYPERCHOLESTEROLEMIA: ICD-10-CM

## 2023-02-03 RX ORDER — LOSARTAN POTASSIUM 50 MG/1
50 TABLET ORAL DAILY
Qty: 180 TABLET | Refills: 0
Start: 2023-02-03

## 2023-02-03 NOTE — PROGRESS NOTES
HPI  Ms. Yung Olivera is a 68y.o. year old female, she is seen today for follow up HTN, cholesterol. No chest pain, sob, dizziness, weakness. Edin Harper after tripping wearing new shoes - foot caught on rug and fell into counter - small laceration left side of forehead- applied butterfly bandages and now healed. No LOC. Didn't even have a bruise. BP - has new machine - not sure if accurate       Chief Complaint   Patient presents with    Hypertension          Prior to Admission medications    Medication Sig Start Date End Date Taking? Authorizing Provider   losartan (COZAAR) 50 mg tablet Take 1 Tablet by mouth daily. 2/3/23  Yes Katharine Mott MD   metoprolol tartrate (LOPRESSOR) 25 mg tablet Take 1 Tablet by mouth daily. 11/3/22  Yes Katharine Mott MD   ibuprofen (MOTRIN) 200 mg tablet Take  by mouth. Yes Provider, Historical   aspirin delayed-release 81 mg tablet Take  by mouth daily. Yes Provider, Historical   pantoprazole (PROTONIX) 40 mg tablet Take 1 Tablet by mouth daily. 9/13/22  Yes Katharine Mott MD   amLODIPine (NORVASC) 5 mg tablet TAKE 1 TABLET BY MOUTH DAILY 7/25/22  Yes Katharine Mott MD   oxybutynin chloride XL (DITROPAN XL) 10 mg CR tablet TAKE 1 TABLET BY MOUTH ONCE DAILY 6/13/22  Yes Katharine Mott MD   psyllium (METAMUCIL) powd Take  by mouth. Yes Provider, Historical   ezetimibe (ZETIA) 10 mg tablet Take 1 Tablet by mouth daily. 4/26/22  Yes Katharine Mott MD   lysine (L-LYSINE) 500 mg tab tablet Take 500 mg by mouth two (2) times a day. Yes Provider, Historical   multivitamin (ONE A DAY) tablet Take 1 Tab by mouth daily. Yes Provider, Historical   OAT BRAN PO Take  by mouth. Yes Provider, Historical   levalbuterol tartrate (XOPENEX) 45 mcg/actuation inhaler Take 1-2 Puffs by inhalation every four (4) hours as needed for Wheezing. 1/17/17  Yes Katharine Mott MD   FLAXSEED PO Take  by mouth daily.  Flaxseed meal   Yes Provider, Historical cetirizine (ZYRTEC) 10 mg tablet Take 10 mg by mouth daily. Yes Provider, Historical   losartan (COZAAR) 50 mg tablet Take 1 Tablet by mouth two (2) times a day. 1/18/23 2/3/23  Rashaun Townsend MD         Allergies   Allergen Reactions    Ruth [place] Nut] Other (comments)     Mouth sores. Walnuts, pecans    Sulfa (Sulfonamide Antibiotics) Nausea and Vomiting         REVIEW OF SYSTEMS:  Per HPI    PHYSICAL EXAM:  Visit Vitals  /60   Pulse 61   Temp 98.1 °F (36.7 °C) (Oral)   Resp 12   Ht 5' 4\" (1.626 m)   Wt 140 lb (63.5 kg)   SpO2 100%   BMI 24.03 kg/m²     Constitutional: Appears well-developed and well-nourished. No distress. HENT:   Head: Normocephalic and atraumatic. Eyes: No scleral icterus. Neck: no lad, no tm, supple   Cardiovascular: Normal S1/S2, regular rhythm. No murmurs, rubs, or gallops. Pulmonary/Chest: Effort normal and breath sounds normal. No respiratory distress. No wheezes, rhonchi, or rales. Ext: No edema. Neurological: Alert. Psychiatric: Normal mood and affect. Behavior is normal.     Lab Results   Component Value Date/Time    Sodium 141 01/30/2023 12:25 PM    Potassium 4.2 01/30/2023 12:25 PM    Chloride 106 01/30/2023 12:25 PM    CO2 19 (L) 01/30/2023 12:25 PM    Anion gap 6 09/26/2019 09:55 PM    Glucose 91 01/30/2023 12:25 PM    BUN 21 01/30/2023 12:25 PM    Creatinine 0.85 01/30/2023 12:25 PM    BUN/Creatinine ratio 25 01/30/2023 12:25 PM    GFR est AA 80 10/18/2021 09:57 AM    GFR est non-AA 70 10/18/2021 09:57 AM    Calcium 9.4 01/30/2023 12:25 PM    Bilirubin, total 0.5 01/30/2023 12:25 PM    Alk.  phosphatase 63 01/30/2023 12:25 PM    Protein, total 6.5 01/30/2023 12:25 PM    Albumin 4.2 01/30/2023 12:25 PM    Globulin 3.3 08/12/2018 09:40 AM    A-G Ratio 1.8 01/30/2023 12:25 PM    ALT (SGPT) 19 01/30/2023 12:25 PM     Lab Results   Component Value Date/Time    Hemoglobin A1c 5.6 01/30/2023 12:25 PM    Hemoglobin A1c 5.5 10/18/2021 09:57 AM    Hemoglobin A1c 5.4 09/27/2019 04:09 AM      Lab Results   Component Value Date/Time    Cholesterol, total 189 01/30/2023 12:25 PM    HDL Cholesterol 63 01/30/2023 12:25 PM    LDL,Direct 150 (H) 01/19/2022 01:30 PM    LDL, calculated 113 (H) 01/30/2023 12:25 PM    LDL, calculated 79 08/12/2020 09:13 AM    VLDL, calculated 13 01/30/2023 12:25 PM    VLDL, calculated 15 08/12/2020 09:13 AM    Triglyceride 69 01/30/2023 12:25 PM    CHOL/HDL Ratio 3.4 09/27/2019 03:54 AM          ASSESSMENT/PLAN  Diagnoses and all orders for this visit:    1. Essential hypertension  -     losartan (COZAAR) 50 mg tablet; Take 1 Tablet by mouth daily. 2. Bilateral carotid artery disease, unspecified type ([facility] [address]    3. Multinodular thyroid    4. Hypercholesterolemia    5. IFG (impaired fasting glucose)    Lipids stable on zetia - cannot tolerate statin  BP at goal - continue current medications   #2 managed by vascular surgery - yearly dopplers for now  Goiter - get repeat thyroid US in July   A1C in prediabetes range    There are no preventive care reminders to display for this patient. Follow-up and Dispositions    Return in about 6 months (around 8/3/2023) for bp, goiter, AWV. Reviewed plan of care. Patient has provided input and agrees with goals. The nurse provided the patient and/or family with advanced directive information if needed and encouraged the patient to provide a copy to the office when available.

## 2023-02-20 ENCOUNTER — APPOINTMENT (OUTPATIENT)
Dept: CT IMAGING | Age: 78
End: 2023-02-20
Attending: EMERGENCY MEDICINE
Payer: MEDICARE

## 2023-02-20 ENCOUNTER — HOSPITAL ENCOUNTER (EMERGENCY)
Age: 78
Discharge: HOME | End: 2023-02-20
Attending: EMERGENCY MEDICINE
Payer: MEDICARE

## 2023-02-20 ENCOUNTER — TELEPHONE (OUTPATIENT)
Dept: INTERNAL MEDICINE CLINIC | Age: 78
End: 2023-02-20

## 2023-02-20 VITALS
BODY MASS INDEX: 22.96 KG/M2 | HEIGHT: 64 IN | HEART RATE: 75 BPM | WEIGHT: 134.48 LBS | DIASTOLIC BLOOD PRESSURE: 65 MMHG | RESPIRATION RATE: 20 BRPM | SYSTOLIC BLOOD PRESSURE: 129 MMHG | TEMPERATURE: 97.4 F | OXYGEN SATURATION: 96 %

## 2023-02-20 DIAGNOSIS — R10.84 ABDOMINAL PAIN, GENERALIZED: Primary | ICD-10-CM

## 2023-02-20 DIAGNOSIS — R19.7 DIARRHEA, UNSPECIFIED TYPE: ICD-10-CM

## 2023-02-20 DIAGNOSIS — K64.9 HEMORRHOIDS, UNSPECIFIED HEMORRHOID TYPE: ICD-10-CM

## 2023-02-20 DIAGNOSIS — K52.9 NONINFECTIOUS GASTROENTERITIS, UNSPECIFIED TYPE: ICD-10-CM

## 2023-02-20 LAB
ALBUMIN SERPL-MCNC: 3.6 G/DL (ref 3.5–5)
ALBUMIN/GLOB SERPL: 1 (ref 1.1–2.2)
ALP SERPL-CCNC: 77 U/L (ref 45–117)
ALT SERPL-CCNC: 22 U/L (ref 12–78)
ANION GAP SERPL CALC-SCNC: 6 MMOL/L (ref 5–15)
APPEARANCE UR: CLEAR
AST SERPL-CCNC: 16 U/L (ref 15–37)
BACTERIA URNS QL MICRO: NEGATIVE /HPF
BASOPHILS # BLD: 0 K/UL (ref 0–0.1)
BASOPHILS NFR BLD: 0 % (ref 0–1)
BILIRUB SERPL-MCNC: 0.5 MG/DL (ref 0.2–1)
BILIRUB UR QL: NEGATIVE
BUN SERPL-MCNC: 21 MG/DL (ref 6–20)
BUN/CREAT SERPL: 21 (ref 12–20)
CALCIUM SERPL-MCNC: 9.5 MG/DL (ref 8.5–10.1)
CHLORIDE SERPL-SCNC: 107 MMOL/L (ref 97–108)
CO2 SERPL-SCNC: 27 MMOL/L (ref 21–32)
COLOR UR: NORMAL
COMMENT, HOLDF: NORMAL
CREAT SERPL-MCNC: 0.99 MG/DL (ref 0.55–1.02)
DIFFERENTIAL METHOD BLD: NORMAL
EOSINOPHIL # BLD: 0.1 K/UL (ref 0–0.4)
EOSINOPHIL NFR BLD: 1 % (ref 0–7)
EPITH CASTS URNS QL MICRO: NORMAL /LPF
ERYTHROCYTE [DISTWIDTH] IN BLOOD BY AUTOMATED COUNT: 12.5 % (ref 11.5–14.5)
GLOBULIN SER CALC-MCNC: 3.6 G/DL (ref 2–4)
GLUCOSE SERPL-MCNC: 120 MG/DL (ref 65–100)
GLUCOSE UR STRIP.AUTO-MCNC: NEGATIVE MG/DL
HCT VFR BLD AUTO: 37.5 % (ref 35–47)
HGB BLD-MCNC: 11.9 G/DL (ref 11.5–16)
HGB UR QL STRIP: NEGATIVE
HYALINE CASTS URNS QL MICRO: NORMAL /LPF (ref 0–5)
IMM GRANULOCYTES # BLD AUTO: 0 K/UL (ref 0–0.04)
IMM GRANULOCYTES NFR BLD AUTO: 0 % (ref 0–0.5)
KETONES UR QL STRIP.AUTO: NEGATIVE MG/DL
LEUKOCYTE ESTERASE UR QL STRIP.AUTO: NEGATIVE
LIPASE SERPL-CCNC: 87 U/L (ref 73–393)
LYMPHOCYTES # BLD: 1.8 K/UL (ref 0.8–3.5)
LYMPHOCYTES NFR BLD: 17 % (ref 12–49)
MCH RBC QN AUTO: 30.7 PG (ref 26–34)
MCHC RBC AUTO-ENTMCNC: 31.7 G/DL (ref 30–36.5)
MCV RBC AUTO: 96.9 FL (ref 80–99)
MONOCYTES # BLD: 0.8 K/UL (ref 0–1)
MONOCYTES NFR BLD: 7 % (ref 5–13)
NEUTS SEG # BLD: 8 K/UL (ref 1.8–8)
NEUTS SEG NFR BLD: 75 % (ref 32–75)
NITRITE UR QL STRIP.AUTO: NEGATIVE
NRBC # BLD: 0 K/UL (ref 0–0.01)
NRBC BLD-RTO: 0 PER 100 WBC
PH UR STRIP: 5.5 (ref 5–8)
PLATELET # BLD AUTO: 261 K/UL (ref 150–400)
PMV BLD AUTO: 11.4 FL (ref 8.9–12.9)
POTASSIUM SERPL-SCNC: 3.6 MMOL/L (ref 3.5–5.1)
PROT SERPL-MCNC: 7.2 G/DL (ref 6.4–8.2)
PROT UR STRIP-MCNC: NEGATIVE MG/DL
RBC # BLD AUTO: 3.87 M/UL (ref 3.8–5.2)
RBC #/AREA URNS HPF: NORMAL /HPF (ref 0–5)
SAMPLES BEING HELD,HOLD: NORMAL
SODIUM SERPL-SCNC: 140 MMOL/L (ref 136–145)
SP GR UR REFRACTOMETRY: >1.03
UR CULT HOLD, URHOLD: NORMAL
UROBILINOGEN UR QL STRIP.AUTO: 0.2 EU/DL (ref 0.2–1)
WBC # BLD AUTO: 10.8 K/UL (ref 3.6–11)
WBC URNS QL MICRO: NORMAL /HPF (ref 0–4)

## 2023-02-20 PROCEDURE — 83690 ASSAY OF LIPASE: CPT

## 2023-02-20 PROCEDURE — 74011250636 HC RX REV CODE- 250/636: Performed by: EMERGENCY MEDICINE

## 2023-02-20 PROCEDURE — 36415 COLL VENOUS BLD VENIPUNCTURE: CPT

## 2023-02-20 PROCEDURE — 96375 TX/PRO/DX INJ NEW DRUG ADDON: CPT

## 2023-02-20 PROCEDURE — 74177 CT ABD & PELVIS W/CONTRAST: CPT

## 2023-02-20 PROCEDURE — 80053 COMPREHEN METABOLIC PANEL: CPT

## 2023-02-20 PROCEDURE — 74011000636 HC RX REV CODE- 636: Performed by: STUDENT IN AN ORGANIZED HEALTH CARE EDUCATION/TRAINING PROGRAM

## 2023-02-20 PROCEDURE — 74011000250 HC RX REV CODE- 250: Performed by: EMERGENCY MEDICINE

## 2023-02-20 PROCEDURE — 96374 THER/PROPH/DIAG INJ IV PUSH: CPT

## 2023-02-20 PROCEDURE — 85025 COMPLETE CBC W/AUTO DIFF WBC: CPT

## 2023-02-20 PROCEDURE — 81001 URINALYSIS AUTO W/SCOPE: CPT

## 2023-02-20 PROCEDURE — 99285 EMERGENCY DEPT VISIT HI MDM: CPT

## 2023-02-20 PROCEDURE — 96372 THER/PROPH/DIAG INJ SC/IM: CPT

## 2023-02-20 RX ORDER — ONDANSETRON 4 MG/1
4 TABLET, FILM COATED ORAL
Qty: 12 TABLET | Refills: 0 | Status: SHIPPED | OUTPATIENT
Start: 2023-02-20

## 2023-02-20 RX ORDER — DICYCLOMINE HYDROCHLORIDE 10 MG/ML
20 INJECTION INTRAMUSCULAR
Status: COMPLETED | OUTPATIENT
Start: 2023-02-20 | End: 2023-02-20

## 2023-02-20 RX ORDER — ONDANSETRON 2 MG/ML
4 INJECTION INTRAMUSCULAR; INTRAVENOUS
Status: COMPLETED | OUTPATIENT
Start: 2023-02-20 | End: 2023-02-20

## 2023-02-20 RX ORDER — METRONIDAZOLE 500 MG/1
500 TABLET ORAL 3 TIMES DAILY
Qty: 30 TABLET | Refills: 0 | Status: SHIPPED | OUTPATIENT
Start: 2023-02-20 | End: 2023-03-02

## 2023-02-20 RX ORDER — DICYCLOMINE HYDROCHLORIDE 20 MG/1
20 TABLET ORAL EVERY 6 HOURS
Qty: 20 TABLET | Refills: 0 | Status: SHIPPED | OUTPATIENT
Start: 2023-02-20

## 2023-02-20 RX ADMIN — SODIUM CHLORIDE 1000 ML: 9 INJECTION, SOLUTION INTRAVENOUS at 10:18

## 2023-02-20 RX ADMIN — DICYCLOMINE HYDROCHLORIDE 20 MG: 10 INJECTION, SOLUTION INTRAMUSCULAR at 10:20

## 2023-02-20 RX ADMIN — FAMOTIDINE 20 MG: 10 INJECTION, SOLUTION INTRAVENOUS at 10:19

## 2023-02-20 RX ADMIN — IOPAMIDOL 100 ML: 755 INJECTION, SOLUTION INTRAVENOUS at 09:57

## 2023-02-20 RX ADMIN — ONDANSETRON HYDROCHLORIDE 4 MG: 2 SOLUTION INTRAMUSCULAR; INTRAVENOUS at 10:18

## 2023-02-20 NOTE — DISCHARGE INSTRUCTIONS
Increase your fluids to 8 ounces every hour that you are awake; include salty liquids such as soups, broths, seltzer water and Gatorade. Please continue with fiber rich foods such as oatmeal, fiber 1 cereals and fiber 1 bar, fruits and vegetables. May take fiber supplement such as sugar-free Metamucil or fiber choice.

## 2023-02-20 NOTE — ED TRIAGE NOTES
Patient is coming in for abdominal pain, vomiting and rectal bleeding since last night at 2000. Patient states going to bathroom every 15 minutes since 2000 last night and started with blood in the early morning. Patient has history of hemorrhoids.

## 2023-02-20 NOTE — TELEPHONE ENCOUNTER
called stating pt has lower abd cramping, nausea, diarrhea with possible blood in stool. NO vomiting or fever. Advised ER or UC for further evaluation.

## 2023-02-20 NOTE — ED PROVIDER NOTES
Abdominal Pain   Associated symptoms include diarrhea and melena. Pertinent negatives include no fever, no nausea, no vomiting, no dysuria, no headaches, no chest pain and no back pain. Melena   Associated symptoms include abdominal pain and diarrhea. Pertinent negatives include no fever, no nausea, no vomiting, no chest pain, no headaches, no coughing and no rash. Patient is a 66-year-old female with extensive past medical history see list below who presents to the ED with abrupt onset of multiple episodes of nonbloody diarrhea starting at 8 PM last evening. Denies fever, cold symptoms, cough, headache, neck pain, visual changes, focal weakness or rash. Denies any difficulty breathing, difficulty swallowing, SOB or chest pain. Denies any nausea, vomiting or urinary symptoms. Pt. Reports that she has external hemorrhoids and noticed some blood mixed with her stool this morning. Not had any food or medications today prior to arrival.  Old charts reviewed. Past Medical History:   Diagnosis Date    Alopecia     Arthritis     Asthma     CAD (coronary artery disease) 08/12/2018    NSTEMI, Cath, TYRONE OM2 of dominant Cx    GERD (gastroesophageal reflux disease)     H/O seasonal allergies     H/O TB skin testing 1968    treated with INH    Humeral head fracture 10/2022    Left humeral head fracture    Hypercholesterolemia     Hypertension     IFG (impaired fasting glucose) 06/14/2013    a1c 6.0 5/2013    Overactive bladder 03/13/2013    Serrated adenoma of colon 02/06/2014 11/17/09 - Dr. Susie Andrade - repeat in 5 years    Stroke [facility])     TIA    TB (tuberculosis) 1966    took [address] for 12 months       Past Surgical History:   Procedure Laterality Date    COLONOSCOPY N/A 11/3/2020    COLONOSCOPY performed by Amee Maciel MD at [place] [address] [address]  11/3/2020         COLORECTAL SCRN; HI RISK IND  2/20/2015         HX CATARACT REMOVAL Bilateral     HX GI      colonscopy.  polyp removed    HX HEART CATHETERIZATION  2018    stent x 1    HX MALIGNANT SKIN LESION EXCISION Left 2018    \"non-melanoma pre-cancerous lesion removed\" per patient    HX ORTHOPAEDIC Right 2015    great toe replacement    UPPER GI ENDOSCOPY,BIOPSY  2/14/2022              Family History:   Problem Relation Age of Onset    Hypertension Mother     Cancer Father         bladder    Other Father         congential heart valve defect    Heart Disease Father     Depression Sister     Stroke Sister 72    Hypertension Sister     COPD Sister     Other Sister 79        twisted bowel, hiatal hernia    Heart Disease Maternal Grandfather     Dementia Paternal Grandfather     Cancer Maternal Grandmother         esophageal    Asthma Paternal Grandmother     Dementia Paternal Grandmother     Breast Cancer Paternal Grandmother        Social History     Socioeconomic History    Marital status:      Spouse name: Not on file    Number of children: Not on file    Years of education: Not on file    Highest education level: Not on file   Occupational History    Not on file   Tobacco Use    Smoking status: Former     Packs/day: 0.25     Years: 10.00     Pack years: 2.50     Types: Cigarettes    Smokeless tobacco: Never    Tobacco comments:     Quit in the 1990's    Vaping Use    Vaping Use: Never used   Substance and Sexual Activity    Alcohol use:  Yes     Alcohol/week: 14.0 standard drinks     Types: 14 Shots of liquor per week     Comment: 1-2 Manhattans per ay    Drug use: Yes     Types: Prescription, OTC    Sexual activity: Not on file   Other Topics Concern    Not on file   Social History Narrative    Not on file     Social Determinants of Health     Financial Resource Strain: Not on file   Food Insecurity: Not on file   Transportation Needs: Not on file   Physical Activity: Not on file   Stress: Not on file   Social Connections: Not on file   Intimate Partner Violence: Not on file   Housing Stability: Not on file         ALLERGIES: Nuts [tree nut] and Sulfa (sulfonamide antibiotics)    Review of Systems   Constitutional:  Positive for appetite change. Negative for activity change, fever and unexpected weight change. HENT:  Negative for congestion, rhinorrhea and trouble swallowing. Respiratory:  Negative for cough and shortness of breath. Cardiovascular:  Negative for chest pain, palpitations and leg swelling. Gastrointestinal:  Positive for abdominal pain, diarrhea and melena. Negative for nausea and vomiting. Genitourinary:  Negative for dysuria and flank pain. Musculoskeletal:  Negative for back pain and neck pain. Skin:  Negative for rash. Neurological:  Negative for headaches. All other systems reviewed and are negative. Vitals:    02/20/23 0932   BP: 129/65   Pulse: 75   Resp: 20   Temp: 97.4 °F (36.3 °C)   SpO2: 96%   Weight: 61 kg (134 lb 7.7 oz)   Height: 5' 4\" (1.626 m)            Physical Exam  Vitals and nursing note reviewed. Constitutional:       General: She is not in acute distress. Appearance: She is well-developed and normal weight. She is not ill-appearing, toxic-appearing or diaphoretic. Comments: Elderly female; non smoker;    HENT:      Head: Normocephalic. Cardiovascular:      Rate and Rhythm: Normal rate and regular rhythm. Pulmonary:      Effort: Pulmonary effort is normal.      Breath sounds: Normal breath sounds. Abdominal:      General: Bowel sounds are normal. There is no distension. There are no signs of injury. Palpations: Abdomen is soft. There is no mass. Tenderness: There is no abdominal tenderness. There is no guarding or rebound. Negative signs include Bush's sign. Skin:     General: Skin is warm and dry. Findings: No rash. Neurological:      Mental Status: She is alert. Medical Decision Making  Amount and/or Complexity of Data Reviewed  Labs: ordered. Radiology: ordered. Risk  Prescription drug management. Procedures      Labs Reviewed   METABOLIC PANEL, COMPREHENSIVE - Abnormal; Notable for the following components:       Result Value    Glucose 120 (*)     BUN 21 (*)     BUN/Creatinine ratio 21 (*)     eGFR 59 (*)     A-G Ratio 1.0 (*)     All other components within normal limits   URINE CULTURE HOLD SAMPLE   CBC WITH AUTOMATED DIFF   SAMPLES BEING HELD   URINALYSIS W/MICROSCOPIC   LIPASE   SAMPLE TO BLOOD BANK     CT ABD PELV W CONT    Result Date: 2/20/2023  1. Wall thickening and mucosal enhancement from the distal transverse colon through the rectum, concerning for infectious or inflammatory proctocolitis 2. Several small foci of ill-defined hyperdensity within several bilateral renal calyces, may reflect excreted contrast material and/or tiny nonobstructing calculi. Pt has been re-examined and denies any c/o  pain or discomfort. Plan to  discharge on flagyl TID x 10 days with close follow up with gastroenterology. Discussed plan of care with Dr. Shraddha Gallagher. Dulce Campuzano NP      Patient's results and plan of care have been reviewed with her and her . Patient and/or family have verbally conveyed their understanding and agreement of the patient's signs, symptoms, diagnosis, treatment and prognosis and additionally agree to follow up as recommended or return to the Emergency Room should her condition change prior to follow-up. Discharge instructions have also been provided to the patient with some educational information regarding her diagnosis as well a list of reasons why she  would want to return to the ER prior to her follow-up appointment should her condition change. Dulce Campuzano NP

## 2023-03-03 ENCOUNTER — HOSPITAL ENCOUNTER (OUTPATIENT)
Age: 78
Setting detail: OUTPATIENT SURGERY
End: 2023-03-03
Attending: INTERNAL MEDICINE | Admitting: INTERNAL MEDICINE
Payer: MEDICARE

## 2023-03-06 DIAGNOSIS — I10 ESSENTIAL HYPERTENSION: ICD-10-CM

## 2023-03-06 RX ORDER — AMLODIPINE BESYLATE 5 MG/1
5 TABLET ORAL DAILY
Qty: 90 TABLET | Refills: 1 | Status: SHIPPED | OUTPATIENT
Start: 2023-03-06

## 2023-03-06 NOTE — TELEPHONE ENCOUNTER
PCP: Rupert Dill MD     Last appt: 2/3/2023     Future Appointments   Date Time Provider Niurka Boles   8/3/2023 10:30 AM Rupert Dill MD [facility] BS AMB          Requested Prescriptions     Pending Prescriptions Disp Refills    amLODIPine (NORVASC) 5 mg tablet 90 Tablet 1     Sig: Take 1 Tablet by mouth daily.

## 2023-04-05 ENCOUNTER — ANESTHESIA (OUTPATIENT)
Dept: ENDOSCOPY | Age: 78
End: 2023-04-05
Payer: MEDICARE

## 2023-04-05 ENCOUNTER — ANESTHESIA EVENT (OUTPATIENT)
Dept: ENDOSCOPY | Age: 78
End: 2023-04-05
Payer: MEDICARE

## 2023-04-05 PROCEDURE — 76060000031 HC ANESTHESIA FIRST 0.5 HR: Performed by: INTERNAL MEDICINE

## 2023-04-05 PROCEDURE — 77030013992 HC SNR POLYP ENDOSC BSC -B: Performed by: INTERNAL MEDICINE

## 2023-04-05 PROCEDURE — 74011250636 HC RX REV CODE- 250/636: Performed by: NURSE ANESTHETIST, CERTIFIED REGISTERED

## 2023-04-05 PROCEDURE — 74011250636 HC RX REV CODE- 250/636: Performed by: INTERNAL MEDICINE

## 2023-04-05 PROCEDURE — 76040000019: Performed by: INTERNAL MEDICINE

## 2023-04-05 PROCEDURE — 2709999900 HC NON-CHARGEABLE SUPPLY: Performed by: INTERNAL MEDICINE

## 2023-04-05 PROCEDURE — 77030021593 HC FCPS BIOP ENDOSC BSC -A: Performed by: INTERNAL MEDICINE

## 2023-04-05 PROCEDURE — 74011000250 HC RX REV CODE- 250: Performed by: NURSE ANESTHETIST, CERTIFIED REGISTERED

## 2023-04-05 RX ORDER — SODIUM CHLORIDE 0.9 % (FLUSH) 0.9 %
5-40 SYRINGE (ML) INJECTION EVERY 8 HOURS
Status: DISCONTINUED
Start: 2023-04-05 | End: 2023-04-05 | Stop reason: HOSPADM

## 2023-04-05 RX ORDER — PROPOFOL 10 MG/ML
INJECTION, EMULSION INTRAVENOUS AS NEEDED
Status: DISCONTINUED
Start: 2023-04-05 | End: 2023-04-05 | Stop reason: HOSPADM

## 2023-04-05 RX ORDER — DEXTROMETHORPHAN/PSEUDOEPHED 2.5-7.5/.8
1.2 DROPS ORAL
Status: DISCONTINUED
Start: 2023-04-05 | End: 2023-04-05 | Stop reason: HOSPADM

## 2023-04-05 RX ORDER — LIDOCAINE HYDROCHLORIDE 20 MG/ML
INJECTION, SOLUTION EPIDURAL; INFILTRATION; INTRACAUDAL; PERINEURAL AS NEEDED
Status: DISCONTINUED
Start: 2023-04-05 | End: 2023-04-05 | Stop reason: HOSPADM

## 2023-04-05 RX ORDER — SODIUM CHLORIDE 9 MG/ML
100 INJECTION, SOLUTION INTRAVENOUS CONTINUOUS
Status: DISCONTINUED
Start: 2023-04-05 | End: 2023-04-05 | Stop reason: HOSPADM

## 2023-04-05 RX ORDER — FLUMAZENIL 0.1 MG/ML
0.2 INJECTION INTRAVENOUS
Status: DISCONTINUED
Start: 2023-04-05 | End: 2023-04-05 | Stop reason: HOSPADM

## 2023-04-05 RX ORDER — NALOXONE HYDROCHLORIDE 0.4 MG/ML
0.4 INJECTION, SOLUTION INTRAMUSCULAR; INTRAVENOUS; SUBCUTANEOUS
Status: DISCONTINUED
Start: 2023-04-05 | End: 2023-04-05 | Stop reason: HOSPADM

## 2023-04-05 RX ORDER — MIDAZOLAM HYDROCHLORIDE 1 MG/ML
.25-5 INJECTION, SOLUTION INTRAMUSCULAR; INTRAVENOUS
Status: DISCONTINUED
Start: 2023-04-05 | End: 2023-04-05 | Stop reason: HOSPADM

## 2023-04-05 RX ORDER — SODIUM CHLORIDE 0.9 % (FLUSH) 0.9 %
5-40 SYRINGE (ML) INJECTION AS NEEDED
Status: DISCONTINUED
Start: 2023-04-05 | End: 2023-04-05 | Stop reason: HOSPADM

## 2023-04-05 RX ORDER — ATROPINE SULFATE 0.1 MG/ML
0.5 INJECTION INTRAVENOUS
Status: DISCONTINUED
Start: 2023-04-05 | End: 2023-04-05 | Stop reason: HOSPADM

## 2023-04-05 RX ORDER — EPINEPHRINE 0.1 MG/ML
1 INJECTION INTRACARDIAC; INTRAVENOUS
Status: DISCONTINUED
Start: 2023-04-05 | End: 2023-04-05 | Stop reason: HOSPADM

## 2023-04-05 RX ADMIN — PROPOFOL 30 MG: 10 INJECTION, EMULSION INTRAVENOUS at 10:22

## 2023-04-05 RX ADMIN — PROPOFOL 120 MG: 10 INJECTION, EMULSION INTRAVENOUS at 10:18

## 2023-04-05 RX ADMIN — PROPOFOL 20 MG: 10 INJECTION, EMULSION INTRAVENOUS at 10:30

## 2023-04-05 RX ADMIN — LIDOCAINE HYDROCHLORIDE 60 MG: 20 INJECTION, SOLUTION EPIDURAL; INFILTRATION; INTRACAUDAL; PERINEURAL at 10:17

## 2023-04-05 RX ADMIN — SODIUM CHLORIDE 100 ML/HR: 9 INJECTION, SOLUTION INTRAVENOUS at 09:19

## 2023-04-05 RX ADMIN — PROPOFOL 50 MG: 10 INJECTION, EMULSION INTRAVENOUS at 10:27

## 2023-04-05 RX ADMIN — PROPOFOL 30 MG: 10 INJECTION, EMULSION INTRAVENOUS at 10:33

## 2023-04-05 NOTE — ANESTHESIA POSTPROCEDURE EVALUATION
Procedure(s):  COLONOSCOPY  ENDOSCOPIC POLYPECTOMY  COLON BIOPSY. total IV anesthesia    <BSHSIANPOST>    INITIAL Post-op Vital signs: No vitals data found for the desired time range.

## 2023-04-05 NOTE — ROUTINE PROCESS
TRANSFER - IN REPORT:    Verbal report received from Farida(name) on Fabi Baker  being received from endo(unit) for routine progression of care      Report consisted of patients Situation, Background, Assessment and   Recommendations(SBAR). Information from the following report(s) SBAR, Procedure Summary, and MAR was reviewed with the receiving nurse. Opportunity for questions and clarification was provided. Assessment completed upon patients arrival to unit and care assumed.

## 2023-04-05 NOTE — PROCEDURES
[facility]                  Colonoscopy Operative Report    4/5/2023      Tuan Thakkar  [id number]  1945    Procedure Type:   Colonoscopy with biopsy     Indications:   Recent acute colitis with bloody diarrhea      Pre-operative Diagnosis: see indication above    Post-operative Diagnosis:  See findings below    :  Carol Joseph MD    Referring Provider: Tamika Olguin MD      Sedation:  MAC anesthesia Propofol    Pre-Procedural Exam:      Airway: clear,  No airway problems anticipated  Heart: RRR, without gallops or rubs  Lungs: clear bilaterally without wheezes, crackles, or rhonchi  Abdomen: soft, nontender, nondistended, bowel sounds present  Mental Status: awake, alert and oriented to person, place and time     Procedure Details:  After informed consent was obtained with all risks and benefits of procedure explained and preoperative exam completed, the patient was taken to the endoscopy suite and placed in the left lateral decubitus position. Upon sequential sedation as per above, a digital rectal exam was performed . The Olympus videocolonoscope  was inserted in the rectum and carefully advanced to the cecum, which was identified by the ileocecal valve and appendiceal orifice. The cecum was identified by the ileocecal valve and appendiceal orifice. The quality of preparation was good. The colonoscope was slowly withdrawn with careful evaluation between folds. Retroflexion in the rectum was completed demonstrating internal hemorrhoids. Findings:   Rectum: Grade 1 internal hemorrhoid(s); Sigmoid: normal  Descending Colon: normal, biopsied  Transverse Colon: Near the hepatic flexure two 3 to 5 mm polyps were cold snared and biopsied off  Ascending Colon: normal  Cecum: normal  Terminal Ileum: not intubated      Specimen Removed:   1. Hepatic flexure polyps  2 Random descending colon biopsies    Complications: None. EBL:  None.     Impression: normal colonic mucosa throughout  hemorrhoids internal, Moderate in size  Small polyps hepatic flexure    Recommendations: --Await pathology. , -Repeat colonoscopy in 5 years. High fiber diet. Resume normal medication(s). Discharge Disposition:  Home in the company of a  when able to ambulate. iPedad Blake MD    4/5/2023     SHIRIN Mendoza MD  [facility], [address] [facility] [address]  [phone]  [email or web address]

## 2023-04-05 NOTE — DISCHARGE INSTRUCTIONS
Guerrero Carranza MD  [facility], [address]  [place], [address] [facility]  [phone]  www. [facility]  [id number]  1945    COLON DISCHARGE INSTRUCTIONS  Discomfort:  Redness at IV site- apply warm compress to area; if redness or soreness persist- contact your physician  There may be a slight amount of blood passed from the rectum  Gaseous discomfort- walking, belching will help relieve any discomfort  You may not operate a vehicle for 12 hours  You may not engage in an occupation involving machinery or appliances for rest of today  You may not drink alcoholic beverages for at least 12 hours  Avoid making any critical decisions for at least 24 hour  DIET:   High fiber diet. - however -  remember your colon is empty and a heavy meal will produce gas. Avoid these foods:  vegetables, fried / greasy foods, carbonated drinks for today      ACTIVITY:  You may resume your normal daily activities it is recommended that you spend the remainder of the day resting -  avoid any strenuous activity. CALL M.D. ANY SIGN OF:   Increasing pain, nausea, vomiting  Abdominal distension (swelling)  New increased bleeding (oral or rectal)  Fever (chills)  Pain in chest area  Bloody discharge from nose or mouth  Shortness of breath     COLONOSCOPY FINDINGS:  Your colonoscopy showed: colitis all healed. Took some random biopsies. And two small polyps removed. Follow-up Instructions:   Call Dr. Guerrero Carranza if any questions or problems. Telephone # [phone]  Dr. Michael Duncan office will notify you of the biopsy results within 7 to 10 days. Should have a repeat colonoscopy in 5 years.    Patient Education on Sedation / Analgesia Administered for Procedure      For 24 hours after general anesthesia or intravenous analgesia / sedation:  Have someone responsible help you with your care  Limit your activities  Do not drive and operate hazardous machinery  Do not make important personal, legal or business decisions  Do not drink alcoholic beverages  If you have not urinated within 8 hours after discharge, please contact your physician  Resume your medications unless otherwise instructed    For 24 hours after general anesthesia or intravenous analgesia / sedation  you may experience:  Drowsiness, dizziness, sleepiness, or confusion  Difficulty remembering or delayed reaction times  Difficulty with your balance, especially while walking, move slowly and carefully, do not make sudden position changes  Difficulty focusing or blurred vision    You may not be aware of slight changes in your behavior and/or your reaction time because of the medication used during and after your procedure.     Report the following to your physician:  Excessive pain, swelling, redness or odor of or around the surgical area  Temperature over 100.5  Nausea and vomiting lasting longer than 4 hours or if unable to take medications  Any signs of decreased circulation or nerve impairment to extremity: change in color, persistent numbness, tingling, coldness or increase pain  Any questions or concerns    IF YOU REPORT TO AN EMERGENCY ROOM, DOCTOR'S OFFICE OR HOSPITAL WITHIN 24 HOURS AFTER YOUR PROCEDURE, BRING THIS SHEET AND YOUR AFTER VISIT SUMMARY WITH YOU AND GIVE IT TO THE PHYSICIAN OR NURSE ATTENDING YOU.

## 2023-04-05 NOTE — ANESTHESIA PREPROCEDURE EVALUATION
Anesthetic History   No history of anesthetic complications            Review of Systems / Medical History  Patient summary reviewed, nursing notes reviewed and pertinent labs reviewed    Pulmonary            Asthma     Comments: Former Smoker - 2 pack years   Neuro/Psych         TIA     Cardiovascular    Hypertension          CAD, PAD and hyperlipidemia    Exercise tolerance: >4 METS  Comments: Mild MR  Mild TR  EF 50%  Carotid stenosis, bilateral  Coronary stent x 1     GI/Hepatic/Renal     GERD          Comments: Hx Serrated adenoma of colon Endo/Other      Hypothyroidism  Arthritis    Comments: IFG (impaired fasting glucose) Other Findings   Comments: Overactive bladder   Vertical diplopia           Physical Exam    Airway  Mallampati: I    Neck ROM: normal range of motion   Mouth opening: Normal     Cardiovascular  Regular rate and rhythm,  S1 and S2 normal,  no murmur, click, rub, or gallop             Dental      Comments: No loose   Pulmonary  Breath sounds clear to auscultation               Abdominal  GI exam deferred       Other Findings            Anesthetic Plan    ASA: 3  Anesthesia type: total IV anesthesia          Induction: Intravenous  Anesthetic plan and risks discussed with: Patient Yes

## 2023-04-05 NOTE — H&P
Jcarlos Hartman MD  [facility], [address], [address], [address] [facility]  [phone]  [email or web address]    Gastroenterology Outpatient History and Physical    Patient: Tanna Muñoz    Physician: Israel Jordan MD    Vital Signs: Blood pressure (!) 145/66, pulse 66, temperature 98.5 °F (36.9 °C), resp. rate 14, height 5' 4\" (1.626 m), weight 61.2 kg (135 lb), SpO2 99 %, not currently breastfeeding. Allergies: Allergies   Allergen Reactions    Nuts [Tree Nut] Other (comments)     Mouth sores. Walnuts, pecans    Sulfa (Sulfonamide Antibiotics) Nausea and Vomiting       Chief Complaint: Hx of colitis    History of Present Illness: Seen in [facility] in Feb with acute colitis with bloody diarrhea  Also personal history of colonic polyps. Last colonoscopy was Nov 2020 and showed polyps which were removed. History:  Past Medical History:   Diagnosis Date    Alopecia     Arthritis     Asthma     CAD (coronary artery disease) 08/12/2018    NSTEMI, Cath, TYRONE OM2 of dominant Cx    Carotid stenosis     GERD (gastroesophageal reflux disease)     Goiter     Hypercholesterolemia     Hypertension     Overactive bladder 03/13/2013    Serrated adenoma of colon 02/06/2014 11/17/09 - Dr. Akanksha Davis - repeat in 5 years    Stroke [facility])     TIA    TB (tuberculosis) 1966    took [address] for 12 months      Past Surgical History:   Procedure Laterality Date    COLONOSCOPY N/A 11/3/2020    COLONOSCOPY performed by Yaima Garcia MD at [address]  11/3/2020         COLORECTAL SCRN; HI RISK IND  2/20/2015         HX CATARACT REMOVAL Bilateral     HX GI      colonscopy.  polyp removed    HX HEART CATHETERIZATION  2018    stent x 1    HX MALIGNANT SKIN LESION EXCISION Left 2018    \"non-melanoma pre-cancerous lesion removed\" per patient    HX ORTHOPAEDIC Right 2015    great toe replacement    UPPER GI ENDOSCOPY,BIOPSY  2022           Social History     Socioeconomic History    Marital status:    Tobacco Use    Smoking status: Former     Types: Cigarettes     Quit date:      Years since quittin.2    Smokeless tobacco: Never    Tobacco comments:     Quit in the     Vaping Use    Vaping Use: Never used   Substance and Sexual Activity    Alcohol use: Yes     Alcohol/week: 14.0 standard drinks     Types: 14 Shots of liquor per week     Comment: 1-2 drinks per day    Drug use: Not Currently      Family History   Problem Relation Age of Onset    Other Mother         carotid stenosis    Hypertension Mother     Cancer Father         bladder    Other Father         congential heart valve defect    Depression Sister     Stroke Sister 72    Hypertension Sister     COPD Sister     Other Sister 79        twisted bowel, hiatal hernia    Cancer Maternal Grandmother         esophageal    Heart Disease Maternal Grandfather     Asthma Paternal Grandmother     Dementia Paternal Grandmother     Breast Cancer Paternal Grandmother     Dementia Paternal Grandfather       Patient Active Problem List   Diagnosis Code    Essential hypertension I10    Asthma J45.909    ADD (attention deficit disorder) F98.8    Overactive bladder N32.81    Environmental allergies Z91.09    S/P colonoscopy Z98.890    At risk for osteoporosis Z91.89    Bilateral carotid artery disease (HCC) I77.9    Vertical diplopia H53.2    IFG (impaired fasting glucose) R73.01    Serrated adenoma of colon D12.6    Alopecia areata L63.9    Advance directive discussed with patient Z71.89    Chest pain R07.9    Coronary artery disease involving native coronary artery of native heart without angina pectoris I25.10    Hypercholesterolemia E78.00    Multinodular thyroid E04.2       Medications:   Prior to Admission medications    Medication Sig Start Date End Date Taking? Authorizing Provider   FLAXSEED PO Take  by mouth.    Yes Provider, Historical ascorbic acid/collagen hydr (COLLAGEN SKIN RENEWAL PO) Take  by mouth. Yes Provider, Historical   amLODIPine (NORVASC) 5 mg tablet Take 1 Tablet by mouth daily. 3/6/23  Yes Nini Granados MD   losartan (COZAAR) 50 mg tablet Take 1 Tablet by mouth daily. 2/3/23  Yes Nini Granados MD   metoprolol tartrate (LOPRESSOR) 25 mg tablet Take 1 Tablet by mouth daily. 11/3/22  Yes Nini Granados MD   ibuprofen (MOTRIN) 200 mg tablet Take  by mouth. Yes Provider, Historical   pantoprazole (PROTONIX) 40 mg tablet Take 1 Tablet by mouth daily. 9/13/22  Yes Nini Granados MD   oxybutynin chloride XL (DITROPAN XL) 10 mg CR tablet TAKE 1 TABLET BY MOUTH ONCE DAILY 6/13/22  Yes Nini Granados MD   psyllium (METAMUCIL) powd Take  by mouth. Yes Provider, Historical   ezetimibe (ZETIA) 10 mg tablet Take 1 Tablet by mouth daily. 4/26/22  Yes Nini Granados MD   lysine (L-LYSINE) 500 mg tab tablet Take 1 Tablet by mouth two (2) times a day. Yes Provider, Historical   multivitamin (ONE A DAY) tablet Take 1 Tablet by mouth daily. Yes Provider, Historical   OAT BRAN PO Take  by mouth. Yes Provider, Historical   levalbuterol tartrate (XOPENEX) 45 mcg/actuation inhaler Take 1-2 Puffs by inhalation every four (4) hours as needed for Wheezing. 1/17/17  Yes Nini Granados MD   cetirizine (ZYRTEC) 10 mg tablet Take 1 Tablet by mouth daily.    Yes Provider, Historical       Physical Exam:     General: well developed, well nourished   HEENT: unremarkable   Heart: regular rhythm no mumur    Lungs: clear   Abdominal:  benign   Neurological: unremarkable   Extremities: no edema     Findings/Diagnosis: Recent acute colitis with bloody diarrhea    Plan of Care/Planned Procedure: Colonoscopy with monitored anesthesia care sedation    Signed:  Sb Ibarra MD 4/5/2023

## 2023-05-03 DIAGNOSIS — E78.00 HYPERCHOLESTEROLEMIA: ICD-10-CM

## 2023-05-04 RX ORDER — EZETIMIBE 10 MG/1
TABLET ORAL
Qty: 90 TABLET | Refills: 3 | Status: SHIPPED | OUTPATIENT
Start: 2023-05-04

## 2023-05-17 ENCOUNTER — TELEPHONE (OUTPATIENT)
Facility: CLINIC | Age: 78
End: 2023-05-17

## 2023-05-17 ENCOUNTER — OFFICE VISIT (OUTPATIENT)
Age: 78
End: 2023-05-17

## 2023-05-17 VITALS
SYSTOLIC BLOOD PRESSURE: 146 MMHG | HEART RATE: 56 BPM | HEIGHT: 64 IN | WEIGHT: 138 LBS | RESPIRATION RATE: 18 BRPM | OXYGEN SATURATION: 100 % | BODY MASS INDEX: 23.56 KG/M2 | TEMPERATURE: 97.1 F | DIASTOLIC BLOOD PRESSURE: 80 MMHG

## 2023-05-17 DIAGNOSIS — J40 BRONCHITIS: Primary | ICD-10-CM

## 2023-05-17 RX ORDER — PREDNISONE 20 MG/1
20 TABLET ORAL 2 TIMES DAILY
Qty: 10 TABLET | Refills: 0 | Status: SHIPPED | OUTPATIENT
Start: 2023-05-17 | End: 2023-05-22

## 2023-05-17 RX ORDER — AZITHROMYCIN 250 MG/1
250 TABLET, FILM COATED ORAL SEE ADMIN INSTRUCTIONS
Qty: 6 TABLET | Refills: 0 | Status: SHIPPED | OUTPATIENT
Start: 2023-05-17 | End: 2023-05-22

## 2023-05-17 RX ORDER — DEXTROMETHORPHAN HYDROBROMIDE AND PROMETHAZINE HYDROCHLORIDE 15; 6.25 MG/5ML; MG/5ML
5 SYRUP ORAL
Qty: 118 ML | Refills: 0 | Status: SHIPPED | OUTPATIENT
Start: 2023-05-17

## 2023-05-17 RX ORDER — AZITHROMYCIN 250 MG/1
250 TABLET, FILM COATED ORAL SEE ADMIN INSTRUCTIONS
Qty: 6 TABLET | Refills: 0 | Status: SHIPPED | OUTPATIENT
Start: 2023-05-17 | End: 2023-05-17 | Stop reason: SDUPTHER

## 2023-05-17 ASSESSMENT — ENCOUNTER SYMPTOMS: COUGH: 1

## 2023-05-17 NOTE — PROGRESS NOTES
Ada Rayo (:  1945) is a 68 y.o. female,Established patient, here for evaluation of the following chief complaint(s):  Cough (C/o cough for 1 month.)      ASSESSMENT/PLAN:  Visit Diagnoses and Associated Orders       Bronchitis    -  Primary    XR CHEST STANDARD (2 VW) [10319 Custom]   - Future Order    azithromycin (ZITHROMAX) 250 MG tablet [34064]      predniSONE (DELTASONE) 20 MG tablet [6496]      promethazine-dextromethorphan (PROMETHAZINE-DM) 6.25-15 MG/5ML syrup [48128]                      Delsym, fluids      Follow up in PRN days if symptoms persist or if symptoms worsen. SUBJECTIVE/OBJECTIVE:    Cough  HPI:   68 y.o. female presents with symptoms of productive cough of phlegm, wheezing for 1 month, gradually worsening. Denies fevers, chills, SOB, CP. Traveled to [place] and has covid tested - negative. Using xopenex inh with mild relief.  is with her and not ill. Vitals:    23 1452 23 1454   BP: (!) 164/75 (!) 146/80   Site: Right Upper Arm Left Upper Arm   Position: Sitting    Cuff Size: Medium Adult    Pulse: 56    Resp: 18    Temp: 97.1 °F (36.2 °C)    TempSrc: Temporal    SpO2: 100%    Weight: 138 lb (62.6 kg)    Height: 5' 4\" (1.626 m)          Physical Exam  Constitutional:       General: She is not in acute distress. Appearance: Normal appearance. She is not ill-appearing or toxic-appearing. HENT:      Head: Normocephalic and atraumatic. Right Ear: Tympanic membrane, ear canal and external ear normal.      Left Ear: Tympanic membrane, ear canal and external ear normal.      Nose: Nose normal.      Mouth/Throat:      Mouth: Mucous membranes are moist.      Pharynx: Oropharynx is clear. Eyes:      Extraocular Movements: Extraocular movements intact. Conjunctiva/sclera: Conjunctivae normal.      Pupils: Pupils are equal, round, and reactive to light. Cardiovascular:      Rate and Rhythm: Normal rate.    Pulmonary:      Effort: Pulmonary

## 2023-05-17 NOTE — TELEPHONE ENCOUNTER
Pt is calling bc she is just now returning from [facility] and she has a terrible cough. She tested for covid about 2-3 weeks ago and it came back negative. Please advise.

## 2023-05-17 NOTE — TELEPHONE ENCOUNTER
Pt has had a cough for over a month, tested for Covid several weeks ago. Went to [facility] end of last week, currently on the way home. Will retest when she gets home, if negative will go to .

## 2023-05-18 ENCOUNTER — PATIENT MESSAGE (OUTPATIENT)
Facility: CLINIC | Age: 78
End: 2023-05-18

## 2023-05-18 ENCOUNTER — TELEPHONE (OUTPATIENT)
Age: 78
End: 2023-05-18

## 2023-05-18 DIAGNOSIS — R05.2 SUBACUTE COUGH: ICD-10-CM

## 2023-05-18 DIAGNOSIS — R93.89 ABNORMAL CXR: Primary | ICD-10-CM

## 2023-05-18 NOTE — TELEPHONE ENCOUNTER
Spoke with patient on phone and informed of below CXR results. Advised following up with PCP within 2-4 weeks for further discussion/evaluation. Patient had no further questions or concerns at this time. Xray Result (most recent):  XR CHEST STANDARD TWO VW 05/18/2023    Narrative  EXAM: Chest 2 views    INDICATION: Cough for one month. COMPARISON: Chest views on 9/26/2019 and 9/13/2018    TECHNIQUE: PA and lateral chest views    FINDINGS: The cardiomediastinal and hilar contours are within normal limits. The  pulmonary vasculature is within normal limits. Upper lobe lucencies are increased. Lungs and pleural spaces are otherwise  clear. Bones are osteopenic. Impression  Emphysema is new versus more conspicuous. No acute process.

## 2023-05-19 NOTE — TELEPHONE ENCOUNTER
From: Rosmery Gardner  To: Dr. Geoffrey Parisi: 5/18/2023 4:52 PM EDT  Subject: Miguel Sevilla on May 17    It looks like I may have emphysema from yesterday's X-ray. The doc there says I should be seen soon. I do have an August 3 appointment. Do you think we should wait until then, or do you want to see me sooner? The appointment on August 3 is the normal 6-month checkup with blood tests, etc. I don't care. It is up to you. They said I may need a pulmonologist consult. I had been seeing Dr. Clary Campbell for allergy shots before [facility], but she's an allergy expert. I wonder if her practice has pulmonologists? Do you know any good ones?

## 2023-07-06 RX ORDER — OXYBUTYNIN CHLORIDE 10 MG/1
10 TABLET, EXTENDED RELEASE ORAL DAILY
Qty: 90 TABLET | Refills: 4 | Status: SHIPPED | OUTPATIENT
Start: 2023-07-06

## 2023-07-06 NOTE — TELEPHONE ENCOUNTER
PCP: Lexi Bone MD     Last appt:  2/3/2023      Future Appointments   Date Time Provider [address]   8/3/2023 10:30 AM Lexi Bone MD [facility] BS AMB          Requested Prescriptions     Pending Prescriptions Disp Refills    oxybutynin (DITROPAN-XL) 10 MG extended release tablet 90 tablet 4     Sig: Take 1 tablet by mouth daily

## 2023-07-21 ENCOUNTER — PATIENT MESSAGE (OUTPATIENT)
Facility: CLINIC | Age: 78
End: 2023-07-21

## 2023-07-21 DIAGNOSIS — E04.2 NONTOXIC MULTINODULAR GOITER: ICD-10-CM

## 2023-07-21 DIAGNOSIS — I10 ESSENTIAL (PRIMARY) HYPERTENSION: Primary | ICD-10-CM

## 2023-07-21 DIAGNOSIS — E78.00 PURE HYPERCHOLESTEROLEMIA, UNSPECIFIED: ICD-10-CM

## 2023-07-24 DIAGNOSIS — E78.00 PURE HYPERCHOLESTEROLEMIA, UNSPECIFIED: ICD-10-CM

## 2023-07-24 DIAGNOSIS — I10 ESSENTIAL (PRIMARY) HYPERTENSION: ICD-10-CM

## 2023-07-24 NOTE — TELEPHONE ENCOUNTER
From: Christine Siegel  To: Dr. Alejandra Sutton: 7/21/2023 1:41 PM EDT  Subject: blood tests before August 3rd appointment    I have an appointment with you on August 3rd. I usually get blood tests before the visit. Do you want to give me a blood test order so I can do that. I like to go to [facility]. I am worried about the goiter too. Should I get another doppler for that? I think you said last visit that we would watch it and if it gets larger we might want to do something about it. Also, I have a Carotid Artery Doppler test scheduled for August 7 with Dr. Robina Summers. Should I ask her to do a Doppler on the goiter?

## 2023-07-30 LAB
ALBUMIN SERPL-MCNC: 4.3 G/DL (ref 3.8–4.8)
ALBUMIN/GLOB SERPL: 1.7 {RATIO} (ref 1.2–2.2)
ALP SERPL-CCNC: 68 IU/L (ref 44–121)
ALT SERPL-CCNC: 19 IU/L (ref 0–32)
AST SERPL-CCNC: 23 IU/L (ref 0–40)
BILIRUB SERPL-MCNC: 0.3 MG/DL (ref 0–1.2)
BUN SERPL-MCNC: 22 MG/DL (ref 8–27)
BUN/CREAT SERPL: 25 (ref 12–28)
CALCIUM SERPL-MCNC: 9.7 MG/DL (ref 8.7–10.3)
CHLORIDE SERPL-SCNC: 106 MMOL/L (ref 96–106)
CHOLEST SERPL-MCNC: 191 MG/DL (ref 100–199)
CO2 SERPL-SCNC: 21 MMOL/L (ref 20–29)
CREAT SERPL-MCNC: 0.89 MG/DL (ref 0.57–1)
EGFRCR SERPLBLD CKD-EPI 2021: 67 ML/MIN/1.73
GLOBULIN SER CALC-MCNC: 2.5 G/DL (ref 1.5–4.5)
GLUCOSE SERPL-MCNC: 100 MG/DL (ref 70–99)
HDLC SERPL-MCNC: 59 MG/DL
LDLC SERPL CALC-MCNC: 111 MG/DL (ref 0–99)
POTASSIUM SERPL-SCNC: 4.7 MMOL/L (ref 3.5–5.2)
PROT SERPL-MCNC: 6.8 G/DL (ref 6–8.5)
SODIUM SERPL-SCNC: 141 MMOL/L (ref 134–144)
TRIGL SERPL-MCNC: 118 MG/DL (ref 0–149)
VLDLC SERPL CALC-MCNC: 21 MG/DL (ref 5–40)

## 2023-08-02 SDOH — HEALTH STABILITY: PHYSICAL HEALTH: ON AVERAGE, HOW MANY MINUTES DO YOU ENGAGE IN EXERCISE AT THIS LEVEL?: 40 MIN

## 2023-08-02 SDOH — ECONOMIC STABILITY: TRANSPORTATION INSECURITY
IN THE PAST 12 MONTHS, HAS LACK OF TRANSPORTATION KEPT YOU FROM MEETINGS, WORK, OR FROM GETTING THINGS NEEDED FOR DAILY LIVING?: NO

## 2023-08-02 SDOH — ECONOMIC STABILITY: INCOME INSECURITY: HOW HARD IS IT FOR YOU TO PAY FOR THE VERY BASICS LIKE FOOD, HOUSING, MEDICAL CARE, AND HEATING?: NOT HARD AT ALL

## 2023-08-02 SDOH — ECONOMIC STABILITY: HOUSING INSECURITY
IN THE LAST 12 MONTHS, WAS THERE A TIME WHEN YOU DID NOT HAVE A STEADY PLACE TO SLEEP OR SLEPT IN A SHELTER (INCLUDING NOW)?: NO

## 2023-08-02 SDOH — ECONOMIC STABILITY: FOOD INSECURITY: WITHIN THE PAST 12 MONTHS, THE FOOD YOU BOUGHT JUST DIDN'T LAST AND YOU DIDN'T HAVE MONEY TO GET MORE.: NEVER TRUE

## 2023-08-02 SDOH — ECONOMIC STABILITY: FOOD INSECURITY: WITHIN THE PAST 12 MONTHS, YOU WORRIED THAT YOUR FOOD WOULD RUN OUT BEFORE YOU GOT MONEY TO BUY MORE.: NEVER TRUE

## 2023-08-02 SDOH — HEALTH STABILITY: PHYSICAL HEALTH: ON AVERAGE, HOW MANY DAYS PER WEEK DO YOU ENGAGE IN MODERATE TO STRENUOUS EXERCISE (LIKE A BRISK WALK)?: 2 DAYS

## 2023-08-02 ASSESSMENT — LIFESTYLE VARIABLES
HOW OFTEN DURING THE LAST YEAR HAVE YOU NEEDED AN ALCOHOLIC DRINK FIRST THING IN THE MORNING TO GET YOURSELF GOING AFTER A NIGHT OF HEAVY DRINKING: NEVER
HAVE YOU OR SOMEONE ELSE BEEN INJURED AS A RESULT OF YOUR DRINKING: NO
HOW OFTEN DURING THE LAST YEAR HAVE YOU FAILED TO DO WHAT WAS NORMALLY EXPECTED FROM YOU BECAUSE OF DRINKING: 0
HOW MANY STANDARD DRINKS CONTAINING ALCOHOL DO YOU HAVE ON A TYPICAL DAY: 1
HOW OFTEN DURING THE LAST YEAR HAVE YOU NEEDED AN ALCOHOLIC DRINK FIRST THING IN THE MORNING TO GET YOURSELF GOING AFTER A NIGHT OF HEAVY DRINKING: 0
HAS A RELATIVE, FRIEND, DOCTOR, OR ANOTHER HEALTH PROFESSIONAL EXPRESSED CONCERN ABOUT YOUR DRINKING OR SUGGESTED YOU CUT DOWN: 0
HOW OFTEN DURING THE LAST YEAR HAVE YOU FAILED TO DO WHAT WAS NORMALLY EXPECTED FROM YOU BECAUSE OF DRINKING: NEVER
HOW OFTEN DO YOU HAVE A DRINK CONTAINING ALCOHOL: 4 OR MORE TIMES A WEEK
HOW OFTEN DURING THE LAST YEAR HAVE YOU HAD A FEELING OF GUILT OR REMORSE AFTER DRINKING: 0
HOW OFTEN DURING THE LAST YEAR HAVE YOU FOUND THAT YOU WERE NOT ABLE TO STOP DRINKING ONCE YOU HAD STARTED: 0
HOW OFTEN DO YOU HAVE A DRINK CONTAINING ALCOHOL: 5
HAS A RELATIVE, FRIEND, DOCTOR, OR ANOTHER HEALTH PROFESSIONAL EXPRESSED CONCERN ABOUT YOUR DRINKING OR SUGGESTED YOU CUT DOWN: NO
HOW OFTEN DURING THE LAST YEAR HAVE YOU BEEN UNABLE TO REMEMBER WHAT HAPPENED THE NIGHT BEFORE BECAUSE YOU HAD BEEN DRINKING: NEVER
HOW OFTEN DURING THE LAST YEAR HAVE YOU FOUND THAT YOU WERE NOT ABLE TO STOP DRINKING ONCE YOU HAD STARTED: NEVER
HOW OFTEN DURING THE LAST YEAR HAVE YOU BEEN UNABLE TO REMEMBER WHAT HAPPENED THE NIGHT BEFORE BECAUSE YOU HAD BEEN DRINKING: 0
HOW OFTEN DURING THE LAST YEAR HAVE YOU HAD A FEELING OF GUILT OR REMORSE AFTER DRINKING: NEVER
HOW MANY STANDARD DRINKS CONTAINING ALCOHOL DO YOU HAVE ON A TYPICAL DAY: 1 OR 2
HAVE YOU OR SOMEONE ELSE BEEN INJURED AS A RESULT OF YOUR DRINKING: 0
HOW OFTEN DO YOU HAVE SIX OR MORE DRINKS ON ONE OCCASION: 1

## 2023-08-02 ASSESSMENT — PATIENT HEALTH QUESTIONNAIRE - PHQ9
SUM OF ALL RESPONSES TO PHQ QUESTIONS 1-9: 0
SUM OF ALL RESPONSES TO PHQ9 QUESTIONS 1 & 2: 0
SUM OF ALL RESPONSES TO PHQ QUESTIONS 1-9: 0
1. LITTLE INTEREST OR PLEASURE IN DOING THINGS: 0
SUM OF ALL RESPONSES TO PHQ QUESTIONS 1-9: 0
SUM OF ALL RESPONSES TO PHQ QUESTIONS 1-9: 0
2. FEELING DOWN, DEPRESSED OR HOPELESS: 0

## 2023-08-03 ENCOUNTER — OFFICE VISIT (OUTPATIENT)
Facility: CLINIC | Age: 78
End: 2023-08-03
Payer: MEDICARE

## 2023-08-03 VITALS
DIASTOLIC BLOOD PRESSURE: 74 MMHG | BODY MASS INDEX: 23.73 KG/M2 | HEIGHT: 64 IN | RESPIRATION RATE: 12 BRPM | SYSTOLIC BLOOD PRESSURE: 122 MMHG | OXYGEN SATURATION: 98 % | HEART RATE: 65 BPM | TEMPERATURE: 98.1 F | WEIGHT: 139 LBS

## 2023-08-03 DIAGNOSIS — I10 ESSENTIAL HYPERTENSION: ICD-10-CM

## 2023-08-03 DIAGNOSIS — R73.01 IFG (IMPAIRED FASTING GLUCOSE): ICD-10-CM

## 2023-08-03 DIAGNOSIS — E04.2 MULTINODULAR THYROID: ICD-10-CM

## 2023-08-03 DIAGNOSIS — J45.40 MODERATE PERSISTENT ASTHMA WITHOUT COMPLICATION: ICD-10-CM

## 2023-08-03 DIAGNOSIS — E78.00 HYPERCHOLESTEROLEMIA: ICD-10-CM

## 2023-08-03 DIAGNOSIS — Z00.00 MEDICARE ANNUAL WELLNESS VISIT, SUBSEQUENT: Primary | ICD-10-CM

## 2023-08-03 PROCEDURE — G8427 DOCREV CUR MEDS BY ELIG CLIN: HCPCS | Performed by: INTERNAL MEDICINE

## 2023-08-03 PROCEDURE — 1123F ACP DISCUSS/DSCN MKR DOCD: CPT | Performed by: INTERNAL MEDICINE

## 2023-08-03 PROCEDURE — 1036F TOBACCO NON-USER: CPT | Performed by: INTERNAL MEDICINE

## 2023-08-03 PROCEDURE — 3078F DIAST BP <80 MM HG: CPT | Performed by: INTERNAL MEDICINE

## 2023-08-03 PROCEDURE — G8420 CALC BMI NORM PARAMETERS: HCPCS | Performed by: INTERNAL MEDICINE

## 2023-08-03 PROCEDURE — G0439 PPPS, SUBSEQ VISIT: HCPCS | Performed by: INTERNAL MEDICINE

## 2023-08-03 PROCEDURE — G8400 PT W/DXA NO RESULTS DOC: HCPCS | Performed by: INTERNAL MEDICINE

## 2023-08-03 PROCEDURE — 1090F PRES/ABSN URINE INCON ASSESS: CPT | Performed by: INTERNAL MEDICINE

## 2023-08-03 PROCEDURE — 3074F SYST BP LT 130 MM HG: CPT | Performed by: INTERNAL MEDICINE

## 2023-08-03 PROCEDURE — 99214 OFFICE O/P EST MOD 30 MIN: CPT | Performed by: INTERNAL MEDICINE

## 2023-08-03 RX ORDER — FLUTICASONE PROPIONATE AND SALMETEROL 250; 50 UG/1; UG/1
1 POWDER RESPIRATORY (INHALATION) 2 TIMES DAILY
COMMUNITY
Start: 2023-06-26
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
## 2023-08-07 ENCOUNTER — HOSPITAL ENCOUNTER (OUTPATIENT)
Facility: HOSPITAL | Age: 78
Discharge: HOME | End: 2023-08-10
Attending: INTERNAL MEDICINE
Payer: MEDICARE

## 2023-08-07 DIAGNOSIS — E04.2 NONTOXIC MULTINODULAR GOITER: ICD-10-CM

## 2023-08-07 PROCEDURE — 76536 US EXAM OF HEAD AND NECK: CPT

## 2023-08-11 RX ORDER — AMLODIPINE BESYLATE 5 MG/1
5 TABLET ORAL DAILY
Qty: 90 TABLET | Refills: 3 | Status: SHIPPED | OUTPATIENT
Start: 2023-08-11

## 2023-08-11 NOTE — TELEPHONE ENCOUNTER
PCP: Kavin Dumont MD     Last appt: 8/3/2023    Future Appointments   Date Time Provider [address] [place] [address]   2/5/2024  1:15 PM Kavin Dumont MD [facility] BS AMB          Requested Prescriptions     Pending Prescriptions Disp Refills    amLODIPine (NORVASC) 5 MG tablet 90 tablet 3     Sig: Take 1 tablet by mouth daily

## 2023-08-27 ENCOUNTER — APPOINTMENT (OUTPATIENT)
Facility: HOSPITAL | Age: 78
End: 2023-08-27
Payer: MEDICARE

## 2023-08-27 ENCOUNTER — HOSPITAL ENCOUNTER (EMERGENCY)
Facility: HOSPITAL | Age: 78
Discharge: HOME | End: 2023-08-27
Attending: STUDENT IN AN ORGANIZED HEALTH CARE EDUCATION/TRAINING PROGRAM
Payer: MEDICARE

## 2023-08-27 VITALS
DIASTOLIC BLOOD PRESSURE: 82 MMHG | OXYGEN SATURATION: 99 % | BODY MASS INDEX: 23.9 KG/M2 | HEIGHT: 64 IN | TEMPERATURE: 97.9 F | WEIGHT: 139.99 LBS | HEART RATE: 64 BPM | RESPIRATION RATE: 20 BRPM | SYSTOLIC BLOOD PRESSURE: 163 MMHG

## 2023-08-27 DIAGNOSIS — R06.1 STRIDOR: Primary | ICD-10-CM

## 2023-08-27 LAB
ALBUMIN SERPL-MCNC: 3.6 G/DL (ref 3.5–5)
ALBUMIN/GLOB SERPL: 1.1 (ref 1.1–2.2)
ALP SERPL-CCNC: 59 U/L (ref 45–117)
ALT SERPL-CCNC: 30 U/L (ref 12–78)
ANION GAP SERPL CALC-SCNC: 2 MMOL/L (ref 5–15)
AST SERPL-CCNC: 22 U/L (ref 15–37)
BASOPHILS # BLD: 0.1 K/UL (ref 0–0.1)
BASOPHILS NFR BLD: 1 % (ref 0–1)
BILIRUB SERPL-MCNC: 0.5 MG/DL (ref 0.2–1)
BUN SERPL-MCNC: 27 MG/DL (ref 6–20)
BUN/CREAT SERPL: 26 (ref 12–20)
CALCIUM SERPL-MCNC: 9.3 MG/DL (ref 8.5–10.1)
CHLORIDE SERPL-SCNC: 110 MMOL/L (ref 97–108)
CO2 SERPL-SCNC: 26 MMOL/L (ref 21–32)
COMMENT:: NORMAL
CREAT SERPL-MCNC: 1.05 MG/DL (ref 0.55–1.02)
DIFFERENTIAL METHOD BLD: NORMAL
EOSINOPHIL # BLD: 0.4 K/UL (ref 0–0.4)
EOSINOPHIL NFR BLD: 7 % (ref 0–7)
ERYTHROCYTE [DISTWIDTH] IN BLOOD BY AUTOMATED COUNT: 12.8 % (ref 11.5–14.5)
GLOBULIN SER CALC-MCNC: 3.2 G/DL (ref 2–4)
GLUCOSE SERPL-MCNC: 103 MG/DL (ref 65–100)
HCT VFR BLD AUTO: 38.4 % (ref 35–47)
HGB BLD-MCNC: 12.3 G/DL (ref 11.5–16)
IMM GRANULOCYTES # BLD AUTO: 0 K/UL (ref 0–0.04)
IMM GRANULOCYTES NFR BLD AUTO: 0 % (ref 0–0.5)
LYMPHOCYTES # BLD: 2.1 K/UL (ref 0.8–3.5)
LYMPHOCYTES NFR BLD: 34 % (ref 12–49)
MCH RBC QN AUTO: 31.1 PG (ref 26–34)
MCHC RBC AUTO-ENTMCNC: 32 G/DL (ref 30–36.5)
MCV RBC AUTO: 97 FL (ref 80–99)
MONOCYTES # BLD: 0.6 K/UL (ref 0–1)
MONOCYTES NFR BLD: 10 % (ref 5–13)
NEUTS SEG # BLD: 2.9 K/UL (ref 1.8–8)
NEUTS SEG NFR BLD: 48 % (ref 32–75)
NRBC # BLD: 0 K/UL (ref 0–0.01)
NRBC BLD-RTO: 0 PER 100 WBC
PLATELET # BLD AUTO: 259 K/UL (ref 150–400)
PMV BLD AUTO: 11.4 FL (ref 8.9–12.9)
POTASSIUM SERPL-SCNC: 3.8 MMOL/L (ref 3.5–5.1)
PROT SERPL-MCNC: 6.8 G/DL (ref 6.4–8.2)
RBC # BLD AUTO: 3.96 M/UL (ref 3.8–5.2)
SODIUM SERPL-SCNC: 138 MMOL/L (ref 136–145)
SPECIMEN HOLD: NORMAL
WBC # BLD AUTO: 6 K/UL (ref 3.6–11)

## 2023-08-27 PROCEDURE — 93005 ELECTROCARDIOGRAM TRACING: CPT

## 2023-08-27 PROCEDURE — 80053 COMPREHEN METABOLIC PANEL: CPT

## 2023-08-27 PROCEDURE — 36415 COLL VENOUS BLD VENIPUNCTURE: CPT

## 2023-08-27 PROCEDURE — 71046 X-RAY EXAM CHEST 2 VIEWS: CPT

## 2023-08-27 PROCEDURE — 85025 COMPLETE CBC W/AUTO DIFF WBC: CPT

## 2023-08-27 PROCEDURE — 99285 EMERGENCY DEPT VISIT HI MDM: CPT

## 2023-08-27 PROCEDURE — 70360 X-RAY EXAM OF NECK: CPT

## 2023-08-27 RX ORDER — NEBULIZER ACCESSORIES
1 KIT MISCELLANEOUS DAILY PRN
Qty: 1 KIT | Refills: 0 | Status: SHIPPED | OUTPATIENT
Start: 2023-08-27

## 2023-08-27 RX ORDER — PREDNISONE 50 MG/1
50 TABLET ORAL DAILY
Qty: 5 TABLET | Refills: 0 | Status: SHIPPED | OUTPATIENT
Start: 2023-08-27 | End: 2023-09-01

## 2023-08-27 ASSESSMENT — ENCOUNTER SYMPTOMS
STRIDOR: 1
SHORTNESS OF BREATH: 1

## 2023-08-27 ASSESSMENT — PAIN - FUNCTIONAL ASSESSMENT: PAIN_FUNCTIONAL_ASSESSMENT: NONE - DENIES PAIN

## 2023-08-27 ASSESSMENT — LIFESTYLE VARIABLES
HOW OFTEN DO YOU HAVE A DRINK CONTAINING ALCOHOL: NEVER
HOW MANY STANDARD DRINKS CONTAINING ALCOHOL DO YOU HAVE ON A TYPICAL DAY: PATIENT DOES NOT DRINK

## 2023-08-27 NOTE — ED NOTES
Discharge instructions given to patient, patient verbalized understanding      Richa Huerta RN  08/27/23 [id number]

## 2023-08-27 NOTE — ED NOTES
8:45 AM  I have evaluated the patient as the Provider in Rapid Medical Evaluation (RME). I have reviewed her vital signs and the triage nurse assessment. I have talked with the patient and any available family and advised that I am the provider in triage and have ordered the appropriate study to initiate their work up based on the clinical presentation during my assessment. I have advised that the patient will be accommodated in the Main ED as soon as possible. I have also requested to contact the triage nurse or myself immediately if the patient experiences any changes in their condition during this brief waiting period. 7:30 AM, woke up with stridor, lasted a few minutes. Occurs intermittently over the last 2 years but this is the worst. Worse with coughing. Inhaler helped some. Feels urge to cough. Denies pain anywhere, dyspnea. Followed by Pulmonology, history of bronchospasm. Afebrile, VSS, SpO2 98%. Mild expiratory wheezing and quiet lung sounds in all fields. Orders placed.       AIDA Savage PA-C  08/27/23 [id number]

## 2023-08-27 NOTE — ED TRIAGE NOTES
Patient is coming in t with episode of stridor that lasted about 2 minutes. The episode woke her up this morning, used inhaler and it got better. Patient denies any Sob or chest pain.  Patient sees pulmonolgist

## 2023-08-27 NOTE — DISCHARGE INSTRUCTIONS
Please  the nebulizer machine and medications. See your primary care doctor or ENT who can do further testing to monitor your throat and breathing.

## 2023-08-27 NOTE — ED PROVIDER NOTES
CATARACT REMOVAL Bilateral     COLONOSCOPY N/A 4/5/2023    COLONOSCOPY performed by Jodi Gamez MD at [facility] ENDOSCOPY    COLONOSCOPY N/A 11/3/2020    COLONOSCOPY performed by Jodi Gamez MD at [address] [place] [address] [address]  11/3/2020         COLORECTAL SCRN; HI RISK IND  2/20/2015         GI      colonscopy.  polyp removed    MALIGNANT SKIN LESION EXCISION Left 2018    \"non-melanoma pre-cancerous lesion removed\" per patient    ORTHOPEDIC SURGERY Right 2015    great toe replacement    UPPER GI ENDOSCOPY,BIOPSY  2/14/2022              CURRENT MEDICATIONS       Discharge Medication List as of 8/27/2023 10:19 AM        CONTINUE these medications which have NOT CHANGED    Details   amLODIPine (NORVASC) 5 MG tablet Take 1 tablet by mouth daily, Disp-90 tablet, R-3Normal      fluticasone-salmeterol (ADVAIR) 250-50 MCG/ACT AEPB diskus inhaler Inhale 1 puff into the lungs 2 times dailyHistorical Med      oxybutynin (DITROPAN-XL) 10 MG extended release tablet Take 1 tablet by mouth daily, Disp-90 tablet, R-4Normal      OAT BRAN PO Take by mouthHistorical Med      cetirizine (ZYRTEC) 10 MG tablet Take 1 tablet by mouth dailyHistorical Med      ezetimibe (ZETIA) 10 MG tablet Take 1 tablet by mouth dailyHistorical Med      ibuprofen (ADVIL;MOTRIN) 200 MG tablet Take by mouthHistorical Med      levalbuterol (XOPENEX HFA) 45 MCG/ACT inhaler Inhale 1-2 puffs into the lungs every 4 hours as neededHistorical Med      losartan (COZAAR) 50 MG tablet Take 1 tablet by mouth dailyHistorical Med      L-Lysine 500 MG TABS Take 500 mg by mouth dailyHistorical Med      metoprolol tartrate (LOPRESSOR) 25 MG tablet Take 1 tablet by mouth dailyHistorical Med      pantoprazole (PROTONIX) 40 MG tablet Take 1 tablet by mouth dailyHistorical Med             ALLERGIES     Macadamia nut oil and Sulfa antibiotics    FAMILY HISTORY       Family History   Problem Relation Age of Onset    Other Mother

## 2023-08-28 LAB
EKG ATRIAL RATE: 61 BPM
EKG DIAGNOSIS: NORMAL
EKG P AXIS: 80 DEGREES
EKG P-R INTERVAL: 218 MS
EKG Q-T INTERVAL: 416 MS
EKG QRS DURATION: 96 MS
EKG QTC CALCULATION (BAZETT): 418 MS
EKG R AXIS: 47 DEGREES
EKG T AXIS: 46 DEGREES
EKG VENTRICULAR RATE: 61 BPM

## 2023-08-28 PROCEDURE — 93010 ELECTROCARDIOGRAM REPORT: CPT | Performed by: SPECIALIST

## 2023-09-01 ENCOUNTER — OFFICE VISIT (OUTPATIENT)
Age: 78
End: 2023-09-01
Payer: MEDICARE

## 2023-09-01 VITALS
WEIGHT: 139 LBS | HEART RATE: 60 BPM | DIASTOLIC BLOOD PRESSURE: 78 MMHG | HEIGHT: 64 IN | OXYGEN SATURATION: 99 % | SYSTOLIC BLOOD PRESSURE: 130 MMHG | BODY MASS INDEX: 23.73 KG/M2

## 2023-09-01 DIAGNOSIS — R06.1 INTERMITTENT STRIDOR: Primary | ICD-10-CM

## 2023-09-01 PROCEDURE — 1123F ACP DISCUSS/DSCN MKR DOCD: CPT | Performed by: STUDENT IN AN ORGANIZED HEALTH CARE EDUCATION/TRAINING PROGRAM

## 2023-09-01 PROCEDURE — 3075F SYST BP GE 130 - 139MM HG: CPT | Performed by: STUDENT IN AN ORGANIZED HEALTH CARE EDUCATION/TRAINING PROGRAM

## 2023-09-01 PROCEDURE — 3078F DIAST BP <80 MM HG: CPT | Performed by: STUDENT IN AN ORGANIZED HEALTH CARE EDUCATION/TRAINING PROGRAM

## 2023-09-01 PROCEDURE — 99204 OFFICE O/P NEW MOD 45 MIN: CPT | Performed by: STUDENT IN AN ORGANIZED HEALTH CARE EDUCATION/TRAINING PROGRAM

## 2023-09-01 NOTE — PROGRESS NOTES
Subjective:   Ada Rayo   66 y.o.   1945     Refered by: No referring provider defined for this encounter. New Patient Visit  Chief Compliant: stridor    History of Present Illness:  Nikole Anthony is a 66 y.o. female with past medical history of coronary artery disease asthma, thyroid nodules, hypertension, hypercholesterolemia who presents today for intermittent stridor. Patient reports that she experiences short periods of intermittent stridor lasting several minutes for the last 5+ years. Most recently on August 27 she came to the ED with an episode of stridor. She was given albuterol nebulizer and steroids. With the use of slow deep breathing techniques she is able to control her breathing and the stridor improved. No prior history of head neck surgery reported. No recent upper respiratory illness reported. On exam today she is breathing comfortably and talking in full sentences without significant difficulty. 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 Denies any dysphagia or aspiration. Endorses some dysphonia started approximately 5 years ago. Review of Systems  Consitutional: denies fever, excessive weight gain or loss. Eyes: denies diplopia, eye pain. Integumentary: denies new concerning skin lesions. Ears, Nose, Mouth, Throat: denies except as per HPI.   Endocrine: denies hot or cold intolerance, increased thirst.  Respiratory: denies cough, hemoptysis, wheezing  Gastrointestinal: denies trouble swallowing, nausea, emesis, regurgitation  Musculoskeletal: denies muscle weakness or wasting  Cardiovascular: denies chest pain, shortness of breath  Neurologic: denies seizures, numbness or tingling, syncope  Hematologic: denies easy bleeding or bruising       Past Medical History:   Diagnosis Date    Alopecia     Arthritis     Asthma     CAD (coronary artery disease) 08/12/2018    NSTEMI, Cath, [facility] OM2 of dominant Cx    Carotid stenosis     GERD (gastroesophageal reflux

## 2023-09-07 ENCOUNTER — HOSPITAL ENCOUNTER (OUTPATIENT)
Facility: HOSPITAL | Age: 78
Discharge: HOME | End: 2023-09-07
Attending: STUDENT IN AN ORGANIZED HEALTH CARE EDUCATION/TRAINING PROGRAM
Payer: MEDICARE

## 2023-09-07 DIAGNOSIS — R06.1 INTERMITTENT STRIDOR: ICD-10-CM

## 2023-09-07 PROCEDURE — 70490 CT SOFT TISSUE NECK W/O DYE: CPT

## 2023-09-11 ENCOUNTER — TELEPHONE (OUTPATIENT)
Age: 78
End: 2023-09-11

## 2023-09-11 NOTE — TELEPHONE ENCOUNTER
Attempted to call patient to discuss test results. Patient did not answer. Will discuss results of CT scan as well as neck steps at follow-up visit. [Follow-Up - Clinic] : a clinic follow-up of [Coronary Artery Disease] : coronary artery disease

## 2023-09-11 NOTE — TELEPHONE ENCOUNTER
Patient called again regarding her CT results. I explained to her that you were in clinic and would call her. [phone].  Hui

## 2023-09-11 NOTE — TELEPHONE ENCOUNTER
Patient returned your call in ref to results. Best contact number left on voicemail was [phone].

## 2023-09-20 ENCOUNTER — OFFICE VISIT (OUTPATIENT)
Age: 78
End: 2023-09-20

## 2023-09-20 VITALS
RESPIRATION RATE: 18 BRPM | OXYGEN SATURATION: 97 % | HEART RATE: 68 BPM | SYSTOLIC BLOOD PRESSURE: 135 MMHG | WEIGHT: 139.2 LBS | DIASTOLIC BLOOD PRESSURE: 62 MMHG | TEMPERATURE: 97 F | BODY MASS INDEX: 23.89 KG/M2

## 2023-09-20 DIAGNOSIS — N30.00 ACUTE CYSTITIS WITHOUT HEMATURIA: Primary | ICD-10-CM

## 2023-09-20 DIAGNOSIS — N18.31 STAGE 3A CHRONIC KIDNEY DISEASE (HCC): ICD-10-CM

## 2023-09-20 DIAGNOSIS — R30.9 URINARY PAIN: ICD-10-CM

## 2023-09-20 PROBLEM — N18.30 CHRONIC RENAL DISEASE, STAGE III (HCC): Status: ACTIVE | Noted: 2023-09-20

## 2023-09-20 LAB
BILIRUBIN, URINE, POC: NEGATIVE
BLOOD URINE, POC: NEGATIVE
GLUCOSE URINE, POC: NEGATIVE
KETONES, URINE, POC: NEGATIVE
LEUKOCYTE ESTERASE, URINE, POC: ABNORMAL
NITRITE, URINE, POC: POSITIVE
PH, URINE, POC: 5.5 (ref 4.6–8)
PROTEIN,URINE, POC: NEGATIVE
SPECIFIC GRAVITY, URINE, POC: 1.01 (ref 1–1.03)
URINALYSIS CLARITY, POC: ABNORMAL
URINALYSIS COLOR, POC: YELLOW
UROBILINOGEN, POC: ABNORMAL

## 2023-09-20 RX ORDER — NITROFURANTOIN 25; 75 MG/1; MG/1
100 CAPSULE ORAL 2 TIMES DAILY
Qty: 10 CAPSULE | Refills: 0 | Status: SHIPPED | OUTPATIENT
Start: 2023-09-20 | End: 2023-09-25

## 2023-09-21 ENCOUNTER — OFFICE VISIT (OUTPATIENT)
Age: 78
End: 2023-09-21

## 2023-09-21 ENCOUNTER — PATIENT MESSAGE (OUTPATIENT)
Facility: CLINIC | Age: 78
End: 2023-09-21

## 2023-09-21 VITALS
BODY MASS INDEX: 23.89 KG/M2 | RESPIRATION RATE: 20 BRPM | OXYGEN SATURATION: 100 % | HEART RATE: 59 BPM | SYSTOLIC BLOOD PRESSURE: 120 MMHG | DIASTOLIC BLOOD PRESSURE: 80 MMHG | HEIGHT: 64 IN

## 2023-09-21 DIAGNOSIS — R49.0 DYSPHONIA: ICD-10-CM

## 2023-09-21 DIAGNOSIS — R79.89 ELEVATED SERUM CREATININE: Primary | ICD-10-CM

## 2023-09-21 DIAGNOSIS — R06.1 STRIDOR: Primary | ICD-10-CM

## 2023-09-21 DIAGNOSIS — J38.01 PARESIS OF LEFT VOCAL CORD: ICD-10-CM

## 2023-09-21 NOTE — PROGRESS NOTES
Blood pressure 120/80, pulse 59, resp. rate 20, height 5' 4\" (1.626 m), SpO2 100 %.
No overt masses or lesions. Base of tongue: Vallecula & epiglottis unremarkable. Clear pyriform sinus. TVC: Vocal folds without lesions. Left TVC paresis with false cord compensation. Small glottic gap on phonation. Subglottis: Visualized subglottis appears patent. Radiology: Imaging studies independently review by me -no subglottic stenosis or tracheal stenosis noted. No overt masses noted along the course of the recurrent laryngeal nerve, however limited interpretation due to no contrast.  CT Neck w/o Contrast (9/7/23):   NASOPHARYNX: Normal.  SUPRAHYOID NECK: Normal oropharynx, oral cavity, parapharyngeal space, and  retropharyngeal space. INFRAHYOID NECK: Normal larynx, hypopharynx and supraglottis. PAROTID GLANDS: Normal.  SUBMANDIBULAR GLANDS: Normal.  THYROID GLAND: Left thyroid gland is small in size and contains a 0.6 cm nodule. There are right thyroid nodules with the largest measuring 1.2 cm. Please refer  to ultrasound of the thyroid dated 8/7/2023. LYMPH NODES: None enlarged by CT size criteria . LUNG APICES: Clear. There are areas of hyperlucency and slight increased density  in the lungs may represent small airways disease. The brachiocephalic artery  slightly indents the right side of the trachea. The trachea at the level of T3  measures 0.8 cm AP by 2 cm transverse  OSSEOUS STRUCTURES: No destructive bone lesion. There are degenerative changes cervical spine most pronounced at C5-C6 and C6-C7 levels where there is stenosis. There are moderate changes spondylosis at C3-C4 C4-C5. ADDITIONAL COMMENTS: There is a mucus retention cyst right maxillary sinus inferiorly. There is a small mucus retention cyst in the sphenoid sinus There is calcification common carotid bifurcations bilaterally. .     IMPRESSION:     1. No acute abnormality identified. 2. There are areas of slight increased density and decreased density in the upper lungs may represent small airways disease.  The trachea is

## 2023-09-22 NOTE — TELEPHONE ENCOUNTER
From: Erin Zuñiga  To: Dr. Mehta Ore: 9/21/2023 12:52 PM EDT  Subject: kidney function test    I am to have an MRI with contrast, if possible, to determine what is causing trouble with my vocal cords. We did a CT scan without contrast and that was normal. Dr. Anika López has the MRI hoping to ge more information, but he was concerned that my kidney function might be made worse. Apparently, my blood tests show that I have some kidney failure. Dr Anika López suggested that I get the real kidney function test, where you collect all your pee for 24 hours and take it to the lab. Sherrie Celaya said his blood test showed kidney impairment but when they tested his pee, it was normal. So, can you set that up for me? I continue to get more and more hoarse and Dr Anika López says one of my vocal cords is very weak and causing the hoarseness but he doesn't know why.

## 2023-09-25 ENCOUNTER — TELEPHONE (OUTPATIENT)
Age: 78
End: 2023-09-25

## 2023-09-25 NOTE — TELEPHONE ENCOUNTER
Contacted pt in reference to [address] [facility] [address] she sent over for Dr. Victor Manuel Downey about MRI w/contrast.   Unable to reach pt so lvm letting her know that Dr. Victor Manuel Downey said that she is able to go ahead and get the imaging scheduled as the order is in our system and provided the number for central scheduling as well as our office number if she has any questions.

## 2023-10-03 ENCOUNTER — OFFICE VISIT (OUTPATIENT)
Facility: CLINIC | Age: 78
End: 2023-10-03
Payer: MEDICARE

## 2023-10-03 VITALS
HEIGHT: 64 IN | DIASTOLIC BLOOD PRESSURE: 80 MMHG | HEART RATE: 61 BPM | RESPIRATION RATE: 14 BRPM | BODY MASS INDEX: 24.07 KG/M2 | SYSTOLIC BLOOD PRESSURE: 132 MMHG | OXYGEN SATURATION: 97 % | WEIGHT: 141 LBS | TEMPERATURE: 97.7 F

## 2023-10-03 DIAGNOSIS — N30.00 ACUTE CYSTITIS WITHOUT HEMATURIA: Primary | ICD-10-CM

## 2023-10-03 DIAGNOSIS — R30.0 DYSURIA: ICD-10-CM

## 2023-10-03 DIAGNOSIS — I10 ESSENTIAL HYPERTENSION: ICD-10-CM

## 2023-10-03 LAB
BILIRUBIN, URINE, POC: NEGATIVE
BLOOD URINE, POC: NEGATIVE
GLUCOSE URINE, POC: NEGATIVE
KETONES, URINE, POC: NEGATIVE
LEUKOCYTE ESTERASE, URINE, POC: NORMAL
NITRITE, URINE, POC: NEGATIVE
PH, URINE, POC: 6 (ref 4.6–8)
PROTEIN,URINE, POC: NEGATIVE
SPECIFIC GRAVITY, URINE, POC: 1 (ref 1–1.03)
URINALYSIS CLARITY, POC: CLEAR
URINALYSIS COLOR, POC: YELLOW
UROBILINOGEN, POC: NORMAL

## 2023-10-03 PROCEDURE — 3075F SYST BP GE 130 - 139MM HG: CPT | Performed by: INTERNAL MEDICINE

## 2023-10-03 PROCEDURE — 1036F TOBACCO NON-USER: CPT | Performed by: INTERNAL MEDICINE

## 2023-10-03 PROCEDURE — 1123F ACP DISCUSS/DSCN MKR DOCD: CPT | Performed by: INTERNAL MEDICINE

## 2023-10-03 PROCEDURE — 3078F DIAST BP <80 MM HG: CPT | Performed by: INTERNAL MEDICINE

## 2023-10-03 PROCEDURE — G8420 CALC BMI NORM PARAMETERS: HCPCS | Performed by: INTERNAL MEDICINE

## 2023-10-03 PROCEDURE — G8484 FLU IMMUNIZE NO ADMIN: HCPCS | Performed by: INTERNAL MEDICINE

## 2023-10-03 PROCEDURE — 99213 OFFICE O/P EST LOW 20 MIN: CPT | Performed by: INTERNAL MEDICINE

## 2023-10-03 PROCEDURE — 1090F PRES/ABSN URINE INCON ASSESS: CPT | Performed by: INTERNAL MEDICINE

## 2023-10-03 PROCEDURE — G8400 PT W/DXA NO RESULTS DOC: HCPCS | Performed by: INTERNAL MEDICINE

## 2023-10-03 PROCEDURE — G8427 DOCREV CUR MEDS BY ELIG CLIN: HCPCS | Performed by: INTERNAL MEDICINE

## 2023-10-03 PROCEDURE — 81003 URINALYSIS AUTO W/O SCOPE: CPT | Performed by: INTERNAL MEDICINE

## 2023-10-03 RX ORDER — ASPIRIN 81 MG/1
TABLET, CHEWABLE ORAL
COMMUNITY

## 2023-10-03 RX ORDER — CEPHALEXIN 500 MG/1
500 CAPSULE ORAL 4 TIMES DAILY
Qty: 28 CAPSULE | Refills: 0 | Status: SHIPPED | OUTPATIENT
Start: 2023-10-03 | End: 2023-10-10

## 2023-10-03 RX ORDER — MINOXIDIL 2 %
SOLUTION, NON-ORAL TOPICAL
COMMUNITY

## 2023-10-04 ENCOUNTER — HOSPITAL ENCOUNTER (OUTPATIENT)
Facility: HOSPITAL | Age: 78
Discharge: HOME | End: 2023-10-07
Attending: STUDENT IN AN ORGANIZED HEALTH CARE EDUCATION/TRAINING PROGRAM
Payer: MEDICARE

## 2023-10-04 DIAGNOSIS — J38.01 PARESIS OF LEFT VOCAL CORD: ICD-10-CM

## 2023-10-04 DIAGNOSIS — R49.0 DYSPHONIA: ICD-10-CM

## 2023-10-04 DIAGNOSIS — R06.1 STRIDOR: ICD-10-CM

## 2023-10-04 PROCEDURE — 6360000004 HC RX CONTRAST MEDICATION: Performed by: STUDENT IN AN ORGANIZED HEALTH CARE EDUCATION/TRAINING PROGRAM

## 2023-10-04 PROCEDURE — 70543 MRI ORBT/FAC/NCK W/O &W/DYE: CPT

## 2023-10-04 PROCEDURE — A9579 GAD-BASE MR CONTRAST NOS,1ML: HCPCS | Performed by: STUDENT IN AN ORGANIZED HEALTH CARE EDUCATION/TRAINING PROGRAM

## 2023-10-04 RX ADMIN — GADOTERIDOL 10 ML: 279.3 INJECTION, SOLUTION INTRAVENOUS at 21:00

## 2023-10-06 LAB
BACTERIA SPEC CULT: ABNORMAL
CC UR VC: ABNORMAL
SERVICE CMNT-IMP: ABNORMAL

## 2023-10-09 ENCOUNTER — PATIENT MESSAGE (OUTPATIENT)
Facility: CLINIC | Age: 78
End: 2023-10-09

## 2023-10-09 RX ORDER — AMOXICILLIN AND CLAVULANATE POTASSIUM 875; 125 MG/1; MG/1
1 TABLET, FILM COATED ORAL 2 TIMES DAILY
Qty: 14 TABLET | Refills: 0 | Status: SHIPPED | OUTPATIENT
Start: 2023-10-09 | End: 2023-10-16

## 2023-10-18 RX ORDER — PANTOPRAZOLE SODIUM 40 MG/1
40 TABLET, DELAYED RELEASE ORAL DAILY
Qty: 90 TABLET | Refills: 3 | Status: SHIPPED | OUTPATIENT
Start: 2023-10-18

## 2023-10-18 NOTE — TELEPHONE ENCOUNTER
PCP: Bambi Otoole MD     Last appt: 10/3/2023    Future Appointments   Date Time Provider [address]   10/25/2023 11:00 AM Argelia Liu MD REP BS AMB   2/5/2024  1:15 PM Bambi Otoole MD [facility] BS AMB          Requested Prescriptions     Pending Prescriptions Disp Refills    pantoprazole (PROTONIX) 40 MG tablet 90 tablet 3     Sig: Take 1 tablet by mouth daily

## 2023-11-02 ENCOUNTER — OFFICE VISIT (OUTPATIENT)
Age: 78
End: 2023-11-02

## 2023-11-02 VITALS
SYSTOLIC BLOOD PRESSURE: 139 MMHG | WEIGHT: 141 LBS | HEART RATE: 55 BPM | OXYGEN SATURATION: 100 % | HEIGHT: 64 IN | DIASTOLIC BLOOD PRESSURE: 81 MMHG | BODY MASS INDEX: 24.07 KG/M2

## 2023-11-02 DIAGNOSIS — R06.1 INTERMITTENT STRIDOR: Primary | ICD-10-CM

## 2023-11-02 DIAGNOSIS — R49.0 DYSPHONIA: ICD-10-CM

## 2023-11-02 DIAGNOSIS — R06.1 STRIDOR: ICD-10-CM

## 2023-11-02 DIAGNOSIS — J38.01 PARESIS OF LEFT VOCAL CORD: ICD-10-CM

## 2023-11-02 NOTE — PROGRESS NOTES
Clear pyriform sinus. TVC: Vocal folds without lesions. Left TVC paresis with false cord compensation. Small glottic gap on phonation. Subglottis: Visualized subglottis appears patent. Radiology: Imaging studies independently review by me:   CT -no subglottic stenosis or tracheal stenosis noted. No overt masses noted along the course of the recurrent laryngeal nerve, however limited interpretation due to no contrast.  MRI - does not show any lesions along the course of the recurrent laryngeal nerve to the aortic arch  CT Neck w/o Contrast (9/7/23):   NASOPHARYNX: Normal.  SUPRAHYOID NECK: Normal oropharynx, oral cavity, parapharyngeal space, and  retropharyngeal space. INFRAHYOID NECK: Normal larynx, hypopharynx and supraglottis. PAROTID GLANDS: Normal.  SUBMANDIBULAR GLANDS: Normal.  THYROID GLAND: Left thyroid gland is small in size and contains a 0.6 cm nodule. There are right thyroid nodules with the largest measuring 1.2 cm. Please refer  to ultrasound of the thyroid dated 8/7/2023. LYMPH NODES: None enlarged by CT size criteria . LUNG APICES: Clear. There are areas of hyperlucency and slight increased density  in the lungs may represent small airways disease. The brachiocephalic artery  slightly indents the right side of the trachea. The trachea at the level of T3  measures 0.8 cm AP by 2 cm transverse  OSSEOUS STRUCTURES: No destructive bone lesion. There are degenerative changes cervical spine most pronounced at C5-C6 and C6-C7 levels where there is stenosis. There are moderate changes spondylosis at C3-C4 C4-C5. ADDITIONAL COMMENTS: There is a mucus retention cyst right maxillary sinus inferiorly. There is a small mucus retention cyst in the sphenoid sinus There is calcification common carotid bifurcations bilaterally. .     IMPRESSION:     1. No acute abnormality identified.   2. There are areas of slight increased density and decreased density in the upper lungs may represent small airways

## 2024-01-03 ENCOUNTER — OFFICE VISIT (OUTPATIENT)
Age: 79
End: 2024-01-03

## 2024-01-03 VITALS
SYSTOLIC BLOOD PRESSURE: 128 MMHG | BODY MASS INDEX: 24.07 KG/M2 | OXYGEN SATURATION: 98 % | HEART RATE: 62 BPM | WEIGHT: 141 LBS | HEIGHT: 64 IN | DIASTOLIC BLOOD PRESSURE: 74 MMHG

## 2024-01-03 DIAGNOSIS — J30.9 ALLERGIC RHINITIS, UNSPECIFIED SEASONALITY, UNSPECIFIED TRIGGER: ICD-10-CM

## 2024-01-03 DIAGNOSIS — J38.3 PARADOXICAL VOCAL FOLD MOTION DISORDER: ICD-10-CM

## 2024-01-03 DIAGNOSIS — R09.81 NASAL CONGESTION: ICD-10-CM

## 2024-01-03 DIAGNOSIS — R06.1 INTERMITTENT STRIDOR: Primary | ICD-10-CM

## 2024-01-03 NOTE — TELEPHONE ENCOUNTER
PCP: Ember Lazaro MD     Last appt: 10/3/2023    Future Appointments   Date Time Provider Department Center   1/3/2024 11:00 AM Jeremy Hodgson MD [facility] BS AMB   2/5/2024  1:00 PM Ember Lazaro MD [facility] AMB          Requested Prescriptions     Pending Prescriptions Disp Refills    metoprolol tartrate (LOPRESSOR) 25 MG tablet 90 tablet 1     Sig: Take 1 tablet by mouth daily

## 2024-01-03 NOTE — PROGRESS NOTES
Subjective:   Ada Rayo   78 y.o.   1945     Refered by: No referring provider defined for this encounter.     New Patient Visit  Chief Compliant: stridor    History of Present Illness:  Ada Rayo is a 78 y.o. female with past medical history of coronary artery disease, asthma, thyroid nodules, hypertension, hypercholesterolemia who presents today for intermittent stridor.    Patient reports that she experiences short periods of intermittent stridor lasting several minutes for the last 5+ years.  Most recently on August 27 she came to the ED with an episode of stridor.  She was given albuterol nebulizer and steroids.  With the use of slow deep breathing techniques she is able to control her breathing and the stridor improved.  No prior history of head neck surgery reported.  No recent upper respiratory illness reported.  On exam today she is breathing comfortably and talking in full sentences without significant difficulty.    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  Denies any dysphagia or aspiration.  Endorses some dysphonia started approximately 5 years ago.  Has a history of cardiac stent in 2018.  No open heart surgery or valve replacement surgery.    Interval Hx:   9/21/23:   CT scan shows no anatomic obstruction, no tracheal or subglottic stenosis.    11/2/23:   No further episodes of stridor or shortness of breath.  MRI was negative for lesion involving the recurrent lingual nerve.    1/5/24:   No further episodes.  Scope exam stable today additionally has complaint of nasal congestion and rhinorrhea    Review of Systems  Consitutional: denies fever, excessive weight gain or loss.  Eyes: denies diplopia, eye pain.  Integumentary: denies new concerning skin lesions.  Ears, Nose, Mouth, Throat: denies except as per HPI.  Endocrine: denies hot or cold intolerance, increased thirst.  Respiratory: denies cough, hemoptysis, wheezing  Gastrointestinal: denies trouble swallowing, nausea,

## 2024-01-03 NOTE — PROGRESS NOTES
/74   Pulse 62   Ht 1.626 m (5' 4\")   Wt 64 kg (141 lb)   SpO2 98%   BMI 24.20 kg/m²   Chief Complaint   Patient presents with    Follow-up

## 2024-01-05 RX ORDER — FLUTICASONE PROPIONATE 50 MCG
2 SPRAY, SUSPENSION (ML) NASAL DAILY
Qty: 32 G | Refills: 1 | Status: SHIPPED | OUTPATIENT
Start: 2024-01-05

## 2024-01-25 NOTE — TELEPHONE ENCOUNTER
PCP: Ember Lazaro MD     Last appt: 10/3/2023    Future Appointments   Date Time Provider Department Center   2/5/2024  1:00 PM Ember Lazaro MD [facility] BS [facility]   5/13/2024 11:00 AM Jeremy Hodgson MD [facility] BS AMB          Requested Prescriptions     Pending Prescriptions Disp Refills    losartan (COZAAR) 50 MG tablet 90 tablet 2     Sig: Take 1 tablet by mouth daily

## 2024-01-26 RX ORDER — LOSARTAN POTASSIUM 50 MG/1
50 TABLET ORAL DAILY
Qty: 90 TABLET | Refills: 2 | Status: SHIPPED | OUTPATIENT
Start: 2024-01-26

## 2024-01-30 DIAGNOSIS — E78.00 HYPERCHOLESTEROLEMIA: ICD-10-CM

## 2024-01-30 DIAGNOSIS — E04.2 MULTINODULAR THYROID: ICD-10-CM

## 2024-01-31 LAB
ALBUMIN SERPL-MCNC: 3.6 G/DL (ref 3.5–5)
ALBUMIN/GLOB SERPL: 1.2 (ref 1.1–2.2)
ALP SERPL-CCNC: 54 U/L (ref 45–117)
ALT SERPL-CCNC: 25 U/L (ref 12–78)
ANION GAP SERPL CALC-SCNC: 4 MMOL/L (ref 5–15)
AST SERPL-CCNC: 16 U/L (ref 15–37)
BILIRUB SERPL-MCNC: 0.5 MG/DL (ref 0.2–1)
BUN SERPL-MCNC: 20 MG/DL (ref 6–20)
BUN/CREAT SERPL: 22 (ref 12–20)
CALCIUM SERPL-MCNC: 9.8 MG/DL (ref 8.5–10.1)
CHLORIDE SERPL-SCNC: 111 MMOL/L (ref 97–108)
CHOLEST SERPL-MCNC: 203 MG/DL
CO2 SERPL-SCNC: 27 MMOL/L (ref 21–32)
CREAT SERPL-MCNC: 0.9 MG/DL (ref 0.55–1.02)
GLOBULIN SER CALC-MCNC: 3 G/DL (ref 2–4)
GLUCOSE SERPL-MCNC: 100 MG/DL (ref 65–100)
HDLC SERPL-MCNC: 63 MG/DL
HDLC SERPL: 3.2 (ref 0–5)
LDLC SERPL CALC-MCNC: 118.4 MG/DL (ref 0–100)
POTASSIUM SERPL-SCNC: 4.2 MMOL/L (ref 3.5–5.1)
PROT SERPL-MCNC: 6.6 G/DL (ref 6.4–8.2)
SODIUM SERPL-SCNC: 142 MMOL/L (ref 136–145)
TRIGL SERPL-MCNC: 108 MG/DL
VLDLC SERPL CALC-MCNC: 21.6 MG/DL

## 2024-02-02 LAB — TSH SERPL DL<=0.05 MIU/L-ACNC: 2.34 UIU/ML (ref 0.45–4.5)

## 2024-02-05 ENCOUNTER — OFFICE VISIT (OUTPATIENT)
Facility: CLINIC | Age: 79
End: 2024-02-05
Payer: MEDICARE

## 2024-02-05 VITALS
HEIGHT: 64 IN | OXYGEN SATURATION: 98 % | DIASTOLIC BLOOD PRESSURE: 82 MMHG | TEMPERATURE: 97 F | HEART RATE: 58 BPM | WEIGHT: 138.8 LBS | SYSTOLIC BLOOD PRESSURE: 132 MMHG | BODY MASS INDEX: 23.7 KG/M2 | RESPIRATION RATE: 16 BRPM

## 2024-02-05 DIAGNOSIS — I10 ESSENTIAL HYPERTENSION: Primary | ICD-10-CM

## 2024-02-05 DIAGNOSIS — E78.00 HYPERCHOLESTEROLEMIA: ICD-10-CM

## 2024-02-05 DIAGNOSIS — J45.40 MODERATE PERSISTENT ASTHMA WITHOUT COMPLICATION: ICD-10-CM

## 2024-02-05 DIAGNOSIS — E04.2 MULTINODULAR THYROID: ICD-10-CM

## 2024-02-05 DIAGNOSIS — I25.10 CORONARY ARTERY DISEASE INVOLVING NATIVE CORONARY ARTERY OF NATIVE HEART WITHOUT ANGINA PECTORIS: ICD-10-CM

## 2024-02-05 DIAGNOSIS — R73.01 IFG (IMPAIRED FASTING GLUCOSE): ICD-10-CM

## 2024-02-05 DIAGNOSIS — Z91.81 AT HIGH RISK FOR FALLS: ICD-10-CM

## 2024-02-05 DIAGNOSIS — I65.23 BILATERAL CAROTID ARTERY STENOSIS: ICD-10-CM

## 2024-02-05 DIAGNOSIS — J38.01 PARESIS OF LEFT VOCAL CORD: ICD-10-CM

## 2024-02-05 PROBLEM — N18.30 CHRONIC RENAL DISEASE, STAGE III (HCC): Status: RESOLVED | Noted: 2023-09-20 | Resolved: 2024-02-05

## 2024-02-05 PROCEDURE — G8484 FLU IMMUNIZE NO ADMIN: HCPCS | Performed by: INTERNAL MEDICINE

## 2024-02-05 PROCEDURE — 99214 OFFICE O/P EST MOD 30 MIN: CPT | Performed by: INTERNAL MEDICINE

## 2024-02-05 PROCEDURE — 1036F TOBACCO NON-USER: CPT | Performed by: INTERNAL MEDICINE

## 2024-02-05 PROCEDURE — 3075F SYST BP GE 130 - 139MM HG: CPT | Performed by: INTERNAL MEDICINE

## 2024-02-05 PROCEDURE — G8400 PT W/DXA NO RESULTS DOC: HCPCS | Performed by: INTERNAL MEDICINE

## 2024-02-05 PROCEDURE — G8420 CALC BMI NORM PARAMETERS: HCPCS | Performed by: INTERNAL MEDICINE

## 2024-02-05 PROCEDURE — 1123F ACP DISCUSS/DSCN MKR DOCD: CPT | Performed by: INTERNAL MEDICINE

## 2024-02-05 PROCEDURE — G8427 DOCREV CUR MEDS BY ELIG CLIN: HCPCS | Performed by: INTERNAL MEDICINE

## 2024-02-05 PROCEDURE — 1090F PRES/ABSN URINE INCON ASSESS: CPT | Performed by: INTERNAL MEDICINE

## 2024-02-05 PROCEDURE — 3078F DIAST BP <80 MM HG: CPT | Performed by: INTERNAL MEDICINE

## 2024-02-05 RX ORDER — VITAMIN B COMPLEX
1 CAPSULE ORAL DAILY
COMMUNITY

## 2024-02-05 SDOH — ECONOMIC STABILITY: FOOD INSECURITY: WITHIN THE PAST 12 MONTHS, YOU WORRIED THAT YOUR FOOD WOULD RUN OUT BEFORE YOU GOT MONEY TO BUY MORE.: NEVER TRUE

## 2024-02-05 SDOH — ECONOMIC STABILITY: INCOME INSECURITY: HOW HARD IS IT FOR YOU TO PAY FOR THE VERY BASICS LIKE FOOD, HOUSING, MEDICAL CARE, AND HEATING?: NOT HARD AT ALL

## 2024-02-05 SDOH — ECONOMIC STABILITY: FOOD INSECURITY: WITHIN THE PAST 12 MONTHS, THE FOOD YOU BOUGHT JUST DIDN'T LAST AND YOU DIDN'T HAVE MONEY TO GET MORE.: NEVER TRUE

## 2024-02-05 ASSESSMENT — PATIENT HEALTH QUESTIONNAIRE - PHQ9
SUM OF ALL RESPONSES TO PHQ QUESTIONS 1-9: 0
SUM OF ALL RESPONSES TO PHQ9 QUESTIONS 1 & 2: 0
SUM OF ALL RESPONSES TO PHQ QUESTIONS 1-9: 0
2. FEELING DOWN, DEPRESSED OR HOPELESS: 0
1. LITTLE INTEREST OR PLEASURE IN DOING THINGS: 0

## 2024-02-05 NOTE — PROGRESS NOTES
8/11/23  Yes Ember Lazaro MD   oxybutynin (DITROPAN-XL) 10 MG extended release tablet Take 1 tablet by mouth daily 7/6/23  Yes John Rogers, AIDA   OAT BRAN PO Take by mouth   Yes Automatic Reconciliation, Ar   cetirizine (ZYRTEC) 10 MG tablet Take 1 tablet by mouth daily   Yes Automatic Reconciliation, Ar   ezetimibe (ZETIA) 10 MG tablet Take 1 tablet by mouth daily 4/26/22  Yes Automatic Reconciliation, Ar   ibuprofen (ADVIL;MOTRIN) 200 MG tablet Take by mouth   Yes Automatic Reconciliation, Ar   L-Lysine 500 MG TABS Take 500 mg by mouth daily   Yes Automatic Reconciliation, Ar   levalbuterol (XOPENEX HFA) 45 MCG/ACT inhaler Inhale 1-2 puffs into the lungs every 4 hours as needed  Patient not taking: Reported on 2/5/2024 1/17/17   Automatic Reconciliation, Ar         Allergies   Allergen Reactions    Macadamia Nut Oil Other (See Comments)     Mouth sores.     Walnuts, pecans    Sulfa Antibiotics Nausea And Vomiting         REVIEW OF SYSTEMS:  Per HPI    PHYSICAL EXAM:  /82   Pulse 58   Temp 97 °F (36.1 °C) (Temporal)   Resp 16   Ht 1.626 m (5' 4\")   Wt 63 kg (138 lb 12.8 oz)   SpO2 98%   BMI 23.82 kg/m²   Constitutional: Appears well-developed and well-nourished. No distress.   HENT:   Head: Normocephalic and atraumatic.   Eyes: No scleral icterus.   Neck: no lad, no tm, supple   Cardiovascular: Normal S1/S2, regular rhythm.  No murmurs, rubs, or gallops.  Pulmonary/Chest: Effort normal and breath sounds normal. No respiratory distress. No wheezes, rhonchi, or rales.   Abdomen: Soft, NT/ND, +BS, no rebound or guarding, no masses, no HSM appreciated.   Ext: No edema.   Neurological: Alert.   Psychiatric: Normal mood and affect. Behavior is normal.     Lab Results   Component Value Date/Time     01/30/2024 10:06 AM    K 4.2 01/30/2024 10:06 AM     01/30/2024 10:06 AM    CO2 27 01/30/2024 10:06 AM    BUN 20 01/30/2024 10:06 AM    GFRAA 80 10/18/2021 09:57 AM    GLOB 3.0

## 2024-07-11 ENCOUNTER — OFFICE VISIT (OUTPATIENT)
Age: 79
End: 2024-07-11
Payer: MEDICARE

## 2024-07-11 VITALS
HEART RATE: 95 BPM | WEIGHT: 138 LBS | HEIGHT: 64 IN | BODY MASS INDEX: 23.56 KG/M2 | DIASTOLIC BLOOD PRESSURE: 82 MMHG | SYSTOLIC BLOOD PRESSURE: 130 MMHG | OXYGEN SATURATION: 97 %

## 2024-07-11 DIAGNOSIS — J31.0 RHINITIS, UNSPECIFIED TYPE: ICD-10-CM

## 2024-07-11 DIAGNOSIS — R09.82 POST-NASAL DRIP: ICD-10-CM

## 2024-07-11 DIAGNOSIS — R09.81 NASAL CONGESTION: ICD-10-CM

## 2024-07-11 DIAGNOSIS — J38.3 PARADOXICAL VOCAL FOLD MOTION DISORDER: Primary | ICD-10-CM

## 2024-07-11 PROCEDURE — 1036F TOBACCO NON-USER: CPT | Performed by: STUDENT IN AN ORGANIZED HEALTH CARE EDUCATION/TRAINING PROGRAM

## 2024-07-11 PROCEDURE — G8420 CALC BMI NORM PARAMETERS: HCPCS | Performed by: STUDENT IN AN ORGANIZED HEALTH CARE EDUCATION/TRAINING PROGRAM

## 2024-07-11 PROCEDURE — 99214 OFFICE O/P EST MOD 30 MIN: CPT | Performed by: STUDENT IN AN ORGANIZED HEALTH CARE EDUCATION/TRAINING PROGRAM

## 2024-07-11 PROCEDURE — 1090F PRES/ABSN URINE INCON ASSESS: CPT | Performed by: STUDENT IN AN ORGANIZED HEALTH CARE EDUCATION/TRAINING PROGRAM

## 2024-07-11 PROCEDURE — G8400 PT W/DXA NO RESULTS DOC: HCPCS | Performed by: STUDENT IN AN ORGANIZED HEALTH CARE EDUCATION/TRAINING PROGRAM

## 2024-07-11 PROCEDURE — 3079F DIAST BP 80-89 MM HG: CPT | Performed by: STUDENT IN AN ORGANIZED HEALTH CARE EDUCATION/TRAINING PROGRAM

## 2024-07-11 PROCEDURE — 1123F ACP DISCUSS/DSCN MKR DOCD: CPT | Performed by: STUDENT IN AN ORGANIZED HEALTH CARE EDUCATION/TRAINING PROGRAM

## 2024-07-11 PROCEDURE — 3075F SYST BP GE 130 - 139MM HG: CPT | Performed by: STUDENT IN AN ORGANIZED HEALTH CARE EDUCATION/TRAINING PROGRAM

## 2024-07-11 PROCEDURE — G8427 DOCREV CUR MEDS BY ELIG CLIN: HCPCS | Performed by: STUDENT IN AN ORGANIZED HEALTH CARE EDUCATION/TRAINING PROGRAM

## 2024-07-11 RX ORDER — IPRATROPIUM BROMIDE 42 UG/1
2 SPRAY, METERED NASAL 4 TIMES DAILY PRN
Qty: 15 ML | Refills: 3 | Status: SHIPPED | OUTPATIENT
Start: 2024-07-11

## 2024-07-11 NOTE — PROGRESS NOTES
Subjective:   Ada Rayo   78 y.o.   1945     Refered by: No referring provider defined for this encounter.     New Patient Visit  Chief Compliant: stridor    History of Present Illness:  Ada Rayo is a 78 y.o. female with past medical history of coronary artery disease, asthma, thyroid nodules, hypertension, hypercholesterolemia who presents today for intermittent stridor.    Patient reports that she experiences short periods of intermittent stridor lasting several minutes for the last 5+ years.  Most recently on August 27 she came to the ED with an episode of stridor.  She was given albuterol nebulizer and steroids.  With the use of slow deep breathing techniques she is able to control her breathing and the stridor improved.  No prior history of head neck surgery reported.  No recent upper respiratory illness reported.  On exam today she is breathing comfortably and talking in full sentences without significant difficulty.    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  Denies any dysphagia or aspiration.  Endorses some dysphonia started approximately 5 years ago.  Has a history of cardiac stent in 2018.  No open heart surgery or valve replacement surgery.    Interval Hx:   9/21/23:   CT scan shows no anatomic obstruction, no tracheal or subglottic stenosis.    11/2/23:   No further episodes of stridor or shortness of breath.  MRI was negative for lesion involving the recurrent lingual nerve.    1/5/24:   No further episodes.  Scope exam stable today additionally has complaint of nasal congestion and rhinorrhea    7/11/24:   Doing well after speech therapy.  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  Speech therapy on her to better control these episodes.     Continues to complain of rhinitis and postnasal drip.  Has not improved with Flonase and azelastine    Review of Systems  Consitutional: denies fever, excessive weight gain or loss.  Eyes: denies diplopia,

## 2024-08-02 LAB
ALBUMIN SERPL-MCNC: 4 G/DL (ref 3.8–4.8)
ALP SERPL-CCNC: 61 IU/L (ref 44–121)
ALT SERPL-CCNC: 13 IU/L (ref 0–32)
AST SERPL-CCNC: 18 IU/L (ref 0–40)
BILIRUB SERPL-MCNC: 0.2 MG/DL (ref 0–1.2)
BUN SERPL-MCNC: 21 MG/DL (ref 8–27)
BUN/CREAT SERPL: 20 (ref 12–28)
CALCIUM SERPL-MCNC: 9.3 MG/DL (ref 8.7–10.3)
CHLORIDE SERPL-SCNC: 103 MMOL/L (ref 96–106)
CHOLEST SERPL-MCNC: 190 MG/DL (ref 100–199)
CO2 SERPL-SCNC: 22 MMOL/L (ref 20–29)
CREAT SERPL-MCNC: 1.05 MG/DL (ref 0.57–1)
EGFRCR SERPLBLD CKD-EPI 2021: 54 ML/MIN/1.73
GLOBULIN SER CALC-MCNC: 2.4 G/DL (ref 1.5–4.5)
GLUCOSE SERPL-MCNC: 84 MG/DL (ref 70–99)
HDLC SERPL-MCNC: 56 MG/DL
LDLC SERPL CALC-MCNC: 113 MG/DL (ref 0–99)
POTASSIUM SERPL-SCNC: 4.7 MMOL/L (ref 3.5–5.2)
PROT SERPL-MCNC: 6.4 G/DL (ref 6–8.5)
SODIUM SERPL-SCNC: 138 MMOL/L (ref 134–144)
TRIGL SERPL-MCNC: 118 MG/DL (ref 0–149)
TSH SERPL DL<=0.005 MIU/L-ACNC: 1.68 UIU/ML (ref 0.45–4.5)
VLDLC SERPL CALC-MCNC: 21 MG/DL (ref 5–40)

## 2024-08-06 SDOH — HEALTH STABILITY: PHYSICAL HEALTH: ON AVERAGE, HOW MANY MINUTES DO YOU ENGAGE IN EXERCISE AT THIS LEVEL?: 30 MIN

## 2024-08-06 ASSESSMENT — PATIENT HEALTH QUESTIONNAIRE - PHQ9
SUM OF ALL RESPONSES TO PHQ QUESTIONS 1-9: 0
SUM OF ALL RESPONSES TO PHQ QUESTIONS 1-9: 0
2. FEELING DOWN, DEPRESSED OR HOPELESS: NOT AT ALL
SUM OF ALL RESPONSES TO PHQ QUESTIONS 1-9: 0
SUM OF ALL RESPONSES TO PHQ QUESTIONS 1-9: 0
1. LITTLE INTEREST OR PLEASURE IN DOING THINGS: NOT AT ALL
SUM OF ALL RESPONSES TO PHQ9 QUESTIONS 1 & 2: 0

## 2024-08-06 ASSESSMENT — LIFESTYLE VARIABLES
HOW OFTEN DO YOU HAVE A DRINK CONTAINING ALCOHOL: 2-3 TIMES A WEEK
HOW OFTEN DO YOU HAVE SIX OR MORE DRINKS ON ONE OCCASION: 1
HOW OFTEN DO YOU HAVE A DRINK CONTAINING ALCOHOL: 4
HOW MANY STANDARD DRINKS CONTAINING ALCOHOL DO YOU HAVE ON A TYPICAL DAY: 1 OR 2
HOW MANY STANDARD DRINKS CONTAINING ALCOHOL DO YOU HAVE ON A TYPICAL DAY: 1

## 2024-08-07 ENCOUNTER — OFFICE VISIT (OUTPATIENT)
Facility: CLINIC | Age: 79
End: 2024-08-07

## 2024-08-07 VITALS
OXYGEN SATURATION: 96 % | SYSTOLIC BLOOD PRESSURE: 127 MMHG | TEMPERATURE: 97.9 F | WEIGHT: 138.5 LBS | DIASTOLIC BLOOD PRESSURE: 70 MMHG | HEART RATE: 59 BPM | RESPIRATION RATE: 12 BRPM | HEIGHT: 64 IN | BODY MASS INDEX: 23.64 KG/M2

## 2024-08-07 DIAGNOSIS — E04.2 MULTINODULAR THYROID: ICD-10-CM

## 2024-08-07 DIAGNOSIS — E78.00 HYPERCHOLESTEROLEMIA: ICD-10-CM

## 2024-08-07 DIAGNOSIS — R73.01 IFG (IMPAIRED FASTING GLUCOSE): ICD-10-CM

## 2024-08-07 DIAGNOSIS — Z00.00 MEDICARE ANNUAL WELLNESS VISIT, SUBSEQUENT: Primary | ICD-10-CM

## 2024-08-07 DIAGNOSIS — I25.10 CORONARY ARTERY DISEASE INVOLVING NATIVE CORONARY ARTERY OF NATIVE HEART WITHOUT ANGINA PECTORIS: ICD-10-CM

## 2024-08-07 DIAGNOSIS — I10 ESSENTIAL HYPERTENSION: ICD-10-CM

## 2024-08-07 DIAGNOSIS — D12.6 SERRATED ADENOMA OF COLON: ICD-10-CM

## 2024-08-07 RX ORDER — OXYBUTYNIN CHLORIDE 10 MG/1
10 TABLET, EXTENDED RELEASE ORAL DAILY
Qty: 90 TABLET | Refills: 3 | Status: SHIPPED | OUTPATIENT
Start: 2024-08-07

## 2024-08-07 RX ORDER — EZETIMIBE 10 MG/1
10 TABLET ORAL DAILY
Qty: 90 TABLET | Refills: 3 | Status: SHIPPED | OUTPATIENT
Start: 2024-08-07

## 2024-08-07 NOTE — PROGRESS NOTES
Medicare Annual Wellness Visit    Ada Rayo is here for Medicare AWV and Hypertension    Assessment & Plan   Medicare annual wellness visit, subsequent  Essential hypertension  Coronary artery disease involving native coronary artery of native heart without angina pectoris  Serrated adenoma of colon  IFG (impaired fasting glucose)  Multinodular thyroid  Hypercholesterolemia  -     Comprehensive Metabolic Panel; Future  -     Lipid Panel; Future    BP at goal - continue current medications   LDL not to goal but unable to tolerate statins- continue zetia  CAD s/p NSTEMI - managed by cardiology   A1C has been in normal range  Up to date on colonoscopy - last 4/5/2023 - repeat in 5 years  No suspicious nodules on US - monitored by ENT  Recommendations for Preventive Services Due: see orders and patient instructions/AVS.  Recommended screening schedule for the next 5-10 years is provided to the patient in written form: see Patient Instructions/AVS.     Return in about 6 months (around 2/7/2025) for HTN, CHOLESTEROL.     Subjective   The following acute and/or chronic problems were also addressed today:  HTN - has been well controlled at home - creatinine slightly higher - advised drinking more water which should also help with constipation - mostly 120s/70s has remote monitoring with cardiology  Cholesterol - not to goal but hasn't been able to tolerate statin  Thyroid nodules - check yearly    Has been more constipated lately - taking otc stool softener - stools can be firm, hard. No blood in stool or melena.   Normal colonoscopy other than small polyps hepatic flexure - Dr. Alba - repeat in 5 years  Will be getting doppler next week with vascular  No chest pain, sob, dizziness, weakness, lightheadedness    Has persistent pain left side jaw - has seen oral surgeon, dentist - has been told due to arthritis    Maintained weight.     Patient's complete Health Risk Assessment and screening values have been reviewed

## 2024-08-07 NOTE — PROGRESS NOTES
Ada Rayo  Identified pt with two pt identifiers(name and ).     Chief Complaint   Patient presents with    Medicare AW    Hypertension       Reviewed record In preparation for visit and have obtained necessary documentation.     1. Have you been to the ER, urgent care clinic or hospitalized since your last visit? No     2. Have you seen or consulted any other health care providers outside of the [facility] since your last visit? Include any pap smears or colon screening. ENT    Vitals reviewed with provider.    Health Maintenance reviewed:     Health Maintenance Due   Topic    Flu vaccine (1)          Wt Readings from Last 3 Encounters:   24 62.8 kg (138 lb 8 oz)   24 62.6 kg (138 lb)   24 63 kg (138 lb 12.8 oz)        Temp Readings from Last 3 Encounters:   24 97.9 °F (36.6 °C) (Oral)   24 97 °F (36.1 °C) (Temporal)   10/03/23 97.7 °F (36.5 °C) (Oral)        BP Readings from Last 3 Encounters:   24 127/70   24 130/82   24 132/82        Pulse Readings from Last 3 Encounters:   24 59   24 95   24 58      [unfilled]       Learning Assessment:   :         2023     8:43 AM   [facility] AMB LEARNING ASSESSMENT   Primary Learner Patient   level of education 4 YEARS OF COLLEGE   Barriers Factors NONE   co-learner caregiver No   Primary Language ENGLISH   Learning Preference DEMONSTRATION   Answered By patient   Relationship to Learner SELF         Fall Risk Assessment:   :         2024    11:58 AM 2024     1:09 PM 2023    12:46 PM 5/3/2022    11:00 AM 2022     2:58 PM 2022    10:00 AM   Amb Fall Risk Assessment and TUG Test   Do you feel unsteady or are you worried about falling?  no no no      2 or more falls in past year? yes yes yes      Fall with injury in past year? yes no yes      Fall in past 12 months?    1  0   Able to walk?    Yes  Yes   Total Score     1           Abuse Screening:   :         2024

## 2024-09-12 RX ORDER — AMLODIPINE BESYLATE 5 MG/1
5 TABLET ORAL DAILY
Qty: 90 TABLET | Refills: 2 | Status: SHIPPED | OUTPATIENT
Start: 2024-09-12

## 2024-10-01 ENCOUNTER — HOSPITAL ENCOUNTER (EMERGENCY)
Facility: HOSPITAL | Age: 79
Discharge: HOME | End: 2024-10-04
Payer: MEDICARE

## 2024-10-01 ENCOUNTER — HOSPITAL ENCOUNTER (EMERGENCY)
Facility: HOSPITAL | Age: 79
Discharge: HOME | End: 2024-10-01
Attending: STUDENT IN AN ORGANIZED HEALTH CARE EDUCATION/TRAINING PROGRAM
Payer: MEDICARE

## 2024-10-01 VITALS
TEMPERATURE: 97.9 F | RESPIRATION RATE: 20 BRPM | OXYGEN SATURATION: 98 % | HEART RATE: 71 BPM | DIASTOLIC BLOOD PRESSURE: 67 MMHG | SYSTOLIC BLOOD PRESSURE: 186 MMHG

## 2024-10-01 DIAGNOSIS — S01.81XA FACIAL LACERATION, INITIAL ENCOUNTER: Primary | ICD-10-CM
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
## 2024-10-01 PROCEDURE — 2500000003 HC RX 250 WO HCPCS: Performed by: STUDENT IN AN ORGANIZED HEALTH CARE EDUCATION/TRAINING PROGRAM

## 2024-10-01 PROCEDURE — 72125 CT NECK SPINE W/O DYE: CPT

## 2024-10-01 PROCEDURE — 70450 CT HEAD/BRAIN W/O DYE: CPT

## 2024-10-01 PROCEDURE — 99284 EMERGENCY DEPT VISIT MOD MDM: CPT

## 2024-10-01 PROCEDURE — 70486 CT MAXILLOFACIAL W/O DYE: CPT

## 2024-10-01 PROCEDURE — 12004 RPR S/N/AX/GEN/TRK7.6-12.5CM: CPT

## 2024-10-01 RX ORDER — LIDOCAINE HYDROCHLORIDE 10 MG/ML
15 INJECTION, SOLUTION EPIDURAL; INFILTRATION; INTRACAUDAL; PERINEURAL
Status: COMPLETED | OUTPATIENT
Start: 2024-10-01 | End: 2024-10-01

## 2024-10-01 RX ADMIN — LIDOCAINE HYDROCHLORIDE 15 ML: 10 INJECTION, SOLUTION EPIDURAL; INFILTRATION; INTRACAUDAL; PERINEURAL at 18:48

## 2024-10-01 ASSESSMENT — PAIN SCALES - GENERAL: PAINLEVEL_OUTOF10: 2

## 2024-10-01 ASSESSMENT — PAIN DESCRIPTION - LOCATION: LOCATION: HEAD

## 2024-10-01 ASSESSMENT — PAIN - FUNCTIONAL ASSESSMENT
PAIN_FUNCTIONAL_ASSESSMENT: ACTIVITIES ARE NOT PREVENTED
PAIN_FUNCTIONAL_ASSESSMENT: 0-10

## 2024-10-01 ASSESSMENT — PAIN DESCRIPTION - FREQUENCY: FREQUENCY: CONTINUOUS

## 2024-10-01 ASSESSMENT — PAIN DESCRIPTION - PAIN TYPE: TYPE: ACUTE PAIN

## 2024-10-01 ASSESSMENT — PAIN DESCRIPTION - ORIENTATION: ORIENTATION: RIGHT

## 2024-10-01 ASSESSMENT — PAIN DESCRIPTION - ONSET: ONSET: ON-GOING

## 2024-10-01 ASSESSMENT — PAIN DESCRIPTION - DESCRIPTORS: DESCRIPTORS: ACHING;DULL;DISCOMFORT

## 2024-10-01 NOTE — DISCHARGE INSTRUCTIONS
Lacerations: Your laceration was repaired with 15 sutures. They should be removed in 5-7 days. Keep the wound dry for 24 hours unless it gets dirty. Wash your lacerations with an antibacterial soap and water. Pat dry. Cover with a layer of antibacterial ointment and cover with bandage. Do this twice daily until healed. Once your wound has healed you should apply sunscreen over the scar for the next year to prevent further scarring while the skin fully heals.     Return to ER with any concern for wound infection: redness, significant swelling, pus-like discharge

## 2024-10-01 NOTE — ED NOTES
PROCEDURE NOTE - LACERATION REPAIR:  7:45 PM  Procedure by Elise ALVAREZ assisted by JOSH Marcano     Complexity: complex   10 cm v-shaped laceration to face  was irrigated copiously with NS under jet lavage, prepped with Betadine and draped in a sterile fashion.  The area was anesthetized via local infiltration of 10 mL Lidocaine 1%.  The wound was explored with the following results: No foreign bodies found.  The wound was repaired with One layer suture closure: Skin Layer:  15 (10 in lateral wound, 3 in inferior, 2 in nasal bridge)  sutures placed, stitch type:simple interrupted, suture: 5-0 nylon..  The wound was closed with good hemostasis and approximation.  Sterile dressing applied.  DELETE THIS LINE IF NOT LIP LAC  Estimated blood loss: minimal  The procedure took 16-30 minutes, and pt tolerated well.      Elena Steele PA  10/01/24 1947

## 2024-10-01 NOTE — ED TRIAGE NOTES
Pt comes in by EMS with CC of tripping and falling down on the train tracks. Pt is on aspirin. Hossein LOC. Reports a right sided head laceration. Pain 2/10.

## 2024-10-02 NOTE — ED PROVIDER NOTES
[facility] EMERGENCY DEP  EMERGENCY DEPARTMENT ENCOUNTER      Pt Name: Ada Rayo  MRN: [id number]  Birthdate 1945  Date of evaluation: 10/1/2024  Provider: Ronnie Armendariz DO    CHIEF COMPLAINT       Chief Complaint   Patient presents with    Fall    Head Laceration         HISTORY OF PRESENT ILLNESS   (Location/Symptom, Timing/Onset, Context/Setting, Quality, Duration, Modifying Factors, Severity)  Note limiting factors.   The patient is a 79-year-old female who presents today secondary to a fall.  States that she was trying to jump over something near railroad track and her dress caught her knees and caused her to hit her face on the railroad track.  She sustained a laceration to the right forehead and bridge of the nose.  Uncertain of her last tetanus shot, chart reviewed and she has had tetanus shot within the past 5 years.  Her pain is mild to moderate.  No dental fractures or malocclusion.  No neck pain or back pain.  No chest pain or shortness of breath.  No extremity pain.  Not on blood thinners.            Review of External Medical Records:     Nursing Notes were reviewed.    REVIEW OF SYSTEMS    (2-9 systems for level 4, 10 or more for level 5)     Review of Systems   Skin:  Positive for wound.       Except as noted above the remainder of the review of systems was reviewed and negative.       PAST MEDICAL HISTORY     Past Medical History:   Diagnosis Date    ADHD (attention deficit hyperactivity disorder) 1945    always had it    Alopecia     Arthritis     Asthma     CAD (coronary artery disease) 08/12/2018    NSTEMI, Cath, KIRK OM2 of dominant Cx    Carotid stenosis     GERD (gastroesophageal reflux disease)     Goiter     Hypercholesterolemia     Hypertension     Overactive bladder 03/13/2013    Serrated adenoma of colon 02/06/2014 11/17/09 - Dr. Alba - repeat in 5 years    Stroke (HCC)     TIA    TB (tuberculosis) 1966    took INH for 12 months    Urinary incontinence     always

## 2024-10-08 ENCOUNTER — OFFICE VISIT (OUTPATIENT)
Facility: CLINIC | Age: 79
End: 2024-10-08
Payer: MEDICARE

## 2024-10-08 VITALS
TEMPERATURE: 98 F | WEIGHT: 139 LBS | BODY MASS INDEX: 23.73 KG/M2 | SYSTOLIC BLOOD PRESSURE: 130 MMHG | RESPIRATION RATE: 12 BRPM | OXYGEN SATURATION: 97 % | HEART RATE: 60 BPM | DIASTOLIC BLOOD PRESSURE: 86 MMHG | HEIGHT: 64 IN

## 2024-10-08 DIAGNOSIS — S01.81XD FACIAL LACERATION, SUBSEQUENT ENCOUNTER: Primary | ICD-10-CM

## 2024-10-08 PROCEDURE — 1090F PRES/ABSN URINE INCON ASSESS: CPT | Performed by: INTERNAL MEDICINE

## 2024-10-08 PROCEDURE — 3075F SYST BP GE 130 - 139MM HG: CPT | Performed by: INTERNAL MEDICINE

## 2024-10-08 PROCEDURE — G8484 FLU IMMUNIZE NO ADMIN: HCPCS | Performed by: INTERNAL MEDICINE

## 2024-10-08 PROCEDURE — 1036F TOBACCO NON-USER: CPT | Performed by: INTERNAL MEDICINE

## 2024-10-08 PROCEDURE — G8427 DOCREV CUR MEDS BY ELIG CLIN: HCPCS | Performed by: INTERNAL MEDICINE

## 2024-10-08 PROCEDURE — G8400 PT W/DXA NO RESULTS DOC: HCPCS | Performed by: INTERNAL MEDICINE

## 2024-10-08 PROCEDURE — 1123F ACP DISCUSS/DSCN MKR DOCD: CPT | Performed by: INTERNAL MEDICINE

## 2024-10-08 PROCEDURE — 3079F DIAST BP 80-89 MM HG: CPT | Performed by: INTERNAL MEDICINE

## 2024-10-08 PROCEDURE — 99213 OFFICE O/P EST LOW 20 MIN: CPT | Performed by: INTERNAL MEDICINE

## 2024-10-08 PROCEDURE — G8420 CALC BMI NORM PARAMETERS: HCPCS | Performed by: INTERNAL MEDICINE

## 2024-10-08 RX ORDER — METOPROLOL TARTRATE 25 MG/1
25 TABLET, FILM COATED ORAL DAILY
Qty: 90 TABLET | Refills: 2 | Status: SHIPPED | OUTPATIENT
Start: 2024-10-08

## 2024-10-08 NOTE — PROGRESS NOTES
Ada Rayo  Identified pt with two pt identifiers(name and ).     Chief Complaint   Patient presents with    Suture / Staple Removal       Reviewed record In preparation for visit and have obtained necessary documentation.     1. Have you been to the ER, urgent care clinic or hospitalized since your last visit? No     2. Have you seen or consulted any other health care providers outside of the [facility] since your last visit? Include any pap smears or colon screening. No    Vitals reviewed with provider.    Health Maintenance reviewed:     Health Maintenance Due   Topic    Flu vaccine (1)    COVID-19 Vaccine ( season)          Wt Readings from Last 3 Encounters:   10/08/24 63 kg (139 lb)   24 62.8 kg (138 lb 8 oz)   24 62.6 kg (138 lb)        Temp Readings from Last 3 Encounters:   10/08/24 98 °F (36.7 °C) (Oral)   10/01/24 97.9 °F (36.6 °C) (Oral)   24 97.9 °F (36.6 °C) (Oral)        BP Readings from Last 3 Encounters:   10/08/24 (!) 169/81   10/01/24 (!) 186/67   24 127/70        Pulse Readings from Last 3 Encounters:   10/08/24 60   10/01/24 71   24 59      [unfilled]       Learning Assessment:   :         2023     8:43 AM   [facility] AMB LEARNING ASSESSMENT   Primary Learner Patient   level of education 4 YEARS OF COLLEGE   Barriers Factors NONE   co-learner caregiver No   Primary Language ENGLISH   Learning Preference DEMONSTRATION   Answered By patient   Relationship to Learner SELF         Fall Risk Assessment:   :         2024    11:58 AM 2024     1:09 PM 2023    12:46 PM 5/3/2022    11:00 AM 2022     2:58 PM 2022    10:00 AM   Amb Fall Risk Assessment and TUG Test   Do you feel unsteady or are you worried about falling?  no no no      2 or more falls in past year? yes yes yes      Fall with injury in past year? yes no yes      Fall in past 12 months?    1  0   Able to walk?    Yes  Yes   Total Score     1           Abuse 
Reconciliation, Ar   cetirizine (ZYRTEC) 10 MG tablet Take 1 tablet by mouth daily   Yes Automatic Reconciliation, Ar   ibuprofen (ADVIL;MOTRIN) 200 MG tablet Take by mouth   Yes Automatic Reconciliation, Ar   levalbuterol (XOPENEX HFA) 45 MCG/ACT inhaler Inhale 1-2 puffs into the lungs every 4 hours as needed 1/17/17  Yes Automatic Reconciliation, Ar   L-Lysine 500 MG TABS Take 500 mg by mouth daily   Yes Automatic Reconciliation, Ar         Allergies   Allergen Reactions    Livalo [Pitavastatin] Myalgia    Macadamia Nut Oil Other (See Comments)     Mouth sores.     Walnuts, pecans    Pravastatin Myalgia    Sulfa Antibiotics Nausea And Vomiting         REVIEW OF SYSTEMS:  Per HPI    PHYSICAL EXAM:  /86   Pulse 60   Temp 98 °F (36.7 °C) (Oral)   Resp 12   Ht 1.626 m (5' 4\")   Wt 63 kg (139 lb)   SpO2 97%   BMI 23.86 kg/m²   Constitutional: Appears well-developed and well-nourished. No distress.   HENT:   Head: Bruising on right side of face with 3 distinct areas of lacerations with sutures which are healing.  Eyes: No scleral icterus.   Cardiovascular: Normal S1/S2, regular rhythm.  No murmurs, rubs, or gallops.  Pulmonary/Chest: Effort normal and breath sounds normal. No respiratory distress. No wheezes, rhonchi, or rales.   Ext: No edema.   Neurological: Alert.   Psychiatric: Normal mood and affect. Behavior is normal.     Lab Results   Component Value Date/Time     08/01/2024 03:04 PM    K 4.7 08/01/2024 03:04 PM     08/01/2024 03:04 PM    CO2 22 08/01/2024 03:04 PM    BUN 21 08/01/2024 03:04 PM    GFRAA 80 10/18/2021 09:57 AM    GLOB 3.0 01/30/2024 10:06 AM    ALT 13 08/01/2024 03:04 PM     No results found for: \"HBA1C\"   Lab Results   Component Value Date/Time    CHOL 190 08/01/2024 03:04 PM    HDL 56 08/01/2024 03:04 PM     08/01/2024 03:04 PM    .4 01/30/2024 10:06 AM    VLDL 21 08/01/2024 03:04 PM    VLDL 13 01/30/2023 12:25 PM          ASSESSMENT/PLAN  Ada was seen

## 2024-10-08 NOTE — TELEPHONE ENCOUNTER
PCP: Ember Lazaro MD     Last appt: 10/8/2024    Future Appointments   Date Time Provider Department Center   1/9/2025  2:00 PM Jeremy Hodgson MD [facility]   2/10/2025 11:10 AM Ember Lazaro MD [facility] DEP          Requested Prescriptions     Pending Prescriptions Disp Refills    metoprolol tartrate (LOPRESSOR) 25 MG tablet 90 tablet 2     Sig: Take 1 tablet by mouth daily

## 2024-10-09 ENCOUNTER — PATIENT MESSAGE (OUTPATIENT)
Facility: CLINIC | Age: 79
End: 2024-10-09

## 2024-10-16 RX ORDER — PANTOPRAZOLE SODIUM 40 MG/1
40 TABLET, DELAYED RELEASE ORAL DAILY
Qty: 90 TABLET | Refills: 3 | Status: SHIPPED | OUTPATIENT
Start: 2024-10-16

## 2024-10-16 NOTE — TELEPHONE ENCOUNTER
PCP: Ember Lazaro MD     Last appt: 10/8/2024    Future Appointments   Date Time Provider Department Center   1/9/2025  2:00 PM Jeremy Hodgson MD [facility]   2/10/2025 11:10 AM Ember Lazaro MD [facility] DEP          Requested Prescriptions     Pending Prescriptions Disp Refills    pantoprazole (PROTONIX) 40 MG tablet [Pharmacy Med Name: PANTOPRAZOLE 40MG TABLETS] 90 tablet 3     Sig: TAKE 1 TABLET BY MOUTH DAILY       Previously Declined (within the last year)

## 2024-11-13 NOTE — TELEPHONE ENCOUNTER
PCP: Ember Lazaro MD     Last appt: 10/8/2024    Future Appointments   Date Time Provider Department Center   1/9/2025  2:00 PM Jeremy Hodgson MD [facility]   2/10/2025 11:10 AM Ember Lazaro MD [facility] DEP          Requested Prescriptions     Pending Prescriptions Disp Refills    losartan (COZAAR) 50 MG tablet 90 tablet 2     Sig: Take 1 tablet by mouth daily

## 2024-11-14 RX ORDER — LOSARTAN POTASSIUM 50 MG/1
50 TABLET ORAL DAILY
Qty: 90 TABLET | Refills: 2 | Status: SHIPPED | OUTPATIENT
Start: 2024-11-14

## 2025-01-09 ENCOUNTER — OFFICE VISIT (OUTPATIENT)
Age: 80
End: 2025-01-09
Payer: MEDICARE

## 2025-01-09 VITALS — DIASTOLIC BLOOD PRESSURE: 80 MMHG | HEART RATE: 64 BPM | OXYGEN SATURATION: 98 % | SYSTOLIC BLOOD PRESSURE: 136 MMHG

## 2025-01-09 DIAGNOSIS — R09.82 POST-NASAL DRIP: ICD-10-CM

## 2025-01-09 DIAGNOSIS — R04.0 EPISTAXIS: ICD-10-CM

## 2025-01-09 DIAGNOSIS — H93.13 TINNITUS OF BOTH EARS: ICD-10-CM

## 2025-01-09 DIAGNOSIS — H61.21 IMPACTED CERUMEN OF RIGHT EAR: ICD-10-CM

## 2025-01-09 DIAGNOSIS — J38.01 PARESIS OF LEFT VOCAL CORD: ICD-10-CM

## 2025-01-09 DIAGNOSIS — H91.93 BILATERAL HEARING LOSS, UNSPECIFIED HEARING LOSS TYPE: ICD-10-CM

## 2025-01-09 DIAGNOSIS — J38.3 PARADOXICAL VOCAL FOLD MOTION DISORDER: Primary | ICD-10-CM

## 2025-01-09 DIAGNOSIS — R09.81 NASAL CONGESTION: ICD-10-CM

## 2025-01-09 PROCEDURE — 1090F PRES/ABSN URINE INCON ASSESS: CPT | Performed by: STUDENT IN AN ORGANIZED HEALTH CARE EDUCATION/TRAINING PROGRAM

## 2025-01-09 PROCEDURE — 1036F TOBACCO NON-USER: CPT | Performed by: STUDENT IN AN ORGANIZED HEALTH CARE EDUCATION/TRAINING PROGRAM

## 2025-01-09 PROCEDURE — 69210 REMOVE IMPACTED EAR WAX UNI: CPT | Performed by: STUDENT IN AN ORGANIZED HEALTH CARE EDUCATION/TRAINING PROGRAM

## 2025-01-09 PROCEDURE — G8420 CALC BMI NORM PARAMETERS: HCPCS | Performed by: STUDENT IN AN ORGANIZED HEALTH CARE EDUCATION/TRAINING PROGRAM

## 2025-01-09 PROCEDURE — G8428 CUR MEDS NOT DOCUMENT: HCPCS | Performed by: STUDENT IN AN ORGANIZED HEALTH CARE EDUCATION/TRAINING PROGRAM

## 2025-01-09 PROCEDURE — G8400 PT W/DXA NO RESULTS DOC: HCPCS | Performed by: STUDENT IN AN ORGANIZED HEALTH CARE EDUCATION/TRAINING PROGRAM

## 2025-01-09 PROCEDURE — 3075F SYST BP GE 130 - 139MM HG: CPT | Performed by: STUDENT IN AN ORGANIZED HEALTH CARE EDUCATION/TRAINING PROGRAM

## 2025-01-09 PROCEDURE — 3079F DIAST BP 80-89 MM HG: CPT | Performed by: STUDENT IN AN ORGANIZED HEALTH CARE EDUCATION/TRAINING PROGRAM

## 2025-01-09 PROCEDURE — 99214 OFFICE O/P EST MOD 30 MIN: CPT | Performed by: STUDENT IN AN ORGANIZED HEALTH CARE EDUCATION/TRAINING PROGRAM

## 2025-01-09 PROCEDURE — 1126F AMNT PAIN NOTED NONE PRSNT: CPT | Performed by: STUDENT IN AN ORGANIZED HEALTH CARE EDUCATION/TRAINING PROGRAM

## 2025-01-09 PROCEDURE — 1123F ACP DISCUSS/DSCN MKR DOCD: CPT | Performed by: STUDENT IN AN ORGANIZED HEALTH CARE EDUCATION/TRAINING PROGRAM

## 2025-01-09 NOTE — PROGRESS NOTES
Subjective:   Ada Rayo   79 y.o.   1945     Refered by: No referring provider defined for this encounter.     New Patient Visit  Chief Compliant: stridor    History of Present Illness:  Ada Rayo is a 79 y.o. female with past medical history of coronary artery disease, asthma, thyroid nodules, hypertension, hypercholesterolemia who presents today for intermittent stridor.    Patient reports that she experiences short periods of intermittent stridor lasting several minutes for the last 5+ years.  Most recently on August 27 she came to the ED with an episode of stridor.  She was given albuterol nebulizer and steroids.  With the use of slow deep breathing techniques she is able to control her breathing and the stridor improved.  No prior history of head neck surgery reported.  No recent upper respiratory illness reported.  On exam today she is breathing comfortably and talking in full sentences without significant difficulty.    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  Denies any dysphagia or aspiration.  Endorses some dysphonia started approximately 5 years ago.  Has a history of cardiac stent in 2018.  No open heart surgery or valve replacement surgery.    Interval Hx:   9/21/23:   CT scan shows no anatomic obstruction, no tracheal or subglottic stenosis.    11/2/23:   No further episodes of stridor or shortness of breath.  MRI was negative for lesion involving the recurrent lingual nerve.    1/5/24:   No further episodes.  Scope exam stable today additionally has complaint of nasal congestion and rhinorrhea    7/11/24:   Doing well after speech therapy.  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  Speech therapy on her to better control these episodes.     Continues to complain of rhinitis and postnasal drip.  Has not improved with Flonase and azelastine    1/9/25:   Patient now complaining of significant dry nose and occasional epistaxis.  No more episodes  car seat

## 2025-02-04 ENCOUNTER — LAB (OUTPATIENT)
Facility: CLINIC | Age: 80
End: 2025-02-04

## 2025-02-04 DIAGNOSIS — E78.00 HYPERCHOLESTEROLEMIA: ICD-10-CM

## 2025-02-05 LAB
ALBUMIN SERPL-MCNC: 3.4 G/DL (ref 3.5–5)
ALBUMIN/GLOB SERPL: 1.1 (ref 1.1–2.2)
ALP SERPL-CCNC: 64 U/L (ref 45–117)
ALT SERPL-CCNC: 25 U/L (ref 12–78)
ANION GAP SERPL CALC-SCNC: 6 MMOL/L (ref 2–12)
AST SERPL-CCNC: 15 U/L (ref 15–37)
BILIRUB SERPL-MCNC: 0.5 MG/DL (ref 0.2–1)
BUN SERPL-MCNC: 16 MG/DL (ref 6–20)
BUN/CREAT SERPL: 16 (ref 12–20)
CALCIUM SERPL-MCNC: 9.1 MG/DL (ref 8.5–10.1)
CHLORIDE SERPL-SCNC: 110 MMOL/L (ref 97–108)
CHOLEST SERPL-MCNC: 203 MG/DL
CO2 SERPL-SCNC: 25 MMOL/L (ref 21–32)
CREAT SERPL-MCNC: 0.98 MG/DL (ref 0.55–1.02)
GLOBULIN SER CALC-MCNC: 3.1 G/DL (ref 2–4)
GLUCOSE SERPL-MCNC: 107 MG/DL (ref 65–100)
HDLC SERPL-MCNC: 64 MG/DL
HDLC SERPL: 3.2 (ref 0–5)
LDLC SERPL CALC-MCNC: 119.2 MG/DL (ref 0–100)
POTASSIUM SERPL-SCNC: 4.2 MMOL/L (ref 3.5–5.1)
PROT SERPL-MCNC: 6.5 G/DL (ref 6.4–8.2)
SODIUM SERPL-SCNC: 141 MMOL/L (ref 136–145)
TRIGL SERPL-MCNC: 99 MG/DL
VLDLC SERPL CALC-MCNC: 19.8 MG/DL

## 2025-02-08 SDOH — ECONOMIC STABILITY: INCOME INSECURITY: IN THE LAST 12 MONTHS, WAS THERE A TIME WHEN YOU WERE NOT ABLE TO PAY THE MORTGAGE OR RENT ON TIME?: NO

## 2025-02-08 SDOH — ECONOMIC STABILITY: FOOD INSECURITY: WITHIN THE PAST 12 MONTHS, THE FOOD YOU BOUGHT JUST DIDN'T LAST AND YOU DIDN'T HAVE MONEY TO GET MORE.: NEVER TRUE

## 2025-02-08 SDOH — ECONOMIC STABILITY: FOOD INSECURITY: WITHIN THE PAST 12 MONTHS, YOU WORRIED THAT YOUR FOOD WOULD RUN OUT BEFORE YOU GOT MONEY TO BUY MORE.: NEVER TRUE

## 2025-02-08 SDOH — ECONOMIC STABILITY: TRANSPORTATION INSECURITY
IN THE PAST 12 MONTHS, HAS THE LACK OF TRANSPORTATION KEPT YOU FROM MEDICAL APPOINTMENTS OR FROM GETTING MEDICATIONS?: NO

## 2025-02-10 ENCOUNTER — OFFICE VISIT (OUTPATIENT)
Facility: CLINIC | Age: 80
End: 2025-02-10
Payer: MEDICARE

## 2025-02-10 VITALS
BODY MASS INDEX: 23.64 KG/M2 | HEART RATE: 81 BPM | WEIGHT: 138.5 LBS | HEIGHT: 64 IN | RESPIRATION RATE: 12 BRPM | DIASTOLIC BLOOD PRESSURE: 75 MMHG | TEMPERATURE: 97.7 F | SYSTOLIC BLOOD PRESSURE: 137 MMHG | OXYGEN SATURATION: 97 %

## 2025-02-10 DIAGNOSIS — R29.6 FREQUENT FALLS: ICD-10-CM

## 2025-02-10 DIAGNOSIS — E04.2 MULTINODULAR THYROID: ICD-10-CM

## 2025-02-10 DIAGNOSIS — E78.00 HYPERCHOLESTEROLEMIA: ICD-10-CM

## 2025-02-10 DIAGNOSIS — I10 ESSENTIAL HYPERTENSION: Primary | ICD-10-CM

## 2025-02-10 PROCEDURE — 1159F MED LIST DOCD IN RCRD: CPT | Performed by: INTERNAL MEDICINE

## 2025-02-10 PROCEDURE — G8427 DOCREV CUR MEDS BY ELIG CLIN: HCPCS | Performed by: INTERNAL MEDICINE

## 2025-02-10 PROCEDURE — 3078F DIAST BP <80 MM HG: CPT | Performed by: INTERNAL MEDICINE

## 2025-02-10 PROCEDURE — 1160F RVW MEDS BY RX/DR IN RCRD: CPT | Performed by: INTERNAL MEDICINE

## 2025-02-10 PROCEDURE — 1090F PRES/ABSN URINE INCON ASSESS: CPT | Performed by: INTERNAL MEDICINE

## 2025-02-10 PROCEDURE — 1123F ACP DISCUSS/DSCN MKR DOCD: CPT | Performed by: INTERNAL MEDICINE

## 2025-02-10 PROCEDURE — G8400 PT W/DXA NO RESULTS DOC: HCPCS | Performed by: INTERNAL MEDICINE

## 2025-02-10 PROCEDURE — 1036F TOBACCO NON-USER: CPT | Performed by: INTERNAL MEDICINE

## 2025-02-10 PROCEDURE — 3075F SYST BP GE 130 - 139MM HG: CPT | Performed by: INTERNAL MEDICINE

## 2025-02-10 PROCEDURE — G8420 CALC BMI NORM PARAMETERS: HCPCS | Performed by: INTERNAL MEDICINE

## 2025-02-10 PROCEDURE — 99213 OFFICE O/P EST LOW 20 MIN: CPT | Performed by: INTERNAL MEDICINE

## 2025-02-10 ASSESSMENT — PATIENT HEALTH QUESTIONNAIRE - PHQ9
1. LITTLE INTEREST OR PLEASURE IN DOING THINGS: NOT AT ALL
SUM OF ALL RESPONSES TO PHQ QUESTIONS 1-9: 0
SUM OF ALL RESPONSES TO PHQ9 QUESTIONS 1 & 2: 0
2. FEELING DOWN, DEPRESSED OR HOPELESS: NOT AT ALL
SUM OF ALL RESPONSES TO PHQ QUESTIONS 1-9: 0

## 2025-02-10 NOTE — PROGRESS NOTES
Ada Rayo  Identified pt with two pt identifiers(name and ).     Chief Complaint   Patient presents with    Hypertension    Cholesterol Problem    fear of falling       Reviewed record In preparation for visit and have obtained necessary documentation.     1. Have you been to the ER, urgent care clinic or hospitalized since your last visit? No     2. Have you seen or consulted any other health care providers outside of the [facility] since your last visit? Include any pap smears or colon screening. No    Vitals reviewed with provider.    Health Maintenance reviewed:     There are no preventive care reminders to display for this patient.       Wt Readings from Last 3 Encounters:   02/10/25 62.8 kg (138 lb 8 oz)   10/08/24 63 kg (139 lb)   24 62.8 kg (138 lb 8 oz)        Temp Readings from Last 3 Encounters:   02/10/25 97.7 °F (36.5 °C) (Oral)   10/08/24 98 °F (36.7 °C) (Oral)   10/01/24 97.9 °F (36.6 °C) (Oral)        BP Readings from Last 3 Encounters:   02/10/25 137/75   25 136/80   10/08/24 130/86        Pulse Readings from Last 3 Encounters:   02/10/25 81   25 64   10/08/24 60      [unfilled]       Learning Assessment:   :         2023     8:43 AM   [facility] AMB LEARNING ASSESSMENT   Primary Learner Patient   level of education 4 YEARS OF COLLEGE   Barriers Factors NONE   co-learner caregiver No   Primary Language ENGLISH   Learning Preference DEMONSTRATION   Answered By patient   Relationship to Learner SELF         Fall Risk Assessment:   :         2024    11:58 AM 2024     1:09 PM 2023    12:46 PM 5/3/2022    11:00 AM 2022     2:58 PM 2022    10:00 AM   Amb Fall Risk Assessment and TUG Test   Do you feel unsteady or are you worried about falling?  no no no      2 or more falls in past year? yes yes yes      Fall with injury in past year? yes no yes      Fall in past 12 months?    1  0   Able to walk?    Yes  Yes   Total Score     1           Abuse

## 2025-02-10 NOTE — PROGRESS NOTES
HPI  Ms. Ada Rayo is a 79 y.o. year old female, she is seen today for HTN  BP at home has been 110s-130s at home - has remote BP monitoring through cardiology and has been well controlled.   No chest pain, sob, dizziness, weakness, lightheadedness.   Says she has a fear of falling - has fallen multiple times while walking. Says she starts walking fast is when she tends to fall.     Has had pain and weakness in legs while taking statin - livalo, pravastatin  Not taking zetia regularly - worried it would cause leg weakness and pain but it actually didn't.   Will go back to taking it daily.     Thyroid nodules managed by ENT - he also manages intermittent stridor.     Hasn't needed xopenex lately. Diagnosed with asthma by pulmonary - reviewed last note on file from 2023 with normal pft's and recommended stopping advair. Hasn't been back.        Chief Complaint   Patient presents with    Hypertension    Cholesterol Problem    fear of falling        Prior to Admission medications    Medication Sig Start Date End Date Taking? Authorizing Provider   carbamide peroxide (DEBROX) 6.5 % otic solution Place 5 drops into both ears Twice a Week 1/9/25  Yes Jeremy Hodgson MD   losartan (COZAAR) 50 MG tablet Take 1 tablet by mouth daily 11/14/24  Yes Ember Lazaro MD   pantoprazole (PROTONIX) 40 MG tablet TAKE 1 TABLET BY MOUTH DAILY 10/16/24  Yes Ember Lazaro MD   metoprolol tartrate (LOPRESSOR) 25 MG tablet Take 1 tablet by mouth daily 10/8/24  Yes Ember Lazaro MD   amLODIPine (NORVASC) 5 MG tablet Take 1 tablet by mouth daily 9/12/24  Yes Ember Lazaro MD   oxyBUTYnin (DITROPAN-XL) 10 MG extended release tablet Take 1 tablet by mouth daily 8/7/24  Yes Ember Lazaro MD   ezetimibe (ZETIA) 10 MG tablet Take 1 tablet by mouth daily 8/7/24  Yes Ember Lazaro MD   ipratropium (ATROVENT) 0.06 % nasal spray 2 sprays by Each Nostril route 4 times daily as needed for Rhinitis 7/11/24  Yes

## 2025-03-04 ENCOUNTER — PATIENT MESSAGE (OUTPATIENT)
Facility: CLINIC | Age: 80
End: 2025-03-04

## 2025-03-04 RX ORDER — LEVALBUTEROL TARTRATE 45 UG/1
1-2 AEROSOL, METERED ORAL EVERY 4 HOURS PRN
Qty: 15 G | Refills: 5 | Status: SHIPPED | OUTPATIENT
Start: 2025-03-04

## 2025-03-04 NOTE — TELEPHONE ENCOUNTER
Future Appointments:  Future Appointments   Date Time Provider Department Center   8/15/2025 10:10 AM Ember Lazaro MD [facility]CC DEP        Last Appointment With Me:  2/10/2025     Requested Prescriptions     Pending Prescriptions Disp Refills    levalbuterol (XOPENEX HFA) 45 MCG/ACT inhaler       Sig: Inhale 1-2 puffs into the lungs every 4 hours as needed for Shortness of Breath or Wheezing

## 2025-03-06 ENCOUNTER — OFFICE VISIT (OUTPATIENT)
Facility: CLINIC | Age: 80
End: 2025-03-06
Payer: MEDICARE

## 2025-03-06 VITALS
OXYGEN SATURATION: 95 % | BODY MASS INDEX: 23.22 KG/M2 | SYSTOLIC BLOOD PRESSURE: 137 MMHG | TEMPERATURE: 98.1 F | DIASTOLIC BLOOD PRESSURE: 63 MMHG | WEIGHT: 136 LBS | HEART RATE: 58 BPM | RESPIRATION RATE: 12 BRPM | HEIGHT: 64 IN

## 2025-03-06 DIAGNOSIS — R05.1 ACUTE COUGH: ICD-10-CM

## 2025-03-06 DIAGNOSIS — J45.21 MILD INTERMITTENT ASTHMA WITH EXACERBATION: Primary | ICD-10-CM

## 2025-03-06 LAB
EXP DATE SOLUTION: NORMAL
EXP DATE SWAB: NORMAL
EXPIRATION DATE: NORMAL
INFLUENZA A ANTIGEN, POC: NEGATIVE
INFLUENZA B ANTIGEN, POC: NEGATIVE
LOT NUMBER POC: NORMAL
LOT NUMBER SOLUTION: NORMAL
LOT NUMBER SWAB: NORMAL
SARS-COV-2 RNA, POC: NEGATIVE
VALID INTERNAL CONTROL, POC: NORMAL

## 2025-03-06 PROCEDURE — 1159F MED LIST DOCD IN RCRD: CPT | Performed by: INTERNAL MEDICINE

## 2025-03-06 PROCEDURE — 99213 OFFICE O/P EST LOW 20 MIN: CPT | Performed by: INTERNAL MEDICINE

## 2025-03-06 PROCEDURE — G8400 PT W/DXA NO RESULTS DOC: HCPCS | Performed by: INTERNAL MEDICINE

## 2025-03-06 PROCEDURE — 3075F SYST BP GE 130 - 139MM HG: CPT | Performed by: INTERNAL MEDICINE

## 2025-03-06 PROCEDURE — G8427 DOCREV CUR MEDS BY ELIG CLIN: HCPCS | Performed by: INTERNAL MEDICINE

## 2025-03-06 PROCEDURE — 87635 SARS-COV-2 COVID-19 AMP PRB: CPT | Performed by: INTERNAL MEDICINE

## 2025-03-06 PROCEDURE — 3078F DIAST BP <80 MM HG: CPT | Performed by: INTERNAL MEDICINE

## 2025-03-06 PROCEDURE — G8420 CALC BMI NORM PARAMETERS: HCPCS | Performed by: INTERNAL MEDICINE

## 2025-03-06 PROCEDURE — 1160F RVW MEDS BY RX/DR IN RCRD: CPT | Performed by: INTERNAL MEDICINE

## 2025-03-06 PROCEDURE — 87804 INFLUENZA ASSAY W/OPTIC: CPT | Performed by: INTERNAL MEDICINE

## 2025-03-06 PROCEDURE — 1036F TOBACCO NON-USER: CPT | Performed by: INTERNAL MEDICINE

## 2025-03-06 PROCEDURE — 1090F PRES/ABSN URINE INCON ASSESS: CPT | Performed by: INTERNAL MEDICINE

## 2025-03-06 PROCEDURE — 1123F ACP DISCUSS/DSCN MKR DOCD: CPT | Performed by: INTERNAL MEDICINE

## 2025-03-06 RX ORDER — METHYLPREDNISOLONE 4 MG/1
TABLET ORAL
Qty: 1 KIT | Refills: 0 | Status: SHIPPED | OUTPATIENT
Start: 2025-03-06 | End: 2025-03-12

## 2025-03-06 NOTE — PROGRESS NOTES
Ada Rayo  Identified pt with two pt identifiers(name and ).     Chief Complaint   Patient presents with    Cough     Room 2A Almost  a week       Reviewed record In preparation for visit and have obtained necessary documentation.     1. Have you been to the ER, urgent care clinic or hospitalized since your last visit? No     2. Have you seen or consulted any other health care providers outside of the [facility] since your last visit? Include any pap smears or colon screening. No    Vitals reviewed with provider.    Health Maintenance reviewed:     There are no preventive care reminders to display for this patient.       Wt Readings from Last 3 Encounters:   25 61.7 kg (136 lb)   02/10/25 62.8 kg (138 lb 8 oz)   10/08/24 63 kg (139 lb)        Temp Readings from Last 3 Encounters:   25 98.1 °F (36.7 °C) (Oral)   02/10/25 97.7 °F (36.5 °C) (Oral)   10/08/24 98 °F (36.7 °C) (Oral)        BP Readings from Last 3 Encounters:   25 137/63   02/10/25 137/75   25 136/80        Pulse Readings from Last 3 Encounters:   25 58   02/10/25 81   25 64      [unfilled]       Learning Assessment:   :         2023     8:43 AM   [facility] AMB LEARNING ASSESSMENT   Primary Learner Patient   level of education 4 YEARS OF COLLEGE   Barriers Factors NONE   co-learner caregiver No   Primary Language ENGLISH   Learning Preference DEMONSTRATION   Answered By patient   Relationship to Learner SELF         Fall Risk Assessment:   :         2024    11:58 AM 2024     1:09 PM 2023    12:46 PM 5/3/2022    11:00 AM 2022     2:58 PM 2022    10:00 AM   Amb Fall Risk Assessment and TUG Test   Do you feel unsteady or are you worried about falling?  no no no      2 or more falls in past year? yes yes yes      Fall with injury in past year? yes no yes      Fall in past 12 months?    1  0   Able to walk?    Yes  Yes   Total Score     1           Abuse Screening:   :         
Date/Time    CHOL 203 02/04/2025 10:02 AM    HDL 64 02/04/2025 10:02 AM    .2 02/04/2025 10:02 AM    .4 01/30/2024 10:06 AM    VLDL 19.8 02/04/2025 10:02 AM    VLDL 13 01/30/2023 12:25 PM          ASSESSMENT/PLAN  Ada was seen today for cough.    Diagnoses and all orders for this visit:    Mild intermittent asthma with exacerbation  -     methylPREDNISolone (MEDROL DOSEPACK) 4 MG tablet; Take by mouth.    Acute cough  -     AMB POC HOLLY INFLUENZA A/B TEST  -     AMB POC COVID-19 COV        Asthma exacerbation, treat with steroid taper and Xopenex and/or albuterol as needed.  Flu and COVID-negative.  Health Maintenance Due   Topic Date Due    COVID-19 Vaccine (8 - 2024-25 season) 03/13/2025        Follow-up and Dispositions    Return if symptoms worsen or fail to improve.            Reviewed plan of care. Patient has provided input and agrees with goals.     The nurse provided the patient and/or family with advanced directive information if needed and encouraged the patient to provide a copy to the office when available.

## 2025-03-10 ENCOUNTER — PATIENT MESSAGE (OUTPATIENT)
Facility: CLINIC | Age: 80
End: 2025-03-10

## 2025-03-11 ENCOUNTER — PROCEDURE VISIT (OUTPATIENT)
Age: 80
End: 2025-03-11

## 2025-03-11 DIAGNOSIS — H90.3 SENSORINEURAL HEARING LOSS (SNHL), BILATERAL: Primary | ICD-10-CM

## 2025-03-11 DIAGNOSIS — H93.13 TINNITUS, BILATERAL: ICD-10-CM

## 2025-03-11 NOTE — PROGRESS NOTES
Ada Rayo   1945, 79 y.o. female   [id number]       Referring Provider: Jeremy Hodgson MD  Referral Type: In an order in [facility]    ADULT AUDIOLOGIC EVALUATION      Ada Rayo is a 79 y.o. female seen today, 3/11/2025 , for an audiologic evaluation.    AUDIOLOGIC AND OTHER PERTINENT MEDICAL HISTORY:      Ada Rayo reports non-debilitating, bilateral tinnitus that is only noticeable in silence. She also reports her  believes she has hearing loss. She had mild hearing loss as a young adult, but does not believe it has progressed.     She denied otalgia, aural fullness, otorrhea, dizziness, and imbalance    Date: 3/11/2025     IMPRESSIONS:      Today's results revealed normal sloping to mild/moderate sensorineural hearing loss. Excellent speech understanding when in quiet. Tympanometry indicates normal middle ear function.. Discussed test results and implications with patient. Discussed use of tinnitus management strategies. Discussed noise exposure and the importance of appropriate hearing protection when in hazardous noise environments.    Follow medical recommendations of primary care physician and referring provider.     ASSESSMENT AND FINDINGS:     Otoscopy unremarkable.    RIGHT EAR:  Hearing Sensitivity: Normal sloping to moderate sensorineural hearing loss  Speech Recognition Threshold: 25 dB HL  Word Recognition: Excellent 100%, based on NU-6 by-difficulty list at 60 dB HL using recorded speech stimuli.    Tympanometry: Normal peak pressure with high compliance, Type Ad tympanogram, consistent with hypermobile tympanic membrane.       LEFT EAR:  Hearing Sensitivity: Normal sloping to mild sensorineural hearing loss  Speech Recognition Threshold: 20 dB HL  Word Recognition: Excellent 100%, based on NU-6 by-difficulty list at 60 dB HL using recorded speech stimuli.    Tympanometry: Normal peak pressure and compliance, Type A tympanogram, consistent with normal middle ear function.

## 2025-04-24 ENCOUNTER — LAB (OUTPATIENT)
Facility: CLINIC | Age: 80
End: 2025-04-24

## 2025-04-24 DIAGNOSIS — Z78.9 RUBELLA IMMUNE STATUS NOT KNOWN: ICD-10-CM

## 2025-04-24 RX ORDER — AMLODIPINE BESYLATE 5 MG/1
5 TABLET ORAL DAILY
Qty: 90 TABLET | Refills: 2 | Status: SHIPPED | OUTPATIENT
Start: 2025-04-24

## 2025-04-24 NOTE — TELEPHONE ENCOUNTER
PCP: Ember Lazaro MD     Last appt: 3/6/2025    Future Appointments   Date Time Provider Department Center   4/24/2025  3:00 PM LAB [facility] DEP   8/15/2025 10:10 AM Ember Lazaro MD [facility] DEP          Requested Prescriptions     Pending Prescriptions Disp Refills    amLODIPine (NORVASC) 5 MG tablet 90 tablet 2     Sig: Take 1 tablet by mouth daily

## 2025-04-26 ENCOUNTER — RESULTS FOLLOW-UP (OUTPATIENT)
Facility: CLINIC | Age: 80
End: 2025-04-26

## 2025-04-26 LAB
MEV IGG SER IA-ACNC: >300 AU/ML
MUV IGG SER IA-ACNC: 226 AU/ML
RUBV IGG SERPL IA-ACNC: 28.5 INDEX

## 2025-06-26 RX ORDER — PANTOPRAZOLE SODIUM 40 MG/1
40 TABLET, DELAYED RELEASE ORAL DAILY
Qty: 30 TABLET | Refills: 0 | Status: SHIPPED | OUTPATIENT
Start: 2025-06-26

## 2025-06-26 NOTE — TELEPHONE ENCOUNTER
Future Appointments:  Future Appointments   Date Time Provider Department Center   8/15/2025 10:10 AM Ember Lazaro MD [facility] DEP        Last Appointment With Me:  3/6/2025     Requested Prescriptions     Pending Prescriptions Disp Refills    pantoprazole (PROTONIX) 40 MG tablet 90 tablet 3     Sig: Take 1 tablet by mouth daily

## 2025-07-03 RX ORDER — PANTOPRAZOLE SODIUM 40 MG/1
40 TABLET, DELAYED RELEASE ORAL DAILY
Qty: 90 TABLET | Refills: 2 | Status: SHIPPED | OUTPATIENT
Start: 2025-07-03

## 2025-07-03 NOTE — TELEPHONE ENCOUNTER
PCP: Ember Lazaro MD     Last appt: 3/6/2025    Future Appointments   Date Time Provider Department Center   8/15/2025 10:10 AM Ember Lazaro MD [facility] DEP          Requested Prescriptions     Pending Prescriptions Disp Refills    pantoprazole (PROTONIX) 40 MG tablet 90 tablet 2     Sig: Take 1 tablet by mouth daily

## 2025-07-23 RX ORDER — LOSARTAN POTASSIUM 50 MG/1
50 TABLET ORAL DAILY
Qty: 90 TABLET | Refills: 2 | Status: SHIPPED | OUTPATIENT
Start: 2025-07-23

## 2025-07-23 RX ORDER — METOPROLOL TARTRATE 25 MG/1
25 TABLET, FILM COATED ORAL DAILY
Qty: 90 TABLET | Refills: 2 | Status: SHIPPED | OUTPATIENT
Start: 2025-07-23

## 2025-07-23 RX ORDER — OXYBUTYNIN CHLORIDE 10 MG/1
10 TABLET, EXTENDED RELEASE ORAL DAILY
Qty: 90 TABLET | Refills: 2 | Status: SHIPPED | OUTPATIENT
Start: 2025-07-23

## 2025-07-23 NOTE — TELEPHONE ENCOUNTER
PCP: Ember Lazaro MD     Last appt: 3/6/2025    Future Appointments   Date Time Provider Department Center   8/15/2025 10:10 AM Ember Lazaro MD [facility] DEP          Requested Prescriptions     Pending Prescriptions Disp Refills    losartan (COZAAR) 50 MG tablet 90 tablet 2     Sig: Take 1 tablet by mouth daily    metoprolol tartrate (LOPRESSOR) 25 MG tablet 90 tablet 2     Sig: Take 1 tablet by mouth daily    oxyBUTYnin (DITROPAN-XL) 10 MG extended release tablet 90 tablet 2     Sig: Take 1 tablet by mouth daily

## 2025-07-28 ENCOUNTER — PATIENT MESSAGE (OUTPATIENT)
Facility: CLINIC | Age: 80
End: 2025-07-28

## 2025-07-29 ENCOUNTER — OFFICE VISIT (OUTPATIENT)
Facility: CLINIC | Age: 80
End: 2025-07-29

## 2025-07-29 VITALS
OXYGEN SATURATION: 95 % | HEIGHT: 64 IN | HEART RATE: 73 BPM | SYSTOLIC BLOOD PRESSURE: 119 MMHG | DIASTOLIC BLOOD PRESSURE: 65 MMHG | RESPIRATION RATE: 16 BRPM | WEIGHT: 133 LBS | BODY MASS INDEX: 22.71 KG/M2
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
## 2025-07-29 DIAGNOSIS — L03.113 CELLULITIS OF RIGHT UPPER EXTREMITY: ICD-10-CM

## 2025-07-29 DIAGNOSIS — Z23 NEED FOR PROPHYLACTIC VACCINATION AGAINST DIPHTHERIA-TETANUS-PERTUSSIS (DTP): ICD-10-CM

## 2025-07-29 NOTE — PROGRESS NOTES
HPI  Ms. Ada Rayo is a 79 y.o. year old female, she is seen today for cat bite about 3 days ago- her cat, fully vaccinated including rabies. Temp up to 100.1 last night. Feels worn down, no n/v. No chills or sweats.   Has been getting worse - encompassing larger area.   Chief Complaint   Patient presents with    Animal Bite     2C//  X 3 days ago.   OTC neosporin, alcohol and hot compress          Prior to Admission medications    Medication Sig Start Date End Date Taking? Authorizing Provider   amoxicillin-clavulanate (AUGMENTIN) 875-125 MG per tablet Take 1 tablet by mouth 2 times daily for 10 days 7/29/25 8/8/25 Yes Ember Lazaro MD   losartan (COZAAR) 50 MG tablet Take 1 tablet by mouth daily 7/23/25  Yes Ember Lazaro MD   metoprolol tartrate (LOPRESSOR) 25 MG tablet Take 1 tablet by mouth daily 7/23/25  Yes Ember Lazaro MD   oxyBUTYnin (DITROPAN-XL) 10 MG extended release tablet Take 1 tablet by mouth daily 7/23/25  Yes Ember Lazaro MD   pantoprazole (PROTONIX) 40 MG tablet Take 1 tablet by mouth daily 7/3/25  Yes Ember Lazaro MD   amLODIPine (NORVASC) 5 MG tablet Take 1 tablet by mouth daily 4/24/25  Yes Ember Lazaro MD   carbamide peroxide (DEBROX) 6.5 % otic solution Place 5 drops into both ears Twice a Week 1/9/25  Yes Jeremy Hodgson MD   ezetimibe (ZETIA) 10 MG tablet Take 1 tablet by mouth daily 8/7/24  Yes Ember Lazaro MD   ipratropium (ATROVENT) 0.06 % nasal spray 2 sprays by Each Nostril route 4 times daily as needed for Rhinitis 7/11/24  Yes Jeremy Hodgson MD   fluticasone (FLONASE) 50 MCG/ACT nasal spray 2 sprays by Each Nostril route daily 1/5/24  Yes Jeremy Hodgson MD   aspirin 81 MG chewable tablet    Yes Provider, MD Yane   OAT BRAN PO Take by mouth   Yes Automatic Reconciliation, Ar   cetirizine (ZYRTEC) 10 MG tablet Take 1 tablet by mouth daily   Yes Automatic Reconciliation, Ar   L-Lysine 500 MG TABS Take 500 mg by mouth daily

## 2025-07-29 NOTE — PROGRESS NOTES
After verbal order read back of  Ember Lazaro MD, patient received TDAP in Right arm. Boostrix 0.5ml NDC 67626-240-48 lot [id number] exp 08/27/2027. Patient tolerated procedure without complaints and received VIS.

## 2025-07-29 NOTE — PROGRESS NOTES
\"Have you been to the ER, urgent care clinic since your last visit?  Hospitalized since your last visit?\"    NO    “Have you seen or consulted any other health care providers outside of [facility] since your last visit?”    No.            Click Here for Release of Records Request

## 2025-08-11 ENCOUNTER — TELEPHONE (OUTPATIENT)
Facility: CLINIC | Age: 80
End: 2025-08-11

## 2025-08-11 SDOH — HEALTH STABILITY: PHYSICAL HEALTH: ON AVERAGE, HOW MANY DAYS PER WEEK DO YOU ENGAGE IN MODERATE TO STRENUOUS EXERCISE (LIKE A BRISK WALK)?: 1 DAY

## 2025-08-11 SDOH — HEALTH STABILITY: PHYSICAL HEALTH: ON AVERAGE, HOW MANY MINUTES DO YOU ENGAGE IN EXERCISE AT THIS LEVEL?: 40 MIN

## 2025-08-11 ASSESSMENT — LIFESTYLE VARIABLES
HOW OFTEN DURING THE LAST YEAR HAVE YOU HAD A FEELING OF GUILT OR REMORSE AFTER DRINKING: NEVER
HOW OFTEN DO YOU HAVE A DRINK CONTAINING ALCOHOL: 4 OR MORE TIMES A WEEK
HOW MANY STANDARD DRINKS CONTAINING ALCOHOL DO YOU HAVE ON A TYPICAL DAY: 1 OR 2
HAVE YOU OR SOMEONE ELSE BEEN INJURED AS A RESULT OF YOUR DRINKING: NO
HOW OFTEN DURING THE LAST YEAR HAVE YOU BEEN UNABLE TO REMEMBER WHAT HAPPENED THE NIGHT BEFORE BECAUSE YOU HAD BEEN DRINKING: NEVER
HOW OFTEN DURING THE LAST YEAR HAVE YOU NEEDED AN ALCOHOLIC DRINK FIRST THING IN THE MORNING TO GET YOURSELF GOING AFTER A NIGHT OF HEAVY DRINKING: NEVER
HOW MANY STANDARD DRINKS CONTAINING ALCOHOL DO YOU HAVE ON A TYPICAL DAY: 1
HOW OFTEN DO YOU HAVE A DRINK CONTAINING ALCOHOL: 5
HAS A RELATIVE, FRIEND, DOCTOR, OR ANOTHER HEALTH PROFESSIONAL EXPRESSED CONCERN ABOUT YOUR DRINKING OR SUGGESTED YOU CUT DOWN: NO
HOW OFTEN DURING THE LAST YEAR HAVE YOU BEEN UNABLE TO REMEMBER WHAT HAPPENED THE NIGHT BEFORE BECAUSE YOU HAD BEEN DRINKING: NEVER
HOW OFTEN DO YOU HAVE SIX OR MORE DRINKS ON ONE OCCASION: 1
HOW OFTEN DURING THE LAST YEAR HAVE YOU FOUND THAT YOU WERE NOT ABLE TO STOP DRINKING ONCE YOU HAD STARTED: NEVER
HOW OFTEN DURING THE LAST YEAR HAVE YOU FAILED TO DO WHAT WAS NORMALLY EXPECTED FROM YOU BECAUSE OF DRINKING: NEVER
HAVE YOU OR SOMEONE ELSE BEEN INJURED AS A RESULT OF YOUR DRINKING: NO
HOW OFTEN DURING THE LAST YEAR HAVE YOU NEEDED AN ALCOHOLIC DRINK FIRST THING IN THE MORNING TO GET YOURSELF GOING AFTER A NIGHT OF HEAVY DRINKING: NEVER
HAS A RELATIVE, FRIEND, DOCTOR, OR ANOTHER HEALTH PROFESSIONAL EXPRESSED CONCERN ABOUT YOUR DRINKING OR SUGGESTED YOU CUT DOWN: NO
HOW OFTEN DURING THE LAST YEAR HAVE YOU FAILED TO DO WHAT WAS NORMALLY EXPECTED FROM YOU BECAUSE OF DRINKING: NEVER
HOW OFTEN DURING THE LAST YEAR HAVE YOU HAD A FEELING OF GUILT OR REMORSE AFTER DRINKING: NEVER
HOW OFTEN DURING THE LAST YEAR HAVE YOU FOUND THAT YOU WERE NOT ABLE TO STOP DRINKING ONCE YOU HAD STARTED: NEVER

## 2025-08-11 ASSESSMENT — PATIENT HEALTH QUESTIONNAIRE - PHQ9
2. FEELING DOWN, DEPRESSED OR HOPELESS: NOT AT ALL
SUM OF ALL RESPONSES TO PHQ QUESTIONS 1-9: 0
1. LITTLE INTEREST OR PLEASURE IN DOING THINGS: NOT AT ALL
SUM OF ALL RESPONSES TO PHQ QUESTIONS 1-9: 0

## 2025-08-15 ENCOUNTER — OFFICE VISIT (OUTPATIENT)
Facility: CLINIC | Age: 80
End: 2025-08-15

## 2025-08-15 VITALS
OXYGEN SATURATION: 95 % | HEIGHT: 64 IN | DIASTOLIC BLOOD PRESSURE: 70 MMHG | TEMPERATURE: 98.1 F | SYSTOLIC BLOOD PRESSURE: 120 MMHG | RESPIRATION RATE: 16 BRPM | WEIGHT: 133.6 LBS | BODY MASS INDEX: 22.81 KG/M2 | HEART RATE: 59 BPM

## 2025-08-15 DIAGNOSIS — I10 ESSENTIAL HYPERTENSION: ICD-10-CM

## 2025-08-15 DIAGNOSIS — R29.6 FREQUENT FALLS: ICD-10-CM

## 2025-08-15 DIAGNOSIS — S00.11XA BLACK EYE OF RIGHT SIDE, INITIAL ENCOUNTER: ICD-10-CM

## 2025-08-15 DIAGNOSIS — Z00.00 MEDICARE ANNUAL WELLNESS VISIT, SUBSEQUENT: Primary | ICD-10-CM

## 2025-08-15 DIAGNOSIS — E78.00 HYPERCHOLESTEROLEMIA: ICD-10-CM

## 2025-08-15 LAB
ANION GAP SERPL CALC-SCNC: 9 MMOL/L (ref 2–14)
BUN SERPL-MCNC: 24 MG/DL (ref 8–23)
BUN/CREAT SERPL: 29 (ref 12–20)
CALCIUM SERPL-MCNC: 9.4 MG/DL (ref 8.8–10.2)
CHLORIDE SERPL-SCNC: 106 MMOL/L (ref 98–107)
CHOLEST SERPL-MCNC: 190 MG/DL (ref 0–200)
CO2 SERPL-SCNC: 26 MMOL/L (ref 20–29)
CREAT SERPL-MCNC: 0.83 MG/DL (ref 0.6–1)
GLUCOSE SERPL-MCNC: 92 MG/DL (ref 65–100)
HDLC SERPL-MCNC: 56 MG/DL (ref 40–60)
HDLC SERPL: 3.4 (ref 0–5)
LDLC SERPL CALC-MCNC: 112 MG/DL (ref 0–100)
POTASSIUM SERPL-SCNC: 4.5 MMOL/L (ref 3.5–5.1)
SODIUM SERPL-SCNC: 140 MMOL/L (ref 136–145)
TRIGL SERPL-MCNC: 112 MG/DL (ref 0–150)
VLDLC SERPL CALC-MCNC: 22 MG/DL

## 2025-08-15 ASSESSMENT — PATIENT HEALTH QUESTIONNAIRE - PHQ9
SUM OF ALL RESPONSES TO PHQ QUESTIONS 1-9: 0
2. FEELING DOWN, DEPRESSED OR HOPELESS: NOT AT ALL
SUM OF ALL RESPONSES TO PHQ QUESTIONS 1-9: 0
1. LITTLE INTEREST OR PLEASURE IN DOING THINGS: NOT AT ALL

## 2025-09-02 RX ORDER — EZETIMIBE 10 MG/1
10 TABLET ORAL DAILY
Qty: 90 TABLET | Refills: 3 | Status: SHIPPED | OUTPATIENT
Start: 2025-09-02

## 2025-09-05 ENCOUNTER — HOSPITAL ENCOUNTER (EMERGENCY)
Facility: HOSPITAL | Age: 80
Discharge: HOME | End: 2025-09-05
Attending: STUDENT IN AN ORGANIZED HEALTH CARE EDUCATION/TRAINING PROGRAM
Payer: MEDICARE

## 2025-09-05 ENCOUNTER — APPOINTMENT (OUTPATIENT)
Facility: HOSPITAL | Age: 80
End: 2025-09-05
Payer: MEDICARE

## 2025-09-05 ENCOUNTER — TELEPHONE (OUTPATIENT)
Facility: CLINIC | Age: 80
End: 2025-09-05

## 2025-09-05 VITALS
BODY MASS INDEX: 23.05 KG/M2 | WEIGHT: 134.26 LBS | SYSTOLIC BLOOD PRESSURE: 177 MMHG | TEMPERATURE: 98.4 F | HEART RATE: 73 BPM | RESPIRATION RATE: 12 BRPM | OXYGEN SATURATION: 97 % | DIASTOLIC BLOOD PRESSURE: 68 MMHG

## 2025-09-05 DIAGNOSIS — M54.12 CERVICAL RADICULOPATHY: Primary | ICD-10-CM

## 2025-09-05 LAB
ALBUMIN SERPL-MCNC: 3.4 G/DL (ref 3.5–5.2)
ALBUMIN/GLOB SERPL: 1 (ref 1.1–2.2)
ALP SERPL-CCNC: 100 U/L (ref 35–104)
ALT SERPL-CCNC: 20 U/L (ref 10–35)
ANION GAP SERPL CALC-SCNC: 10 MMOL/L (ref 2–14)
AST SERPL-CCNC: 23 U/L (ref 10–35)
BASOPHILS # BLD: 0.06 K/UL (ref 0–0.1)
BASOPHILS NFR BLD: 1 % (ref 0–1)
BILIRUB SERPL-MCNC: 0.3 MG/DL (ref 0–1.2)
BUN SERPL-MCNC: 19 MG/DL (ref 8–23)
BUN/CREAT SERPL: 26 (ref 12–20)
CALCIUM SERPL-MCNC: 9.2 MG/DL (ref 8.8–10.2)
CHLORIDE SERPL-SCNC: 111 MMOL/L (ref 98–107)
CO2 SERPL-SCNC: 23 MMOL/L (ref 20–29)
COMMENT:: NORMAL
COMMENT:: NORMAL
CREAT SERPL-MCNC: 0.75 MG/DL (ref 0.6–1)
DIFFERENTIAL METHOD BLD: NORMAL
EKG ATRIAL RATE: 80 BPM
EKG DIAGNOSIS: NORMAL
EKG P AXIS: 73 DEGREES
EKG P-R INTERVAL: 214 MS
EKG Q-T INTERVAL: 370 MS
EKG QRS DURATION: 92 MS
EKG QTC CALCULATION (BAZETT): 426 MS
EKG R AXIS: 49 DEGREES
EKG T AXIS: 61 DEGREES
EKG VENTRICULAR RATE: 80 BPM
EOSINOPHIL # BLD: 0.39 K/UL (ref 0–0.4)
EOSINOPHIL NFR BLD: 6.8 % (ref 0–7)
ERYTHROCYTE [DISTWIDTH] IN BLOOD BY AUTOMATED COUNT: 13.1 % (ref 11.5–14.5)
GLOBULIN SER CALC-MCNC: 3.4 G/DL (ref 2–4)
GLUCOSE SERPL-MCNC: 87 MG/DL (ref 65–100)
HCT VFR BLD AUTO: 38.3 % (ref 35–47)
HGB BLD-MCNC: 12.5 G/DL (ref 11.5–16)
IMM GRANULOCYTES # BLD AUTO: 0.01 K/UL (ref 0–0.04)
IMM GRANULOCYTES NFR BLD AUTO: 0.2 % (ref 0–0.5)
LYMPHOCYTES # BLD: 1.95 K/UL (ref 0.8–3.5)
LYMPHOCYTES NFR BLD: 34.1 % (ref 12–49)
MCH RBC QN AUTO: 31 PG (ref 26–34)
MCHC RBC AUTO-ENTMCNC: 32.6 G/DL (ref 30–36.5)
MCV RBC AUTO: 95 FL (ref 80–99)
MONOCYTES # BLD: 0.6 K/UL (ref 0–1)
MONOCYTES NFR BLD: 10.5 % (ref 5–13)
NEUTS SEG # BLD: 2.71 K/UL (ref 1.8–8)
NEUTS SEG NFR BLD: 47.4 % (ref 32–75)
NRBC # BLD: 0 K/UL (ref 0–0.01)
NRBC BLD-RTO: 0 PER 100 WBC
PLATELET # BLD AUTO: 298 K/UL (ref 150–400)
PMV BLD AUTO: 11.3 FL (ref 8.9–12.9)
POTASSIUM SERPL-SCNC: 4.2 MMOL/L (ref 3.5–5.1)
PROT SERPL-MCNC: 6.8 G/DL (ref 6.4–8.3)
RBC # BLD AUTO: 4.03 M/UL (ref 3.8–5.2)
SODIUM SERPL-SCNC: 145 MMOL/L (ref 136–145)
SPECIMEN HOLD: NORMAL
SPECIMEN HOLD: NORMAL
TROPONIN T SERPL HS-MCNC: 8.6 NG/L (ref 0–14)
WBC # BLD AUTO: 5.7 K/UL (ref 3.6–11)

## 2025-09-05 PROCEDURE — 73030 X-RAY EXAM OF SHOULDER: CPT

## 2025-09-05 PROCEDURE — 84484 ASSAY OF TROPONIN QUANT: CPT

## 2025-09-05 PROCEDURE — 80053 COMPREHEN METABOLIC PANEL: CPT

## 2025-09-05 PROCEDURE — 85025 COMPLETE CBC W/AUTO DIFF WBC: CPT

## 2025-09-05 RX ORDER — KETOROLAC TROMETHAMINE 30 MG/ML
15 INJECTION, SOLUTION INTRAMUSCULAR; INTRAVENOUS ONCE
Status: DISCONTINUED | OUTPATIENT
Start: 2025-09-05 | End: 2025-09-05 | Stop reason: HOSPADM

## 2025-09-05 ASSESSMENT — PAIN - FUNCTIONAL ASSESSMENT
PAIN_FUNCTIONAL_ASSESSMENT: PREVENTS OR INTERFERES SOME ACTIVE ACTIVITIES AND ADLS
PAIN_FUNCTIONAL_ASSESSMENT: 0-10
PAIN_FUNCTIONAL_ASSESSMENT: ACTIVITIES ARE NOT PREVENTED

## 2025-09-05 ASSESSMENT — PAIN DESCRIPTION - PAIN TYPE
TYPE: ACUTE PAIN
TYPE: ACUTE PAIN

## 2025-09-05 ASSESSMENT — PAIN SCALES - GENERAL
PAINLEVEL_OUTOF10: 10
PAINLEVEL_OUTOF10: 0
PAINLEVEL_OUTOF10: 10

## 2025-09-05 ASSESSMENT — PAIN DESCRIPTION - LOCATION
LOCATION: ARM
LOCATION: ARM

## 2025-09-05 ASSESSMENT — PAIN DESCRIPTION - FREQUENCY
FREQUENCY: INTERMITTENT
FREQUENCY: CONTINUOUS

## 2025-09-05 ASSESSMENT — PAIN DESCRIPTION - ORIENTATION
ORIENTATION: LEFT
ORIENTATION: LEFT

## 2025-09-05 ASSESSMENT — PAIN DESCRIPTION - ONSET
ONSET: ON-GOING
ONSET: ON-GOING

## 2025-09-05 ASSESSMENT — PAIN DESCRIPTION - DIRECTION: RADIATING_TOWARDS: FINGER

## 2025-09-05 ASSESSMENT — PAIN DESCRIPTION - DESCRIPTORS
DESCRIPTORS: ACHING;DISCOMFORT
DESCRIPTORS: ACHING

## 2025-09-05 ASSESSMENT — ENCOUNTER SYMPTOMS: SHORTNESS OF BREATH: 0

## (undated) DEVICE — FORCEPS BX L160CM DIA8MM GRSP DISECT CUP TIP NONLOCKING ROT

## (undated) DEVICE — 1200 GUARD II KIT W/5MM TUBE W/O VAC TUBE: Brand: GUARDIAN

## (undated) DEVICE — KIT MED IMAG CNTRST AGNT W/ IOPAMIDOL REUSE

## (undated) DEVICE — ELECTRODE,RADIOTRANSLUCENT,FOAM,5PK: Brand: MEDLINE

## (undated) DEVICE — SOLIDIFIER FLD 2OZ 1500CC N DISINF IN BTL DISP SAFESORB

## (undated) DEVICE — Z DISCONTINUED PER MEDLINE LINE GAS SAMPLING O2/CO2 LNG AD 13 FT NSL W/ TBNG FILTERLINE

## (undated) DEVICE — NON-REM POLYHESIVE PATIENT RETURN ELECTRODE: Brand: VALLEYLAB

## (undated) DEVICE — BLOCK BITE ENDOSCP AD 21 MM W/ DIL BLU LF DISP

## (undated) DEVICE — KIT MFLD ISOLATN NACL CNTRST PRT TBNG SPIK W/ PRSS TRNSDUC

## (undated) DEVICE — SET ADMIN 16ML TBNG L100IN 2 Y INJ SITE IV PIGGY BK DISP

## (undated) DEVICE — Device

## (undated) DEVICE — KIT HND CTRL 3 W STPCOCK ROT END 54IN PREM HI PRSS TBNG AT

## (undated) DEVICE — TR BAND RADIAL ARTERY COMPRESSION DEVICE: Brand: TR BAND

## (undated) DEVICE — CATH IV AUTOGRD BC PNK 20GA 25 -- INSYTE

## (undated) DEVICE — ANGIOGRAPHIC CATHETER: Brand: IMPULSE™

## (undated) DEVICE — SYR 10ML LUER LOK 1/5ML GRAD --

## (undated) DEVICE — PACK PROCEDURE SURG HRT CATH

## (undated) DEVICE — YANKAUER,TAPERED BULBOUS TIP,W/O VENT: Brand: MEDLINE

## (undated) DEVICE — FCPS BX HOT LG 2.2MMX240CM

## (undated) DEVICE — SPLINT WR POS F/ARTERIAL ACC -- BX/10

## (undated) DEVICE — NEONATAL-ADULT SPO2 SENSOR: Brand: NELLCOR

## (undated) DEVICE — BASIN EMSIS 16OZ GRAPHITE PLAS KID SHP MOLD GRAD FOR ORAL

## (undated) DEVICE — TOWEL 4 PLY TISS 19X30 SUE WHT

## (undated) DEVICE — SYR 3ML LL TIP 1/10ML GRAD --

## (undated) DEVICE — TUBING PRSS MON L6IN PVC M FEM CONN

## (undated) DEVICE — TRAP,MUCUS SPECIMEN, 80CC: Brand: MEDLINE

## (undated) DEVICE — STRAINER URIN CALC RNL MSH -- CONVERT TO ITEM 357634

## (undated) DEVICE — SNARE ENDOSCP M L240CM W27MM SHTH DIA2.4MM CHN 2.8MM OVL

## (undated) DEVICE — CUSTOM KT PTCA INFL DEV K05 00052M

## (undated) DEVICE — SET ADMIN 16ML TBNG L100IN 2 Y INJ SITE IV PIGGY BK DISP (ORDER IN MULIPLES OF 48)

## (undated) DEVICE — FCPS RAD JAW 4LC 240CM W/NDL -- BX/40

## (undated) DEVICE — GUIDEWIRE VASC L260CM DIA0.035IN TIP L3MM STD EXCHG PTFE J

## (undated) DEVICE — GLIDESHEATH SLENDER ACCESS KIT: Brand: GLIDESHEATH SLENDER

## (undated) DEVICE — HEARTRAIL III GUIDING CATHETER: Brand: HEARTRAIL

## (undated) DEVICE — COPILOT BLEEDBACK CONTROL VALVE: Brand: COPILOT

## (undated) DEVICE — CONTAINER SPEC 20 ML LID NEUT BUFF FORMALIN 10 % POLYPR STS

## (undated) DEVICE — HYPODERMIC SAFETY NEEDLE: Brand: MAGELLAN

## (undated) DEVICE — ANGIOGRAPHY KIT

## (undated) DEVICE — GUIDEWIRE VASC L185CM DIA0.014IN HYDRPHLC PTFE STR TIP

## (undated) DEVICE — SPECIAL PROCEDURE DRAPE 32" X 34": Brand: SPECIAL PROCEDURE DRAPE

## (undated) DEVICE — NEEDLE HYPO 18GA L1.5IN PNK S STL HUB POLYPR SHLD REG BVL

## (undated) DEVICE — BAG SPEC BIOHZRD 10 X 10 IN --